# Patient Record
Sex: MALE | Race: OTHER | HISPANIC OR LATINO
[De-identification: names, ages, dates, MRNs, and addresses within clinical notes are randomized per-mention and may not be internally consistent; named-entity substitution may affect disease eponyms.]

---

## 2017-09-13 ENCOUNTER — RECORD ABSTRACTING (OUTPATIENT)
Age: 37
End: 2017-09-13

## 2017-09-13 ENCOUNTER — APPOINTMENT (OUTPATIENT)
Dept: ALLERGY | Facility: CLINIC | Age: 37
End: 2017-09-13
Payer: MEDICAID

## 2017-09-13 VITALS
DIASTOLIC BLOOD PRESSURE: 110 MMHG | HEART RATE: 80 BPM | RESPIRATION RATE: 14 BRPM | SYSTOLIC BLOOD PRESSURE: 150 MMHG | HEIGHT: 75 IN | WEIGHT: 256 LBS | BODY MASS INDEX: 31.83 KG/M2

## 2017-09-13 PROBLEM — Z00.00 ENCOUNTER FOR PREVENTIVE HEALTH EXAMINATION: Status: ACTIVE | Noted: 2017-09-13

## 2017-09-13 PROCEDURE — 95018 ALL TSTG PERQ&IQ DRUGS/BIOL: CPT

## 2017-09-13 PROCEDURE — 94060 EVALUATION OF WHEEZING: CPT

## 2017-09-13 PROCEDURE — 99204 OFFICE O/P NEW MOD 45 MIN: CPT | Mod: 25

## 2017-09-13 PROCEDURE — 95004 PERQ TESTS W/ALRGNC XTRCS: CPT

## 2017-09-13 RX ORDER — FAMOTIDINE 20 MG/1
20 TABLET, FILM COATED ORAL
Qty: 20 | Refills: 0 | Status: DISCONTINUED | COMMUNITY
Start: 2017-07-30

## 2017-09-13 RX ORDER — EPINEPHRINE 0.3 MG/.3ML
0.3 INJECTION INTRAMUSCULAR
Qty: 2 | Refills: 0 | Status: DISCONTINUED | COMMUNITY
Start: 2017-09-06

## 2017-09-13 RX ORDER — VALSARTAN 80 MG/1
80 TABLET, COATED ORAL
Qty: 15 | Refills: 0 | Status: DISCONTINUED | COMMUNITY
Start: 2017-07-05

## 2017-09-13 RX ORDER — PREDNISONE 20 MG/1
20 TABLET ORAL
Qty: 8 | Refills: 0 | Status: DISCONTINUED | COMMUNITY
Start: 2017-09-06

## 2017-09-13 RX ORDER — TRIAMCINOLONE ACETONIDE 1 MG/G
0.1 CREAM TOPICAL
Qty: 80 | Refills: 0 | Status: DISCONTINUED | COMMUNITY
Start: 2017-07-05

## 2017-09-14 ENCOUNTER — RESULT REVIEW (OUTPATIENT)
Age: 37
End: 2017-09-14

## 2017-09-14 ENCOUNTER — APPOINTMENT (OUTPATIENT)
Dept: DERMATOLOGY | Facility: CLINIC | Age: 37
End: 2017-09-14
Payer: MEDICAID

## 2017-09-14 ENCOUNTER — LABORATORY RESULT (OUTPATIENT)
Age: 37
End: 2017-09-14

## 2017-09-14 VITALS — DIASTOLIC BLOOD PRESSURE: 90 MMHG | SYSTOLIC BLOOD PRESSURE: 148 MMHG

## 2017-09-14 DIAGNOSIS — Z87.39 PERSONAL HISTORY OF OTHER DISEASES OF THE MUSCULOSKELETAL SYSTEM AND CONNECTIVE TISSUE: ICD-10-CM

## 2017-09-14 DIAGNOSIS — Z91.89 OTHER SPECIFIED PERSONAL RISK FACTORS, NOT ELSEWHERE CLASSIFIED: ICD-10-CM

## 2017-09-14 DIAGNOSIS — Z78.9 OTHER SPECIFIED HEALTH STATUS: ICD-10-CM

## 2017-09-14 DIAGNOSIS — D48.5 NEOPLASM OF UNCERTAIN BEHAVIOR OF SKIN: ICD-10-CM

## 2017-09-14 PROCEDURE — 99203 OFFICE O/P NEW LOW 30 MIN: CPT | Mod: 25

## 2017-09-14 PROCEDURE — 11100 BX SKIN SUBCUTANEOUS&/MUCOUS MEMBRANE 1 LESION: CPT

## 2017-09-20 ENCOUNTER — APPOINTMENT (OUTPATIENT)
Dept: ALLERGY | Facility: CLINIC | Age: 37
End: 2017-09-20
Payer: MEDICAID

## 2017-09-20 PROCEDURE — 95018 ALL TSTG PERQ&IQ DRUGS/BIOL: CPT

## 2017-09-20 PROCEDURE — 99214 OFFICE O/P EST MOD 30 MIN: CPT | Mod: 25

## 2017-09-20 PROCEDURE — 95004 PERQ TESTS W/ALRGNC XTRCS: CPT

## 2017-09-21 VITALS
HEART RATE: 80 BPM | RESPIRATION RATE: 14 BRPM | BODY MASS INDEX: 31.83 KG/M2 | HEIGHT: 75 IN | WEIGHT: 256 LBS | DIASTOLIC BLOOD PRESSURE: 90 MMHG | SYSTOLIC BLOOD PRESSURE: 150 MMHG

## 2017-09-21 RX ORDER — AZITHROMYCIN 250 MG/1
250 TABLET, FILM COATED ORAL
Qty: 6 | Refills: 0 | Status: DISCONTINUED | COMMUNITY
Start: 2017-09-13 | End: 2017-09-21

## 2017-09-27 ENCOUNTER — APPOINTMENT (OUTPATIENT)
Dept: ALLERGY | Facility: CLINIC | Age: 37
End: 2017-09-27
Payer: MEDICAID

## 2017-09-27 ENCOUNTER — APPOINTMENT (OUTPATIENT)
Dept: ENDOCRINOLOGY | Facility: CLINIC | Age: 37
End: 2017-09-27
Payer: MEDICAID

## 2017-09-27 VITALS — RESPIRATION RATE: 14 BRPM | WEIGHT: 256 LBS | HEIGHT: 75 IN | HEART RATE: 80 BPM | BODY MASS INDEX: 31.83 KG/M2

## 2017-09-27 VITALS
BODY MASS INDEX: 31.83 KG/M2 | HEART RATE: 87 BPM | WEIGHT: 256 LBS | SYSTOLIC BLOOD PRESSURE: 160 MMHG | HEIGHT: 75 IN | DIASTOLIC BLOOD PRESSURE: 100 MMHG | OXYGEN SATURATION: 98 %

## 2017-09-27 VITALS — DIASTOLIC BLOOD PRESSURE: 100 MMHG | SYSTOLIC BLOOD PRESSURE: 160 MMHG

## 2017-09-27 DIAGNOSIS — Z80.42 FAMILY HISTORY OF MALIGNANT NEOPLASM OF PROSTATE: ICD-10-CM

## 2017-09-27 PROCEDURE — 99204 OFFICE O/P NEW MOD 45 MIN: CPT

## 2017-09-27 PROCEDURE — 94060 EVALUATION OF WHEEZING: CPT

## 2017-09-27 PROCEDURE — 95004 PERQ TESTS W/ALRGNC XTRCS: CPT

## 2017-09-27 PROCEDURE — 95018 ALL TSTG PERQ&IQ DRUGS/BIOL: CPT

## 2017-09-27 PROCEDURE — 95024 IQ TESTS W/ALLERGENIC XTRCS: CPT

## 2017-09-27 PROCEDURE — 99214 OFFICE O/P EST MOD 30 MIN: CPT | Mod: 25

## 2017-09-27 RX ORDER — BUDESONIDE AND FORMOTEROL FUMARATE DIHYDRATE 160; 4.5 UG/1; UG/1
160-4.5 AEROSOL RESPIRATORY (INHALATION) TWICE DAILY
Qty: 1 | Refills: 3 | Status: DISCONTINUED | COMMUNITY
Start: 2017-09-21 | End: 2017-09-27

## 2017-09-27 RX ORDER — BUDESONIDE AND FORMOTEROL FUMARATE DIHYDRATE 160; 4.5 UG/1; UG/1
160-4.5 AEROSOL RESPIRATORY (INHALATION)
Refills: 0 | Status: DISCONTINUED | COMMUNITY
End: 2017-09-27

## 2017-09-28 ENCOUNTER — APPOINTMENT (OUTPATIENT)
Dept: DERMATOLOGY | Facility: CLINIC | Age: 37
End: 2017-09-28
Payer: MEDICAID

## 2017-09-28 VITALS — DIASTOLIC BLOOD PRESSURE: 100 MMHG | SYSTOLIC BLOOD PRESSURE: 150 MMHG

## 2017-09-28 DIAGNOSIS — Z48.02 ENCOUNTER FOR REMOVAL OF SUTURES: ICD-10-CM

## 2017-09-28 DIAGNOSIS — L30.9 DERMATITIS, UNSPECIFIED: ICD-10-CM

## 2017-09-28 PROCEDURE — 99214 OFFICE O/P EST MOD 30 MIN: CPT | Mod: GC

## 2017-09-29 PROBLEM — Z48.02 VISIT FOR SUTURE REMOVAL: Status: ACTIVE | Noted: 2017-09-29

## 2017-10-01 ENCOUNTER — TRANSCRIPTION ENCOUNTER (OUTPATIENT)
Age: 37
End: 2017-10-01

## 2017-10-04 ENCOUNTER — APPOINTMENT (OUTPATIENT)
Dept: ALLERGY | Facility: CLINIC | Age: 37
End: 2017-10-04
Payer: MEDICAID

## 2017-10-04 PROCEDURE — 95018 ALL TSTG PERQ&IQ DRUGS/BIOL: CPT

## 2017-10-11 ENCOUNTER — APPOINTMENT (OUTPATIENT)
Dept: ALLERGY | Facility: CLINIC | Age: 37
End: 2017-10-11
Payer: MEDICAID

## 2017-10-11 PROCEDURE — 95076 INGEST CHALLENGE INI 120 MIN: CPT

## 2017-10-13 ENCOUNTER — APPOINTMENT (OUTPATIENT)
Dept: RADIOLOGY | Facility: IMAGING CENTER | Age: 37
End: 2017-10-13
Payer: MEDICAID

## 2017-10-13 ENCOUNTER — APPOINTMENT (OUTPATIENT)
Dept: MRI IMAGING | Facility: IMAGING CENTER | Age: 37
End: 2017-10-13
Payer: MEDICAID

## 2017-10-13 ENCOUNTER — OUTPATIENT (OUTPATIENT)
Dept: OUTPATIENT SERVICES | Facility: HOSPITAL | Age: 37
LOS: 1 days | End: 2017-10-13
Payer: MEDICAID

## 2017-10-13 DIAGNOSIS — Z00.00 ENCOUNTER FOR GENERAL ADULT MEDICAL EXAMINATION WITHOUT ABNORMAL FINDINGS: ICD-10-CM

## 2017-10-13 PROCEDURE — 77080 DXA BONE DENSITY AXIAL: CPT | Mod: 26

## 2017-10-13 PROCEDURE — 77080 DXA BONE DENSITY AXIAL: CPT

## 2017-10-13 PROCEDURE — 70553 MRI BRAIN STEM W/O & W/DYE: CPT

## 2017-10-13 PROCEDURE — 82565 ASSAY OF CREATININE: CPT

## 2017-10-13 PROCEDURE — A9585: CPT

## 2017-10-13 PROCEDURE — 70553 MRI BRAIN STEM W/O & W/DYE: CPT | Mod: 26

## 2017-10-15 PROBLEM — Z80.42 FAMILY HISTORY OF MALIGNANT NEOPLASM OF PROSTATE: Status: ACTIVE | Noted: 2017-10-15

## 2017-10-16 ENCOUNTER — APPOINTMENT (OUTPATIENT)
Dept: DERMATOLOGY | Facility: CLINIC | Age: 37
End: 2017-10-16
Payer: MEDICAID

## 2017-10-16 PROCEDURE — 96910 PHOTCHMTX TAR&UVB/PTRLTM&UVB: CPT

## 2017-10-18 ENCOUNTER — APPOINTMENT (OUTPATIENT)
Dept: DERMATOLOGY | Facility: CLINIC | Age: 37
End: 2017-10-18
Payer: MEDICAID

## 2017-10-18 PROCEDURE — 96910 PHOTCHMTX TAR&UVB/PTRLTM&UVB: CPT

## 2017-10-19 ENCOUNTER — RESULT REVIEW (OUTPATIENT)
Age: 37
End: 2017-10-19

## 2017-10-19 LAB
ALBUMIN SERPL ELPH-MCNC: 4.3 G/DL
ALP BLD-CCNC: 81 U/L
ALT SERPL-CCNC: 22 U/L
ANDROST SERPL-MCNC: 136 NG/DL
ANION GAP SERPL CALC-SCNC: 14 MMOL/L
AST SERPL-CCNC: 18 U/L
BASOPHILS # BLD AUTO: 0.02 K/UL
BASOPHILS NFR BLD AUTO: 0.3 %
BILIRUB SERPL-MCNC: 0.9 MG/DL
BUN SERPL-MCNC: 14 MG/DL
CALCIUM SERPL-MCNC: 9.9 MG/DL
CHLORIDE SERPL-SCNC: 100 MMOL/L
CO2 SERPL-SCNC: 26 MMOL/L
CREAT SERPL-MCNC: 0.98 MG/DL
DHEA-S SERPL-MCNC: 73.4 UG/DL
EOSINOPHIL # BLD AUTO: 0.36 K/UL
EOSINOPHIL NFR BLD AUTO: 5 %
ESTRADIOL SERPL-MCNC: 14 PG/ML
FSH SERPL-MCNC: 1 IU/L
GLUCOSE SERPL-MCNC: 111 MG/DL
HBA1C MFR BLD HPLC: 5.4 %
HCT VFR BLD CALC: 43.1 %
HGB BLD-MCNC: 14.3 G/DL
IMM GRANULOCYTES NFR BLD AUTO: 0.4 %
LH SERPL-ACNC: 0.8 IU/L
LYMPHOCYTES # BLD AUTO: 1.75 K/UL
LYMPHOCYTES NFR BLD AUTO: 24.2 %
MAN DIFF?: NORMAL
MCHC RBC-ENTMCNC: 29.3 PG
MCHC RBC-ENTMCNC: 33.2 GM/DL
MCV RBC AUTO: 88.3 FL
MONOCYTES # BLD AUTO: 0.31 K/UL
MONOCYTES NFR BLD AUTO: 4.3 %
NEUTROPHILS # BLD AUTO: 4.77 K/UL
NEUTROPHILS NFR BLD AUTO: 65.8 %
PLATELET # BLD AUTO: 244 K/UL
POTASSIUM SERPL-SCNC: 4.2 MMOL/L
PROLACTIN SERPL-MCNC: 6.3 NG/ML
PROT SERPL-MCNC: 7.5 G/DL
PSA SERPL-MCNC: 0.03 NG/ML
RBC # BLD: 4.88 M/UL
RBC # FLD: 13.2 %
SHBG SERPL-SCNC: 32 NMOL/L
SODIUM SERPL-SCNC: 140 MMOL/L
T4 FREE SERPL-MCNC: 1.5 NG/DL
TESTOST SERPL-MCNC: 12.2 NG/DL
TSH SERPL-ACNC: 1.99 UIU/ML
WBC # FLD AUTO: 7.24 K/UL

## 2017-10-20 ENCOUNTER — APPOINTMENT (OUTPATIENT)
Dept: DERMATOLOGY | Facility: CLINIC | Age: 37
End: 2017-10-20
Payer: MEDICAID

## 2017-10-20 PROCEDURE — 96910 PHOTCHMTX TAR&UVB/PTRLTM&UVB: CPT

## 2017-10-23 ENCOUNTER — APPOINTMENT (OUTPATIENT)
Dept: DERMATOLOGY | Facility: CLINIC | Age: 37
End: 2017-10-23
Payer: MEDICAID

## 2017-10-23 PROCEDURE — 96910 PHOTCHMTX TAR&UVB/PTRLTM&UVB: CPT

## 2017-10-25 ENCOUNTER — APPOINTMENT (OUTPATIENT)
Dept: DERMATOLOGY | Facility: CLINIC | Age: 37
End: 2017-10-25
Payer: MEDICAID

## 2017-10-25 PROCEDURE — 96910 PHOTCHMTX TAR&UVB/PTRLTM&UVB: CPT

## 2017-10-26 ENCOUNTER — APPOINTMENT (OUTPATIENT)
Dept: ALLERGY | Facility: CLINIC | Age: 37
End: 2017-10-26
Payer: MEDICAID

## 2017-10-26 ENCOUNTER — OUTPATIENT (OUTPATIENT)
Dept: OUTPATIENT SERVICES | Facility: HOSPITAL | Age: 37
LOS: 1 days | End: 2017-10-26
Payer: MEDICAID

## 2017-10-26 ENCOUNTER — APPOINTMENT (OUTPATIENT)
Dept: UROLOGY | Facility: CLINIC | Age: 37
End: 2017-10-26
Payer: MEDICAID

## 2017-10-26 VITALS
WEIGHT: 264.4 LBS | BODY MASS INDEX: 32.87 KG/M2 | RESPIRATION RATE: 16 BRPM | HEART RATE: 66 BPM | HEIGHT: 75 IN | DIASTOLIC BLOOD PRESSURE: 101 MMHG | SYSTOLIC BLOOD PRESSURE: 153 MMHG

## 2017-10-26 VITALS
DIASTOLIC BLOOD PRESSURE: 110 MMHG | HEIGHT: 75 IN | HEART RATE: 80 BPM | WEIGHT: 256 LBS | BODY MASS INDEX: 31.83 KG/M2 | RESPIRATION RATE: 14 BRPM | SYSTOLIC BLOOD PRESSURE: 170 MMHG

## 2017-10-26 DIAGNOSIS — R35.0 FREQUENCY OF MICTURITION: ICD-10-CM

## 2017-10-26 PROCEDURE — 93975 VASCULAR STUDY: CPT

## 2017-10-26 PROCEDURE — 99214 OFFICE O/P EST MOD 30 MIN: CPT | Mod: 25

## 2017-10-26 PROCEDURE — 76870 US EXAM SCROTUM: CPT | Mod: 26

## 2017-10-26 PROCEDURE — 76870 US EXAM SCROTUM: CPT

## 2017-10-26 PROCEDURE — 94060 EVALUATION OF WHEEZING: CPT

## 2017-10-26 PROCEDURE — 93975 VASCULAR STUDY: CPT | Mod: 26

## 2017-10-26 PROCEDURE — 99204 OFFICE O/P NEW MOD 45 MIN: CPT | Mod: 25

## 2017-10-26 RX ORDER — PREDNISONE 10 MG/1
10 TABLET ORAL
Qty: 63 | Refills: 0 | Status: DISCONTINUED | COMMUNITY
Start: 2017-09-14 | End: 2017-10-26

## 2017-10-26 RX ORDER — AMOXICILLIN 250 MG/5ML
250 POWDER, FOR SUSPENSION ORAL 3 TIMES DAILY
Qty: 100 | Refills: 0 | Status: DISCONTINUED | COMMUNITY
Start: 2017-10-04 | End: 2017-10-26

## 2017-10-27 ENCOUNTER — APPOINTMENT (OUTPATIENT)
Dept: DERMATOLOGY | Facility: CLINIC | Age: 37
End: 2017-10-27
Payer: MEDICAID

## 2017-10-27 ENCOUNTER — LABORATORY RESULT (OUTPATIENT)
Age: 37
End: 2017-10-27

## 2017-10-27 PROCEDURE — 96910 PHOTCHMTX TAR&UVB/PTRLTM&UVB: CPT

## 2017-10-30 ENCOUNTER — APPOINTMENT (OUTPATIENT)
Dept: DERMATOLOGY | Facility: CLINIC | Age: 37
End: 2017-10-30
Payer: MEDICAID

## 2017-10-30 LAB
ALBUMIN SERPL ELPH-MCNC: 4.5 G/DL
ALP BLD-CCNC: 103 U/L
ALT SERPL-CCNC: 54 U/L
ANION GAP SERPL CALC-SCNC: 17 MMOL/L
AST SERPL-CCNC: 33 U/L
BASOPHILS # BLD AUTO: 0.04 K/UL
BASOPHILS NFR BLD AUTO: 0.6 %
BILIRUB SERPL-MCNC: 1.1 MG/DL
BUN SERPL-MCNC: 12 MG/DL
CALCIUM SERPL-MCNC: 9.5 MG/DL
CHLORIDE SERPL-SCNC: 100 MMOL/L
CHOLEST SERPL-MCNC: 159 MG/DL
CHOLEST/HDLC SERPL: 2.7 RATIO
CO2 SERPL-SCNC: 24 MMOL/L
CREAT SERPL-MCNC: 0.8 MG/DL
EOSINOPHIL # BLD AUTO: 0.47 K/UL
EOSINOPHIL NFR BLD AUTO: 6.8 %
ESTRADIOL SERPL-MCNC: 10 PG/ML
FSH SERPL-MCNC: 0.9 IU/L
GLUCOSE SERPL-MCNC: 89 MG/DL
HBA1C MFR BLD HPLC: 5.3 %
HCT VFR BLD CALC: 41.6 %
HDLC SERPL-MCNC: 59 MG/DL
HGB BLD-MCNC: 14.6 G/DL
IMM GRANULOCYTES NFR BLD AUTO: 0.3 %
LDLC SERPL CALC-MCNC: 80 MG/DL
LH SERPL-ACNC: 0.8 IU/L
LYMPHOCYTES # BLD AUTO: 1.21 K/UL
LYMPHOCYTES NFR BLD AUTO: 17.6 %
MAN DIFF?: NORMAL
MCHC RBC-ENTMCNC: 29.6 PG
MCHC RBC-ENTMCNC: 35.1 GM/DL
MCV RBC AUTO: 84.4 FL
MONOCYTES # BLD AUTO: 0.53 K/UL
MONOCYTES NFR BLD AUTO: 7.7 %
NEUTROPHILS # BLD AUTO: 4.62 K/UL
NEUTROPHILS NFR BLD AUTO: 67 %
PLATELET # BLD AUTO: 226 K/UL
POTASSIUM SERPL-SCNC: 4 MMOL/L
PROLACTIN SERPL-MCNC: 6.2 NG/ML
PROT SERPL-MCNC: 7.8 G/DL
RBC # BLD: 4.93 M/UL
RBC # FLD: 12.7 %
SODIUM SERPL-SCNC: 141 MMOL/L
TRIGL SERPL-MCNC: 101 MG/DL
TSH SERPL-ACNC: 2.95 UIU/ML
WBC # FLD AUTO: 6.89 K/UL

## 2017-10-30 PROCEDURE — 96910 PHOTCHMTX TAR&UVB/PTRLTM&UVB: CPT

## 2017-11-01 ENCOUNTER — APPOINTMENT (OUTPATIENT)
Dept: DERMATOLOGY | Facility: CLINIC | Age: 37
End: 2017-11-01
Payer: MEDICAID

## 2017-11-01 LAB
TESTOST BND SERPL-MCNC: 0.9 PG/ML
TESTOST SERPL-MCNC: 18.8 NG/DL
Y CHROMOSOME MICRODELETION, DNA ANALYSIS: NORMAL

## 2017-11-01 PROCEDURE — 96910 PHOTCHMTX TAR&UVB/PTRLTM&UVB: CPT

## 2017-11-03 ENCOUNTER — APPOINTMENT (OUTPATIENT)
Dept: DERMATOLOGY | Facility: CLINIC | Age: 37
End: 2017-11-03
Payer: MEDICAID

## 2017-11-03 PROCEDURE — 96910 PHOTCHMTX TAR&UVB/PTRLTM&UVB: CPT

## 2017-11-06 ENCOUNTER — APPOINTMENT (OUTPATIENT)
Dept: DERMATOLOGY | Facility: CLINIC | Age: 37
End: 2017-11-06

## 2017-11-06 DIAGNOSIS — N46.01 ORGANIC AZOOSPERMIA: ICD-10-CM

## 2017-11-06 DIAGNOSIS — I86.1 SCROTAL VARICES: ICD-10-CM

## 2017-11-07 LAB — CFTR MUT TESTED BLD/T: NORMAL

## 2017-11-08 ENCOUNTER — APPOINTMENT (OUTPATIENT)
Dept: DERMATOLOGY | Facility: CLINIC | Age: 37
End: 2017-11-08
Payer: MEDICAID

## 2017-11-08 PROCEDURE — 96910 PHOTCHMTX TAR&UVB/PTRLTM&UVB: CPT

## 2017-11-10 ENCOUNTER — APPOINTMENT (OUTPATIENT)
Dept: DERMATOLOGY | Facility: CLINIC | Age: 37
End: 2017-11-10
Payer: MEDICAID

## 2017-11-10 PROCEDURE — 96910 PHOTCHMTX TAR&UVB/PTRLTM&UVB: CPT

## 2017-11-13 ENCOUNTER — APPOINTMENT (OUTPATIENT)
Dept: DERMATOLOGY | Facility: CLINIC | Age: 37
End: 2017-11-13
Payer: MEDICAID

## 2017-11-13 PROCEDURE — 96910 PHOTCHMTX TAR&UVB/PTRLTM&UVB: CPT

## 2017-11-14 ENCOUNTER — APPOINTMENT (OUTPATIENT)
Dept: UROLOGY | Facility: CLINIC | Age: 37
End: 2017-11-14
Payer: MEDICAID

## 2017-11-14 PROCEDURE — 99214 OFFICE O/P EST MOD 30 MIN: CPT

## 2017-11-15 ENCOUNTER — APPOINTMENT (OUTPATIENT)
Dept: DERMATOLOGY | Facility: CLINIC | Age: 37
End: 2017-11-15
Payer: MEDICAID

## 2017-11-15 PROCEDURE — 96910 PHOTCHMTX TAR&UVB/PTRLTM&UVB: CPT

## 2017-11-17 ENCOUNTER — APPOINTMENT (OUTPATIENT)
Dept: DERMATOLOGY | Facility: CLINIC | Age: 37
End: 2017-11-17
Payer: MEDICAID

## 2017-11-17 PROCEDURE — 96910 PHOTCHMTX TAR&UVB/PTRLTM&UVB: CPT

## 2017-11-20 ENCOUNTER — APPOINTMENT (OUTPATIENT)
Dept: DERMATOLOGY | Facility: CLINIC | Age: 37
End: 2017-11-20
Payer: MEDICAID

## 2017-11-20 PROCEDURE — 96910 PHOTCHMTX TAR&UVB/PTRLTM&UVB: CPT

## 2017-11-22 ENCOUNTER — APPOINTMENT (OUTPATIENT)
Dept: DERMATOLOGY | Facility: CLINIC | Age: 37
End: 2017-11-22
Payer: MEDICAID

## 2017-11-22 ENCOUNTER — APPOINTMENT (OUTPATIENT)
Dept: HUMAN REPRODUCTION | Facility: CLINIC | Age: 37
End: 2017-11-22
Payer: SELF-PAY

## 2017-11-22 PROCEDURE — 96910 PHOTCHMTX TAR&UVB/PTRLTM&UVB: CPT

## 2017-11-22 PROCEDURE — 89322 SEMEN ANAL STRICT CRITERIA: CPT

## 2017-11-24 ENCOUNTER — APPOINTMENT (OUTPATIENT)
Dept: DERMATOLOGY | Facility: CLINIC | Age: 37
End: 2017-11-24

## 2017-11-27 ENCOUNTER — APPOINTMENT (OUTPATIENT)
Dept: DERMATOLOGY | Facility: CLINIC | Age: 37
End: 2017-11-27
Payer: MEDICAID

## 2017-11-27 PROCEDURE — 96910 PHOTCHMTX TAR&UVB/PTRLTM&UVB: CPT

## 2017-11-29 ENCOUNTER — APPOINTMENT (OUTPATIENT)
Dept: DERMATOLOGY | Facility: CLINIC | Age: 37
End: 2017-11-29
Payer: MEDICAID

## 2017-11-29 PROCEDURE — 96910 PHOTCHMTX TAR&UVB/PTRLTM&UVB: CPT

## 2017-12-01 ENCOUNTER — APPOINTMENT (OUTPATIENT)
Dept: DERMATOLOGY | Facility: CLINIC | Age: 37
End: 2017-12-01
Payer: MEDICAID

## 2017-12-01 PROCEDURE — 96910 PHOTCHMTX TAR&UVB/PTRLTM&UVB: CPT

## 2017-12-04 ENCOUNTER — APPOINTMENT (OUTPATIENT)
Dept: DERMATOLOGY | Facility: CLINIC | Age: 37
End: 2017-12-04

## 2017-12-06 ENCOUNTER — APPOINTMENT (OUTPATIENT)
Dept: DERMATOLOGY | Facility: CLINIC | Age: 37
End: 2017-12-06

## 2017-12-06 ENCOUNTER — APPOINTMENT (OUTPATIENT)
Dept: DERMATOLOGY | Facility: CLINIC | Age: 37
End: 2017-12-06
Payer: MEDICAID

## 2017-12-06 PROCEDURE — 96910 PHOTCHMTX TAR&UVB/PTRLTM&UVB: CPT

## 2017-12-08 ENCOUNTER — APPOINTMENT (OUTPATIENT)
Dept: DERMATOLOGY | Facility: CLINIC | Age: 37
End: 2017-12-08

## 2017-12-11 ENCOUNTER — APPOINTMENT (OUTPATIENT)
Dept: DERMATOLOGY | Facility: CLINIC | Age: 37
End: 2017-12-11

## 2017-12-13 ENCOUNTER — APPOINTMENT (OUTPATIENT)
Dept: DERMATOLOGY | Facility: CLINIC | Age: 37
End: 2017-12-13

## 2017-12-15 ENCOUNTER — APPOINTMENT (OUTPATIENT)
Dept: DERMATOLOGY | Facility: CLINIC | Age: 37
End: 2017-12-15

## 2017-12-18 ENCOUNTER — APPOINTMENT (OUTPATIENT)
Dept: DERMATOLOGY | Facility: CLINIC | Age: 37
End: 2017-12-18
Payer: MEDICAID

## 2017-12-18 PROCEDURE — 96910 PHOTCHMTX TAR&UVB/PTRLTM&UVB: CPT

## 2017-12-20 ENCOUNTER — APPOINTMENT (OUTPATIENT)
Dept: DERMATOLOGY | Facility: CLINIC | Age: 37
End: 2017-12-20
Payer: MEDICAID

## 2017-12-20 PROCEDURE — 96910 PHOTCHMTX TAR&UVB/PTRLTM&UVB: CPT

## 2017-12-22 ENCOUNTER — APPOINTMENT (OUTPATIENT)
Dept: DERMATOLOGY | Facility: CLINIC | Age: 37
End: 2017-12-22
Payer: MEDICAID

## 2017-12-22 PROCEDURE — 96910 PHOTCHMTX TAR&UVB/PTRLTM&UVB: CPT

## 2017-12-27 ENCOUNTER — APPOINTMENT (OUTPATIENT)
Dept: DERMATOLOGY | Facility: CLINIC | Age: 37
End: 2017-12-27

## 2017-12-28 ENCOUNTER — APPOINTMENT (OUTPATIENT)
Dept: DERMATOLOGY | Facility: CLINIC | Age: 37
End: 2017-12-28
Payer: MEDICAID

## 2017-12-28 ENCOUNTER — APPOINTMENT (OUTPATIENT)
Dept: DERMATOLOGY | Facility: CLINIC | Age: 37
End: 2017-12-28

## 2017-12-28 VITALS — SYSTOLIC BLOOD PRESSURE: 162 MMHG | DIASTOLIC BLOOD PRESSURE: 114 MMHG

## 2017-12-28 PROCEDURE — 99213 OFFICE O/P EST LOW 20 MIN: CPT

## 2017-12-29 ENCOUNTER — APPOINTMENT (OUTPATIENT)
Dept: DERMATOLOGY | Facility: CLINIC | Age: 37
End: 2017-12-29

## 2018-01-03 ENCOUNTER — APPOINTMENT (OUTPATIENT)
Dept: DERMATOLOGY | Facility: CLINIC | Age: 38
End: 2018-01-03

## 2018-01-05 ENCOUNTER — APPOINTMENT (OUTPATIENT)
Dept: DERMATOLOGY | Facility: CLINIC | Age: 38
End: 2018-01-05

## 2018-01-10 LAB
ACTH SER-ACNC: 50 PG/ML
CORTIS SERPL-MCNC: 14.2 UG/DL
FSH SERPL-MCNC: 1 IU/L
LH SERPL-ACNC: 0.6 IU/L
SHBG SERPL-SCNC: 43 NMOL/L
TESTOST BND SERPL-MCNC: 0.43 NG/DL
TESTOST SERPL-MCNC: 29.2 NG/DL
TESTOSTERONE BIOAVAILABLE: 2.6 NG/DL
TESTOSTERONE TOTAL S: 24 NG/DL

## 2018-03-27 ENCOUNTER — APPOINTMENT (OUTPATIENT)
Dept: UROLOGY | Facility: CLINIC | Age: 38
End: 2018-03-27
Payer: COMMERCIAL

## 2018-03-27 PROCEDURE — 99214 OFFICE O/P EST MOD 30 MIN: CPT

## 2018-04-02 LAB
25(OH)D3 SERPL-MCNC: 36.5 NG/ML
ALBUMIN SERPL ELPH-MCNC: 4.6 G/DL
ALP BLD-CCNC: 132 U/L
ALT SERPL-CCNC: 46 U/L
ANION GAP SERPL CALC-SCNC: 13 MMOL/L
AST SERPL-CCNC: 33 U/L
BASOPHILS # BLD AUTO: 0.03 K/UL
BASOPHILS NFR BLD AUTO: 0.4 %
BILIRUB SERPL-MCNC: 0.7 MG/DL
BUN SERPL-MCNC: 17 MG/DL
CALCIUM SERPL-MCNC: 10.1 MG/DL
CHLORIDE SERPL-SCNC: 101 MMOL/L
CHOLEST SERPL-MCNC: 148 MG/DL
CHOLEST/HDLC SERPL: 2.8 RATIO
CO2 SERPL-SCNC: 28 MMOL/L
CREAT SERPL-MCNC: 1.01 MG/DL
EOSINOPHIL # BLD AUTO: 0.5 K/UL
EOSINOPHIL NFR BLD AUTO: 6.5 %
ESTRADIOL SERPL-MCNC: 9 PG/ML
FSH SERPL-MCNC: 0.8 IU/L
GLUCOSE SERPL-MCNC: 107 MG/DL
HBA1C MFR BLD HPLC: 5.3 %
HCT VFR BLD CALC: 44.7 %
HDLC SERPL-MCNC: 52 MG/DL
HGB BLD-MCNC: 14.9 G/DL
IMM GRANULOCYTES NFR BLD AUTO: 0.1 %
LDLC SERPL CALC-MCNC: 80 MG/DL
LH SERPL-ACNC: 0.5 IU/L
LYMPHOCYTES # BLD AUTO: 2.13 K/UL
LYMPHOCYTES NFR BLD AUTO: 27.8 %
MAN DIFF?: NORMAL
MCHC RBC-ENTMCNC: 28.9 PG
MCHC RBC-ENTMCNC: 33.3 GM/DL
MCV RBC AUTO: 86.8 FL
MONOCYTES # BLD AUTO: 0.47 K/UL
MONOCYTES NFR BLD AUTO: 6.1 %
NEUTROPHILS # BLD AUTO: 4.51 K/UL
NEUTROPHILS NFR BLD AUTO: 59.1 %
PLATELET # BLD AUTO: 283 K/UL
POTASSIUM SERPL-SCNC: 4.8 MMOL/L
PROT SERPL-MCNC: 8.3 G/DL
RBC # BLD: 5.15 M/UL
RBC # FLD: 13.6 %
SODIUM SERPL-SCNC: 142 MMOL/L
TRIGL SERPL-MCNC: 79 MG/DL
TSH SERPL-ACNC: 2.29 UIU/ML
WBC # FLD AUTO: 7.65 K/UL

## 2018-04-03 LAB
TESTOST BND SERPL-MCNC: 2.1 PG/ML
TESTOST SERPL-MCNC: 13.3 NG/DL

## 2018-04-04 ENCOUNTER — TRANSCRIPTION ENCOUNTER (OUTPATIENT)
Age: 38
End: 2018-04-04

## 2018-05-17 ENCOUNTER — TRANSCRIPTION ENCOUNTER (OUTPATIENT)
Age: 38
End: 2018-05-17

## 2018-07-23 PROBLEM — Z78.9 ALCOHOL USE: Status: ACTIVE | Noted: 2017-09-14

## 2018-07-31 ENCOUNTER — APPOINTMENT (OUTPATIENT)
Dept: UROLOGY | Facility: CLINIC | Age: 38
End: 2018-07-31
Payer: COMMERCIAL

## 2018-07-31 PROCEDURE — 99214 OFFICE O/P EST MOD 30 MIN: CPT

## 2018-08-07 LAB
BASOPHILS # BLD AUTO: 0.03 K/UL
BASOPHILS NFR BLD AUTO: 0.4 %
EOSINOPHIL # BLD AUTO: 0.36 K/UL
EOSINOPHIL NFR BLD AUTO: 4.5 %
ESTRADIOL SERPL-MCNC: 25 PG/ML
FSH SERPL-MCNC: 0.8 IU/L
HCT VFR BLD CALC: 44.7 %
HGB BLD-MCNC: 15.6 G/DL
IMM GRANULOCYTES NFR BLD AUTO: 0.4 %
LH SERPL-ACNC: 0.7 IU/L
LYMPHOCYTES # BLD AUTO: 1.3 K/UL
LYMPHOCYTES NFR BLD AUTO: 16.1 %
MAN DIFF?: NORMAL
MCHC RBC-ENTMCNC: 28.9 PG
MCHC RBC-ENTMCNC: 34.9 GM/DL
MCV RBC AUTO: 82.9 FL
MONOCYTES # BLD AUTO: 0.46 K/UL
MONOCYTES NFR BLD AUTO: 5.7 %
NEUTROPHILS # BLD AUTO: 5.88 K/UL
NEUTROPHILS NFR BLD AUTO: 72.9 %
PLATELET # BLD AUTO: 255 K/UL
RBC # BLD: 5.39 M/UL
RBC # FLD: 13.5 %
WBC # FLD AUTO: 8.06 K/UL

## 2018-08-10 LAB
TESTOST BND SERPL-MCNC: 5 PG/ML
TESTOST SERPL-MCNC: 211.7 NG/DL

## 2018-09-20 ENCOUNTER — APPOINTMENT (OUTPATIENT)
Dept: UROLOGY | Facility: CLINIC | Age: 38
End: 2018-09-20
Payer: COMMERCIAL

## 2018-09-20 PROCEDURE — 99214 OFFICE O/P EST MOD 30 MIN: CPT

## 2018-09-20 RX ORDER — GONADOTROPHIN, CHORIONIC 10000 UNIT
10000 KIT INTRAMUSCULAR
Qty: 12 | Refills: 0 | Status: DISCONTINUED | COMMUNITY
Start: 2017-11-14 | End: 2018-09-20

## 2018-10-16 ENCOUNTER — APPOINTMENT (OUTPATIENT)
Dept: UROLOGY | Facility: CLINIC | Age: 38
End: 2018-10-16

## 2018-12-07 ENCOUNTER — TRANSCRIPTION ENCOUNTER (OUTPATIENT)
Age: 38
End: 2018-12-07

## 2018-12-08 ENCOUNTER — EMERGENCY (EMERGENCY)
Facility: HOSPITAL | Age: 38
LOS: 1 days | Discharge: ANOTHER HOSPITAL | End: 2018-12-08
Attending: EMERGENCY MEDICINE | Admitting: EMERGENCY MEDICINE
Payer: COMMERCIAL

## 2018-12-08 ENCOUNTER — APPOINTMENT (OUTPATIENT)
Dept: CARDIOTHORACIC SURGERY | Facility: HOSPITAL | Age: 38
End: 2018-12-08

## 2018-12-08 ENCOUNTER — RESULT REVIEW (OUTPATIENT)
Age: 38
End: 2018-12-08

## 2018-12-08 ENCOUNTER — INPATIENT (INPATIENT)
Facility: HOSPITAL | Age: 38
LOS: 4 days | Discharge: HOME | DRG: 219 | End: 2018-12-13
Attending: THORACIC SURGERY (CARDIOTHORACIC VASCULAR SURGERY) | Admitting: THORACIC SURGERY (CARDIOTHORACIC VASCULAR SURGERY)
Payer: COMMERCIAL

## 2018-12-08 VITALS
DIASTOLIC BLOOD PRESSURE: 79 MMHG | SYSTOLIC BLOOD PRESSURE: 142 MMHG | OXYGEN SATURATION: 97 % | RESPIRATION RATE: 14 BRPM | HEART RATE: 79 BPM

## 2018-12-08 VITALS
SYSTOLIC BLOOD PRESSURE: 205 MMHG | RESPIRATION RATE: 18 BRPM | TEMPERATURE: 98 F | HEART RATE: 66 BPM | DIASTOLIC BLOOD PRESSURE: 118 MMHG | OXYGEN SATURATION: 98 %

## 2018-12-08 VITALS
HEART RATE: 74 BPM | DIASTOLIC BLOOD PRESSURE: 99 MMHG | RESPIRATION RATE: 16 BRPM | OXYGEN SATURATION: 100 % | SYSTOLIC BLOOD PRESSURE: 175 MMHG

## 2018-12-08 DIAGNOSIS — I71.00 DISSECTION OF UNSPECIFIED SITE OF AORTA: ICD-10-CM

## 2018-12-08 LAB
ALBUMIN SERPL ELPH-MCNC: 3.7 G/DL — SIGNIFICANT CHANGE UP (ref 3.3–5)
ALBUMIN SERPL ELPH-MCNC: 4.2 G/DL — SIGNIFICANT CHANGE UP (ref 3.3–5)
ALBUMIN SERPL ELPH-MCNC: 4.2 G/DL — SIGNIFICANT CHANGE UP (ref 3.3–5)
ALP SERPL-CCNC: 108 U/L — SIGNIFICANT CHANGE UP (ref 40–120)
ALP SERPL-CCNC: 118 U/L — SIGNIFICANT CHANGE UP (ref 40–120)
ALP SERPL-CCNC: 64 U/L — SIGNIFICANT CHANGE UP (ref 40–120)
ALT FLD-CCNC: 19 U/L — SIGNIFICANT CHANGE UP (ref 10–45)
ALT FLD-CCNC: 23 U/L — SIGNIFICANT CHANGE UP (ref 10–45)
ALT FLD-CCNC: 27 U/L — SIGNIFICANT CHANGE UP (ref 4–41)
AMYLASE P1 CFR SERPL: 63 U/L — SIGNIFICANT CHANGE UP (ref 25–125)
ANION GAP SERPL CALC-SCNC: 13 MMOL/L — SIGNIFICANT CHANGE UP (ref 5–17)
ANION GAP SERPL CALC-SCNC: 21 MMOL/L — HIGH (ref 5–17)
APTT BLD: 27.5 SEC — SIGNIFICANT CHANGE UP (ref 27.5–36.3)
APTT BLD: 33.1 SEC — SIGNIFICANT CHANGE UP (ref 27.5–36.3)
APTT BLD: 33.2 SEC — SIGNIFICANT CHANGE UP (ref 27.5–36.3)
AST SERPL-CCNC: 18 U/L — SIGNIFICANT CHANGE UP (ref 10–40)
AST SERPL-CCNC: 21 U/L — SIGNIFICANT CHANGE UP (ref 4–40)
AST SERPL-CCNC: 43 U/L — HIGH (ref 10–40)
BASE EXCESS BLDV CALC-SCNC: -0.6 MMOL/L — SIGNIFICANT CHANGE UP (ref -2–2)
BASOPHILS # BLD AUTO: 0 K/UL — SIGNIFICANT CHANGE UP (ref 0–0.2)
BASOPHILS # BLD AUTO: 0 K/UL — SIGNIFICANT CHANGE UP (ref 0–0.2)
BASOPHILS # BLD AUTO: 0.04 K/UL — SIGNIFICANT CHANGE UP (ref 0–0.2)
BASOPHILS NFR BLD AUTO: 0.1 % — SIGNIFICANT CHANGE UP (ref 0–2)
BASOPHILS NFR BLD AUTO: 0.1 % — SIGNIFICANT CHANGE UP (ref 0–2)
BASOPHILS NFR BLD AUTO: 0.3 % — SIGNIFICANT CHANGE UP (ref 0–2)
BILIRUB SERPL-MCNC: 1.1 MG/DL — SIGNIFICANT CHANGE UP (ref 0.2–1.2)
BILIRUB SERPL-MCNC: 1.1 MG/DL — SIGNIFICANT CHANGE UP (ref 0.2–1.2)
BILIRUB SERPL-MCNC: 1.8 MG/DL — HIGH (ref 0.2–1.2)
BLD GP AB SCN SERPL QL: NEGATIVE — SIGNIFICANT CHANGE UP
BLD GP AB SCN SERPL QL: NEGATIVE — SIGNIFICANT CHANGE UP
BUN SERPL-MCNC: 14 MG/DL — SIGNIFICANT CHANGE UP (ref 7–23)
BUN SERPL-MCNC: 16 MG/DL — SIGNIFICANT CHANGE UP (ref 7–23)
BUN SERPL-MCNC: 16 MG/DL — SIGNIFICANT CHANGE UP (ref 7–23)
CA-I SERPL-SCNC: 1.06 MMOL/L — LOW (ref 1.12–1.3)
CALCIUM SERPL-MCNC: 8.8 MG/DL — SIGNIFICANT CHANGE UP (ref 8.4–10.5)
CALCIUM SERPL-MCNC: 9.1 MG/DL — SIGNIFICANT CHANGE UP (ref 8.4–10.5)
CALCIUM SERPL-MCNC: 9.4 MG/DL — SIGNIFICANT CHANGE UP (ref 8.4–10.5)
CHLORIDE BLDV-SCNC: 109 MMOL/L — HIGH (ref 96–108)
CHLORIDE SERPL-SCNC: 101 MMOL/L — SIGNIFICANT CHANGE UP (ref 96–108)
CHLORIDE SERPL-SCNC: 93 MMOL/L — LOW (ref 96–108)
CHLORIDE SERPL-SCNC: 97 MMOL/L — LOW (ref 98–107)
CK MB BLD-MCNC: 4.4 % — HIGH (ref 0–3.5)
CK MB CFR SERPL CALC: 25.8 NG/ML — HIGH (ref 0–6.7)
CK MB CFR SERPL CALC: 3.5 NG/ML — SIGNIFICANT CHANGE UP (ref 0–6.7)
CK SERPL-CCNC: 170 U/L — SIGNIFICANT CHANGE UP (ref 30–200)
CK SERPL-CCNC: 587 U/L — HIGH (ref 30–200)
CO2 BLDV-SCNC: 26 MMOL/L — SIGNIFICANT CHANGE UP (ref 22–30)
CO2 SERPL-SCNC: 20 MMOL/L — LOW (ref 22–31)
CO2 SERPL-SCNC: 23 MMOL/L — SIGNIFICANT CHANGE UP (ref 22–31)
CO2 SERPL-SCNC: 26 MMOL/L — SIGNIFICANT CHANGE UP (ref 22–31)
CREAT SERPL-MCNC: 0.85 MG/DL — SIGNIFICANT CHANGE UP (ref 0.5–1.3)
CREAT SERPL-MCNC: 1.03 MG/DL — SIGNIFICANT CHANGE UP (ref 0.5–1.3)
CREAT SERPL-MCNC: 1.27 MG/DL — SIGNIFICANT CHANGE UP (ref 0.5–1.3)
D DIMER BLD IA.RAPID-MCNC: 849 NG/ML DDU — HIGH
EOSINOPHIL # BLD AUTO: 0.1 K/UL — SIGNIFICANT CHANGE UP (ref 0–0.5)
EOSINOPHIL # BLD AUTO: 0.1 K/UL — SIGNIFICANT CHANGE UP (ref 0–0.5)
EOSINOPHIL # BLD AUTO: 0.28 K/UL — SIGNIFICANT CHANGE UP (ref 0–0.5)
EOSINOPHIL NFR BLD AUTO: 0.5 % — SIGNIFICANT CHANGE UP (ref 0–6)
EOSINOPHIL NFR BLD AUTO: 0.8 % — SIGNIFICANT CHANGE UP (ref 0–6)
EOSINOPHIL NFR BLD AUTO: 2.3 % — SIGNIFICANT CHANGE UP (ref 0–6)
FIBRINOGEN PPP-MCNC: 365 MG/DL — SIGNIFICANT CHANGE UP (ref 350–510)
FIBRINOGEN PPP-MCNC: 431 MG/DL — SIGNIFICANT CHANGE UP (ref 350–510)
GAS PNL BLDA: SIGNIFICANT CHANGE UP
GAS PNL BLDV: 141 MMOL/L — SIGNIFICANT CHANGE UP (ref 136–145)
GAS PNL BLDV: SIGNIFICANT CHANGE UP
GAS PNL BLDV: SIGNIFICANT CHANGE UP
GLUCOSE BLDC GLUCOMTR-MCNC: 129 MG/DL — HIGH (ref 70–99)
GLUCOSE BLDC GLUCOMTR-MCNC: 131 MG/DL — HIGH (ref 70–99)
GLUCOSE BLDC GLUCOMTR-MCNC: 163 MG/DL — HIGH (ref 70–99)
GLUCOSE BLDV-MCNC: 103 MG/DL — HIGH (ref 70–99)
GLUCOSE SERPL-MCNC: 133 MG/DL — HIGH (ref 70–99)
GLUCOSE SERPL-MCNC: 185 MG/DL — HIGH (ref 70–99)
GLUCOSE SERPL-MCNC: 191 MG/DL — HIGH (ref 70–99)
HBA1C BLD-MCNC: 5.7 % — HIGH (ref 4–5.6)
HCO3 BLDV-SCNC: 25 MMOL/L — SIGNIFICANT CHANGE UP (ref 21–29)
HCT VFR BLD CALC: 30.3 % — LOW (ref 39–50)
HCT VFR BLD CALC: 41.1 % — SIGNIFICANT CHANGE UP (ref 39–50)
HCT VFR BLD CALC: 42.6 % — SIGNIFICANT CHANGE UP (ref 39–50)
HCT VFR BLDA CALC: 31 % — LOW (ref 39–50)
HGB BLD CALC-MCNC: 10.1 G/DL — LOW (ref 13–17)
HGB BLD-MCNC: 10.8 G/DL — LOW (ref 13–17)
HGB BLD-MCNC: 14.6 G/DL — SIGNIFICANT CHANGE UP (ref 13–17)
HGB BLD-MCNC: 14.6 G/DL — SIGNIFICANT CHANGE UP (ref 13–17)
HOROWITZ INDEX BLDV+IHG-RTO: 70 — SIGNIFICANT CHANGE UP
IMM GRANULOCYTES # BLD AUTO: 0.05 # — SIGNIFICANT CHANGE UP
IMM GRANULOCYTES NFR BLD AUTO: 0.4 % — SIGNIFICANT CHANGE UP (ref 0–1.5)
INR BLD: 1.01 — SIGNIFICANT CHANGE UP (ref 0.88–1.17)
INR BLD: 1.06 RATIO — SIGNIFICANT CHANGE UP (ref 0.88–1.16)
INR BLD: 1.07 RATIO — SIGNIFICANT CHANGE UP (ref 0.88–1.16)
LACTATE BLDV-MCNC: 2.2 MMOL/L — HIGH (ref 0.7–2)
LYMPHOCYTES # BLD AUTO: 0.7 K/UL — LOW (ref 1–3.3)
LYMPHOCYTES # BLD AUTO: 1 K/UL — SIGNIFICANT CHANGE UP (ref 1–3.3)
LYMPHOCYTES # BLD AUTO: 1.73 K/UL — SIGNIFICANT CHANGE UP (ref 1–3.3)
LYMPHOCYTES # BLD AUTO: 14.4 % — SIGNIFICANT CHANGE UP (ref 13–44)
LYMPHOCYTES # BLD AUTO: 5.1 % — LOW (ref 13–44)
LYMPHOCYTES # BLD AUTO: 7.7 % — LOW (ref 13–44)
MAGNESIUM SERPL-MCNC: 1.6 MG/DL — SIGNIFICANT CHANGE UP (ref 1.6–2.6)
MAGNESIUM SERPL-MCNC: 2.7 MG/DL — HIGH (ref 1.6–2.6)
MCHC RBC-ENTMCNC: 28.5 PG — SIGNIFICANT CHANGE UP (ref 27–34)
MCHC RBC-ENTMCNC: 28.7 PG — SIGNIFICANT CHANGE UP (ref 27–34)
MCHC RBC-ENTMCNC: 29.1 PG — SIGNIFICANT CHANGE UP (ref 27–34)
MCHC RBC-ENTMCNC: 34.3 % — SIGNIFICANT CHANGE UP (ref 32–36)
MCHC RBC-ENTMCNC: 35.5 GM/DL — SIGNIFICANT CHANGE UP (ref 32–36)
MCHC RBC-ENTMCNC: 35.6 GM/DL — SIGNIFICANT CHANGE UP (ref 32–36)
MCV RBC AUTO: 80.8 FL — SIGNIFICANT CHANGE UP (ref 80–100)
MCV RBC AUTO: 81.6 FL — SIGNIFICANT CHANGE UP (ref 80–100)
MCV RBC AUTO: 83 FL — SIGNIFICANT CHANGE UP (ref 80–100)
MONOCYTES # BLD AUTO: 0.6 K/UL — SIGNIFICANT CHANGE UP (ref 0–0.9)
MONOCYTES # BLD AUTO: 0.8 K/UL — SIGNIFICANT CHANGE UP (ref 0–0.9)
MONOCYTES # BLD AUTO: 0.81 K/UL — SIGNIFICANT CHANGE UP (ref 0–0.9)
MONOCYTES NFR BLD AUTO: 4.5 % — SIGNIFICANT CHANGE UP (ref 2–14)
MONOCYTES NFR BLD AUTO: 5.8 % — SIGNIFICANT CHANGE UP (ref 2–14)
MONOCYTES NFR BLD AUTO: 6.7 % — SIGNIFICANT CHANGE UP (ref 2–14)
NEUTROPHILS # BLD AUTO: 11.1 K/UL — HIGH (ref 1.8–7.4)
NEUTROPHILS # BLD AUTO: 11.4 K/UL — HIGH (ref 1.8–7.4)
NEUTROPHILS # BLD AUTO: 9.14 K/UL — HIGH (ref 1.8–7.4)
NEUTROPHILS NFR BLD AUTO: 75.9 % — SIGNIFICANT CHANGE UP (ref 43–77)
NEUTROPHILS NFR BLD AUTO: 86.8 % — HIGH (ref 43–77)
NEUTROPHILS NFR BLD AUTO: 88.5 % — HIGH (ref 43–77)
NRBC # FLD: 0 — SIGNIFICANT CHANGE UP
NT-PROBNP SERPL-SCNC: 155 PG/ML — SIGNIFICANT CHANGE UP (ref 0–300)
OTHER CELLS CSF MANUAL: 9 ML/DL — LOW (ref 18–22)
PCO2 BLDV: 47 MMHG — SIGNIFICANT CHANGE UP (ref 35–50)
PH BLDV: 7.34 — LOW (ref 7.35–7.45)
PHOSPHATE SERPL-MCNC: 3.1 MG/DL — SIGNIFICANT CHANGE UP (ref 2.5–4.5)
PHOSPHATE SERPL-MCNC: 3.8 MG/DL — SIGNIFICANT CHANGE UP (ref 2.5–4.5)
PLATELET # BLD AUTO: 161 K/UL — SIGNIFICANT CHANGE UP (ref 150–400)
PLATELET # BLD AUTO: 210 K/UL — SIGNIFICANT CHANGE UP (ref 150–400)
PLATELET # BLD AUTO: 221 K/UL — SIGNIFICANT CHANGE UP (ref 150–400)
PMV BLD: 9.6 FL — SIGNIFICANT CHANGE UP (ref 7–13)
PO2 BLDV: 35 MMHG — SIGNIFICANT CHANGE UP (ref 25–45)
POTASSIUM BLDV-SCNC: 4.6 MMOL/L — SIGNIFICANT CHANGE UP (ref 3.5–5.3)
POTASSIUM SERPL-MCNC: 3.6 MMOL/L — SIGNIFICANT CHANGE UP (ref 3.5–5.3)
POTASSIUM SERPL-MCNC: 3.7 MMOL/L — SIGNIFICANT CHANGE UP (ref 3.5–5.3)
POTASSIUM SERPL-MCNC: 4.1 MMOL/L — SIGNIFICANT CHANGE UP (ref 3.5–5.3)
POTASSIUM SERPL-SCNC: 3.6 MMOL/L — SIGNIFICANT CHANGE UP (ref 3.5–5.3)
POTASSIUM SERPL-SCNC: 3.7 MMOL/L — SIGNIFICANT CHANGE UP (ref 3.5–5.3)
POTASSIUM SERPL-SCNC: 4.1 MMOL/L — SIGNIFICANT CHANGE UP (ref 3.5–5.3)
PROCALCITONIN SERPL-MCNC: 0.02 NG/ML — SIGNIFICANT CHANGE UP (ref 0.02–0.1)
PROT SERPL-MCNC: 5.6 G/DL — LOW (ref 6–8.3)
PROT SERPL-MCNC: 7.3 G/DL — SIGNIFICANT CHANGE UP (ref 6–8.3)
PROT SERPL-MCNC: 7.7 G/DL — SIGNIFICANT CHANGE UP (ref 6–8.3)
PROTHROM AB SERPL-ACNC: 11.5 SEC — SIGNIFICANT CHANGE UP (ref 9.8–13.1)
PROTHROM AB SERPL-ACNC: 12.2 SEC — SIGNIFICANT CHANGE UP (ref 10–12.9)
PROTHROM AB SERPL-ACNC: 12.3 SEC — SIGNIFICANT CHANGE UP (ref 10–12.9)
RBC # BLD: 3.72 M/UL — LOW (ref 4.2–5.8)
RBC # BLD: 5.08 M/UL — SIGNIFICANT CHANGE UP (ref 4.2–5.8)
RBC # BLD: 5.13 M/UL — SIGNIFICANT CHANGE UP (ref 4.2–5.8)
RBC # FLD: 11.8 % — SIGNIFICANT CHANGE UP (ref 10.3–14.5)
RBC # FLD: 12.3 % — SIGNIFICANT CHANGE UP (ref 10.3–14.5)
RBC # FLD: 12.6 % — SIGNIFICANT CHANGE UP (ref 10.3–14.5)
RH IG SCN BLD-IMP: POSITIVE — SIGNIFICANT CHANGE UP
SAO2 % BLDV: 63 % — LOW (ref 67–88)
SODIUM SERPL-SCNC: 129 MMOL/L — LOW (ref 135–145)
SODIUM SERPL-SCNC: 137 MMOL/L — SIGNIFICANT CHANGE UP (ref 135–145)
SODIUM SERPL-SCNC: 142 MMOL/L — SIGNIFICANT CHANGE UP (ref 135–145)
TROPONIN T, HIGH SENSITIVITY RESULT: 29 NG/L — SIGNIFICANT CHANGE UP (ref 0–51)
TROPONIN T, HIGH SENSITIVITY RESULT: 726 NG/L — HIGH (ref 0–51)
TROPONIN T, HIGH SENSITIVITY: 28 NG/L — SIGNIFICANT CHANGE UP (ref ?–14)
WBC # BLD: 12.05 K/UL — HIGH (ref 3.8–10.5)
WBC # BLD: 12.8 K/UL — HIGH (ref 3.8–10.5)
WBC # BLD: 12.9 K/UL — HIGH (ref 3.8–10.5)
WBC # FLD AUTO: 12.05 K/UL — HIGH (ref 3.8–10.5)
WBC # FLD AUTO: 12.8 K/UL — HIGH (ref 3.8–10.5)
WBC # FLD AUTO: 12.9 K/UL — HIGH (ref 3.8–10.5)

## 2018-12-08 PROCEDURE — 74174 CTA ABD&PLVS W/CONTRAST: CPT | Mod: 26

## 2018-12-08 PROCEDURE — 71045 X-RAY EXAM CHEST 1 VIEW: CPT | Mod: 26

## 2018-12-08 PROCEDURE — 33860: CPT | Mod: AS

## 2018-12-08 PROCEDURE — 93010 ELECTROCARDIOGRAM REPORT: CPT | Mod: 59

## 2018-12-08 PROCEDURE — 93010 ELECTROCARDIOGRAM REPORT: CPT

## 2018-12-08 PROCEDURE — 99285 EMERGENCY DEPT VISIT HI MDM: CPT | Mod: 25

## 2018-12-08 PROCEDURE — 71046 X-RAY EXAM CHEST 2 VIEWS: CPT | Mod: 26

## 2018-12-08 PROCEDURE — 33860: CPT

## 2018-12-08 PROCEDURE — 71275 CT ANGIOGRAPHY CHEST: CPT | Mod: 26

## 2018-12-08 PROCEDURE — 99291 CRITICAL CARE FIRST HOUR: CPT

## 2018-12-08 PROCEDURE — 88305 TISSUE EXAM BY PATHOLOGIST: CPT | Mod: 26

## 2018-12-08 RX ORDER — LABETALOL HCL 100 MG
2 TABLET ORAL
Qty: 400 | Refills: 0 | Status: DISCONTINUED | OUTPATIENT
Start: 2018-12-08 | End: 2018-12-12

## 2018-12-08 RX ORDER — METOCLOPRAMIDE HCL 10 MG
10 TABLET ORAL EVERY 8 HOURS
Qty: 0 | Refills: 0 | Status: COMPLETED | OUTPATIENT
Start: 2018-12-08 | End: 2018-12-10

## 2018-12-08 RX ORDER — NICARDIPINE HYDROCHLORIDE 30 MG/1
5 CAPSULE, EXTENDED RELEASE ORAL
Qty: 40 | Refills: 0 | Status: DISCONTINUED | OUTPATIENT
Start: 2018-12-08 | End: 2018-12-10

## 2018-12-08 RX ORDER — LABETALOL HCL 100 MG
10 TABLET ORAL ONCE
Qty: 0 | Refills: 0 | Status: COMPLETED | OUTPATIENT
Start: 2018-12-08 | End: 2018-12-08

## 2018-12-08 RX ORDER — CHLORHEXIDINE GLUCONATE 213 G/1000ML
1 SOLUTION TOPICAL DAILY
Qty: 0 | Refills: 0 | Status: DISCONTINUED | OUTPATIENT
Start: 2018-12-08 | End: 2018-12-08

## 2018-12-08 RX ORDER — MAGNESIUM SULFATE 500 MG/ML
1 VIAL (ML) INJECTION ONCE
Qty: 0 | Refills: 0 | Status: COMPLETED | OUTPATIENT
Start: 2018-12-08 | End: 2018-12-08

## 2018-12-08 RX ORDER — PANTOPRAZOLE SODIUM 20 MG/1
40 TABLET, DELAYED RELEASE ORAL DAILY
Qty: 0 | Refills: 0 | Status: DISCONTINUED | OUTPATIENT
Start: 2018-12-08 | End: 2018-12-09

## 2018-12-08 RX ORDER — METOCLOPRAMIDE HCL 10 MG
10 TABLET ORAL EVERY 8 HOURS
Qty: 0 | Refills: 0 | Status: DISCONTINUED | OUTPATIENT
Start: 2018-12-08 | End: 2018-12-08

## 2018-12-08 RX ORDER — ALBUMIN HUMAN 25 %
250 VIAL (ML) INTRAVENOUS ONCE
Qty: 0 | Refills: 0 | Status: COMPLETED | OUTPATIENT
Start: 2018-12-08 | End: 2018-12-08

## 2018-12-08 RX ORDER — LABETALOL HCL 100 MG
0.03 TABLET ORAL
Qty: 400 | Refills: 0 | Status: DISCONTINUED | OUTPATIENT
Start: 2018-12-08 | End: 2018-12-08

## 2018-12-08 RX ORDER — MILRINONE LACTATE 1 MG/ML
0.2 INJECTION, SOLUTION INTRAVENOUS
Qty: 20 | Refills: 0 | Status: DISCONTINUED | OUTPATIENT
Start: 2018-12-08 | End: 2018-12-09

## 2018-12-08 RX ORDER — NICARDIPINE HYDROCHLORIDE 30 MG/1
5 CAPSULE, EXTENDED RELEASE ORAL
Qty: 40 | Refills: 0 | Status: DISCONTINUED | OUTPATIENT
Start: 2018-12-08 | End: 2018-12-08

## 2018-12-08 RX ORDER — LABETALOL HCL 100 MG
40 TABLET ORAL ONCE
Qty: 0 | Refills: 0 | Status: COMPLETED | OUTPATIENT
Start: 2018-12-08 | End: 2018-12-08

## 2018-12-08 RX ORDER — SODIUM CHLORIDE 9 MG/ML
1000 INJECTION INTRAMUSCULAR; INTRAVENOUS; SUBCUTANEOUS
Qty: 0 | Refills: 0 | Status: DISCONTINUED | OUTPATIENT
Start: 2018-12-08 | End: 2018-12-13

## 2018-12-08 RX ORDER — ACETAMINOPHEN 500 MG
975 TABLET ORAL ONCE
Qty: 0 | Refills: 0 | Status: COMPLETED | OUTPATIENT
Start: 2018-12-08 | End: 2018-12-08

## 2018-12-08 RX ORDER — POTASSIUM CHLORIDE 20 MEQ
10 PACKET (EA) ORAL
Qty: 0 | Refills: 0 | Status: DISCONTINUED | OUTPATIENT
Start: 2018-12-08 | End: 2018-12-09

## 2018-12-08 RX ORDER — DIAZEPAM 5 MG
5 TABLET ORAL ONCE
Qty: 0 | Refills: 0 | Status: DISCONTINUED | OUTPATIENT
Start: 2018-12-08 | End: 2018-12-08

## 2018-12-08 RX ORDER — CHLORHEXIDINE GLUCONATE 213 G/1000ML
1 SOLUTION TOPICAL
Qty: 0 | Refills: 0 | Status: DISCONTINUED | OUTPATIENT
Start: 2018-12-08 | End: 2018-12-08

## 2018-12-08 RX ORDER — INFLUENZA VIRUS VACCINE 15; 15; 15; 15 UG/.5ML; UG/.5ML; UG/.5ML; UG/.5ML
0.5 SUSPENSION INTRAMUSCULAR ONCE
Qty: 0 | Refills: 0 | Status: DISCONTINUED | OUTPATIENT
Start: 2018-12-08 | End: 2018-12-09

## 2018-12-08 RX ORDER — POTASSIUM CHLORIDE 20 MEQ
10 PACKET (EA) ORAL
Qty: 0 | Refills: 0 | Status: COMPLETED | OUTPATIENT
Start: 2018-12-08 | End: 2018-12-08

## 2018-12-08 RX ORDER — INSULIN HUMAN 100 [IU]/ML
1 INJECTION, SOLUTION SUBCUTANEOUS
Qty: 100 | Refills: 0 | Status: DISCONTINUED | OUTPATIENT
Start: 2018-12-08 | End: 2018-12-09

## 2018-12-08 RX ORDER — NICARDIPINE HYDROCHLORIDE 30 MG/1
2.5 CAPSULE, EXTENDED RELEASE ORAL
Qty: 40 | Refills: 0 | Status: DISCONTINUED | OUTPATIENT
Start: 2018-12-08 | End: 2018-12-12

## 2018-12-08 RX ORDER — LABETALOL HCL 100 MG
80 TABLET ORAL ONCE
Qty: 0 | Refills: 0 | Status: COMPLETED | OUTPATIENT
Start: 2018-12-08 | End: 2018-12-08

## 2018-12-08 RX ORDER — DOBUTAMINE HCL 250MG/20ML
2 VIAL (ML) INTRAVENOUS
Qty: 500 | Refills: 0 | Status: DISCONTINUED | OUTPATIENT
Start: 2018-12-08 | End: 2018-12-08

## 2018-12-08 RX ORDER — CEFUROXIME AXETIL 250 MG
1500 TABLET ORAL EVERY 8 HOURS
Qty: 0 | Refills: 0 | Status: COMPLETED | OUTPATIENT
Start: 2018-12-08 | End: 2018-12-10

## 2018-12-08 RX ORDER — HYDROMORPHONE HYDROCHLORIDE 2 MG/ML
1 INJECTION INTRAMUSCULAR; INTRAVENOUS; SUBCUTANEOUS ONCE
Qty: 0 | Refills: 0 | Status: DISCONTINUED | OUTPATIENT
Start: 2018-12-08 | End: 2018-12-08

## 2018-12-08 RX ORDER — DOCUSATE SODIUM 100 MG
100 CAPSULE ORAL THREE TIMES A DAY
Qty: 0 | Refills: 0 | Status: DISCONTINUED | OUTPATIENT
Start: 2018-12-08 | End: 2018-12-13

## 2018-12-08 RX ORDER — ASPIRIN/CALCIUM CARB/MAGNESIUM 324 MG
300 TABLET ORAL ONCE
Qty: 0 | Refills: 0 | Status: DISCONTINUED | OUTPATIENT
Start: 2018-12-08 | End: 2018-12-09

## 2018-12-08 RX ORDER — MEPERIDINE HYDROCHLORIDE 50 MG/ML
25 INJECTION INTRAMUSCULAR; INTRAVENOUS; SUBCUTANEOUS ONCE
Qty: 0 | Refills: 0 | Status: DISCONTINUED | OUTPATIENT
Start: 2018-12-08 | End: 2018-12-09

## 2018-12-08 RX ORDER — ASPIRIN/CALCIUM CARB/MAGNESIUM 324 MG
81 TABLET ORAL DAILY
Qty: 0 | Refills: 0 | Status: DISCONTINUED | OUTPATIENT
Start: 2018-12-08 | End: 2018-12-13

## 2018-12-08 RX ORDER — DEXMEDETOMIDINE HYDROCHLORIDE IN 0.9% SODIUM CHLORIDE 4 UG/ML
0.1 INJECTION INTRAVENOUS
Qty: 200 | Refills: 0 | Status: DISCONTINUED | OUTPATIENT
Start: 2018-12-08 | End: 2018-12-09

## 2018-12-08 RX ORDER — CHLORHEXIDINE GLUCONATE 213 G/1000ML
5 SOLUTION TOPICAL EVERY 4 HOURS
Qty: 0 | Refills: 0 | Status: DISCONTINUED | OUTPATIENT
Start: 2018-12-08 | End: 2018-12-09

## 2018-12-08 RX ADMIN — Medication 100 MILLIEQUIVALENT(S): at 17:15

## 2018-12-08 RX ADMIN — HYDROMORPHONE HYDROCHLORIDE 1 MILLIGRAM(S): 2 INJECTION INTRAMUSCULAR; INTRAVENOUS; SUBCUTANEOUS at 07:45

## 2018-12-08 RX ADMIN — Medication 100 MILLIEQUIVALENT(S): at 20:12

## 2018-12-08 RX ADMIN — Medication 10 MILLIGRAM(S): at 22:03

## 2018-12-08 RX ADMIN — DEXMEDETOMIDINE HYDROCHLORIDE IN 0.9% SODIUM CHLORIDE 3.24 MICROGRAM(S)/KG/HR: 4 INJECTION INTRAVENOUS at 17:36

## 2018-12-08 RX ADMIN — Medication 100 MILLIEQUIVALENT(S): at 18:19

## 2018-12-08 RX ADMIN — Medication 80 MILLIGRAM(S): at 06:23

## 2018-12-08 RX ADMIN — Medication 40 MILLIGRAM(S): at 07:49

## 2018-12-08 RX ADMIN — CHLORHEXIDINE GLUCONATE 5 MILLILITER(S): 213 SOLUTION TOPICAL at 17:37

## 2018-12-08 RX ADMIN — Medication 125 MILLILITER(S): at 16:45

## 2018-12-08 RX ADMIN — Medication 10 MILLIGRAM(S): at 06:15

## 2018-12-08 RX ADMIN — Medication 5 MILLIGRAM(S): at 05:41

## 2018-12-08 RX ADMIN — Medication 120 MG/MIN: at 06:41

## 2018-12-08 RX ADMIN — MILRINONE LACTATE 14.57 MICROGRAM(S)/KG/MIN: 1 INJECTION, SOLUTION INTRAVENOUS at 20:11

## 2018-12-08 RX ADMIN — CHLORHEXIDINE GLUCONATE 5 MILLILITER(S): 213 SOLUTION TOPICAL at 22:03

## 2018-12-08 RX ADMIN — Medication 1500 MILLILITER(S): at 22:06

## 2018-12-08 RX ADMIN — Medication 100 MILLIGRAM(S): at 18:09

## 2018-12-08 RX ADMIN — Medication 100 MILLIEQUIVALENT(S): at 21:30

## 2018-12-08 RX ADMIN — Medication 100 MILLIEQUIVALENT(S): at 16:45

## 2018-12-08 RX ADMIN — NICARDIPINE HYDROCHLORIDE 25 MG/HR: 30 CAPSULE, EXTENDED RELEASE ORAL at 17:36

## 2018-12-08 RX ADMIN — Medication 100 GRAM(S): at 18:40

## 2018-12-08 RX ADMIN — INSULIN HUMAN 1 UNIT(S)/HR: 100 INJECTION, SOLUTION SUBCUTANEOUS at 17:37

## 2018-12-08 RX ADMIN — Medication 7.77 MICROGRAM(S)/KG/MIN: at 20:12

## 2018-12-08 RX ADMIN — Medication 19.43 MICROGRAM(S)/KG/MIN: at 17:37

## 2018-12-08 RX ADMIN — Medication 100 MILLIEQUIVALENT(S): at 19:02

## 2018-12-08 RX ADMIN — Medication 40 MILLIGRAM(S): at 07:20

## 2018-12-08 RX ADMIN — Medication 100 MILLIEQUIVALENT(S): at 18:39

## 2018-12-08 RX ADMIN — Medication 100 MILLIEQUIVALENT(S): at 21:00

## 2018-12-08 RX ADMIN — Medication 125 MILLILITER(S): at 18:09

## 2018-12-08 RX ADMIN — Medication 100 MILLIEQUIVALENT(S): at 17:40

## 2018-12-08 RX ADMIN — PANTOPRAZOLE SODIUM 40 MILLIGRAM(S): 20 TABLET, DELAYED RELEASE ORAL at 18:19

## 2018-12-08 RX ADMIN — Medication 10 MILLIGRAM(S): at 06:23

## 2018-12-08 RX ADMIN — Medication 975 MILLIGRAM(S): at 05:41

## 2018-12-08 RX ADMIN — Medication 975 MILLIGRAM(S): at 06:02

## 2018-12-08 RX ADMIN — HYDROMORPHONE HYDROCHLORIDE 1 MILLIGRAM(S): 2 INJECTION INTRAMUSCULAR; INTRAVENOUS; SUBCUTANEOUS at 07:30

## 2018-12-08 RX ADMIN — SODIUM CHLORIDE 10 MILLILITER(S): 9 INJECTION INTRAMUSCULAR; INTRAVENOUS; SUBCUTANEOUS at 17:38

## 2018-12-08 NOTE — ED ADULT TRIAGE NOTE - CHIEF COMPLAINT QUOTE
Pt s/p asthma exacerbation at 11:30pm, reports took breathing tx with relief however now having severe back spasm rad to left rib. Denies SOB at this time. Reports took advil at 3am with no relief. Pt hypertensive in triage, did not take BP meds today. Pt appears uncomfortable, unable to sit comfortably. PMHx asthma, htn, Kallmann syndrome. EKG to be obtained. Pt s/p asthma exacerbation at 11:30pm, reports took breathing tx with relief however now having severe back spasm rad to left rib. Denies SOB at this time. Reports took advil at 3am with no relief. Pt hypertensive in triage, did not take BP meds today. Pt appears uncomfortable, unable to sit comfortably. PMHx asthma, htn, Kallmann syndrome. EKG to be obtained.    Pt with penicillin allergy per chart, pt denies penicillin allergy, states "the doctor did a test and said I'm not allergic." Pt s/p asthma exacerbation at 11:30pm, reports took breathing tx with relief however now having severe back spasm rad to left rib. Denies SOB at this time. Reports took advil at 3am with no relief. Pt hypertensive in triage, did not take BP meds today. Pt appears uncomfortable, unable to sit comfortably. PMHx asthma, htn, Kallmann syndrome. EKG to be obtained.    Addendum: EKG completed, pt to be seen soon as per MD. Charge RN notified and aware. Pt to go to room 16.  Pt with penicillin allergy per chart, pt denies penicillin allergy, states "the doctor did a test and said I'm not allergic." Pt s/p asthma exacerbation at 11:30pm, reports took breathing tx with relief however now having severe back spasm rad to left rib. Denies SOB at this time. Reports took advil at 3am with no relief. Pt hypertensive in triage, did not take BP meds today. Pt appears uncomfortable, unable to sit comfortably. PMHx asthma, htn, Kallmann syndrome. EKG to be obtained.  Pt with penicillin allergy per chart, pt denies penicillin allergy, states "the doctor did a test and said I'm not allergic."    Addendum 0426: EKG completed, pt to be seen soon as per MD. Charge RN notified and aware. Pt to go to room 16.

## 2018-12-08 NOTE — ED ADULT NURSE NOTE - OBJECTIVE STATEMENT
pt on bed aox3 c/o midback pain, left arm shoulder anterior chest wall since 1130 pm last night and worsening, pain on left anterior chest with deep breathing with headache on left face and frontal area, denies dizziness palpitation SOB N V sweating. MD at bedside eval the pt. will monitor on cm sinus rhythm

## 2018-12-08 NOTE — PROCEDURE NOTE - NSPOSTCAREGUIDE_GEN_A_CORE
Verbal/written post procedure instructions were given to patient/caregiver/Care for catheter as per unit/ICU protocols
Verbal/written post procedure instructions were given to patient/caregiver/Care for catheter as per unit/ICU protocols

## 2018-12-08 NOTE — ED PROVIDER NOTE - MEDICAL DECISION MAKING DETAILS
37M presenting with back and chest pain. EKG with RBBB. Will obtain screening labs including trop and cxr. DDx includes pneumothorax vs msk pain. 37M presenting with back and chest pain. EKG with RBBB. Will obtain screening labs including trop and cxr. DDx includes dissection vs pneumothorax vs msk pain.

## 2018-12-08 NOTE — BRIEF OPERATIVE NOTE - OPERATION/FINDINGS
Hemiarch repair of Type A Aortic Dissection with 36mm hemashield platinum graft..  41 minutes of circulatory arrest with antegrade cerebral perfusion via innominate artery.  Aortic cross clamp time of 145 minutes

## 2018-12-08 NOTE — ED ADULT NURSE NOTE - CHIEF COMPLAINT QUOTE
Pt s/p asthma exacerbation at 11:30pm, reports took breathing tx with relief however now having severe back spasm rad to left rib. Denies SOB at this time. Reports took advil at 3am with no relief. Pt hypertensive in triage, did not take BP meds today. Pt appears uncomfortable, unable to sit comfortably. PMHx asthma, htn, Kallmann syndrome. EKG to be obtained.  Pt with penicillin allergy per chart, pt denies penicillin allergy, states "the doctor did a test and said I'm not allergic."    Addendum 0426: EKG completed, pt to be seen soon as per MD. Charge RN notified and aware. Pt to go to room 16.

## 2018-12-08 NOTE — PROGRESS NOTE ADULT - SUBJECTIVE AND OBJECTIVE BOX
10:30pm-11pm  Remained critically ill on continuos ICU monitoring.        OBJECTIVE:    T(C): 36.3 (18 @ 19:00), Max: 36.7 (18 @ 04:50)  T(F): 97.3 (18 @ 19:00), Max: 98.1 (18 @ 04:50)  HR: 80 (18 @ 22:30) (66 - 85)  BP: 142/79 (18 @ 07:15) (142/79 - 211/108)  ABP: 148/62 (18 @ 22:30) (116/58 - 155/70)  ABP(mean): 82 (18 @ 22:30) (67 - 94)  RR: 14 (18 @ 22:30) (12 - 21)  SpO2: 99% (18 @ 22:30) (95% - 100%)                PHYSICAL EXAM:    Daily Weight in kG:   General: intubated & sedated   Neurology: sedated nonfocal, GUADALUPE x 4  Eyes: PERRLA/ EOMI, Gross vision intact  ENT/Neck: Neck supple,+ET trachea midline, No JVD, Gross hearing intact  Respiratory:  B/L fine  rales + sternal dressing M x2  chest tubes  CV: RRR, S1S2, no murmurs, rubs or gallops  Abdominal: Soft, NT, ND +BS,   Extremities: No edema, + peripheral pulses  Skin: No Rashes, Hematoma, Ecchymosis          HOSPITAL MEDICATIONS:  MEDICATIONS  (STANDING):  MEDICATIONS  (STANDING):  aspirin enteric coated 81 milliGRAM(s) Oral daily  aspirin Suppository 300 milliGRAM(s) Rectal once  cefuroxime  IVPB 1500 milliGRAM(s) IV Intermittent every 8 hours  chlorhexidine 0.12% Liquid 5 milliLiter(s) Oral Mucosa every 4 hours  dexmedetomidine Infusion 0.1 MICROgram(s)/kG/Hr (3.238 mL/Hr) IV Continuous <Continuous>  DOBUTamine Infusion 2 MICROgram(s)/kG/Min (7.77 mL/Hr) IV Continuous <Continuous>  docusate sodium 100 milliGRAM(s) Oral three times a day  influenza   Vaccine 0.5 milliLiter(s) IntraMuscular once  insulin Infusion 1 Unit(s)/Hr (1 mL/Hr) IV Continuous <Continuous>  meperidine     Injectable 25 milliGRAM(s) IV Push once  metoclopramide Injectable 10 milliGRAM(s) IV Push every 8 hours  milrinone Infusion 0.375 MICROgram(s)/kG/Min (14.569 mL/Hr) IV Continuous <Continuous>  niCARdipine Infusion 5 mG/Hr (25 mL/Hr) IV Continuous <Continuous>  pantoprazole  Injectable 40 milliGRAM(s) IV Push daily  potassium chloride  10 mEq/50 mL IVPB 10 milliEquivalent(s) IV Intermittent every 1 hour  potassium chloride  10 mEq/50 mL IVPB 10 milliEquivalent(s) IV Intermittent every 1 hour  potassium chloride  10 mEq/50 mL IVPB 10 milliEquivalent(s) IV Intermittent every 1 hour  sodium chloride 0.9%. 1000 milliLiter(s) (10 mL/Hr) IV Continuous <Continuous>    MEDICATIONS  (PRN):        MEDICATIONS  (PRN):      LABS:              x                    142  | 20   | 16           x     >-----------< x       ------------------------< 185                   x                    4.1  | 101  | 1.27                                         Ca 8.8   Mg 2.7   Ph 3.8                       RADIOLOGY:  X Reviewed and interpreted by me            Assessment and Plan:   HPI:  37 yr male with hx of HTN , seasonal allg and food allg. pt c/o of substernal chest spasm at 1130 pm , pt took some motrin and fell asleep. pt woke up at 2 am with similiar pain . pt went to ER for further management .   pt with CTA showing TYpe A dissection , pt transferred for sx. (08 Dec 2018 07:46)    S/P Type A aortic dissection repair POD #0  Plan f/u ABGs, Spo2, CXR, wean to extubate once hemodynamically stable.  SR, back up pacing in place, monitor for post op bleeding, minimal chest tube outputs.    & Primacor gtt for LV dysfunction, maintain MAP <70   Transfuse products, PRBC cont to monitor  Hct    f/u perfusion indices, volume resuscitation  Glycemic control,   Ryann op antibiotics.        I have spent 30 minutes providing critical care management to this patient.

## 2018-12-08 NOTE — PATIENT PROFILE ADULT - NSPROGENDIFFINTUB_GEN_A_NUR
Hemoglobin A1C   Date Value Ref Range Status   09/26/2018 6.0 (H) 4.0 - 5.6 % Final     Comment:   06/22/2018 4.9 4.0 - 5.6 % Final     Comment:   04/06/2018 4.7 4.0 - 5.6 % Final     Comment:     Chronic insulin therapy at home.  contineu novolog.  Continue diabetic diet therapy with glucose monitoring AC and HS.    Hyperglycemia to be treated with SSNI prn.  Glucose goal is < 180mg/dl and avoidance of hypoglycemia.  Will continue to monitor and adjust regimen as necessary to achieve goals.       never intubated

## 2018-12-08 NOTE — ED PROVIDER NOTE - PROGRESS NOTE DETAILS
CTA with findings of type A dissection. CT surgery consulted with recs to start labetalol drip. Transfer center called CTA with findings of type A dissection. CT surgery consulted with recs to start labetalol drip. Transfer center called. Repeat exam with no change in neurological status

## 2018-12-08 NOTE — H&P ADULT - NSHPLABSRESULTS_GEN_ALL_CORE
albumin human  5% IVPB 250 milliLiter(s) IV Intermittent once  aspirin enteric coated 81 milliGRAM(s) Oral daily  aspirin Suppository 300 milliGRAM(s) Rectal once  cefuroxime  IVPB 1500 milliGRAM(s) IV Intermittent every 8 hours  chlorhexidine 0.12% Liquid 5 milliLiter(s) Oral Mucosa every 4 hours  dexmedetomidine Infusion 0.1 MICROgram(s)/kG/Hr IV Continuous <Continuous>  DOBUTamine Infusion 5 MICROgram(s)/kG/Min IV Continuous <Continuous>  docusate sodium 100 milliGRAM(s) Oral three times a day  insulin Infusion 1 Unit(s)/Hr IV Continuous <Continuous>  meperidine     Injectable 25 milliGRAM(s) IV Push once  metoclopramide Injectable 10 milliGRAM(s) IV Push every 8 hours  niCARdipine Infusion 5 mG/Hr IV Continuous <Continuous>  pantoprazole  Injectable 40 milliGRAM(s) IV Push daily  potassium chloride  10 mEq/50 mL IVPB 10 milliEquivalent(s) IV Intermittent every 1 hour  potassium chloride  10 mEq/50 mL IVPB 10 milliEquivalent(s) IV Intermittent every 1 hour  potassium chloride  10 mEq/50 mL IVPB 10 milliEquivalent(s) IV Intermittent every 1 hour  potassium chloride  10 mEq/50 mL IVPB 10 milliEquivalent(s) IV Intermittent every 1 hour  sodium chloride 0.9%. 1000 milliLiter(s) IV Continuous <Continuous>                            14.6   12.8  )-----------( 210      ( 08 Dec 2018 08:13 )             41.1       Hemoglobin: 14.6 g/dL (12-08 @ 08:13)  Hemoglobin: 14.6 g/dL (12-08 @ 04:50)      12-08    129<L>  |  93<L>  |  14  ----------------------------<  191<H>  3.6   |  23  |  0.85    Ca    9.1      08 Dec 2018 08:13  Phos  3.1     12-08  Mg     1.6     12-08    TPro  7.3  /  Alb  4.2  /  TBili  1.1  /  DBili  x   /  AST  18  /  ALT  23  /  AlkPhos  108  12-08    Creatinine Trend: 0.85<--, 1.03<--    COAGS: PT/INR - ( 08 Dec 2018 08:13 )   PT: 12.2 sec;   INR: 1.06 ratio         PTT - ( 08 Dec 2018 08:13 )  PTT:33.1 sec    CARDIAC MARKERS ( 08 Dec 2018 08:13 )  x     / x     / 170 U/L / x     / 3.5 ng/mL        T(C): 36.7 (12-08-18 @ 04:50), Max: 36.7 (12-08-18 @ 04:50)  HR: 77 (12-08-18 @ 16:14) (66 - 79)  BP: 142/79 (12-08-18 @ 07:15) (142/79 - 211/108)  RR: 12 (12-08-18 @ 07:45) (12 - 18)  SpO2: 100% (12-08-18 @ 16:14) (97% - 100%)  Wt(kg): --    I&O's Summary

## 2018-12-08 NOTE — PROCEDURE NOTE - NSPROCDETAILS_GEN_ALL_CORE
positive blood return obtained via catheter/sutured in place/hemostasis with direct pressure, dressing applied/connected to a pressurized flush line/all materials/supplies accounted for at end of procedure/location identified, draped/prepped, sterile technique used, needle inserted/introduced

## 2018-12-08 NOTE — BRIEF OPERATIVE NOTE - PROCEDURE
<<-----Click on this checkbox to enter Procedure Aortic dissection repair  12/08/2018  Type A Aortic Dissection, supracoronary.  26 Hemashield Graft hemiarch repair  Active  AMATTIA

## 2018-12-08 NOTE — PROCEDURE NOTE - NSPROCDETAILS_GEN_ALL_CORE
location identified, draped/prepped, sterile technique used, needle inserted/introduced/hemostasis with direct pressure, dressing applied/positive blood return obtained via catheter/Seldinger technique/all materials/supplies accounted for at end of procedure/connected to a pressurized flush line

## 2018-12-08 NOTE — ED PROVIDER NOTE - OBJECTIVE STATEMENT
37M with pmh HTN, asthma, Kallman syndrome presenting with chest and back pain x5 hours, constant, radiating to left arm and neck. Patient states he was having intercourse when pain started and thought it was his asthma however, symptoms continued to worsen. States it feels like his back is spasming. No fever, chills, sob, n/v/d, dizziness, headache, dysuria

## 2018-12-08 NOTE — H&P ADULT - ASSESSMENT
37 yr male with hx of HTN , seasonal allg and food allg. pt c/o of substernal chest spasm at 1130 pm , pt took some motrin and fell asleep. pt woke up at 2 am with similiar pain . pt went to ER for further management .   pt with CTA showing TYpe A dissection , pt transferred for sx.

## 2018-12-08 NOTE — ED ADULT NURSE NOTE - NSIMPLEMENTINTERV_GEN_ALL_ED
Implemented All Universal Safety Interventions:  [place] to call system. Call bell, personal items and telephone within reach. Instruct patient to call for assistance. Room bathroom lighting operational. Non-slip footwear when patient is off stretcher. Physically safe environment: no spills, clutter or unnecessary equipment. Stretcher in lowest position, wheels locked, appropriate side rails in place.

## 2018-12-08 NOTE — H&P ADULT - NSHPPHYSICALEXAM_GEN_ALL_CORE
Appearance: Normal	  HEENT:   Normal oral mucosa, PERRL, EOMI	  Lymphatic: No lymphadenopathy , no edema  Cardiovascular: Normal S1 S2, No JVD, No murmurs , Peripheral pulses palpable 2+ bilaterally  Respiratory: Lungs clear to auscultation, normal effort 	  Gastrointestinal:  Soft, Non-tender, + BS	  Skin: No rashes, No ecchymoses, No cyanosis, warm to touch  Musculoskeletal: Normal range of motion, normal strength  Psychiatry:  Mood & affect appropriate

## 2018-12-09 LAB
ALBUMIN SERPL ELPH-MCNC: 4 G/DL — SIGNIFICANT CHANGE UP (ref 3.3–5)
ALP SERPL-CCNC: 57 U/L — SIGNIFICANT CHANGE UP (ref 40–120)
ALT FLD-CCNC: 19 U/L — SIGNIFICANT CHANGE UP (ref 10–45)
ANION GAP SERPL CALC-SCNC: 13 MMOL/L — SIGNIFICANT CHANGE UP (ref 5–17)
APTT BLD: 25.9 SEC — LOW (ref 27.5–36.3)
AST SERPL-CCNC: 53 U/L — HIGH (ref 10–40)
BASE EXCESS BLDV CALC-SCNC: 0.9 MMOL/L — SIGNIFICANT CHANGE UP (ref -2–2)
BILIRUB SERPL-MCNC: 2.4 MG/DL — HIGH (ref 0.2–1.2)
BUN SERPL-MCNC: 19 MG/DL — SIGNIFICANT CHANGE UP (ref 7–23)
CALCIUM SERPL-MCNC: 8.8 MG/DL — SIGNIFICANT CHANGE UP (ref 8.4–10.5)
CHLORIDE SERPL-SCNC: 106 MMOL/L — SIGNIFICANT CHANGE UP (ref 96–108)
CO2 BLDV-SCNC: 28 MMOL/L — SIGNIFICANT CHANGE UP (ref 22–30)
CO2 SERPL-SCNC: 23 MMOL/L — SIGNIFICANT CHANGE UP (ref 22–31)
CREAT SERPL-MCNC: 1.34 MG/DL — HIGH (ref 0.5–1.3)
GAS PNL BLDA: SIGNIFICANT CHANGE UP
GLUCOSE BLDC GLUCOMTR-MCNC: 132 MG/DL — HIGH (ref 70–99)
GLUCOSE BLDC GLUCOMTR-MCNC: 138 MG/DL — HIGH (ref 70–99)
GLUCOSE BLDC GLUCOMTR-MCNC: 154 MG/DL — HIGH (ref 70–99)
GLUCOSE BLDC GLUCOMTR-MCNC: 181 MG/DL — HIGH (ref 70–99)
GLUCOSE BLDC GLUCOMTR-MCNC: 193 MG/DL — HIGH (ref 70–99)
GLUCOSE BLDC GLUCOMTR-MCNC: 203 MG/DL — HIGH (ref 70–99)
GLUCOSE BLDC GLUCOMTR-MCNC: 211 MG/DL — HIGH (ref 70–99)
GLUCOSE BLDC GLUCOMTR-MCNC: 222 MG/DL — HIGH (ref 70–99)
GLUCOSE BLDC GLUCOMTR-MCNC: 228 MG/DL — HIGH (ref 70–99)
GLUCOSE SERPL-MCNC: 133 MG/DL — HIGH (ref 70–99)
HCO3 BLDV-SCNC: 26 MMOL/L — SIGNIFICANT CHANGE UP (ref 21–29)
HCT VFR BLD CALC: 29.2 % — LOW (ref 39–50)
HGB BLD-MCNC: 10.6 G/DL — LOW (ref 13–17)
HOROWITZ INDEX BLDV+IHG-RTO: 60 — SIGNIFICANT CHANGE UP
INR BLD: 1.13 RATIO — SIGNIFICANT CHANGE UP (ref 0.88–1.16)
MAGNESIUM SERPL-MCNC: 2.6 MG/DL — SIGNIFICANT CHANGE UP (ref 1.6–2.6)
MCHC RBC-ENTMCNC: 29.7 PG — SIGNIFICANT CHANGE UP (ref 27–34)
MCHC RBC-ENTMCNC: 36.2 GM/DL — HIGH (ref 32–36)
MCV RBC AUTO: 82.2 FL — SIGNIFICANT CHANGE UP (ref 80–100)
PCO2 BLDV: 48 MMHG — SIGNIFICANT CHANGE UP (ref 35–50)
PH BLDV: 7.35 — SIGNIFICANT CHANGE UP (ref 7.35–7.45)
PHOSPHATE SERPL-MCNC: 4.2 MG/DL — SIGNIFICANT CHANGE UP (ref 2.5–4.5)
PLATELET # BLD AUTO: 134 K/UL — LOW (ref 150–400)
PO2 BLDV: 34 MMHG — SIGNIFICANT CHANGE UP (ref 25–45)
POTASSIUM SERPL-MCNC: 4.3 MMOL/L — SIGNIFICANT CHANGE UP (ref 3.5–5.3)
POTASSIUM SERPL-SCNC: 4.3 MMOL/L — SIGNIFICANT CHANGE UP (ref 3.5–5.3)
PROT SERPL-MCNC: 5.8 G/DL — LOW (ref 6–8.3)
PROTHROM AB SERPL-ACNC: 13.1 SEC — HIGH (ref 10–12.9)
RBC # BLD: 3.55 M/UL — LOW (ref 4.2–5.8)
RBC # FLD: 12.6 % — SIGNIFICANT CHANGE UP (ref 10.3–14.5)
SAO2 % BLDV: 59 % — LOW (ref 67–88)
SODIUM SERPL-SCNC: 142 MMOL/L — SIGNIFICANT CHANGE UP (ref 135–145)
WBC # BLD: 9.4 K/UL — SIGNIFICANT CHANGE UP (ref 3.8–10.5)
WBC # FLD AUTO: 9.4 K/UL — SIGNIFICANT CHANGE UP (ref 3.8–10.5)

## 2018-12-09 PROCEDURE — 71045 X-RAY EXAM CHEST 1 VIEW: CPT | Mod: 26

## 2018-12-09 PROCEDURE — 99291 CRITICAL CARE FIRST HOUR: CPT

## 2018-12-09 PROCEDURE — 93010 ELECTROCARDIOGRAM REPORT: CPT

## 2018-12-09 RX ORDER — TIOTROPIUM BROMIDE 18 UG/1
1 CAPSULE ORAL; RESPIRATORY (INHALATION) DAILY
Qty: 0 | Refills: 0 | Status: DISCONTINUED | OUTPATIENT
Start: 2018-12-09 | End: 2018-12-10

## 2018-12-09 RX ORDER — LABETALOL HCL 100 MG
400 TABLET ORAL EVERY 6 HOURS
Qty: 0 | Refills: 0 | Status: DISCONTINUED | OUTPATIENT
Start: 2018-12-09 | End: 2018-12-09

## 2018-12-09 RX ORDER — PANTOPRAZOLE SODIUM 20 MG/1
40 TABLET, DELAYED RELEASE ORAL
Qty: 0 | Refills: 0 | Status: DISCONTINUED | OUTPATIENT
Start: 2018-12-10 | End: 2018-12-13

## 2018-12-09 RX ORDER — HYDROMORPHONE HYDROCHLORIDE 2 MG/ML
1 INJECTION INTRAMUSCULAR; INTRAVENOUS; SUBCUTANEOUS ONCE
Qty: 0 | Refills: 0 | Status: DISCONTINUED | OUTPATIENT
Start: 2018-12-09 | End: 2018-12-09

## 2018-12-09 RX ORDER — INSULIN LISPRO 100/ML
VIAL (ML) SUBCUTANEOUS AT BEDTIME
Qty: 0 | Refills: 0 | Status: DISCONTINUED | OUTPATIENT
Start: 2018-12-09 | End: 2018-12-13

## 2018-12-09 RX ORDER — HYDROMORPHONE HYDROCHLORIDE 2 MG/ML
0.5 INJECTION INTRAMUSCULAR; INTRAVENOUS; SUBCUTANEOUS ONCE
Qty: 0 | Refills: 0 | Status: DISCONTINUED | OUTPATIENT
Start: 2018-12-09 | End: 2018-12-09

## 2018-12-09 RX ORDER — FUROSEMIDE 40 MG
20 TABLET ORAL ONCE
Qty: 0 | Refills: 0 | Status: COMPLETED | OUTPATIENT
Start: 2018-12-09 | End: 2018-12-09

## 2018-12-09 RX ORDER — IPRATROPIUM/ALBUTEROL SULFATE 18-103MCG
3 AEROSOL WITH ADAPTER (GRAM) INHALATION EVERY 6 HOURS
Qty: 0 | Refills: 0 | Status: DISCONTINUED | OUTPATIENT
Start: 2018-12-09 | End: 2018-12-10

## 2018-12-09 RX ORDER — INSULIN LISPRO 100/ML
VIAL (ML) SUBCUTANEOUS
Qty: 0 | Refills: 0 | Status: DISCONTINUED | OUTPATIENT
Start: 2018-12-09 | End: 2018-12-13

## 2018-12-09 RX ORDER — BUDESONIDE AND FORMOTEROL FUMARATE DIHYDRATE 160; 4.5 UG/1; UG/1
2 AEROSOL RESPIRATORY (INHALATION)
Qty: 0 | Refills: 0 | Status: DISCONTINUED | OUTPATIENT
Start: 2018-12-09 | End: 2018-12-10

## 2018-12-09 RX ORDER — HUMAN INSULIN 100 [IU]/ML
8 INJECTION, SUSPENSION SUBCUTANEOUS ONCE
Qty: 0 | Refills: 0 | Status: COMPLETED | OUTPATIENT
Start: 2018-12-09 | End: 2018-12-09

## 2018-12-09 RX ORDER — LOSARTAN POTASSIUM 100 MG/1
25 TABLET, FILM COATED ORAL DAILY
Qty: 0 | Refills: 0 | Status: DISCONTINUED | OUTPATIENT
Start: 2018-12-09 | End: 2018-12-12

## 2018-12-09 RX ORDER — ENOXAPARIN SODIUM 100 MG/ML
40 INJECTION SUBCUTANEOUS DAILY
Qty: 0 | Refills: 0 | Status: DISCONTINUED | OUTPATIENT
Start: 2018-12-09 | End: 2018-12-13

## 2018-12-09 RX ORDER — SPIRONOLACTONE 25 MG/1
25 TABLET, FILM COATED ORAL DAILY
Qty: 0 | Refills: 0 | Status: DISCONTINUED | OUTPATIENT
Start: 2018-12-09 | End: 2018-12-12

## 2018-12-09 RX ORDER — LABETALOL HCL 100 MG
400 TABLET ORAL EVERY 8 HOURS
Qty: 0 | Refills: 0 | Status: DISCONTINUED | OUTPATIENT
Start: 2018-12-09 | End: 2018-12-09

## 2018-12-09 RX ORDER — ALBUTEROL 90 UG/1
1 AEROSOL, METERED ORAL EVERY 4 HOURS
Qty: 0 | Refills: 0 | Status: DISCONTINUED | OUTPATIENT
Start: 2018-12-09 | End: 2018-12-10

## 2018-12-09 RX ORDER — FUROSEMIDE 40 MG
40 TABLET ORAL ONCE
Qty: 0 | Refills: 0 | Status: COMPLETED | OUTPATIENT
Start: 2018-12-09 | End: 2018-12-09

## 2018-12-09 RX ORDER — OXYCODONE AND ACETAMINOPHEN 5; 325 MG/1; MG/1
1 TABLET ORAL EVERY 6 HOURS
Qty: 0 | Refills: 0 | Status: DISCONTINUED | OUTPATIENT
Start: 2018-12-09 | End: 2018-12-13

## 2018-12-09 RX ORDER — OXYCODONE AND ACETAMINOPHEN 5; 325 MG/1; MG/1
2 TABLET ORAL EVERY 6 HOURS
Qty: 0 | Refills: 0 | Status: DISCONTINUED | OUTPATIENT
Start: 2018-12-09 | End: 2018-12-13

## 2018-12-09 RX ORDER — KETOROLAC TROMETHAMINE 30 MG/ML
30 SYRINGE (ML) INJECTION EVERY 8 HOURS
Qty: 0 | Refills: 0 | Status: DISCONTINUED | OUTPATIENT
Start: 2018-12-09 | End: 2018-12-11

## 2018-12-09 RX ORDER — ACETAMINOPHEN 500 MG
1000 TABLET ORAL ONCE
Qty: 0 | Refills: 0 | Status: COMPLETED | OUTPATIENT
Start: 2018-12-09 | End: 2018-12-09

## 2018-12-09 RX ORDER — LABETALOL HCL 100 MG
600 TABLET ORAL EVERY 6 HOURS
Qty: 0 | Refills: 0 | Status: DISCONTINUED | OUTPATIENT
Start: 2018-12-09 | End: 2018-12-10

## 2018-12-09 RX ORDER — SPIRONOLACTONE 25 MG/1
25 TABLET, FILM COATED ORAL ONCE
Qty: 0 | Refills: 0 | Status: COMPLETED | OUTPATIENT
Start: 2018-12-09 | End: 2018-12-09

## 2018-12-09 RX ORDER — LABETALOL HCL 100 MG
10 TABLET ORAL ONCE
Qty: 0 | Refills: 0 | Status: COMPLETED | OUTPATIENT
Start: 2018-12-09 | End: 2018-12-09

## 2018-12-09 RX ADMIN — Medication 100 MILLIGRAM(S): at 17:12

## 2018-12-09 RX ADMIN — Medication 10 MILLIGRAM(S): at 13:14

## 2018-12-09 RX ADMIN — Medication 1000 MILLIGRAM(S): at 10:03

## 2018-12-09 RX ADMIN — HUMAN INSULIN 8 UNIT(S): 100 INJECTION, SUSPENSION SUBCUTANEOUS at 13:26

## 2018-12-09 RX ADMIN — CHLORHEXIDINE GLUCONATE 5 MILLILITER(S): 213 SOLUTION TOPICAL at 01:57

## 2018-12-09 RX ADMIN — Medication 100 MILLIGRAM(S): at 01:56

## 2018-12-09 RX ADMIN — HYDROMORPHONE HYDROCHLORIDE 0.5 MILLIGRAM(S): 2 INJECTION INTRAMUSCULAR; INTRAVENOUS; SUBCUTANEOUS at 08:04

## 2018-12-09 RX ADMIN — HYDROMORPHONE HYDROCHLORIDE 0.5 MILLIGRAM(S): 2 INJECTION INTRAMUSCULAR; INTRAVENOUS; SUBCUTANEOUS at 10:23

## 2018-12-09 RX ADMIN — Medication 600 MILLIGRAM(S): at 23:48

## 2018-12-09 RX ADMIN — Medication 81 MILLIGRAM(S): at 11:45

## 2018-12-09 RX ADMIN — Medication 100 MILLIGRAM(S): at 13:14

## 2018-12-09 RX ADMIN — NICARDIPINE HYDROCHLORIDE 25 MG/HR: 30 CAPSULE, EXTENDED RELEASE ORAL at 09:45

## 2018-12-09 RX ADMIN — Medication 30 MILLIGRAM(S): at 18:12

## 2018-12-09 RX ADMIN — LOSARTAN POTASSIUM 25 MILLIGRAM(S): 100 TABLET, FILM COATED ORAL at 23:48

## 2018-12-09 RX ADMIN — HYDROMORPHONE HYDROCHLORIDE 0.5 MILLIGRAM(S): 2 INJECTION INTRAMUSCULAR; INTRAVENOUS; SUBCUTANEOUS at 15:39

## 2018-12-09 RX ADMIN — Medication 400 MILLIGRAM(S): at 10:25

## 2018-12-09 RX ADMIN — Medication 400 MILLIGRAM(S): at 17:11

## 2018-12-09 RX ADMIN — Medication 10 MILLIGRAM(S): at 05:12

## 2018-12-09 RX ADMIN — Medication 30 MILLIGRAM(S): at 10:56

## 2018-12-09 RX ADMIN — OXYCODONE AND ACETAMINOPHEN 2 TABLET(S): 5; 325 TABLET ORAL at 18:42

## 2018-12-09 RX ADMIN — INSULIN HUMAN 1 UNIT(S)/HR: 100 INJECTION, SOLUTION SUBCUTANEOUS at 09:46

## 2018-12-09 RX ADMIN — HYDROMORPHONE HYDROCHLORIDE 1 MILLIGRAM(S): 2 INJECTION INTRAMUSCULAR; INTRAVENOUS; SUBCUTANEOUS at 02:00

## 2018-12-09 RX ADMIN — Medication 40 MILLIGRAM(S): at 18:11

## 2018-12-09 RX ADMIN — BUDESONIDE AND FORMOTEROL FUMARATE DIHYDRATE 2 PUFF(S): 160; 4.5 AEROSOL RESPIRATORY (INHALATION) at 22:20

## 2018-12-09 RX ADMIN — Medication 400 MILLIGRAM(S): at 09:33

## 2018-12-09 RX ADMIN — SPIRONOLACTONE 25 MILLIGRAM(S): 25 TABLET, FILM COATED ORAL at 19:22

## 2018-12-09 RX ADMIN — HYDROMORPHONE HYDROCHLORIDE 1 MILLIGRAM(S): 2 INJECTION INTRAMUSCULAR; INTRAVENOUS; SUBCUTANEOUS at 01:45

## 2018-12-09 RX ADMIN — Medication 30 MILLIGRAM(S): at 10:41

## 2018-12-09 RX ADMIN — Medication 100 MILLIGRAM(S): at 21:47

## 2018-12-09 RX ADMIN — SPIRONOLACTONE 25 MILLIGRAM(S): 25 TABLET, FILM COATED ORAL at 23:48

## 2018-12-09 RX ADMIN — HYDROMORPHONE HYDROCHLORIDE 0.5 MILLIGRAM(S): 2 INJECTION INTRAMUSCULAR; INTRAVENOUS; SUBCUTANEOUS at 08:19

## 2018-12-09 RX ADMIN — Medication 10 MILLIGRAM(S): at 21:47

## 2018-12-09 RX ADMIN — Medication 30 MILLIGRAM(S): at 18:26

## 2018-12-09 RX ADMIN — MILRINONE LACTATE 14.57 MICROGRAM(S)/KG/MIN: 1 INJECTION, SOLUTION INTRAVENOUS at 09:45

## 2018-12-09 RX ADMIN — Medication 100 MILLIGRAM(S): at 10:23

## 2018-12-09 RX ADMIN — HYDROMORPHONE HYDROCHLORIDE 0.5 MILLIGRAM(S): 2 INJECTION INTRAMUSCULAR; INTRAVENOUS; SUBCUTANEOUS at 10:38

## 2018-12-09 RX ADMIN — ENOXAPARIN SODIUM 40 MILLIGRAM(S): 100 INJECTION SUBCUTANEOUS at 11:45

## 2018-12-09 RX ADMIN — OXYCODONE AND ACETAMINOPHEN 2 TABLET(S): 5; 325 TABLET ORAL at 18:12

## 2018-12-09 RX ADMIN — Medication 3 MILLILITER(S): at 23:53

## 2018-12-09 RX ADMIN — HYDROMORPHONE HYDROCHLORIDE 0.5 MILLIGRAM(S): 2 INJECTION INTRAMUSCULAR; INTRAVENOUS; SUBCUTANEOUS at 15:54

## 2018-12-09 RX ADMIN — SODIUM CHLORIDE 10 MILLILITER(S): 9 INJECTION INTRAMUSCULAR; INTRAVENOUS; SUBCUTANEOUS at 09:46

## 2018-12-09 RX ADMIN — Medication 2: at 12:28

## 2018-12-09 RX ADMIN — Medication 40 MILLIGRAM(S): at 23:48

## 2018-12-09 RX ADMIN — PANTOPRAZOLE SODIUM 40 MILLIGRAM(S): 20 TABLET, DELAYED RELEASE ORAL at 11:45

## 2018-12-09 RX ADMIN — Medication 10 MILLIGRAM(S): at 17:24

## 2018-12-09 RX ADMIN — Medication 20 MILLIGRAM(S): at 13:14

## 2018-12-09 NOTE — PHYSICAL THERAPY INITIAL EVALUATION ADULT - GENERAL OBSERVATIONS, REHAB EVAL
Received in chair, NAD. Received in chair, NAD. + external pacer, sternal incision, NRB mask, radial farooq, IV, IJ

## 2018-12-09 NOTE — PHYSICAL THERAPY INITIAL EVALUATION ADULT - PERTINENT HX OF CURRENT PROBLEM, REHAB EVAL
. pt c/o of substernal chest spasm at 1130 pm , pt took some motrin and fell asleep. pt woke up at 2 am with similiar pain . pt went to ER for further management .

## 2018-12-09 NOTE — PHYSICAL THERAPY INITIAL EVALUATION ADULT - PLANNED THERAPY INTERVENTIONS, PT EVAL
bed mobility training/strengthening/transfer training/Pt will negotiate up/down 12  steps independently with railing in 4 weeks/balance training/gait training

## 2018-12-09 NOTE — AIRWAY REMOVAL NOTE  ADULT & PEDS - ARTIFICAL AIRWAY REMOVAL COMMENTS
Written order for extubation verified. The patient was identified by full name and birth date compared to the identification band. Present during the procedure was Teresa Kearns RN.

## 2018-12-09 NOTE — PHYSICAL THERAPY INITIAL EVALUATION ADULT - ACTIVE RANGE OF MOTION EXAMINATION, REHAB EVAL
titi. upper extremity Active ROM was WNL (within normal limits)/bilateral lower extremity Active ROM was WNL (within normal limits)

## 2018-12-09 NOTE — PROGRESS NOTE ADULT - SUBJECTIVE AND OBJECTIVE BOX
JOANNE QUINTANA   MRN#: [id number]     The patient is a 37y Male who was seen, evaluated, & examined with the CTICU staff on rounds with a multidisciplinary care plan formulated & implemented.  All available clinical, laboratory, radiographic, pharmacologic, and electrocardiographic data were reviewed & analyzed.      The patient was in the CTICU in critical condition secondary to:     cardiogenic shock-cardiovascular dysfunction  uncontrolled Type II Diabetes melitus-stress hyperglycemia    For support and evaluation & prevention of further decompensation secondary to persistent cardiopulmonary dysfunction and cardiogenic shock-cardiovascular dysfunction, respiratory status required:     supplemental oxygen with nasal cannula  continuous pulse oximetry monitoring  following ABG’s with A-line monitoring    Invasive hemodynamic monitoring with an A-line was required for continuous MAP/BP monitoring     to ensure adequate cardiovascular support and to evaluate for & help prevent decompensation while receiving     intermittent volume expansion  IV Primacor infusion  IV Cardene infusion    secondary to cardiogenic shock-cardiovascular dysfunction    Metabolic stability, uncontrolled type II Diabetes mellitus-stress hyperglycemia, & infection prophylaxis required an IV regular Insulin drip & the following of serial glucose levels to help achieve & maintain euglycemia.      In addition:  Extubated this AM - stable on nasal cannula  Stable hemodynamics on Primacor on Cardene infusions; will bridge with PO Labetalol and attempt to wean both to off  Volume overloaded on exam with JVP 14-16 cm H2O - will diurese for goal 1-1.5L  Bridge off insulin drip with sliding scale      The patient required critical care management and I provided 30 minutes of non-continuous care to the patient in addition to  discussing the patient and plan at length with the CTICU staff and helping coordinate care. JOANNE QUINTANA   MRN#: [id number]     The patient is a 37y Male who was seen, evaluated, & examined with the CTICU staff on rounds with a multidisciplinary care plan formulated & implemented.  All available clinical, laboratory, radiographic, pharmacologic, and electrocardiographic data were reviewed & analyzed.      The patient was in the CTICU in critical condition secondary to:     cardiogenic shock-cardiovascular dysfunction  uncontrolled Type II Diabetes melitus-stress hyperglycemia    For support and evaluation & prevention of further decompensation secondary to persistent cardiopulmonary dysfunction and cardiogenic shock-cardiovascular dysfunction, respiratory status required:     supplemental oxygen with nasal cannula  continuous pulse oximetry monitoring  following ABG’s with A-line monitoring    Invasive hemodynamic monitoring with an A-line was required for continuous MAP/BP monitoring     to ensure adequate cardiovascular support and to evaluate for & help prevent decompensation while receiving     intermittent volume expansion  IV Primacor infusion  IV Cardene infusion    secondary to cardiogenic shock-cardiovascular dysfunction    Metabolic stability, uncontrolled type II Diabetes mellitus-stress hyperglycemia, & infection prophylaxis required an IV regular Insulin drip & the following of serial glucose levels to help achieve & maintain euglycemia.      In addition:  Extubated this AM - stable on nasal cannula  Stable hemodynamics on Primacor on Cardene infusions; will bridge with PO Labetalol and attempt to wean both to off  Volume overloaded on exam with JVP 14-16 cm H2O - will diurese for goal 1-1.5L  Bridge off insulin drip with sliding scale    The patient required critical care management and I provided 30 minutes of non-continuous care to the patient in addition to  discussing the patient and plan at length with the CTICU staff and helping coordinate care.

## 2018-12-10 DIAGNOSIS — Z98.890 OTHER SPECIFIED POSTPROCEDURAL STATES: ICD-10-CM

## 2018-12-10 LAB
ALBUMIN SERPL ELPH-MCNC: 3.7 G/DL — SIGNIFICANT CHANGE UP (ref 3.3–5)
ALP SERPL-CCNC: 54 U/L — SIGNIFICANT CHANGE UP (ref 40–120)
ALT FLD-CCNC: 22 U/L — SIGNIFICANT CHANGE UP (ref 10–45)
ANION GAP SERPL CALC-SCNC: 15 MMOL/L — SIGNIFICANT CHANGE UP (ref 5–17)
APTT BLD: 27.6 SEC — SIGNIFICANT CHANGE UP (ref 27.5–36.3)
AST SERPL-CCNC: 35 U/L — SIGNIFICANT CHANGE UP (ref 10–40)
BILIRUB SERPL-MCNC: 0.8 MG/DL — SIGNIFICANT CHANGE UP (ref 0.2–1.2)
BUN SERPL-MCNC: 38 MG/DL — HIGH (ref 7–23)
CALCIUM SERPL-MCNC: 8.2 MG/DL — LOW (ref 8.4–10.5)
CHLORIDE SERPL-SCNC: 101 MMOL/L — SIGNIFICANT CHANGE UP (ref 96–108)
CO2 SERPL-SCNC: 22 MMOL/L — SIGNIFICANT CHANGE UP (ref 22–31)
CREAT SERPL-MCNC: 1.31 MG/DL — HIGH (ref 0.5–1.3)
GAS PNL BLDA: SIGNIFICANT CHANGE UP
GLUCOSE BLDC GLUCOMTR-MCNC: 139 MG/DL — HIGH (ref 70–99)
GLUCOSE BLDC GLUCOMTR-MCNC: 144 MG/DL — HIGH (ref 70–99)
GLUCOSE BLDC GLUCOMTR-MCNC: 190 MG/DL — HIGH (ref 70–99)
GLUCOSE SERPL-MCNC: 176 MG/DL — HIGH (ref 70–99)
HCT VFR BLD CALC: 28.5 % — LOW (ref 39–50)
HGB BLD-MCNC: 10.1 G/DL — LOW (ref 13–17)
INR BLD: 1.23 RATIO — HIGH (ref 0.88–1.16)
MAGNESIUM SERPL-MCNC: 2.4 MG/DL — SIGNIFICANT CHANGE UP (ref 1.6–2.6)
MCHC RBC-ENTMCNC: 29.7 PG — SIGNIFICANT CHANGE UP (ref 27–34)
MCHC RBC-ENTMCNC: 35.5 GM/DL — SIGNIFICANT CHANGE UP (ref 32–36)
MCV RBC AUTO: 83.8 FL — SIGNIFICANT CHANGE UP (ref 80–100)
PHOSPHATE SERPL-MCNC: 5 MG/DL — HIGH (ref 2.5–4.5)
PLATELET # BLD AUTO: 140 K/UL — LOW (ref 150–400)
POTASSIUM BLDA-SCNC: 3.9 MMOL/L — SIGNIFICANT CHANGE UP (ref 3.5–5.3)
POTASSIUM SERPL-MCNC: 4.1 MMOL/L — SIGNIFICANT CHANGE UP (ref 3.5–5.3)
POTASSIUM SERPL-SCNC: 4.1 MMOL/L — SIGNIFICANT CHANGE UP (ref 3.5–5.3)
PROT SERPL-MCNC: 5.9 G/DL — LOW (ref 6–8.3)
PROTHROM AB SERPL-ACNC: 14.1 SEC — HIGH (ref 10–12.9)
RBC # BLD: 3.41 M/UL — LOW (ref 4.2–5.8)
RBC # FLD: 12.8 % — SIGNIFICANT CHANGE UP (ref 10.3–14.5)
SODIUM SERPL-SCNC: 138 MMOL/L — SIGNIFICANT CHANGE UP (ref 135–145)
WBC # BLD: 15.4 K/UL — HIGH (ref 3.8–10.5)
WBC # FLD AUTO: 15.4 K/UL — HIGH (ref 3.8–10.5)

## 2018-12-10 PROCEDURE — 71045 X-RAY EXAM CHEST 1 VIEW: CPT | Mod: 26

## 2018-12-10 PROCEDURE — 99291 CRITICAL CARE FIRST HOUR: CPT

## 2018-12-10 PROCEDURE — 99292 CRITICAL CARE ADDL 30 MIN: CPT | Mod: 24

## 2018-12-10 PROCEDURE — 93010 ELECTROCARDIOGRAM REPORT: CPT

## 2018-12-10 PROCEDURE — 93010 ELECTROCARDIOGRAM REPORT: CPT | Mod: 77

## 2018-12-10 RX ORDER — HYDROMORPHONE HYDROCHLORIDE 2 MG/ML
0.5 INJECTION INTRAMUSCULAR; INTRAVENOUS; SUBCUTANEOUS ONCE
Qty: 0 | Refills: 0 | Status: DISCONTINUED | OUTPATIENT
Start: 2018-12-10 | End: 2018-12-10

## 2018-12-10 RX ORDER — FUROSEMIDE 40 MG
40 TABLET ORAL DAILY
Qty: 0 | Refills: 0 | Status: DISCONTINUED | OUTPATIENT
Start: 2018-12-10 | End: 2018-12-13

## 2018-12-10 RX ORDER — POTASSIUM CHLORIDE 20 MEQ
40 PACKET (EA) ORAL ONCE
Qty: 0 | Refills: 0 | Status: COMPLETED | OUTPATIENT
Start: 2018-12-10 | End: 2018-12-10

## 2018-12-10 RX ORDER — LABETALOL HCL 100 MG
800 TABLET ORAL EVERY 8 HOURS
Qty: 0 | Refills: 0 | Status: DISCONTINUED | OUTPATIENT
Start: 2018-12-10 | End: 2018-12-13

## 2018-12-10 RX ORDER — SODIUM CHLORIDE 9 MG/ML
3 INJECTION INTRAMUSCULAR; INTRAVENOUS; SUBCUTANEOUS EVERY 8 HOURS
Qty: 0 | Refills: 0 | Status: DISCONTINUED | OUTPATIENT
Start: 2018-12-10 | End: 2018-12-13

## 2018-12-10 RX ADMIN — Medication 3 MILLILITER(S): at 05:06

## 2018-12-10 RX ADMIN — Medication 800 MILLIGRAM(S): at 13:18

## 2018-12-10 RX ADMIN — BUDESONIDE AND FORMOTEROL FUMARATE DIHYDRATE 2 PUFF(S): 160; 4.5 AEROSOL RESPIRATORY (INHALATION) at 05:09

## 2018-12-10 RX ADMIN — Medication 30 MILLIGRAM(S): at 03:00

## 2018-12-10 RX ADMIN — OXYCODONE AND ACETAMINOPHEN 2 TABLET(S): 5; 325 TABLET ORAL at 07:16

## 2018-12-10 RX ADMIN — Medication 600 MILLIGRAM(S): at 05:24

## 2018-12-10 RX ADMIN — PANTOPRAZOLE SODIUM 40 MILLIGRAM(S): 20 TABLET, DELAYED RELEASE ORAL at 08:32

## 2018-12-10 RX ADMIN — Medication 30 MILLIGRAM(S): at 12:00

## 2018-12-10 RX ADMIN — Medication 40 MILLIEQUIVALENT(S): at 05:25

## 2018-12-10 RX ADMIN — SODIUM CHLORIDE 3 MILLILITER(S): 9 INJECTION INTRAMUSCULAR; INTRAVENOUS; SUBCUTANEOUS at 21:33

## 2018-12-10 RX ADMIN — Medication 800 MILLIGRAM(S): at 21:34

## 2018-12-10 RX ADMIN — Medication 40 MILLIGRAM(S): at 05:24

## 2018-12-10 RX ADMIN — Medication 81 MILLIGRAM(S): at 11:23

## 2018-12-10 RX ADMIN — OXYCODONE AND ACETAMINOPHEN 1 TABLET(S): 5; 325 TABLET ORAL at 11:26

## 2018-12-10 RX ADMIN — Medication 30 MILLIGRAM(S): at 11:24

## 2018-12-10 RX ADMIN — Medication 2: at 16:35

## 2018-12-10 RX ADMIN — Medication 100 MILLIGRAM(S): at 05:24

## 2018-12-10 RX ADMIN — SODIUM CHLORIDE 10 MILLILITER(S): 9 INJECTION INTRAMUSCULAR; INTRAVENOUS; SUBCUTANEOUS at 05:25

## 2018-12-10 RX ADMIN — Medication 10 MILLIGRAM(S): at 05:25

## 2018-12-10 RX ADMIN — OXYCODONE AND ACETAMINOPHEN 2 TABLET(S): 5; 325 TABLET ORAL at 06:46

## 2018-12-10 RX ADMIN — Medication 30 MILLIGRAM(S): at 02:28

## 2018-12-10 RX ADMIN — SODIUM CHLORIDE 3 MILLILITER(S): 9 INJECTION INTRAMUSCULAR; INTRAVENOUS; SUBCUTANEOUS at 13:19

## 2018-12-10 RX ADMIN — Medication 100 MILLIGRAM(S): at 21:34

## 2018-12-10 RX ADMIN — OXYCODONE AND ACETAMINOPHEN 1 TABLET(S): 5; 325 TABLET ORAL at 12:05

## 2018-12-10 RX ADMIN — Medication 30 MILLIGRAM(S): at 18:09

## 2018-12-10 RX ADMIN — HYDROMORPHONE HYDROCHLORIDE 0.5 MILLIGRAM(S): 2 INJECTION INTRAMUSCULAR; INTRAVENOUS; SUBCUTANEOUS at 07:28

## 2018-12-10 RX ADMIN — Medication 100 MILLIGRAM(S): at 01:58

## 2018-12-10 RX ADMIN — ENOXAPARIN SODIUM 40 MILLIGRAM(S): 100 INJECTION SUBCUTANEOUS at 11:24

## 2018-12-10 RX ADMIN — Medication 2: at 08:32

## 2018-12-10 RX ADMIN — Medication 100 MILLIGRAM(S): at 13:17

## 2018-12-10 RX ADMIN — Medication 10 MILLIGRAM(S): at 13:18

## 2018-12-10 RX ADMIN — HYDROMORPHONE HYDROCHLORIDE 0.5 MILLIGRAM(S): 2 INJECTION INTRAMUSCULAR; INTRAVENOUS; SUBCUTANEOUS at 06:58

## 2018-12-10 NOTE — PROGRESS NOTE ADULT - PROBLEM SELECTOR PLAN 1
Continue with  mg PO Daily.   Continue with Labetalol 800 mg PO TID.   Continue with Losartan 25 mg PO daily.   Continue with Statin   Continue with Lasix 40 mg PO daily and Aldactone 25 mg PO daily   Increase activity as tolerated.   Encourage Chest PT / Pulmonary toileting and Incentive spirometry every 1 hour x 10 while awake.   Continue with PUD and DVT prophylaxis.   Shower on POD #5.   D/C plan.   Plan of care discussed with attending Continue with ASA 81 mg PO Daily.   Continue with Labetalol 800 mg PO TID.   Continue with Losartan 25 mg PO daily.   Continue with Statin   Continue with Lasix 40 mg PO daily and Aldactone 25 mg PO daily   Strict CRISTOBAL's   Daily Weight   Pain management   Glycemic Control   Increase activity as tolerated.   Encourage Chest PT / Pulmonary toileting and Incentive spirometry every 1 hour x 10 while awake.   Continue with PUD and DVT prophylaxis.   Shower on POD #5.   D/C plan.   Plan of care discussed with attending

## 2018-12-10 NOTE — PROGRESS NOTE ADULT - SUBJECTIVE AND OBJECTIVE BOX
JOANNE QUINTANA   MRN#: [id number]     The patient is a 37y Male who was seen, evaluated, & examined with the CTICU staff on rounds with a multidisciplinary care plan formulated & implemented.  All available clinical, laboratory, radiographic, pharmacologic, and electrocardiographic data were reviewed & analyzed.      The patient was in the CTICU in critical condition secondary to:     malignant hypertension-cardiovascular dysfunction    For support and evaluation & prevention of further decompensation secondary to persistent cardiopulmonary dysfunction and cardiogenic shock-cardiovascular dysfunction, respiratory status required:     supplemental oxygen with nasal cannula  continuous pulse oximetry monitoring  following ABG’s with A-line monitoring    Invasive hemodynamic monitoring with an A-line was required for continuous MAP/BP monitoring to ensure adequate cardiovascular support and to evaluate for & help prevent decompensation while receiving IV Cardene infusion secondary to malignant hypertension-cardiovascular dysfunction    In addition:  Extubated and off of Milrinone yesterday  Bridging off of Cardene infusion with PO Labetalol, Aldactone, Lasix and Losartan  Volume overloaded on exam with JVP 14-16 cm H2O - diurese appx 1L negative in last 24 hours, will continue to diurese for goal 1-1.5L net negative fluid balance  Rising WBC with borderline Tmax (99.6) - will monitor    The patient required critical care management and I provided 30 minutes of non-continuous care to the patient in addition to  discussing the patient and plan at length with the CTICU staff and helping coordinate care.

## 2018-12-10 NOTE — PROGRESS NOTE ADULT - SUBJECTIVE AND OBJECTIVE BOX
VITAL SIGNS    Subjective:     Telemetry:      Vital Signs Last 24 Hrs  T(C): 36.8 (12-10-18 @ 13:42), Max: 37.6 (18 @ 23:00)  T(F): 98.2 (12-10-18 @ 13:42), Max: 99.6 (18 @ 23:00)  HR: 86 (12-10-18 @ 13:42) (78 - 108)  BP: 153/67 (12-10-18 @ 13:42) (136/66 - 153/67)  RR: 18 (12-10-18 @ 13:42) (11 - 33)  SpO2: 90% (12-10-18 @ 13:42) (88% - 97%)                    @ 07:01  -  12-10 @ 07:00  --------------------------------------------------------  IN: 2766.4 mL / OUT: 3760 mL / NET: -993.6 mL    12-10 @ 07:01  -  12-10 @ 17:52  --------------------------------------------------------  IN: 270 mL / OUT: 410 mL / NET: -140 mL          Daily     Daily Weight in k.8 (10 Dec 2018 01:00)        CAPILLARY BLOOD GLUCOSE      POCT Blood Glucose.: 190 mg/dL (10 Dec 2018 15:53)  POCT Blood Glucose.: 139 mg/dL (10 Dec 2018 11:48)  POCT Blood Glucose.: 193 mg/dL (09 Dec 2018 21:44)        PHYSICAL EXAM    Neurology: alert and oriented x 3, nonfocal, no gross deficits    CV: (+) S1 and S2, No murmurs, rubs, gallops or clicks     Sternal Wound:  MSI -->CDI , sternum stable    Lungs: CTA B/L     Abdomen: soft, nontender, nondistended, positive bowel sounds, (+) Flatus; (+) BM     :  Voiding               Extremities:  B/L LE (+) edema; negative calf tenderness; (+) 2 DP palpable        aspirin enteric coated 81 milliGRAM(s) Oral daily  docusate sodium 100 milliGRAM(s) Oral three times a day  enoxaparin Injectable 40 milliGRAM(s) SubCutaneous daily  furosemide Tablet 40 milliGRAM(s) Oral daily  insulin lispro (HumaLOG) corrective regimen sliding scale   SubCutaneous three times a day before meals  insulin lispro (HumaLOG) corrective regimen sliding scale   SubCutaneous at bedtime  ketorolac  Injectable 30 milliGRAM(s) IV Push every 8 hours  labetalol 800 milliGRAM(s) Oral every 8 hours  losartan 25 milliGRAM(s) Oral daily  oxyCODONE 5 mG/acetaminophen 325 mG 2 Tablet(s) Oral every 6 hours PRN  oxyCODONE 5 mG/acetaminophen 325 mG 1 Tablet(s) Oral every 6 hours PRN  pantoprazole Tablet 40 milliGRAM(s) Oral before breakfast  spironolactone 25 milliGRAM(s) Oral daily                              Physical Therapy Rec:   Home  [  ]   Home w/ PT  [  ]  Rehab  [  ]    Discussed with Cardiothoracic Team at AM rounds. VITAL SIGNS    Subjective: "I'm feeling tonight." Denies CP, palpitation, SOB, ALVARADO, HA, dizziness, N/V/D, fever or chills.  No acute event noted overnight.     Telemetry: NSR 80-90     Vital Signs Last 24 Hrs  T(C): 36.8 (12-10-18 @ 13:42), Max: 37.6 (18 @ 23:00)  T(F): 98.2 (12-10-18 @ 13:42), Max: 99.6 (18 @ 23:00)  HR: 86 (12-10-18 @ 13:42) (78 - 108)  BP: 153/67 (12-10-18 @ 13:42) (136/66 - 153/67)  RR: 18 (12-10-18 @ 13:42) (11 - 33)  SpO2: 90% (12-10-18 @ 13:42) (88% - 97%)            @ :  -  12-10 @ 07:00  --------------------------------------------------------  IN: 2766.4 mL / OUT: 3760 mL / NET: -993.6 mL    12-10 @ 07:01  -  12-10 @ 17:52  --------------------------------------------------------  IN: 270 mL / OUT: 410 mL / NET: -140 mL    Daily     Daily Weight in k.8 (10 Dec 2018 01:00)    CAPILLARY BLOOD GLUCOSE    POCT Blood Glucose.: 190 mg/dL (10 Dec 2018 15:53)  POCT Blood Glucose.: 139 mg/dL (10 Dec 2018 11:48)  POCT Blood Glucose.: 193 mg/dL (09 Dec 2018 21:44)        PHYSICAL EXAM    Neurology: alert and oriented x 3, nonfocal, no gross deficits    CV: (+) S1 and S2, No murmurs, rubs, gallops or clicks     Sternal Wound:  MSI -->CDI , sternum stable    Lungs: CTA B/L     Abdomen: soft, nontender, nondistended, positive bowel sounds, (+) Flatus; (+) BM     :  Voiding               Extremities:  B/L LE (+) edema; negative calf tenderness; (+) 2 DP palpable        aspirin enteric coated 81 milliGRAM(s) Oral daily  docusate sodium 100 milliGRAM(s) Oral three times a day  enoxaparin Injectable 40 milliGRAM(s) SubCutaneous daily  furosemide Tablet 40 milliGRAM(s) Oral daily  insulin lispro (HumaLOG) corrective regimen sliding scale   SubCutaneous three times a day before meals  insulin lispro (HumaLOG) corrective regimen sliding scale   SubCutaneous at bedtime  ketorolac  Injectable 30 milliGRAM(s) IV Push every 8 hours  labetalol 800 milliGRAM(s) Oral every 8 hours  losartan 25 milliGRAM(s) Oral daily  oxyCODONE 5 mG/acetaminophen 325 mG 2 Tablet(s) Oral every 6 hours PRN  oxyCODONE 5 mG/acetaminophen 325 mG 1 Tablet(s) Oral every 6 hours PRN  pantoprazole Tablet 40 milliGRAM(s) Oral before breakfast  spironolactone 25 milliGRAM(s) Oral daily    Physical Therapy Rec:   Home  [  ]   Home w/ PT  [ X ]  Rehab  [  ]    Discussed with Cardiothoracic Team at AM rounds. VITAL SIGNS    Subjective: "I'm feeling tonight." Denies CP, palpitation, SOB, ALVARADO, HA, dizziness, N/V/D, fever or chills.  No acute event noted overnight.     Telemetry: NSR 80-90     Vital Signs Last 24 Hrs  T(C): 36.8 (12-10-18 @ 13:42), Max: 37.6 (18 @ 23:00)  T(F): 98.2 (12-10-18 @ 13:42), Max: 99.6 (18 @ 23:00)  HR: 86 (12-10-18 @ 13:42) (78 - 108)  BP: 153/67 (12-10-18 @ 13:42) (136/66 - 153/67)  RR: 18 (12-10-18 @ 13:42) (11 - 33)  SpO2: 90% (12-10-18 @ 13:42) (88% - 97%)            @ :  -  12-10 @ 07:00  --------------------------------------------------------  IN: 2766.4 mL / OUT: 3760 mL / NET: -993.6 mL    12-10 @ 07:01  -  12-10 @ 17:52  --------------------------------------------------------  IN: 270 mL / OUT: 410 mL / NET: -140 mL    Daily     Daily Weight in k.8 (10 Dec 2018 01:00)    CAPILLARY BLOOD GLUCOSE    POCT Blood Glucose.: 190 mg/dL (10 Dec 2018 15:53)  POCT Blood Glucose.: 139 mg/dL (10 Dec 2018 11:48)  POCT Blood Glucose.: 193 mg/dL (09 Dec 2018 21:44)        PHYSICAL EXAM    Neurology: alert and oriented x 3, nonfocal, no gross deficits    CV: (+) S1 and S2, No murmurs, rubs, gallops or clicks     Sternal Wound:  MSI -->CDI , sternum stable; PW x 2 --> EPM     Lungs: CTA B/L     Abdomen: soft, nontender, nondistended, positive bowel sounds, (+) Flatus; (+) BM     :  Voiding               Extremities:  B/L LE (+) edema; negative calf tenderness; (+) 2 DP palpable        aspirin enteric coated 81 milliGRAM(s) Oral daily  docusate sodium 100 milliGRAM(s) Oral three times a day  enoxaparin Injectable 40 milliGRAM(s) SubCutaneous daily  furosemide Tablet 40 milliGRAM(s) Oral daily  insulin lispro (HumaLOG) corrective regimen sliding scale SubCutaneous three times a day before meals  insulin lispro (HumaLOG) corrective regimen sliding scale SubCutaneous at bedtime  ketorolac  Injectable 30 milliGRAM(s) IV Push every 8 hours  labetalol 800 milliGRAM(s) Oral every 8 hours  losartan 25 milliGRAM(s) Oral daily  oxyCODONE 5 mG/acetaminophen 325 mG 2 Tablet(s) Oral every 6 hours PRN  oxyCODONE 5 mG/acetaminophen 325 mG 1 Tablet(s) Oral every 6 hours PRN  pantoprazole Tablet 40 milliGRAM(s) Oral before breakfast  spironolactone 25 milliGRAM(s) Oral daily    Physical Therapy Rec:   Home  [  ]   Home w/ PT  [ X ]  Rehab  [  ]    Discussed with Cardiothoracic Team at AM rounds.

## 2018-12-10 NOTE — PROGRESS NOTE ADULT - SUBJECTIVE AND OBJECTIVE BOX
JOANNE QUINTANA   MRN#: [id number]     The patient is a 37y Male who was seen, evaluated, & examined with the CTICU staff on rounds with a multidisciplinary care plan formulated & implemented.  All available clinical, laboratory, radiographic, pharmacologic, and electrocardiographic data were reviewed & analyzed.      The patient was in the CTICU in critical condition secondary to:     malignant hypertension-cardiovascular dysfunction    For support and evaluation & prevention of further decompensation secondary to persistent cardiopulmonary dysfunction and cardiogenic shock-cardiovascular dysfunction, respiratory status required:     supplemental oxygen with nasal cannula  continuous pulse oximetry monitoring  following ABG’s with A-line monitoring    Invasive hemodynamic monitoring with an A-line was required for continuous MAP/BP monitoring to ensure adequate cardiovascular support and to evaluate for & help prevent decompensation while receiving IV Cardene infusion secondary to malignant hypertension-cardiovascular dysfunction    In addition:  Extubated and off of Milrinone yesterday  Bridging off of Cardene infusion with PO Labetalol, Aldactone, Lasix and Losartan  Volume overloaded on exam with JVP 14-16 cm H2O - diurese appx 1L negative in last 24 hours, will continue to diurese for goal 1-1.5L net negative fluid balance  Rising WBC with borderline Tmax (99.6) - will monitor    The patient required critical care management and I provided 30 minutes of non-continuous care to the patient in addition to  discussing the patient and plan at length with the CTICU staff and helping coordinate care.

## 2018-12-11 LAB
ANION GAP SERPL CALC-SCNC: 11 MMOL/L — SIGNIFICANT CHANGE UP (ref 5–17)
BUN SERPL-MCNC: 43 MG/DL — HIGH (ref 7–23)
CALCIUM SERPL-MCNC: 8.4 MG/DL — SIGNIFICANT CHANGE UP (ref 8.4–10.5)
CHLORIDE SERPL-SCNC: 102 MMOL/L — SIGNIFICANT CHANGE UP (ref 96–108)
CO2 SERPL-SCNC: 25 MMOL/L — SIGNIFICANT CHANGE UP (ref 22–31)
CREAT SERPL-MCNC: 1.35 MG/DL — HIGH (ref 0.5–1.3)
GLUCOSE BLDC GLUCOMTR-MCNC: 129 MG/DL — HIGH (ref 70–99)
GLUCOSE BLDC GLUCOMTR-MCNC: 136 MG/DL — HIGH (ref 70–99)
GLUCOSE BLDC GLUCOMTR-MCNC: 137 MG/DL — HIGH (ref 70–99)
GLUCOSE BLDC GLUCOMTR-MCNC: 149 MG/DL — HIGH (ref 70–99)
GLUCOSE SERPL-MCNC: 129 MG/DL — HIGH (ref 70–99)
HCT VFR BLD CALC: 27.5 % — LOW (ref 39–50)
HGB BLD-MCNC: 9.6 G/DL — LOW (ref 13–17)
INR BLD: 1.08 RATIO — SIGNIFICANT CHANGE UP (ref 0.88–1.16)
MCHC RBC-ENTMCNC: 29.8 PG — SIGNIFICANT CHANGE UP (ref 27–34)
MCHC RBC-ENTMCNC: 35 GM/DL — SIGNIFICANT CHANGE UP (ref 32–36)
MCV RBC AUTO: 85.2 FL — SIGNIFICANT CHANGE UP (ref 80–100)
PLATELET # BLD AUTO: 149 K/UL — LOW (ref 150–400)
POTASSIUM SERPL-MCNC: 5 MMOL/L — SIGNIFICANT CHANGE UP (ref 3.5–5.3)
POTASSIUM SERPL-SCNC: 5 MMOL/L — SIGNIFICANT CHANGE UP (ref 3.5–5.3)
PROTHROM AB SERPL-ACNC: 12.5 SEC — SIGNIFICANT CHANGE UP (ref 10–12.9)
RBC # BLD: 3.23 M/UL — LOW (ref 4.2–5.8)
RBC # FLD: 12.8 % — SIGNIFICANT CHANGE UP (ref 10.3–14.5)
SODIUM SERPL-SCNC: 138 MMOL/L — SIGNIFICANT CHANGE UP (ref 135–145)
WBC # BLD: 11.9 K/UL — HIGH (ref 3.8–10.5)
WBC # FLD AUTO: 11.9 K/UL — HIGH (ref 3.8–10.5)

## 2018-12-11 PROCEDURE — 71045 X-RAY EXAM CHEST 1 VIEW: CPT | Mod: 26

## 2018-12-11 PROCEDURE — 99232 SBSQ HOSP IP/OBS MODERATE 35: CPT

## 2018-12-11 RX ORDER — ATORVASTATIN CALCIUM 80 MG/1
20 TABLET, FILM COATED ORAL AT BEDTIME
Qty: 0 | Refills: 0 | Status: DISCONTINUED | OUTPATIENT
Start: 2018-12-11 | End: 2018-12-13

## 2018-12-11 RX ADMIN — Medication 100 MILLIGRAM(S): at 06:09

## 2018-12-11 RX ADMIN — SPIRONOLACTONE 25 MILLIGRAM(S): 25 TABLET, FILM COATED ORAL at 06:09

## 2018-12-11 RX ADMIN — PANTOPRAZOLE SODIUM 40 MILLIGRAM(S): 20 TABLET, DELAYED RELEASE ORAL at 06:10

## 2018-12-11 RX ADMIN — SODIUM CHLORIDE 3 MILLILITER(S): 9 INJECTION INTRAMUSCULAR; INTRAVENOUS; SUBCUTANEOUS at 06:10

## 2018-12-11 RX ADMIN — Medication 81 MILLIGRAM(S): at 11:47

## 2018-12-11 RX ADMIN — Medication 100 MILLIGRAM(S): at 14:30

## 2018-12-11 RX ADMIN — Medication 800 MILLIGRAM(S): at 14:31

## 2018-12-11 RX ADMIN — Medication 800 MILLIGRAM(S): at 23:05

## 2018-12-11 RX ADMIN — SODIUM CHLORIDE 3 MILLILITER(S): 9 INJECTION INTRAMUSCULAR; INTRAVENOUS; SUBCUTANEOUS at 14:29

## 2018-12-11 RX ADMIN — Medication 30 MILLIGRAM(S): at 02:38

## 2018-12-11 RX ADMIN — LOSARTAN POTASSIUM 25 MILLIGRAM(S): 100 TABLET, FILM COATED ORAL at 06:09

## 2018-12-11 RX ADMIN — SODIUM CHLORIDE 3 MILLILITER(S): 9 INJECTION INTRAMUSCULAR; INTRAVENOUS; SUBCUTANEOUS at 23:05

## 2018-12-11 RX ADMIN — Medication 40 MILLIGRAM(S): at 06:09

## 2018-12-11 RX ADMIN — Medication 800 MILLIGRAM(S): at 06:09

## 2018-12-11 RX ADMIN — ENOXAPARIN SODIUM 40 MILLIGRAM(S): 100 INJECTION SUBCUTANEOUS at 11:47

## 2018-12-11 RX ADMIN — Medication 100 MILLIGRAM(S): at 23:05

## 2018-12-11 RX ADMIN — ATORVASTATIN CALCIUM 20 MILLIGRAM(S): 80 TABLET, FILM COATED ORAL at 23:05

## 2018-12-11 RX ADMIN — Medication 30 MILLIGRAM(S): at 03:08

## 2018-12-11 NOTE — PROGRESS NOTE ADULT - SUBJECTIVE AND OBJECTIVE BOX
VITAL SIGNS    Telemetry:    Vital Signs Last 24 Hrs  T(C): 37.4 (18 @ 13:39), Max: 37.6 (12-10-18 @ 20:37)  T(F): 99.3 (18 @ 13:39), Max: 99.7 (12-10-18 @ 20:37)  HR: 91 (18 @ 14:29) (83 - 94)  BP: 114/75 (18 @ 14:29) (101/66 - 150/76)  RR: 20 (18 @ 13:39) (18 - 20)  SpO2: 92% (18 @ 13:39) (90% - 92%)            12-10 @ 07:01  -   @ 07:00  --------------------------------------------------------  IN: 510 mL / OUT: 410 mL / NET: 100 mL     @ 07:01  -   @ 16:23  --------------------------------------------------------  IN: 480 mL / OUT: 300 mL / NET: 180 mL       Daily     Daily Weight in k.6 (11 Dec 2018 07:30)  Admit Wt: Drug Dosing Weight  Height (cm): 190.5 (08 Dec 2018 07:28)  Weight (kg): 129.5 (08 Dec 2018 07:28)  BMI (kg/m2): 35.7 (08 Dec 2018 07:28)  BSA (m2): 2.55 (08 Dec 2018 07:28)      CAPILLARY BLOOD GLUCOSE      POCT Blood Glucose.: 129 mg/dL (11 Dec 2018 11:53)  POCT Blood Glucose.: 149 mg/dL (11 Dec 2018 07:37)  POCT Blood Glucose.: 144 mg/dL (10 Dec 2018 21:29)          MEDICATIONS  aspirin enteric coated 81 milliGRAM(s) Oral daily  docusate sodium 100 milliGRAM(s) Oral three times a day  enoxaparin Injectable 40 milliGRAM(s) SubCutaneous daily  furosemide    Tablet 40 milliGRAM(s) Oral daily  insulin lispro (HumaLOG) corrective regimen sliding scale   SubCutaneous three times a day before meals  insulin lispro (HumaLOG) corrective regimen sliding scale   SubCutaneous at bedtime  labetalol 800 milliGRAM(s) Oral every 8 hours  losartan 25 milliGRAM(s) Oral daily  oxyCODONE    5 mG/acetaminophen 325 mG 2 Tablet(s) Oral every 6 hours PRN  oxyCODONE    5 mG/acetaminophen 325 mG 1 Tablet(s) Oral every 6 hours PRN  pantoprazole    Tablet 40 milliGRAM(s) Oral before breakfast  sodium chloride 0.9% lock flush 3 milliLiter(s) IV Push every 8 hours  sodium chloride 0.9%. 1000 milliLiter(s) IV Continuous <Continuous>  spironolactone 25 milliGRAM(s) Oral daily      PHYSICAL EXAM  Subjective: Pt denies any complaints at this time  Neurology: alert and oriented x 3, nonfocal, no gross deficits  CV : s1, s2  Sternal Wound :  CDI , Stable (+) PW  Lungs: CTA B/L  Abdomen: soft, NT,ND, (+) Bowel sounds  :  voiding  Extremities:  -c/c/e. Neg calve tenderness. Pedal pulses 2+ b/l     LABS  -    138  |  102  |  43<H>  ----------------------------<  129<H>  5.0   |  25  |  1.35<H>    Ca    8.4      11 Dec 2018 06:49  Phos  5.0     12-10  Mg     2.4     12-10    TPro  5.9<L>  /  Alb  3.7  /  TBili  0.8  /  DBili  x   /  AST  35  /  ALT  22  /  AlkPhos  54  12-10                                 9.6    11.9  )-----------( 149      ( 11 Dec 2018 06:52 )             27.5          PT/INR - ( 11 Dec 2018 06:52 )   PT: 12.5 sec;   INR: 1.08 ratio         PTT - ( 10 Dec 2018 01:48 )  PTT:27.6 sec       PAST MEDICAL & SURGICAL HISTORY:  Hypertension, unspecified type  No significant past surgical history

## 2018-12-11 NOTE — PROGRESS NOTE ADULT - PROBLEM SELECTOR PLAN 1
Continue with ASA 81 mg PO Daily.   Continue with Labetalol 800 mg PO TID.   Continue with Losartan 25 mg PO daily.   Continue with Statin   Continue with Lasix 40 mg PO daily and Aldactone 25 mg PO daily   Strict CRISTOBAL's   Daily Weight   Pain management   Glycemic Control   Increase activity as tolerated.   Encourage Chest PT / Pulmonary toileting and Incentive spirometry every 1 hour x 10 while awake.   Continue with PUD and DVT prophylaxis.   Shower on POD #5.   D/C plan.   Plan of care discussed with attending

## 2018-12-12 LAB
ANION GAP SERPL CALC-SCNC: 12 MMOL/L — SIGNIFICANT CHANGE UP (ref 5–17)
ANION GAP SERPL CALC-SCNC: 12 MMOL/L — SIGNIFICANT CHANGE UP (ref 5–17)
APPEARANCE UR: CLEAR — SIGNIFICANT CHANGE UP
BILIRUB UR-MCNC: NEGATIVE — SIGNIFICANT CHANGE UP
BUN SERPL-MCNC: 24 MG/DL — HIGH (ref 7–23)
BUN SERPL-MCNC: 25 MG/DL — HIGH (ref 7–23)
CALCIUM SERPL-MCNC: 8.3 MG/DL — LOW (ref 8.4–10.5)
CALCIUM SERPL-MCNC: 8.5 MG/DL — SIGNIFICANT CHANGE UP (ref 8.4–10.5)
CHLORIDE SERPL-SCNC: 100 MMOL/L — SIGNIFICANT CHANGE UP (ref 96–108)
CHLORIDE SERPL-SCNC: 99 MMOL/L — SIGNIFICANT CHANGE UP (ref 96–108)
CO2 SERPL-SCNC: 22 MMOL/L — SIGNIFICANT CHANGE UP (ref 22–31)
CO2 SERPL-SCNC: 24 MMOL/L — SIGNIFICANT CHANGE UP (ref 22–31)
COLOR SPEC: YELLOW — SIGNIFICANT CHANGE UP
CREAT SERPL-MCNC: 0.94 MG/DL — SIGNIFICANT CHANGE UP (ref 0.5–1.3)
CREAT SERPL-MCNC: 0.99 MG/DL — SIGNIFICANT CHANGE UP (ref 0.5–1.3)
DIFF PNL FLD: NEGATIVE — SIGNIFICANT CHANGE UP
GLUCOSE BLDC GLUCOMTR-MCNC: 117 MG/DL — HIGH (ref 70–99)
GLUCOSE BLDC GLUCOMTR-MCNC: 136 MG/DL — HIGH (ref 70–99)
GLUCOSE BLDC GLUCOMTR-MCNC: 139 MG/DL — HIGH (ref 70–99)
GLUCOSE BLDC GLUCOMTR-MCNC: 155 MG/DL — HIGH (ref 70–99)
GLUCOSE SERPL-MCNC: 120 MG/DL — HIGH (ref 70–99)
GLUCOSE SERPL-MCNC: 135 MG/DL — HIGH (ref 70–99)
GLUCOSE UR QL: NEGATIVE — SIGNIFICANT CHANGE UP
HCT VFR BLD CALC: 28.3 % — LOW (ref 39–50)
HGB BLD-MCNC: 9.8 G/DL — LOW (ref 13–17)
KETONES UR-MCNC: NEGATIVE — SIGNIFICANT CHANGE UP
LEUKOCYTE ESTERASE UR-ACNC: NEGATIVE — SIGNIFICANT CHANGE UP
MCHC RBC-ENTMCNC: 29.4 PG — SIGNIFICANT CHANGE UP (ref 27–34)
MCHC RBC-ENTMCNC: 34.5 GM/DL — SIGNIFICANT CHANGE UP (ref 32–36)
MCV RBC AUTO: 85.3 FL — SIGNIFICANT CHANGE UP (ref 80–100)
NITRITE UR-MCNC: NEGATIVE — SIGNIFICANT CHANGE UP
PH UR: 6 — SIGNIFICANT CHANGE UP (ref 5–8)
PLATELET # BLD AUTO: 182 K/UL — SIGNIFICANT CHANGE UP (ref 150–400)
POTASSIUM SERPL-MCNC: 4.7 MMOL/L — SIGNIFICANT CHANGE UP (ref 3.5–5.3)
POTASSIUM SERPL-MCNC: 5.1 MMOL/L — SIGNIFICANT CHANGE UP (ref 3.5–5.3)
POTASSIUM SERPL-SCNC: 4.7 MMOL/L — SIGNIFICANT CHANGE UP (ref 3.5–5.3)
POTASSIUM SERPL-SCNC: 5.1 MMOL/L — SIGNIFICANT CHANGE UP (ref 3.5–5.3)
PROT UR-MCNC: SIGNIFICANT CHANGE UP
RBC # BLD: 3.32 M/UL — LOW (ref 4.2–5.8)
RBC # FLD: 12.4 % — SIGNIFICANT CHANGE UP (ref 10.3–14.5)
SODIUM SERPL-SCNC: 134 MMOL/L — LOW (ref 135–145)
SODIUM SERPL-SCNC: 135 MMOL/L — SIGNIFICANT CHANGE UP (ref 135–145)
SP GR SPEC: 1.02 — SIGNIFICANT CHANGE UP (ref 1.01–1.02)
SURGICAL PATHOLOGY STUDY: SIGNIFICANT CHANGE UP
UROBILINOGEN FLD QL: NEGATIVE — SIGNIFICANT CHANGE UP
WBC # BLD: 11 K/UL — HIGH (ref 3.8–10.5)
WBC # FLD AUTO: 11 K/UL — HIGH (ref 3.8–10.5)

## 2018-12-12 PROCEDURE — 93306 TTE W/DOPPLER COMPLETE: CPT | Mod: 26

## 2018-12-12 RX ORDER — LOSARTAN POTASSIUM 100 MG/1
25 TABLET, FILM COATED ORAL ONCE
Qty: 0 | Refills: 0 | Status: COMPLETED | OUTPATIENT
Start: 2018-12-12 | End: 2018-12-12

## 2018-12-12 RX ORDER — ACETAMINOPHEN 500 MG
650 TABLET ORAL EVERY 6 HOURS
Qty: 0 | Refills: 0 | Status: DISCONTINUED | OUTPATIENT
Start: 2018-12-12 | End: 2018-12-13

## 2018-12-12 RX ORDER — LOSARTAN POTASSIUM 100 MG/1
50 TABLET, FILM COATED ORAL DAILY
Qty: 0 | Refills: 0 | Status: DISCONTINUED | OUTPATIENT
Start: 2018-12-12 | End: 2018-12-13

## 2018-12-12 RX ORDER — BENZOCAINE AND MENTHOL 5; 1 G/100ML; G/100ML
1 LIQUID ORAL
Qty: 0 | Refills: 0 | Status: DISCONTINUED | OUTPATIENT
Start: 2018-12-12 | End: 2018-12-13

## 2018-12-12 RX ADMIN — Medication 650 MILLIGRAM(S): at 15:15

## 2018-12-12 RX ADMIN — LOSARTAN POTASSIUM 25 MILLIGRAM(S): 100 TABLET, FILM COATED ORAL at 05:51

## 2018-12-12 RX ADMIN — ATORVASTATIN CALCIUM 20 MILLIGRAM(S): 80 TABLET, FILM COATED ORAL at 21:05

## 2018-12-12 RX ADMIN — Medication 100 MILLIGRAM(S): at 21:05

## 2018-12-12 RX ADMIN — LOSARTAN POTASSIUM 25 MILLIGRAM(S): 100 TABLET, FILM COATED ORAL at 11:12

## 2018-12-12 RX ADMIN — Medication 800 MILLIGRAM(S): at 05:51

## 2018-12-12 RX ADMIN — SPIRONOLACTONE 25 MILLIGRAM(S): 25 TABLET, FILM COATED ORAL at 05:51

## 2018-12-12 RX ADMIN — ENOXAPARIN SODIUM 40 MILLIGRAM(S): 100 INJECTION SUBCUTANEOUS at 11:12

## 2018-12-12 RX ADMIN — Medication 650 MILLIGRAM(S): at 14:45

## 2018-12-12 RX ADMIN — Medication 40 MILLIGRAM(S): at 05:51

## 2018-12-12 RX ADMIN — SODIUM CHLORIDE 3 MILLILITER(S): 9 INJECTION INTRAMUSCULAR; INTRAVENOUS; SUBCUTANEOUS at 21:03

## 2018-12-12 RX ADMIN — Medication 800 MILLIGRAM(S): at 14:45

## 2018-12-12 RX ADMIN — SODIUM CHLORIDE 3 MILLILITER(S): 9 INJECTION INTRAMUSCULAR; INTRAVENOUS; SUBCUTANEOUS at 14:40

## 2018-12-12 RX ADMIN — Medication 800 MILLIGRAM(S): at 21:05

## 2018-12-12 RX ADMIN — OXYCODONE AND ACETAMINOPHEN 2 TABLET(S): 5; 325 TABLET ORAL at 06:01

## 2018-12-12 RX ADMIN — OXYCODONE AND ACETAMINOPHEN 2 TABLET(S): 5; 325 TABLET ORAL at 20:55

## 2018-12-12 RX ADMIN — OXYCODONE AND ACETAMINOPHEN 2 TABLET(S): 5; 325 TABLET ORAL at 21:33

## 2018-12-12 RX ADMIN — SODIUM CHLORIDE 3 MILLILITER(S): 9 INJECTION INTRAMUSCULAR; INTRAVENOUS; SUBCUTANEOUS at 06:03

## 2018-12-12 RX ADMIN — OXYCODONE AND ACETAMINOPHEN 2 TABLET(S): 5; 325 TABLET ORAL at 07:00

## 2018-12-12 RX ADMIN — PANTOPRAZOLE SODIUM 40 MILLIGRAM(S): 20 TABLET, DELAYED RELEASE ORAL at 05:51

## 2018-12-12 RX ADMIN — Medication 100 MILLIGRAM(S): at 05:51

## 2018-12-12 RX ADMIN — Medication 81 MILLIGRAM(S): at 11:12

## 2018-12-12 NOTE — PROGRESS NOTE ADULT - PROBLEM SELECTOR PLAN 1
Continue with ASA 81 mg PO Daily.   Continue with Labetalol 800 mg PO TID.   Continue with Losartan 50 mg PO daily.   Continue with Statin   Continue with Lasix 40 mg PO daily and Aldactone 25 mg PO daily   Strict CRISTOBAL's   Daily Weight   Pain management   Glycemic Control   Increase activity as tolerated.   Encourage Chest PT / Pulmonary toileting and Incentive spirometry every 1 hour x 10 while awake.   Continue with PUD and DVT prophylaxis.   Shower on POD #5.   D/C plan.   Plan of care discussed with attending

## 2018-12-12 NOTE — PROGRESS NOTE ADULT - REASON FOR ADMISSION
Type A Dissection Repair
Type A dissection

## 2018-12-12 NOTE — PROGRESS NOTE ADULT - SUBJECTIVE AND OBJECTIVE BOX
VITAL SIGNS    Telemetry:  SR 80-90  Vital Signs Last 24 Hrs  T(C): 37.1 (18 @ 16:23), Max: 38.6 (18 @ 14:22)  T(F): 98.8 (18 @ 16:23), Max: 101.5 (18 @ 14:22)  HR: 84 (18 @ 14:22) (84 - 86)  BP: 113/73 (18 @ 14:22) (113/73 - 149/98)  RR: 19 (18 @ 14:22) (18 - 19)  SpO2: 95% (18 @ 14:22) (91% - 98%)             @ 07:01  -   @ 07:00  --------------------------------------------------------  IN: 960 mL / OUT: 1000 mL / NET: -40 mL     @ 07:01  -   @ 16:37  --------------------------------------------------------  IN: 340 mL / OUT: 0 mL / NET: 340 mL       Daily     Daily Weight in k.9 (12 Dec 2018 07:25)  Admit Wt: Drug Dosing Weight  Height (cm): 190.5 (08 Dec 2018 07:28)  Weight (kg): 129.5 (08 Dec 2018 07:28)  BMI (kg/m2): 35.7 (08 Dec 2018 07:28)  BSA (m2): 2.55 (08 Dec 2018 07:28)      CAPILLARY BLOOD GLUCOSE      POCT Blood Glucose.: 136 mg/dL (12 Dec 2018 16:20)  POCT Blood Glucose.: 117 mg/dL (12 Dec 2018 11:38)  POCT Blood Glucose.: 139 mg/dL (12 Dec 2018 07:35)  POCT Blood Glucose.: 136 mg/dL (11 Dec 2018 22:25)          MEDICATIONS  acetaminophen   Tablet .. 650 milliGRAM(s) Oral every 6 hours PRN  aspirin enteric coated 81 milliGRAM(s) Oral daily  atorvastatin 20 milliGRAM(s) Oral at bedtime  docusate sodium 100 milliGRAM(s) Oral three times a day  enoxaparin Injectable 40 milliGRAM(s) SubCutaneous daily  furosemide    Tablet 40 milliGRAM(s) Oral daily  insulin lispro (HumaLOG) corrective regimen sliding scale   SubCutaneous three times a day before meals  insulin lispro (HumaLOG) corrective regimen sliding scale   SubCutaneous at bedtime  labetalol 800 milliGRAM(s) Oral every 8 hours  losartan 50 milliGRAM(s) Oral daily  oxyCODONE    5 mG/acetaminophen 325 mG 2 Tablet(s) Oral every 6 hours PRN  oxyCODONE    5 mG/acetaminophen 325 mG 1 Tablet(s) Oral every 6 hours PRN  pantoprazole    Tablet 40 milliGRAM(s) Oral before breakfast  sodium chloride 0.9% lock flush 3 milliLiter(s) IV Push every 8 hours  sodium chloride 0.9%. 1000 milliLiter(s) IV Continuous <Continuous>      PHYSICAL EXAM  Subjective: Pt denies any pain or sob  Neurology: alert and oriented x 3, nonfocal, no gross deficits  CV : s1, s2 +pw  Sternal Wound :  CDI , Stable  Lungs: CTA B/L  Abdomen: soft, NT,ND, (+) Bowel sounds  :  voiding  Extremities:   -c/c/e    LABS  -    134<L>  |  100  |  25<H>  ----------------------------<  120<H>  5.1   |  22  |  0.94    Ca    8.3<L>      12 Dec 2018 06:48                                   9.8    11.0  )-----------( 182      ( 12 Dec 2018 06:48 )             28.3          PT/INR - ( 11 Dec 2018 06:52 )   PT: 12.5 sec;   INR: 1.08 ratio                PAST MEDICAL & SURGICAL HISTORY:  Hypertension, unspecified type  No significant past surgical history

## 2018-12-12 NOTE — PROGRESS NOTE ADULT - ASSESSMENT
37 yr male with PMHx of HTN, seasonal allergies and food allergies who presents with c/o of substernal chest spasm at 1130 pm, reports took Motrin and fell asleep. pt woke up at 2 am with similiar pain and  went to ER for further management. In ER CTA revealed Type A dissection patient transferred to [facility] for emergent cardiac surgery.   On 12/8/18 s/p Aortic dissection repair supracoronary with 26 Hemashield Graft hemiarch repair / Circulatory arrest.   Post op Course:   Extubated on POD #1  Inotropic support required on Milrinone gtt weaned off.   Hypertension on Cardene gtt infusion à transitioned to PO Labetalol and Losartan   Volume overloaded diuresis on Lasix and aldactone   Rising WBC with borderline Tmax (99.6) - will monitor  12/10 VVS; transferred to 2 [facility] floor.   12/11 VSS; OOB to chair. Continue asa, BB and BP control < 130.  Disposition: Home PT once medically cleared
37 yr male with PMHx of HTN, seasonal allergies and food allergies who presents with c/o of substernal chest spasm at 1130 pm, reports took Motrin and fell asleep. pt woke up at 2 am with similiar pain and  went to ER for further management. In ER CTA revealed Type A dissection patient transferred to [facility] for emergent cardiac surgery.   On 12/8/18 s/p Aortic dissection repair supracoronary with 26 Hemashield Graft hemiarch repair / Circulatory arrest.   Post op Course:   Extubated on POD #1  Inotropic support required on Milrinone gtt weaned off.   Hypertension on Cardene gtt infusion à transitioned to PO Labetalol and Losartan   Volume overloaded diuresis on Lasix and aldactone   Rising WBC with borderline Tmax (99.6) - will monitor  12/10 VVS; transferred to 2 [facility] floor.   12/11 VSS; OOB to chair. Continue asa, BB and BP control < 130.  12/12 Temp 101.5, UA sent. F/U. OB to chair; ambulating. TTE today Losartan increased to 50 QD.   Disposition: Home PT once medically cleared
37 yr male with PMHx of HTN, seasonal allergies and food allergies who presents with c/o of substernal chest spasm at 1130 pm, reports took Motrin and fell asleep. pt woke up at 2 am with similiar pain and  went to ER for further management. In ER CTA revealed Type A dissection patient transferred to [facility] for emergent cardiac surgery.   On 12/8/18 s/p Aortic dissection repair supracoronary with 26 Hemashield Graft hemiarch repair / Circulatory arrest.   Post op Course:   Extubated on POD #1  Inotropic support required on Milrinone gtt weaned off.   Hypertension on Cardene gtt infusion à transitioned to PO Labetalol and Losartan   Volume overloaded diuresis on Lasix and aldactone   Rising WBC with borderline Tmax (99.6) - will monitor  12/10 VVS; transferred to 2 [facility] floor.   Disposition: Home PT once medically cleared

## 2018-12-13 ENCOUNTER — TRANSCRIPTION ENCOUNTER (OUTPATIENT)
Age: 38
End: 2018-12-13

## 2018-12-13 VITALS
OXYGEN SATURATION: 94 % | HEART RATE: 78 BPM | SYSTOLIC BLOOD PRESSURE: 118 MMHG | TEMPERATURE: 99 F | DIASTOLIC BLOOD PRESSURE: 74 MMHG | RESPIRATION RATE: 18 BRPM

## 2018-12-13 PROBLEM — I10 ESSENTIAL (PRIMARY) HYPERTENSION: Chronic | Status: ACTIVE | Noted: 2018-12-08

## 2018-12-13 LAB
ANION GAP SERPL CALC-SCNC: 13 MMOL/L — SIGNIFICANT CHANGE UP (ref 5–17)
BUN SERPL-MCNC: 21 MG/DL — SIGNIFICANT CHANGE UP (ref 7–23)
CALCIUM SERPL-MCNC: 8.7 MG/DL — SIGNIFICANT CHANGE UP (ref 8.4–10.5)
CHLORIDE SERPL-SCNC: 97 MMOL/L — SIGNIFICANT CHANGE UP (ref 96–108)
CO2 SERPL-SCNC: 25 MMOL/L — SIGNIFICANT CHANGE UP (ref 22–31)
CREAT SERPL-MCNC: 0.99 MG/DL — SIGNIFICANT CHANGE UP (ref 0.5–1.3)
GLUCOSE BLDC GLUCOMTR-MCNC: 127 MG/DL — HIGH (ref 70–99)
GLUCOSE BLDC GLUCOMTR-MCNC: 136 MG/DL — HIGH (ref 70–99)
GLUCOSE BLDC GLUCOMTR-MCNC: 142 MG/DL — HIGH (ref 70–99)
GLUCOSE BLDC GLUCOMTR-MCNC: 150 MG/DL — HIGH (ref 70–99)
GLUCOSE SERPL-MCNC: 117 MG/DL — HIGH (ref 70–99)
HCT VFR BLD CALC: 27 % — LOW (ref 39–50)
HGB BLD-MCNC: 9.3 G/DL — LOW (ref 13–17)
MCHC RBC-ENTMCNC: 29.4 PG — SIGNIFICANT CHANGE UP (ref 27–34)
MCHC RBC-ENTMCNC: 34.5 GM/DL — SIGNIFICANT CHANGE UP (ref 32–36)
MCV RBC AUTO: 85.3 FL — SIGNIFICANT CHANGE UP (ref 80–100)
PLATELET # BLD AUTO: 202 K/UL — SIGNIFICANT CHANGE UP (ref 150–400)
POTASSIUM SERPL-MCNC: 4.5 MMOL/L — SIGNIFICANT CHANGE UP (ref 3.5–5.3)
POTASSIUM SERPL-SCNC: 4.5 MMOL/L — SIGNIFICANT CHANGE UP (ref 3.5–5.3)
RBC # BLD: 3.16 M/UL — LOW (ref 4.2–5.8)
RBC # FLD: 12.4 % — SIGNIFICANT CHANGE UP (ref 10.3–14.5)
SODIUM SERPL-SCNC: 135 MMOL/L — SIGNIFICANT CHANGE UP (ref 135–145)
WBC # BLD: 10.6 K/UL — HIGH (ref 3.8–10.5)
WBC # FLD AUTO: 10.6 K/UL — HIGH (ref 3.8–10.5)

## 2018-12-13 PROCEDURE — 84145 PROCALCITONIN (PCT): CPT

## 2018-12-13 PROCEDURE — 82553 CREATINE MB FRACTION: CPT

## 2018-12-13 PROCEDURE — 83735 ASSAY OF MAGNESIUM: CPT

## 2018-12-13 PROCEDURE — P9041: CPT

## 2018-12-13 PROCEDURE — 85379 FIBRIN DEGRADATION QUANT: CPT

## 2018-12-13 PROCEDURE — 84100 ASSAY OF PHOSPHORUS: CPT

## 2018-12-13 PROCEDURE — 82330 ASSAY OF CALCIUM: CPT

## 2018-12-13 PROCEDURE — P9045: CPT

## 2018-12-13 PROCEDURE — 82435 ASSAY OF BLOOD CHLORIDE: CPT

## 2018-12-13 PROCEDURE — 93005 ELECTROCARDIOGRAM TRACING: CPT

## 2018-12-13 PROCEDURE — 82150 ASSAY OF AMYLASE: CPT

## 2018-12-13 PROCEDURE — 85384 FIBRINOGEN ACTIVITY: CPT

## 2018-12-13 PROCEDURE — 81003 URINALYSIS AUTO W/O SCOPE: CPT

## 2018-12-13 PROCEDURE — 94002 VENT MGMT INPAT INIT DAY: CPT

## 2018-12-13 PROCEDURE — P9016: CPT

## 2018-12-13 PROCEDURE — 36430 TRANSFUSION BLD/BLD COMPNT: CPT

## 2018-12-13 PROCEDURE — 86850 RBC ANTIBODY SCREEN: CPT

## 2018-12-13 PROCEDURE — 83036 HEMOGLOBIN GLYCOSYLATED A1C: CPT

## 2018-12-13 PROCEDURE — 82803 BLOOD GASES ANY COMBINATION: CPT

## 2018-12-13 PROCEDURE — 84484 ASSAY OF TROPONIN QUANT: CPT

## 2018-12-13 PROCEDURE — 85027 COMPLETE CBC AUTOMATED: CPT

## 2018-12-13 PROCEDURE — 99238 HOSP IP/OBS DSCHRG MGMT 30/<: CPT

## 2018-12-13 PROCEDURE — 97116 GAIT TRAINING THERAPY: CPT

## 2018-12-13 PROCEDURE — 86901 BLOOD TYPING SEROLOGIC RH(D): CPT

## 2018-12-13 PROCEDURE — 83880 ASSAY OF NATRIURETIC PEPTIDE: CPT

## 2018-12-13 PROCEDURE — C1768: CPT

## 2018-12-13 PROCEDURE — 82962 GLUCOSE BLOOD TEST: CPT

## 2018-12-13 PROCEDURE — 86923 COMPATIBILITY TEST ELECTRIC: CPT

## 2018-12-13 PROCEDURE — 71046 X-RAY EXAM CHEST 2 VIEWS: CPT | Mod: 26

## 2018-12-13 PROCEDURE — 85610 PROTHROMBIN TIME: CPT

## 2018-12-13 PROCEDURE — 80053 COMPREHEN METABOLIC PANEL: CPT

## 2018-12-13 PROCEDURE — 94640 AIRWAY INHALATION TREATMENT: CPT

## 2018-12-13 PROCEDURE — 82947 ASSAY GLUCOSE BLOOD QUANT: CPT

## 2018-12-13 PROCEDURE — P9047: CPT

## 2018-12-13 PROCEDURE — 71046 X-RAY EXAM CHEST 2 VIEWS: CPT

## 2018-12-13 PROCEDURE — 84132 ASSAY OF SERUM POTASSIUM: CPT

## 2018-12-13 PROCEDURE — 97110 THERAPEUTIC EXERCISES: CPT

## 2018-12-13 PROCEDURE — 31720 CLEARANCE OF AIRWAYS: CPT

## 2018-12-13 PROCEDURE — 93306 TTE W/DOPPLER COMPLETE: CPT

## 2018-12-13 PROCEDURE — 84295 ASSAY OF SERUM SODIUM: CPT

## 2018-12-13 PROCEDURE — 86900 BLOOD TYPING SEROLOGIC ABO: CPT

## 2018-12-13 PROCEDURE — 97162 PT EVAL MOD COMPLEX 30 MIN: CPT

## 2018-12-13 PROCEDURE — 85730 THROMBOPLASTIN TIME PARTIAL: CPT

## 2018-12-13 PROCEDURE — P9059: CPT

## 2018-12-13 PROCEDURE — C1751: CPT

## 2018-12-13 PROCEDURE — 80048 BASIC METABOLIC PNL TOTAL CA: CPT

## 2018-12-13 PROCEDURE — 83605 ASSAY OF LACTIC ACID: CPT

## 2018-12-13 PROCEDURE — 88305 TISSUE EXAM BY PATHOLOGIST: CPT

## 2018-12-13 PROCEDURE — 71045 X-RAY EXAM CHEST 1 VIEW: CPT

## 2018-12-13 PROCEDURE — 85014 HEMATOCRIT: CPT

## 2018-12-13 PROCEDURE — P9012: CPT

## 2018-12-13 PROCEDURE — P9037: CPT

## 2018-12-13 PROCEDURE — C1889: CPT

## 2018-12-13 PROCEDURE — 82550 ASSAY OF CK (CPK): CPT

## 2018-12-13 PROCEDURE — C1769: CPT

## 2018-12-13 RX ORDER — VERAPAMIL HCL 240 MG
0 CAPSULE, EXTENDED RELEASE PELLETS 24 HR ORAL
Qty: 0 | Refills: 0 | COMMUNITY

## 2018-12-13 RX ORDER — FUROSEMIDE 40 MG
1 TABLET ORAL
Qty: 3 | Refills: 0 | OUTPATIENT
Start: 2018-12-13 | End: 2018-12-15

## 2018-12-13 RX ORDER — ATORVASTATIN CALCIUM 80 MG/1
1 TABLET, FILM COATED ORAL
Qty: 30 | Refills: 0 | OUTPATIENT
Start: 2018-12-13 | End: 2019-01-11

## 2018-12-13 RX ORDER — DOCUSATE SODIUM 100 MG
1 CAPSULE ORAL
Qty: 9 | Refills: 0 | OUTPATIENT
Start: 2018-12-13 | End: 2018-12-15

## 2018-12-13 RX ORDER — ACETAMINOPHEN 500 MG
2 TABLET ORAL
Qty: 0 | Refills: 0 | COMMUNITY
Start: 2018-12-13

## 2018-12-13 RX ORDER — LABETALOL HCL 100 MG
4 TABLET ORAL
Qty: 240 | Refills: 0 | OUTPATIENT
Start: 2018-12-13 | End: 2019-01-01

## 2018-12-13 RX ORDER — LABETALOL HCL 100 MG
3 TABLET ORAL
Qty: 180 | Refills: 0 | OUTPATIENT
Start: 2018-12-13 | End: 2019-01-01

## 2018-12-13 RX ORDER — ACETAMINOPHEN 500 MG
2 TABLET ORAL
Qty: 40 | Refills: 0 | OUTPATIENT
Start: 2018-12-13 | End: 2018-12-17

## 2018-12-13 RX ORDER — LOSARTAN POTASSIUM 100 MG/1
1 TABLET, FILM COATED ORAL
Qty: 30 | Refills: 0 | OUTPATIENT
Start: 2018-12-13 | End: 2019-01-11

## 2018-12-13 RX ORDER — PANTOPRAZOLE SODIUM 20 MG/1
1 TABLET, DELAYED RELEASE ORAL
Qty: 14 | Refills: 0 | OUTPATIENT
Start: 2018-12-13 | End: 2018-12-26

## 2018-12-13 RX ADMIN — Medication 100 MILLIGRAM(S): at 14:30

## 2018-12-13 RX ADMIN — SODIUM CHLORIDE 3 MILLILITER(S): 9 INJECTION INTRAMUSCULAR; INTRAVENOUS; SUBCUTANEOUS at 14:29

## 2018-12-13 RX ADMIN — Medication 800 MILLIGRAM(S): at 05:38

## 2018-12-13 RX ADMIN — OXYCODONE AND ACETAMINOPHEN 2 TABLET(S): 5; 325 TABLET ORAL at 05:28

## 2018-12-13 RX ADMIN — LOSARTAN POTASSIUM 50 MILLIGRAM(S): 100 TABLET, FILM COATED ORAL at 05:44

## 2018-12-13 RX ADMIN — Medication 40 MILLIGRAM(S): at 05:38

## 2018-12-13 RX ADMIN — Medication 100 MILLIGRAM(S): at 05:38

## 2018-12-13 RX ADMIN — PANTOPRAZOLE SODIUM 40 MILLIGRAM(S): 20 TABLET, DELAYED RELEASE ORAL at 07:06

## 2018-12-13 RX ADMIN — OXYCODONE AND ACETAMINOPHEN 2 TABLET(S): 5; 325 TABLET ORAL at 04:46

## 2018-12-13 RX ADMIN — Medication 81 MILLIGRAM(S): at 14:30

## 2018-12-13 RX ADMIN — Medication 800 MILLIGRAM(S): at 14:30

## 2018-12-13 RX ADMIN — SODIUM CHLORIDE 3 MILLILITER(S): 9 INJECTION INTRAMUSCULAR; INTRAVENOUS; SUBCUTANEOUS at 05:36

## 2018-12-13 RX ADMIN — ENOXAPARIN SODIUM 40 MILLIGRAM(S): 100 INJECTION SUBCUTANEOUS at 14:30

## 2018-12-13 NOTE — DISCHARGE NOTE ADULT - HOSPITAL COURSE
37 yr male with PMHx of HTN, seasonal allergies and food allergies who presents with c/o of substernal chest spasm at 1130 pm, reports took Motrin and fell asleep. pt woke up at 2 am with similiar pain and  went to ER for further management. In ER CTA revealed Type A dissection patient transferred to [facility] for emergent cardiac surgery.   On 12/8/18 s/p Aortic dissection repair supracoronary with 26 Hemashield Graft hemiarch repair / Circulatory arrest.   Post op Course:   Extubated on POD #1  Inotropic support required on Milrinone gtt weaned off.   Hypertension on Cardene gtt infusion à transitioned to PO Labetalol and Losartan   Volume overloaded diuresis on Lasix and aldactone   Rising WBC with borderline Tmax (99.6) - will monitor  12/10 VVS; transferred to 2 [facility] floor.   12/11 VSS; OOB to chair. Continue asa, BB and BP control < 130.  12/12 Temp 101.5, UA sent. F/U. OB to chair; ambulating. TTE today Losartan increased to 50 QD.   12/13 UA- Neg. No further temps. VSS; D/C to home today w/ Vatsia  Disposition: Home PT

## 2018-12-13 NOTE — DISCHARGE NOTE ADULT - PATIENT PORTAL LINK FT
You can access the [facility] Patient Portal, offered by [facility], by registering with the following website: [email or web address]

## 2018-12-13 NOTE — DISCHARGE NOTE ADULT - ADDITIONAL INSTRUCTIONS
Follow up with Dr. Toledo- cardiothoracic surgeon on 12/21/18 at 9:00 AM. Call [phone] to confirm appointment.  Follow up with cardiologist/PCP - Dr. Salgado within 1 week post discharge. Call Phone: [phone] to make appointment.

## 2018-12-13 NOTE — DISCHARGE NOTE ADULT - CARE PROVIDER_API CALL
Scar Toledo), Surgery; Thoracic and Cardiac Surgery  [address] [place] [address]  Phone: [phone]  Fax: [phone]    Peewee Salgado), Internal Medicine  [address], [place] [id number]  Phone: [phone]  Fax: [phone]

## 2018-12-13 NOTE — DISCHARGE NOTE ADULT - MEDICATION SUMMARY - MEDICATIONS TO STOP TAKING
I will STOP taking the medications listed below when I get home from the hospital:    verapamil 80 mg oral tablet  -- orally once a day

## 2018-12-13 NOTE — DISCHARGE NOTE ADULT - MEDICATION SUMMARY - MEDICATIONS TO TAKE
I will START or STAY ON the medications listed below when I get home from the hospital:    oxycodone-acetaminophen 5 mg-325 mg oral tablet  -- 2 tab(s) by mouth every 6 hours, As needed, Severe Pain (7 - 10) MDD:8 tabs  -- Indication: For pain control    acetaminophen 325 mg oral tablet  -- 2 tab(s) by mouth every 6 hours, As needed, Temp greater or equal to 38.5C (101.3F)  -- Indication: For pain control    losartan 50 mg oral tablet  -- 1 tab(s) by mouth once a day  -- Indication: For blood pressure    atorvastatin 20 mg oral tablet  -- 1 tab(s) by mouth once a day (at bedtime)  -- Indication: For Statin    labetalol 200 mg oral tablet  -- 4 tab(s) by mouth every 8 hours  -- Indication: For Heart rate and blood pressure    furosemide 40 mg oral tablet  -- 1 tab(s) by mouth once a day  -- Indication: For water pill    docusate sodium 100 mg oral capsule  -- 1 cap(s) by mouth 3 times a day  -- Indication: For Stool softerner    pantoprazole 40 mg oral delayed release tablet  -- 1 tab(s) by mouth once a day (before a meal)  -- Indication: For PPI I will START or STAY ON the medications listed below when I get home from the hospital:    oxycodone-acetaminophen 5 mg-325 mg oral tablet  -- 2 tab(s) by mouth every 6 hours, As needed, Severe Pain (7 - 10) MDD:8 tabs  -- Indication: For pain control    acetaminophen 325 mg oral tablet  -- 2 tab(s) by mouth every 6 hours, As needed, Temp greater or equal to 38.5C (101.3F)  -- Indication: For pain control    losartan 50 mg oral tablet  -- 1 tab(s) by mouth once a day  -- Indication: For blood pressure    atorvastatin 20 mg oral tablet  -- 1 tab(s) by mouth once a day (at bedtime)  -- Indication: For Statin    labetalol 200 mg oral tablet  -- 3 tab(s) by mouth every 8 hours   -- Indication: For Heart rate control    furosemide 40 mg oral tablet  -- 1 tab(s) by mouth once a day  -- Indication: For water pill    docusate sodium 100 mg oral capsule  -- 1 cap(s) by mouth 3 times a day  -- Indication: For Stool softerner    pantoprazole 40 mg oral delayed release tablet  -- 1 tab(s) by mouth once a day (before a meal)  -- Indication: For PPI

## 2018-12-13 NOTE — DISCHARGE NOTE ADULT - CARE PLAN
Principal Discharge DX:	S/P aortic aneurysm repair  Goal:	Recovery  Assessment and plan of treatment:	1. Daily Shower  2. Weight yourself daily and notify any weight gain greater than 2-3 pounds in 24 hours.  3. Regular diet - low fat, low cholesterol, no added salt.  4. Cleanse Midsternal incision and leg incision daily while showering with warm water and mild soap, pat dry and maintain open to air.   5. Follow Cardiac Surgery Do's and Don'ts discharge instructions.   6. No driving until cleared by MD.   7. No heavy lifting nothing greater than 5 pounds until cleared by MD.   8. Call / Notify MD any fever greater than 101.0  9. Increase Activity as tolerated.

## 2018-12-17 ENCOUNTER — INPATIENT (INPATIENT)
Facility: HOSPITAL | Age: 38
LOS: 0 days | Discharge: HOME | DRG: 202 | End: 2018-12-18
Attending: THORACIC SURGERY (CARDIOTHORACIC VASCULAR SURGERY) | Admitting: THORACIC SURGERY (CARDIOTHORACIC VASCULAR SURGERY)
Payer: COMMERCIAL

## 2018-12-17 VITALS
HEIGHT: 70 IN | RESPIRATION RATE: 22 BRPM | HEART RATE: 87 BPM | SYSTOLIC BLOOD PRESSURE: 175 MMHG | TEMPERATURE: 99 F | OXYGEN SATURATION: 95 % | WEIGHT: 309.97 LBS | DIASTOLIC BLOOD PRESSURE: 92 MMHG

## 2018-12-17 DIAGNOSIS — Z98.890 OTHER SPECIFIED POSTPROCEDURAL STATES: Chronic | ICD-10-CM

## 2018-12-17 DIAGNOSIS — J45.909 UNSPECIFIED ASTHMA, UNCOMPLICATED: Chronic | ICD-10-CM

## 2018-12-17 DIAGNOSIS — R06.02 SHORTNESS OF BREATH: ICD-10-CM

## 2018-12-17 DIAGNOSIS — I71.00 DISSECTION OF UNSPECIFIED SITE OF AORTA: Chronic | ICD-10-CM

## 2018-12-17 DIAGNOSIS — Z98.890 OTHER SPECIFIED POSTPROCEDURAL STATES: ICD-10-CM

## 2018-12-17 DIAGNOSIS — I10 ESSENTIAL (PRIMARY) HYPERTENSION: Chronic | ICD-10-CM

## 2018-12-17 LAB
BASOPHILS # BLD AUTO: 0 K/UL — SIGNIFICANT CHANGE UP (ref 0–0.2)
BASOPHILS NFR BLD AUTO: 0.2 % — SIGNIFICANT CHANGE UP (ref 0–2)
EOSINOPHIL # BLD AUTO: 0.5 K/UL — SIGNIFICANT CHANGE UP (ref 0–0.5)
EOSINOPHIL NFR BLD AUTO: 4.5 % — SIGNIFICANT CHANGE UP (ref 0–6)
GAS PNL BLDV: SIGNIFICANT CHANGE UP
HCT VFR BLD CALC: 25.6 % — LOW (ref 39–50)
HGB BLD-MCNC: 8.8 G/DL — LOW (ref 13–17)
LYMPHOCYTES # BLD AUTO: 1.2 K/UL — SIGNIFICANT CHANGE UP (ref 1–3.3)
LYMPHOCYTES # BLD AUTO: 11.9 % — LOW (ref 13–44)
MCHC RBC-ENTMCNC: 29.5 PG — SIGNIFICANT CHANGE UP (ref 27–34)
MCHC RBC-ENTMCNC: 34.6 GM/DL — SIGNIFICANT CHANGE UP (ref 32–36)
MCV RBC AUTO: 85.5 FL — SIGNIFICANT CHANGE UP (ref 80–100)
MONOCYTES # BLD AUTO: 1.2 K/UL — HIGH (ref 0–0.9)
MONOCYTES NFR BLD AUTO: 11.2 % — SIGNIFICANT CHANGE UP (ref 2–14)
NEUTROPHILS # BLD AUTO: 7.5 K/UL — HIGH (ref 1.8–7.4)
NEUTROPHILS NFR BLD AUTO: 72.2 % — SIGNIFICANT CHANGE UP (ref 43–77)
NT-PROBNP SERPL-SCNC: 823 PG/ML — HIGH (ref 0–300)
PLATELET # BLD AUTO: 310 K/UL — SIGNIFICANT CHANGE UP (ref 150–400)
RBC # BLD: 3 M/UL — LOW (ref 4.2–5.8)
RBC # FLD: 12.4 % — SIGNIFICANT CHANGE UP (ref 10.3–14.5)
TROPONIN T, HIGH SENSITIVITY RESULT: 128 NG/L — HIGH (ref 0–51)
TROPONIN T, HIGH SENSITIVITY RESULT: 142 NG/L — HIGH (ref 0–51)
WBC # BLD: 10.4 K/UL — SIGNIFICANT CHANGE UP (ref 3.8–10.5)
WBC # FLD AUTO: 10.4 K/UL — SIGNIFICANT CHANGE UP (ref 3.8–10.5)

## 2018-12-17 PROCEDURE — 93010 ELECTROCARDIOGRAM REPORT: CPT | Mod: 59

## 2018-12-17 PROCEDURE — 71046 X-RAY EXAM CHEST 2 VIEWS: CPT | Mod: 26

## 2018-12-17 PROCEDURE — 99285 EMERGENCY DEPT VISIT HI MDM: CPT | Mod: 25

## 2018-12-17 PROCEDURE — 71275 CT ANGIOGRAPHY CHEST: CPT | Mod: 26

## 2018-12-17 RX ORDER — OXYCODONE AND ACETAMINOPHEN 5; 325 MG/1; MG/1
2 TABLET ORAL EVERY 6 HOURS
Qty: 0 | Refills: 0 | Status: DISCONTINUED | OUTPATIENT
Start: 2018-12-17 | End: 2018-12-18

## 2018-12-17 RX ORDER — ALBUTEROL 90 UG/1
2.5 AEROSOL, METERED ORAL ONCE
Qty: 0 | Refills: 0 | Status: COMPLETED | OUTPATIENT
Start: 2018-12-17 | End: 2018-12-17

## 2018-12-17 RX ORDER — IPRATROPIUM/ALBUTEROL SULFATE 18-103MCG
3 AEROSOL WITH ADAPTER (GRAM) INHALATION
Qty: 0 | Refills: 0 | Status: COMPLETED | OUTPATIENT
Start: 2018-12-17 | End: 2018-12-17

## 2018-12-17 RX ORDER — MONTELUKAST 4 MG/1
10 TABLET, CHEWABLE ORAL AT BEDTIME
Qty: 0 | Refills: 0 | Status: DISCONTINUED | OUTPATIENT
Start: 2018-12-17 | End: 2018-12-18

## 2018-12-17 RX ORDER — LABETALOL HCL 100 MG
600 TABLET ORAL EVERY 8 HOURS
Qty: 0 | Refills: 0 | Status: DISCONTINUED | OUTPATIENT
Start: 2018-12-17 | End: 2018-12-18

## 2018-12-17 RX ORDER — LOSARTAN POTASSIUM 100 MG/1
50 TABLET, FILM COATED ORAL DAILY
Qty: 0 | Refills: 0 | Status: DISCONTINUED | OUTPATIENT
Start: 2018-12-17 | End: 2018-12-18

## 2018-12-17 RX ORDER — ALBUTEROL 90 UG/1
1 AEROSOL, METERED ORAL EVERY 4 HOURS
Qty: 0 | Refills: 0 | Status: DISCONTINUED | OUTPATIENT
Start: 2018-12-17 | End: 2018-12-18

## 2018-12-17 RX ORDER — MORPHINE SULFATE 50 MG/1
4 CAPSULE, EXTENDED RELEASE ORAL ONCE
Qty: 0 | Refills: 0 | Status: DISCONTINUED | OUTPATIENT
Start: 2018-12-17 | End: 2018-12-17

## 2018-12-17 RX ORDER — ATORVASTATIN CALCIUM 80 MG/1
20 TABLET, FILM COATED ORAL AT BEDTIME
Qty: 0 | Refills: 0 | Status: DISCONTINUED | OUTPATIENT
Start: 2018-12-17 | End: 2018-12-18

## 2018-12-17 RX ORDER — TIOTROPIUM BROMIDE 18 UG/1
1 CAPSULE ORAL; RESPIRATORY (INHALATION) DAILY
Qty: 0 | Refills: 0 | Status: DISCONTINUED | OUTPATIENT
Start: 2018-12-17 | End: 2018-12-18

## 2018-12-17 RX ORDER — IPRATROPIUM/ALBUTEROL SULFATE 18-103MCG
3 AEROSOL WITH ADAPTER (GRAM) INHALATION EVERY 6 HOURS
Qty: 0 | Refills: 0 | Status: DISCONTINUED | OUTPATIENT
Start: 2018-12-17 | End: 2018-12-18

## 2018-12-17 RX ORDER — FUROSEMIDE 40 MG
40 TABLET ORAL DAILY
Qty: 0 | Refills: 0 | Status: DISCONTINUED | OUTPATIENT
Start: 2018-12-17 | End: 2018-12-18

## 2018-12-17 RX ORDER — DOCUSATE SODIUM 100 MG
100 CAPSULE ORAL THREE TIMES A DAY
Qty: 0 | Refills: 0 | Status: DISCONTINUED | OUTPATIENT
Start: 2018-12-17 | End: 2018-12-18

## 2018-12-17 RX ORDER — ACETAMINOPHEN 500 MG
650 TABLET ORAL EVERY 6 HOURS
Qty: 0 | Refills: 0 | Status: DISCONTINUED | OUTPATIENT
Start: 2018-12-17 | End: 2018-12-18

## 2018-12-17 RX ORDER — PANTOPRAZOLE SODIUM 20 MG/1
40 TABLET, DELAYED RELEASE ORAL
Qty: 0 | Refills: 0 | Status: DISCONTINUED | OUTPATIENT
Start: 2018-12-17 | End: 2018-12-18

## 2018-12-17 RX ADMIN — OXYCODONE AND ACETAMINOPHEN 2 TABLET(S): 5; 325 TABLET ORAL at 20:18

## 2018-12-17 RX ADMIN — LOSARTAN POTASSIUM 50 MILLIGRAM(S): 100 TABLET, FILM COATED ORAL at 18:38

## 2018-12-17 RX ADMIN — ALBUTEROL 2.5 MILLIGRAM(S): 90 AEROSOL, METERED ORAL at 12:52

## 2018-12-17 RX ADMIN — ATORVASTATIN CALCIUM 20 MILLIGRAM(S): 80 TABLET, FILM COATED ORAL at 23:06

## 2018-12-17 RX ADMIN — OXYCODONE AND ACETAMINOPHEN 2 TABLET(S): 5; 325 TABLET ORAL at 20:48

## 2018-12-17 RX ADMIN — MORPHINE SULFATE 4 MILLIGRAM(S): 50 CAPSULE, EXTENDED RELEASE ORAL at 05:47

## 2018-12-17 RX ADMIN — MONTELUKAST 10 MILLIGRAM(S): 4 TABLET, CHEWABLE ORAL at 23:06

## 2018-12-17 RX ADMIN — Medication 3 MILLILITER(S): at 05:43

## 2018-12-17 RX ADMIN — Medication 100 MILLIGRAM(S): at 23:06

## 2018-12-17 RX ADMIN — Medication 125 MILLIGRAM(S): at 05:49

## 2018-12-17 RX ADMIN — Medication 40 MILLIGRAM(S): at 18:38

## 2018-12-17 RX ADMIN — Medication 3 MILLILITER(S): at 18:40

## 2018-12-17 RX ADMIN — Medication 600 MILLIGRAM(S): at 18:38

## 2018-12-17 NOTE — H&P ADULT - HISTORY OF PRESENT ILLNESS
History of Present Illness:  37y Male History of Present Illness:    37M with PMHx of obesity,  HTN, asthma, seasonal allergies and food allergies who presented to the hospital with complaint of of substernal chest pain,  CTA revealed Type A dissection patient transferred to [facility] for emergent cardiac surgery.  He underwent Aortic dissection repair supracoronary with 26 Hemashield Graft hemiarch repair on 12/8/18 with post op course significant for cardiogenic shock requiring pressor support, hypertension requiring cardene gtt, volume overload,  and leukocytosis with one episode of fever to 101.5.  He was discharged on 12/13 and returns today to [facility] ER with c/o shortness of breath and wheezing. Reports unable to successfully use his albuterol inhaler because he is so sore from recent surgery and unable to take a deep breath. Pt. endorses recent cough. Denies fever chills n/v/d.  Chest CTA revealed no evidence of PE. O2 sat on repeat vitals 92-93% on RA.  Readmitted for further management of likely asthma exacerbation; may also have component of atelectasis or bronchiectasis given recent surgery.

## 2018-12-17 NOTE — H&P ADULT - PROBLEM SELECTOR PLAN 2
Continue with Labetalol 600 mg TID   Continue with Losartan 25 mg PO daily   Increase activity as tolerated.   Encourage Chest PT / Pulmonary toileting and Incentive spirometry every 1 hour x 10 while awake.  D/C plan Home once medically cleared   Plan of care discussed with attending Continue with Labetalol 600 mg TID   Continue with Losartan 50 mg PO daily   Increase activity as tolerated.   Encourage Chest PT / Pulmonary toileting and Incentive spirometry every 1 hour x 10 while awake.  D/C plan Home once medically cleared   Plan of care discussed with attending

## 2018-12-17 NOTE — ED PROVIDER NOTE - OBJECTIVE STATEMENT
36yo M pmhx Kallmann Syndrome, Asthma, HTN, recent Type A aortic dissection s/p surgical repair on 12/8/18 p/w CC SOB and wheezing. States that since he was discharged from the hospital on Thursday he has experienced difficulty breathing, he has been unable to successfully use his albuterol inhaler because he is so sore from recent surgery and unable to take a deep breath. Pt. endorses recent cough. Denies fever chills n/v/d. Has been using incentive spirometer at home.

## 2018-12-17 NOTE — CONSULT NOTE ADULT - ASSESSMENT
37 yr male with PMHx of HTN, seasonal allergies and food allergies who presented to the hospital on 12/18 with complaint of of substernal chest pain,  CTA revealed Type A dissection patient transferred to [facility] for emergent cardiac surgery.  He underwent Aortic dissection repair supracoronary with 26 Hemashield Graft hemiarch repair on 12/18 with post operative course requiring pressor support, hypertension requiring cardene gtt, volume overload and leukocytosis with one episode of fever to 101.5.  He was discharged on 37 yr male with PMHx of HTN, seasonal allergies and food allergies who presented to the hospital on 12/8 with complaint of of substernal chest pain,  CTA revealed Type A dissection patient transferred to [facility] for emergent cardiac surgery.  He underwent Aortic dissection repair supracoronary with 26 Hemashield Graft hemiarch repair on 12/8 with post operative course requiring pressor support, hypertension requiring cardene gtt, volume overload and leukocytosis with one episode of fever to 101.5. He was discharged on 12/13 and returns today with shortness of breath, wheezing, increasing edema, and complaints of incisional pain. 37M with PMHx of Kallman syndrome, obesity,  HTN, asthma, seasonal/food allergies, Type A dissection s/p  Aortic dissection repair supra-coronary with 26 Hemashield Graft hemiarch repair on 12/8/18.  Presents to the ER with shortness of breath, cough, and increased lower extremity edema.   +wheezing on initial assessment, receiving duoneb.   Lab work pending and ER planning for CT-A to rule out PE.    Disposition pending testing.

## 2018-12-17 NOTE — H&P ADULT - PROBLEM SELECTOR PLAN 1
Admit to 2 [facility] Telemetry floor   Start DuoNeb inh every 6 hours   Spiriva 1 puff inh daily   Pulmonary Consult  Continue with Labetalol 600 mg TID   Continue with Losartan 25 mg PO daily   Increase activity as tolerated.   Encourage Chest PT / Pulmonary toileting and Incentive spirometry every 1 hour x 10 while awake.   Continue with PUD and DVT prophylaxis.   D/C plan Home once medically cleared   Plan of care discussed with attending Admit to 2 [facility] Telemetry floor   Start DuoNeb inh every 6 hours   Spiriva 1 puff inh daily   Pulmonary Consult  Continue with Labetalol 600 mg TID   Continue with Losartan 50 mg PO daily   Increase activity as tolerated.   Encourage Chest PT / Pulmonary toileting and Incentive spirometry every 1 hour x 10 while awake.   Continue with PUD and DVT prophylaxis.   D/C plan Home once medically cleared   Plan of care discussed with attending

## 2018-12-17 NOTE — H&P ADULT - NSHPREVIEWOFSYSTEMS_GEN_ALL_CORE
Review of Systems  GENERAL:  Fevers[] chills[] sweats[] fatigue[] weight loss[] weight gain []                                        NEURO:  parathesias[] seizures []  syncope []  confusion []                                                                                  EYES: glasses[]  blurry vision[]  discharge[] pain[] glaucoma []                                                                            ENMT:  difficulty hearing []  vertigo[]  dysphagia[] epistaxis[] recent dental work []                                      CV:  chest pain[] palpitations[] ALVARADO [X] diaphoresis [] edema[]                                                                                             RESPIRATORY:  wheezing[X] SOB[X] cough [X] sputum[] hemoptysis[]                                                                    GI:  nausea[]  vomiting []  diarrhea[] constipation [] melena []                                                                        : hematuria[ ]  dysuria[ ] urgency[] incontinence[]                                                                                              MUSCULOSKELETAL:  arthritis[ ]  joint swelling [ ] muscle weakness [ ]                                                                  SKIN/BREAST:  rash[ ] itching [ ]  hair loss[ ] masses[ ]                                                                                                PSYCH:  dementia [ ] depression [ ] anxiety[ ]                                                                                                                  HEME/LYMPH:  bruises easily[ ] enlarged lymph nodes[ ] tender lymph nodes[ ]                                                 ENDOCRINE:  cold intolerance[ ] heat intolerance[ ] polydipsia[ ]

## 2018-12-17 NOTE — ED ADULT NURSE NOTE - OBJECTIVE STATEMENT
Pt presents to the ED with complaint of shortness of breath, AXOX3 with significant other at the bedside, reports worsening difficulty breathing, ,states unable to use inhaler at home, pt recently discharged thursday for surgery s/p aortic dissection, breathing labored, wheezes noted, pt reports shortness of breath, pt reports chest pain, states pain is similar in character and quality since surgery. Steristrips to sternum intact, dry, wound is well approximated and dry. Pt denies fevers or chills, no nausea vomiting or diarrhea, no dysuria or hematuria.

## 2018-12-17 NOTE — ED PROVIDER NOTE - PROGRESS NOTE DETAILS
Roula Retana DO: Patient signed to me at 7AM pending reassessment and evaluation by CT surgeon. Patient seen and evaluated by NP and discussed with surgeon. clinically appears as asthma, c/b splinting from post-op pain. Post-surgical changes on CTA. No evidence of PE. Will d/c with nebulizer. Patient has f/u appt on 12/21 with CT surgeon. Patient comfortable with plan. Symptomatically improved. Roula Retana DO: Patient hypoxic on RA to 92-93% on RA. discussed with NP. will admit to attg. Patient assessed, reports feeling SOB again; lungs with slight end expiratory wheezing, but patient otherwise well appearing, in NAD, not tachypneic and speaking in full sentences. additional neb ordered, will continue to monitor. Bubba Patient remains well appearing and in NAD; however, O2 sat on repeat vitals 92-93% on RA.  Will plan now for admission (CT surgery v medicine) for further management of likely asthma exacerbation; may also have component of atelectasis or bronchiectasis given recent surgery. No evidence of pulmonary embolism, edema/effusions or consolidation on CT. -[place]

## 2018-12-17 NOTE — H&P ADULT - ASSESSMENT
37y Male presents with Shortness of breath 37y Male presents s/p 12/8/18 Aortic dissection repair with worsening Shortness of breath

## 2018-12-17 NOTE — ED ADULT NURSE NOTE - NSIMPLEMENTINTERV_GEN_ALL_ED
Implemented All Fall Risk Interventions:  [place] to call system. Call bell, personal items and telephone within reach. Instruct patient to call for assistance. Room bathroom lighting operational. Non-slip footwear when patient is off stretcher. Physically safe environment: no spills, clutter or unnecessary equipment. Stretcher in lowest position, wheels locked, appropriate side rails in place. Provide visual cue, wrist band, yellow gown, etc. Monitor gait and stability. Monitor for mental status changes and reorient to person, place, and time. Review medications for side effects contributing to fall risk. Reinforce activity limits and safety measures with patient and family.

## 2018-12-17 NOTE — H&P ADULT - NSHPSOCIALHISTORY_GEN_ALL_CORE
SOCIAL HISTORY:  Smoker: [ ] Yes  [ ] No        PACK YEARS:                         WHEN QUIT?  ETOH use: [ ] Yes  [ ] No              FREQUENCY / QUANTITY:  [place] Drug use:  [ ] Yes  [ ] No  Occupation:  Live with:  Assist device use: SOCIAL HISTORY:  Smoker: [ ] Yes  [X ] No        PACK YEARS:                         WHEN QUIT?  ETOH use: [ ] Yes  [X] No              FREQUENCY / QUANTITY:  [place] Drug use:  [ ] Yes  [X ] No  Live with: Spouse   Assist device use: Denies

## 2018-12-17 NOTE — CONSULT NOTE ADULT - SUBJECTIVE AND OBJECTIVE BOX
CC: Called to assess post-operative patient with complaint of shortness of breath.     History of Present Illness:  37 yr male with PMHx of HTN, seasonal allergies and food allergies who presented to the hospital on 12/18 with complaint of of substernal chest pain,  CTA revealed Type A dissection patient transferred to [facility] for emergent cardiac surgery.  He underwent Aortic dissection repair supracoronary with 26 Hemashield Graft hemiarch repair on 12/18 with post operative course requiring pressor support, hypertension requiring cardene gtt, volume overload and leukocytosis with one episode of fever to 101.5.  He was discharged on 12/13 and returns today with shortness of breath and wheezing.      Past Medical History  Hypertension, unspecified type    Past Surgical History  s/p Type A dissection repair      MEDICATIONS:   ALBUTerol/ipratropium for Nebulization.. 3 milliLiter(s) Nebulizer every 20 minutes  methylPREDNISolone sodium succinate Injectable 125 milliGRAM(s) IV Push Once  morphine  - Injectable 4 milliGRAM(s) IV Push Once    MEDICATIONS  Home medications:  oxycodone-acetaminophen 5 mg-325 mg oral tablet  2 tab(s) by mouth every 6 hours, As needed  acetaminophen 325 mg oral tablet 2 tab(s) by mouth every 6 hours  losartan 50 mg oral tablet 1 tab(s) by mouth once a day  atorvastatin 20 mg oral tablet 1 tab(s) by mouth once a day (at bedtime)  labetalol 200 mg oral tablet 3 tab(s) by mouth every 8 hours   furosemide 40 mg oral tablet 1 tab(s) by mouth once a day  docusate sodium 100 mg oral capsule 1 cap(s) by mouth 3 times a day  pantoprazole 40 mg oral delayed release table 1 tab(s) by mouth once a day (before a meal)    Allergies:   Allergy to Eggplant (Short breath; Rash)  penicillin (Unknown)  Potatoes (Short breath; Rash)  Tomatoes (Short breath; Rash)      SOCIAL HISTORY:  Smoker: [ ] Yes  [ ] No        PACK YEARS:                         WHEN QUIT?  ETOH use: [ ] Yes  [ ] No              FREQUENCY / QUANTITY:  Ilicit Drug use:  [ ] Yes  [ ] No  Occupation:  Live with:  Assist device use:    Relevant Family History  FAMILY HISTORY:  No pertinent family history in first degree relatives      Review of Systems  GENERAL:  Fevers[] chills[] sweats[] fatigue[] weight loss[] weight gain []                                        NEURO:  paresthesias[] seizures []  syncope []  confusion []                                                                                  EYES: glasses[]  blurry vision[]  discharge[] pain[] glaucoma []                                                                            ENMT:  difficulty hearing []  vertigo[]  dysphagia[] epistaxis[] recent dental work []                                      CV:  chest pain[] palpitations[] ALVARADO [] diaphoresis [] edema[]                                                                                             RESPIRATORY:  wheezing[x] SOB[x] cough [] sputum[] hemoptysis[]                                                                    GI:  nausea[]  vomiting []  diarrhea[] constipation [] melena []                                                                        : hematuria[ ]  dysuria[ ] urgency[] incontinence[]                                                                                              MUSKULOSKELETAL:  arthritis[ ]  joint swelling [ ] muscle weakness [ ]                                                                  SKIN/BREAST:  rash[ ] itching [ ]  hair loss[ ] masses[ ]                                                                                                PSYCH:  dementia [ ] depresion [ ] anxiety[ ]                                                                                                                  HEME/LYMPH:  bruises easily[ ] enlarged lymph nodes[ ] tender lymph nodes[ ]                                                 ENDOCRINE:  cold intolerance[ ] heat intolerance[ ] polydipsia[ ]                                                                              PHYSICAL EXAM  Vital Signs Last 24 Hrs  T(C): 37.1 (17 Dec 2018 05:10), Max: 37.1 (17 Dec 2018 05:10)  T(F): 98.7 (17 Dec 2018 05:10), Max: 98.7 (17 Dec 2018 05:10)  HR: 87 (17 Dec 2018 05:10) (87 - 87)  BP: 175/92 (17 Dec 2018 05:10) (175/92 - 175/92)  RR: 22 (17 Dec 2018 05:10) (22 - 22)  SpO2: 95% (17 Dec 2018 05:10) (95% - 95%)    General: Well nourished, well developed, no acute distress.                                                         Neuro: Normal exam oriented to person/place & time with no focal motor or sensory  deficits.                    Eyes: Normal exam of conjunctiva & lids, pupils equally reactive.   ENT: Normal exam of nasal/oral mucosa with absence of cyanosis.   Neck: Normal exam of jugular veins, trachea & thyroid.   Chest: Normal lung exam with good air movement absence of wheezes, rales, or rhonchi:                                                                          CV:  Auscultation: normal [ ] S3[ ] S4[ ] Irregular [ ] Rub[ ] Clicks[ ]  Murmurs none:[ ]systolic [ ]  diastolic [ ] holosystolic [ ]  Carotids: No Bruits[ ] Other____________ Abdominal Aorta: normal [ ] nonpalpable[ ]                                                                         GI: Normal exam of abdomen, liver & spleen with no noted masses or tenderness.                                                                                              Extremities: Normal no evidence of cyanosis or deformity Edema: none[ ]trace[ ]1+[ ]2+[ ]3+[ ]4+[ ]  Lower Extremity Pulses: Right[ ] Left[ ]Varicosities[ ]  SKIN : Normal exam to inspection & palation.                                                           LABS:  Pending    CT-A pending: CC: Called to assess post-operative patient with complaint of shortness of breath.     History of Present Illness:  37M with PMHx of obesity,  HTN, asthma, seasonal allergies and food allergies who presented to the hospital with complaint of of substernal chest pain,  CTA revealed Type A dissection patient transferred to [facility] for emergent cardiac surgery.  He underwent Aortic dissection repair supracoronary with 26 Hemashield Graft hemiarch repair on 12/8/18 with post operative course significant for cardiogenic shock requiring pressor support, hypertension requiring cardene gtt, volume overload,  and leukocytosis with one episode of fever to 101.5.  He was discharged on 12/13 and returns today with shortness of breath and wheezing.      Past Medical History  Hypertension, unspecified type    Past Surgical History  s/p Type A dissection repair      MEDICATIONS:   ALBUTerol/ipratropium for Nebulization.. 3 milliLiter(s) Nebulizer every 20 minutes  methylPREDNISolone sodium succinate Injectable 125 milliGRAM(s) IV Push Once  morphine  - Injectable 4 milliGRAM(s) IV Push Once    MEDICATIONS  Home medications:  oxycodone-acetaminophen 5 mg-325 mg oral tablet  2 tab(s) by mouth every 6 hours, As needed  acetaminophen 325 mg oral tablet 2 tab(s) by mouth every 6 hours  losartan 50 mg oral tablet 1 tab(s) by mouth once a day  atorvastatin 20 mg oral tablet 1 tab(s) by mouth once a day (at bedtime)  labetalol 200 mg oral tablet 3 tab(s) by mouth every 8 hours   furosemide 40 mg oral tablet 1 tab(s) by mouth once a day  docusate sodium 100 mg oral capsule 1 cap(s) by mouth 3 times a day  pantoprazole 40 mg oral delayed release table 1 tab(s) by mouth once a day (before a meal)    Allergies:   Allergy to Eggplant (Short breath; Rash)  penicillin (Unknown)  Potatoes (Short breath; Rash)  Tomatoes (Short breath; Rash)      SOCIAL HISTORY:  Smoker: [ ] Yes  [x ] No        PACK YEARS:                         WHEN QUIT?  ETOH use: [ ] Yes  [x ] No              FREQUENCY / QUANTITY:  Ilicit Drug use:  [ ] Yes  [x ] No  Occupation:  Live with:  Assist device use: none    Relevant Family History  FAMILY HISTORY:  No pertinent family history in first degree relatives      Review of Systems  GENERAL:  Fevers[] chills[] sweats[] fatigue[] weight loss[] weight gain []                                        NEURO:  paresthesias[] seizures []  syncope []  confusion []                                                                                  EYES: glasses[]  blurry vision[]  discharge[] pain[] glaucoma []                                                                            ENMT:  difficulty hearing []  vertigo[]  dysphagia[] epistaxis[] recent dental work []                                      CV:  chest pain[] palpitations[] ALVARADO [] diaphoresis [] edema[]                                                                                             RESPIRATORY:  wheezing[x] SOB[x] cough [] sputum[] hemoptysis[]                                                                    GI:  nausea[]  vomiting []  diarrhea[] constipation [] melena []                                                                        : hematuria[ ]  dysuria[ ] urgency[] incontinence[]                                                                                                                                                          SKIN/BREAST:  rash[ ] itching [ ]  hair loss[ ] masses[ ]                                                                                                PSYCH:  dementia [ ] depresion [ ] anxiety[ ]                                                                                                                  HEME/LYMPH:  bruises easily[ ] enlarged lymph nodes[ ] tender lymph nodes[ ]                                                 ENDOCRINE:  cold intolerance[ ] heat intolerance[ ] polydipsia[ ]                                                                              PHYSICAL EXAM  Vital Signs Last 24 Hrs  T(C): 37.1 (17 Dec 2018 05:10), Max: 37.1 (17 Dec 2018 05:10)  T(F): 98.7 (17 Dec 2018 05:10), Max: 98.7 (17 Dec 2018 05:10)  HR: 87 (17 Dec 2018 05:10) (87 - 87)  BP: 175/92 (17 Dec 2018 05:10) (175/92 - 175/92)  RR: 22 (17 Dec 2018 05:10) (22 - 22)  SpO2: 95% (17 Dec 2018 05:10) (95% - 95%)    General: Well nourished, well developed, no acute distress.                                                         Neuro: Non-focal, A+Ox3, GUADALUPE x 4                  Eyes: PERRLA EOMI conjunctiva clear   ENT: Moist oral mucosa, neck supple, trachea midline   Neck: Normal exam of jugular veins, trachea & thyroid.   Chest: expiratory wheezes, intermittent productive cough, currently receiving duoneb treatment                                                                      CV:  Auscultation: normal [x ] S3[ ] S4[ ] Irregular [ ] Rub[ ] Clicks[ ]  Murmurs none:[x ]systolic [ ]  diastolic [ ] holosystolic [ ]                                                                   GI: Obese,soft, non-tender, non-distended bowel sounds active x 4                                                                                              Extremities:  Edema: none[ ]trace[ ]1+[ ]2+[x ]3+[ ]4+[ ] bilateral lower extremity   Lower Extremity Pulses: Right[+2/4 ] Left[ +2/4]  SKIN : Normal exam to inspection & palpation.   Mediastinal incision NA stable, intact, without drainage.  CT sites with staples NA no drainage                                                          LABS:  Pending    CT-A pending: CC: Called to assess post-operative patient with complaint of shortness of breath.     History of Present Illness:  37M with PMHx of Kallman syndrome, obesity,  HTN, asthma, seasonal and food allergies, Type A dissection s/p  Aortic dissection repair supracoronary with 26 Hemashield Graft hemiarch repair on 12/8/18.  His post operative course significant for cardiogenic shock requiring pressor support, hypertension requiring cardene gtt, volume overload,  and leukocytosis with one episode of fever to 101.5.  He was discharged on 12/13 and returns today with shortness of breath and wheezing.  Cites difficulty in using MDI's for asthma due to persistent incisional pain.    Patient receiving duoneb nebulizer therapy during interview.   Denies fever, chills, headache, sick contacts, orthopnea, back pain, abdominal pain, constipation, dysuria.  Endorses ambulating and using incentive spirometer at home.  + cough, white sputum production, increased lower extremity edema.     Past Medical History  Hypertension, unspecified type  Kallman syndrome    Past Surgical History  s/p Type A dissection repair    MEDICATIONS:   ALBUTerol/ipratropium for Nebulization.. 3 milliLiter(s) Nebulizer every 20 minutes  methylPREDNISolone sodium succinate Injectable 125 milliGRAM(s) IV Push Once  morphine  - Injectable 4 milliGRAM(s) IV Push Once    MEDICATIONS  Home medications:  oxycodone-acetaminophen 5 mg-325 mg oral tablet  2 tab(s) by mouth every 6 hours, As needed  acetaminophen 325 mg oral tablet 2 tab(s) by mouth every 6 hours  losartan 50 mg oral tablet 1 tab(s) by mouth once a day  atorvastatin 20 mg oral tablet 1 tab(s) by mouth once a day (at bedtime)  labetalol 200 mg oral tablet 3 tab(s) by mouth every 8 hours   furosemide 40 mg oral tablet 1 tab(s) by mouth once a day  docusate sodium 100 mg oral capsule 1 cap(s) by mouth 3 times a day  pantoprazole 40 mg oral delayed release table 1 tab(s) by mouth once a day (before a meal)    Allergies:   Allergy to Eggplant (Short breath; Rash)  penicillin (Unknown)  Potatoes (Short breath; Rash)  Tomatoes (Short breath; Rash)      SOCIAL HISTORY:  Smoker: [ ] Yes  [x ] No        PACK YEARS:                         WHEN QUIT?  ETOH use: [ ] Yes  [x ] No              FREQUENCY / QUANTITY:  Ilicit Drug use:  [ ] Yes  [x ] No  Occupation: works as a   Live with: , lives with spouse   Assist device use: none    Relevant Family History  FAMILY HISTORY:  No pertinent family history in first degree relatives      Review of Systems  GENERAL:  Fevers[] chills[] sweats[] fatigue[] weight loss[] weight gain []                                        NEURO:  paresthesias[] seizures []  syncope []  confusion []                                                                                  EYES: glasses[]  blurry vision[]  discharge[] pain[] glaucoma []                                                                            ENMT:  difficulty hearing []  vertigo[]  dysphagia[] epistaxis[] recent dental work []                                      CV:  chest pain[] palpitations[] ALVARADO [] diaphoresis [] edema[]                                                                                             RESPIRATORY:  wheezing[x] SOB[x] cough [x] sputum[] hemoptysis[]                                                                    GI:  nausea[]  vomiting []  diarrhea[] constipation [] melena []                                                                        : hematuria[ ]  dysuria[ ] urgency[] incontinence[]                                                                                                                                                          SKIN/BREAST:  rash[ ] itching [ ]  hair loss[ ] masses[ ]                                                                                                PSYCH:  dementia [ ] depression [ ] anxiety[ ]                                                                                                                  HEME/LYMPH:  bruises easily[ ] enlarged lymph nodes[ ] tender lymph nodes[ ]                                                 ENDOCRINE:  cold intolerance[ ] heat intolerance[ ] polydipsia[ ]                                                                              PHYSICAL EXAM  Vital Signs Last 24 Hrs  T(C): 37.1 (17 Dec 2018 05:10), Max: 37.1 (17 Dec 2018 05:10)  T(F): 98.7 (17 Dec 2018 05:10), Max: 98.7 (17 Dec 2018 05:10)  HR: 87 (17 Dec 2018 05:10) (87 - 87)  Tele: SR  BP: 175/92 (17 Dec 2018 05:10) (175/92 - 175/92)  RR: 22 (17 Dec 2018 05:10) (22 - 22)  SpO2: 95% (17 Dec 2018 05:10) (95% - 95%)    General: Well nourished, well developed, no acute distress. Spouse at bedside                                                   Neuro: Non-focal, A+Ox3, GUADALUPE x 4                  Eyes: PERRLA EOMI conjunctiva clear   ENT: Moist oral mucosa, neck supple, trachea midline   Neck: Normal exam of jugular veins, trachea & thyroid.   Chest: expiratory wheezes, intermittent productive cough, currently receiving duoneb treatment                                                                      CV:  Auscultation: normal [x ] S3[ ] S4[ ] Irregular [ ] Rub[ ] Clicks[ ]  Murmurs none:[x ]systolic [ ]  diastolic [ ] holosystolic [ ]                                                                   GI: Obese,soft, non-tender, non-distended bowel sounds active x 4                                                                                              Extremities:  Edema: none[ ]trace[ ]1+[ ]2+[x ]3+[ ]4+[ ] bilateral lower extremity   Lower Extremity Pulses: Right[+2/4 ] Left[ +2/4]  SKIN : Normal exam to inspection & palpation.   Mediastinal incision NA stable, intact, without drainage.  CT sites with staples NA no drainage                                                          LABS:  Pending    CT-A pending:

## 2018-12-17 NOTE — ED PROVIDER NOTE - SHIFT CHANGE DETAILS
I have received sign out on this patient, briefly: 36yo M w/ h/o Kallmann Syndrome, Asthma, HTN, recent Type A aortic dissection s/p surgical repair on 12/8/18 p/w SOB and wheezing.  Feeling better now after solumedrol, nebs and reassessment; CTA performed and shows no evidence of PE.  Labs nonactinoable.  Currently awaiting re-evaluation by CT surgery service.  -jeremie

## 2018-12-17 NOTE — ED PROVIDER NOTE - ATTENDING CONTRIBUTION TO CARE
GEN: no acute respiratory distress. nontoxic, speaking comfortably in full sentences, ambulating with steady gait.  HEENT: NCAT. face symmetrical. PERRL 4mm, EOMI,  MMM, oropharynx wnl.  Neck: no JVD, trachea midline, no LAD  CV: RRR. +S1S2, no murmur. 2+ pulses in 4 extremities  Chest: slight wheezing, no rales or rhonchi. no retractions. decreased air movement. poor resp effort. appropriate incisional ttp. no signs of infection. post-op scars well healing.   ABD: +BS, soft, non distended, non tender.   MSK: No clubbing, cyanosis, 1+bl lower ext edema. FROM of all extremities. no tenderness to palpation. No midline or paraspinal tenderness.   Neuro: AOOX3.   Sensation intact, motor 5/5 throughout.     kallman, syndrome, asthma, recent Ao dissection repair presents with sob, wheezing, bilateral lower ext edema (edema since post op). poor resp effort 2/2 post-op pain. ddx asthma vs volume overload/chf vs PE. EKG unchanged. plan labs, cxr, cta for PE. nebs ,steroids, pain relief, reassess.  CT sx contacted [phone] and are following pt.

## 2018-12-17 NOTE — H&P ADULT - NSHPPHYSICALEXAM_GEN_ALL_CORE
PHYSICAL EXAM  Vital Signs Last 24 Hrs  T(C): 37.5 (17 Dec 2018 17:45), Max: 37.7 (17 Dec 2018 05:35)  T(F): 99.5 (17 Dec 2018 17:45), Max: 99.8 (17 Dec 2018 05:35)  HR: 83 (17 Dec 2018 17:45) (78 - 87)  BP: 177/84 (17 Dec 2018 17:45) (151/69 - 177/84)  BP(mean): --  RR: 18 (17 Dec 2018 17:45) (18 - 22)  SpO2: 95% (17 Dec 2018 17:45) (92% - 95%)    General: WN/WD NAD  Neurology: A&Ox3, nonfocal, GUADALUPE x 4  Head:  Normocephalic, atraumatic  ENT:  Mucosa moist, no ulcerations  Neck:  Supple, no sinuses or palpable masses  Lymphatic:  No palpable cervical, supraclavicular, axillary or inguinal adenopathy  Respiratory: CTA B/L  CV: RRR, S1S2, no murmur  Abdominal: Soft, NT, ND no palpable mass  MSK: No edema, + peripheral pulses, FROM all 4 extremity  Incisions: intact, no erythema or drainage PHYSICAL EXAM  Vital Signs Last 24 Hrs  T(C): 37.5 (17 Dec 2018 17:45), Max: 37.7 (17 Dec 2018 05:35)  T(F): 99.5 (17 Dec 2018 17:45), Max: 99.8 (17 Dec 2018 05:35)  HR: 83 (17 Dec 2018 17:45) (78 - 87)  BP: 177/84 (17 Dec 2018 17:45) (151/69 - 177/84)  BP(mean): --  RR: 18 (17 Dec 2018 17:45) (18 - 22)  SpO2: 95% (17 Dec 2018 17:45) (92% - 95%)    General: WN/WD NAD  Neurology: A&Ox3, nonfocal, GUADALUPE x 4  Head:  Normocephalic, atraumatic  ENT:  Mucosa moist, no ulcerations  Neck:  Supple, no sinuses or palpable masses  Lymphatic:  No palpable cervical, supraclavicular, axillary or inguinal adenopathy  Respiratory: CTA B/L  CV: RRR, S1S2, no murmur  Abdominal: Soft, NT, ND no palpable mass  MSK: B/L LE Trace edema, + peripheral pulses, FROM all 4 extremity  Incisions: intact, no erythema or drainage

## 2018-12-17 NOTE — ED PROVIDER NOTE - MEDICAL DECISION MAKING DETAILS
36yo M pmhx asthma, recent aortic dissection repair p/w CC difficulty breathing/wheezing for days. Likely secondary to asthma exacerbation and inability to use asthma pump at home. Cannot r/o PE, needs CTA. Will consult CT surg

## 2018-12-17 NOTE — ED ADULT TRIAGE NOTE - CHIEF COMPLAINT QUOTE
pt c/o "worsening SOB since being d/c'd on Thursday s/p aortic dissection with surgery" + audible wheezing in triage

## 2018-12-17 NOTE — H&P ADULT - NSHPLABSRESULTS_GEN_ALL_CORE
[facility]    [address] [place] [id number]    Phone:  [phone]    Fax:  [phone]       Thank You for choosing us for your health care visit. We are glad to serve you and happy to provide you with this summary of your visit. Please help us to ensure we have accurate records. If you find anything that needs to be changed, please let our staff know as soon as possible.          Your Demographic Information     Patient Name Sex Bhupendra Vargas Female 1971       Ethnic Group Patient Race    Not of  or  Origin Black/      Your Visit Details     Date & Time Provider Department    2017 11:15 AM Matilde Salas MD [facility]      Your Upcoming Appointment*(Max 10)      11:15 AM CST   Follow-up Visit with Matilde Salas MD   [facility] ([facility])    [address] [place] [address] [phone]            2017  9:00 AM CST   Follow-up Visit with Jesus Lawson DPM   [facility] ([facility])    [address]   [place] [address]   [phone]            2017 11:00 AM CST   Complete Physical Exam with Pap with Conor Buckner MD   [facility] ([facility])    [address]   [place] [address]   [phone]            2017  8:00 AM CST   Routine Eye Exam with Lon Rivas OD   [facility] ([facility])    [address]  [facility] [address]   [phone]            Wednesday February 15, 2017 11:20 AM CST   Behavioral Health Extended Follow-Up with Ken Ribeiro MD   [facility] ([facility])    [address] [place] [address]   [phone]              9:30 AM CDT      Follow-up Visit with Matilde Salas MD   [facility] ([facility])    [address] [place] [address] [phone]              Your To Do List     Follow-Up    Return in about 6 months (around 7/12/2017).      Conditions Discussed Today or Order-Related Diagnoses        Comments    Uncomplicated asthma, unspecified asthma severity           Your Vitals Were     BP Pulse Height Weight LMP SpO2    116/68 (BP Location: [facility], Patient Position: Sitting, Cuff Size: Large Adult) 71 5' 5\" (1.651 m) 243 lb (110.2 kg) 04/18/2016 (Exact Date) 100%    BMI Smoking Status                40.44 kg/m2 Current Some Day Smoker          Medications Prescribed or Re-Ordered Today     None      Your Current Medications Are        Disp Refills Start End    meloxicam (MOBIC) 15 MG tablet 30 tablet 3 1/2/2017     Sig: TAKE 1 TABLET BY MOUTH DAILY    Class: Eprescribe    pregabalin (LYRICA) 300 MG capsule 60 capsule 3 12/28/2016     Sig - Route: Take 1 capsule by mouth 2 times daily. - Oral    oxcarbazepine (TRILEPTAL) 600 MG tablet 180 tablet 1 12/28/2016     Sig - Route: Take 1 tablet by mouth 2 times daily. - Oral    Class: Eprescribe    Notes to Pharmacy: **10/8 REORDERED AS 90 DAY SUPPLY PER MD FIELDS**    Milnacipran HCl (SAVELLA) 100 MG Tab 60 tablet 3 12/28/2016     Sig - Route: Take 1 tablet by mouth 2 times daily. - Oral    Class: Eprescribe    clonazePAM (KLONOPIN) 1 MG tablet 90 tablet 3 12/28/2016     Sig - Route: Take 1 tablet by mouth 3 times daily. - Oral    lurasidone (LATUDA) 80 MG tablet 30 tablet 3 12/28/2016     Sig - Route: Take 1 tablet by mouth daily after a meal. - Oral    Class: Eprescribe    fluticasone-salmeterol (ADVAIR DISKUS) 250-50 MCG/DOSE AEROSOL POWDER, BREATH ACTIVATED 1 each 5 12/7/2016     Sig - Route: Inhale 1 puff into the lungs two times daily. For maintenance - Inhalation    Class: Eprescribe    Cosign for Ordering: Accepted by Matilde Salas MD on  12/7/2016  2:52 PM    albuterol (PROVENTIL HFA) 108 (90 BASE) MCG/ACT inhaler 1 Inhaler 0 11/9/2016     Sig - Route: Inhale 2 puffs into the lungs every 4 hours as needed for Shortness of Breath or Wheezing. - Inhalation    Class: Eprescribe    Cosign for Ordering: Accepted by Matilde Salas MD on 11/9/2016  3:10 PM    montelukast (SINGULAIR) 10 MG tablet 90 tablet 6 10/22/2015     Sig - Route: Take 1 tablet by mouth nightly. - Oral    Class: Eprescribe    omeprazole (PRILOSEC) 20 MG capsule 90 capsule 1 3/14/2014     Sig - Route: Take 1 capsule by mouth daily. - Oral    Class: Eprescribe    methocarbamol (ROBAXIN) 500 MG tablet 60 tablet 0 4/8/2013     Sig: TAKE ONE TABLET BY MOUTH FOUR TIMES DAILY    Class: Eprescribe    hydrocortisone (CORTIZONE) 2.5 % cream 60 g 4 8/27/2012     Sig: Apply to facial eruption with ketoconazole cream BID until eruption clears, then as needed.    Class: Eprescribe    adapalene (DIFFERIN) 0.1 % gel 45 g 4 8/27/2012     Sig: Applied to nose and cheeks once daily with the benzyl peroxide gel. Apply Cetaphil cream over these to prevent dryness.    Class: Eprescribe    tretinoin microspheres (RETIN-A MICRO) 0.04 % gel        Sig - Route: Apply  topically nightly. - Topical    Class: Historical Med      Allergies     No Known Allergies      Immunizations History as of 1/12/2017     Name Date    Depo-provera 6/13/2011    Hepatitis B Adult 3/10/2014, 10/7/2013, 9/6/2013    INFLUENZA QUADRIVALENT 11/5/2015, 10/27/2014 10:57 AM    Influenza 9/26/2016, 9/6/2013, 11/7/2012, 2/6/2012, 10/16/2006    Tdap 7/20/2010, 10/16/2006    Toradol 8/16/2011      Problem List as of 1/12/2017     DJD (degenerative joint disease) of lumbar spine    Fibromyalgia    Bilateral knee pain    Primary osteoarthritis of both knees    Backache, unspecified    Major depressive disorder, recurrent episode, severe, without mention of psychotic behavior    Allergic rhinitis    Morbid obesity - BMI=47    At risk for abuse of  opiates    Candidal enteritis    Lumbago    Neck pain    Abnormal pelvic ultrasound    Calcaneal spur    Pain, upper back    External hemorrhoid    Plantar fascial fibromatosis    Pain in limb    Contracture of tendon (sheath)    Tobacco use    RT ASD; DCR; RCRlg ; biceps tenodesis 3/31/15    Localized osteoarthrosis not specified whether primary or secondary, shoulder region    Nontraumatic rupture of tendons of biceps (long head)    Left ovarian cyst    asthma    Vitamin D insufficiency    Bipolar disorder    Lymph node enlargement    Rheumatism, unspecified and fibrositis    Unspecified inflammatory polyarthropathy    Other and unspecified nonspecific immunological findings    Multinodular goiter (nontoxic)    LT shoulder ASD; DCR; RCR med 2/2/16            Patient Instructions     None       LABS:                        8.8    10.4  )-----------( 310      ( 17 Dec 2018 06:12 )             25.6     12-17    133<L>  |  95<L>  |  12  ----------------------------<  117<H>  4.7   |  27  |  0.85    Ca    9.1      17 Dec 2018 06:12    TPro  6.6  /  Alb  3.4  /  TBili  0.7  /  DBili  x   /  AST  34  /  ALT  70<H>  /  AlkPhos  98  12-17

## 2018-12-18 ENCOUNTER — TRANSCRIPTION ENCOUNTER (OUTPATIENT)
Age: 38
End: 2018-12-18

## 2018-12-18 VITALS
RESPIRATION RATE: 18 BRPM | DIASTOLIC BLOOD PRESSURE: 74 MMHG | HEART RATE: 72 BPM | SYSTOLIC BLOOD PRESSURE: 144 MMHG | OXYGEN SATURATION: 98 %

## 2018-12-18 DIAGNOSIS — I10 ESSENTIAL (PRIMARY) HYPERTENSION: ICD-10-CM

## 2018-12-18 LAB
ANION GAP SERPL CALC-SCNC: 12 MMOL/L — SIGNIFICANT CHANGE UP (ref 5–17)
BUN SERPL-MCNC: 13 MG/DL — SIGNIFICANT CHANGE UP (ref 7–23)
CALCIUM SERPL-MCNC: 8.8 MG/DL — SIGNIFICANT CHANGE UP (ref 8.4–10.5)
CHLORIDE SERPL-SCNC: 97 MMOL/L — SIGNIFICANT CHANGE UP (ref 96–108)
CO2 SERPL-SCNC: 25 MMOL/L — SIGNIFICANT CHANGE UP (ref 22–31)
CREAT SERPL-MCNC: 0.87 MG/DL — SIGNIFICANT CHANGE UP (ref 0.5–1.3)
GLUCOSE SERPL-MCNC: 123 MG/DL — HIGH (ref 70–99)
HCT VFR BLD CALC: 25.4 % — LOW (ref 39–50)
HGB BLD-MCNC: 8.6 G/DL — LOW (ref 13–17)
MCHC RBC-ENTMCNC: 29 PG — SIGNIFICANT CHANGE UP (ref 27–34)
MCHC RBC-ENTMCNC: 33.9 GM/DL — SIGNIFICANT CHANGE UP (ref 32–36)
MCV RBC AUTO: 85.5 FL — SIGNIFICANT CHANGE UP (ref 80–100)
PLATELET # BLD AUTO: 319 K/UL — SIGNIFICANT CHANGE UP (ref 150–400)
POTASSIUM SERPL-MCNC: 3.8 MMOL/L — SIGNIFICANT CHANGE UP (ref 3.5–5.3)
POTASSIUM SERPL-SCNC: 3.8 MMOL/L — SIGNIFICANT CHANGE UP (ref 3.5–5.3)
RBC # BLD: 2.97 M/UL — LOW (ref 4.2–5.8)
RBC # FLD: 12 % — SIGNIFICANT CHANGE UP (ref 10.3–14.5)
SODIUM SERPL-SCNC: 134 MMOL/L — LOW (ref 135–145)
WBC # BLD: 9.8 K/UL — SIGNIFICANT CHANGE UP (ref 3.8–10.5)
WBC # FLD AUTO: 9.8 K/UL — SIGNIFICANT CHANGE UP (ref 3.8–10.5)

## 2018-12-18 PROCEDURE — 93005 ELECTROCARDIOGRAM TRACING: CPT

## 2018-12-18 PROCEDURE — 80053 COMPREHEN METABOLIC PANEL: CPT

## 2018-12-18 PROCEDURE — 85027 COMPLETE CBC AUTOMATED: CPT

## 2018-12-18 PROCEDURE — 80048 BASIC METABOLIC PNL TOTAL CA: CPT

## 2018-12-18 PROCEDURE — 71046 X-RAY EXAM CHEST 2 VIEWS: CPT

## 2018-12-18 PROCEDURE — 84295 ASSAY OF SERUM SODIUM: CPT

## 2018-12-18 PROCEDURE — 96375 TX/PRO/DX INJ NEW DRUG ADDON: CPT | Mod: XU

## 2018-12-18 PROCEDURE — 82330 ASSAY OF CALCIUM: CPT

## 2018-12-18 PROCEDURE — 84132 ASSAY OF SERUM POTASSIUM: CPT

## 2018-12-18 PROCEDURE — 83880 ASSAY OF NATRIURETIC PEPTIDE: CPT

## 2018-12-18 PROCEDURE — 82947 ASSAY GLUCOSE BLOOD QUANT: CPT

## 2018-12-18 PROCEDURE — 94640 AIRWAY INHALATION TREATMENT: CPT

## 2018-12-18 PROCEDURE — 84484 ASSAY OF TROPONIN QUANT: CPT

## 2018-12-18 PROCEDURE — 99285 EMERGENCY DEPT VISIT HI MDM: CPT | Mod: 25

## 2018-12-18 PROCEDURE — 71275 CT ANGIOGRAPHY CHEST: CPT

## 2018-12-18 PROCEDURE — 85014 HEMATOCRIT: CPT

## 2018-12-18 PROCEDURE — 82435 ASSAY OF BLOOD CHLORIDE: CPT

## 2018-12-18 PROCEDURE — 96374 THER/PROPH/DIAG INJ IV PUSH: CPT | Mod: XU

## 2018-12-18 PROCEDURE — 82803 BLOOD GASES ANY COMBINATION: CPT

## 2018-12-18 PROCEDURE — 83605 ASSAY OF LACTIC ACID: CPT

## 2018-12-18 RX ORDER — IPRATROPIUM/ALBUTEROL SULFATE 18-103MCG
3 AEROSOL WITH ADAPTER (GRAM) INHALATION
Qty: 1 | Refills: 0 | OUTPATIENT
Start: 2018-12-18 | End: 2019-01-16

## 2018-12-18 RX ORDER — POTASSIUM CHLORIDE 20 MEQ
20 PACKET (EA) ORAL
Qty: 0 | Refills: 0 | Status: COMPLETED | OUTPATIENT
Start: 2018-12-18 | End: 2018-12-18

## 2018-12-18 RX ORDER — FUROSEMIDE 40 MG
1 TABLET ORAL
Qty: 7 | Refills: 0 | OUTPATIENT
Start: 2018-12-18 | End: 2018-12-24

## 2018-12-18 RX ORDER — INFLUENZA VIRUS VACCINE 15; 15; 15; 15 UG/.5ML; UG/.5ML; UG/.5ML; UG/.5ML
0.5 SUSPENSION INTRAMUSCULAR ONCE
Qty: 0 | Refills: 0 | Status: DISCONTINUED | OUTPATIENT
Start: 2018-12-18 | End: 2018-12-18

## 2018-12-18 RX ORDER — TIOTROPIUM BROMIDE 18 UG/1
1 CAPSULE ORAL; RESPIRATORY (INHALATION)
Qty: 0 | Refills: 0 | COMMUNITY
Start: 2018-12-18

## 2018-12-18 RX ADMIN — LOSARTAN POTASSIUM 50 MILLIGRAM(S): 100 TABLET, FILM COATED ORAL at 06:08

## 2018-12-18 RX ADMIN — Medication 3 MILLILITER(S): at 06:08

## 2018-12-18 RX ADMIN — Medication 650 MILLIGRAM(S): at 10:54

## 2018-12-18 RX ADMIN — Medication 600 MILLIGRAM(S): at 12:32

## 2018-12-18 RX ADMIN — Medication 650 MILLIGRAM(S): at 10:24

## 2018-12-18 RX ADMIN — Medication 3 MILLILITER(S): at 00:42

## 2018-12-18 RX ADMIN — Medication 600 MILLIGRAM(S): at 06:08

## 2018-12-18 RX ADMIN — OXYCODONE AND ACETAMINOPHEN 2 TABLET(S): 5; 325 TABLET ORAL at 04:01

## 2018-12-18 RX ADMIN — Medication 40 MILLIGRAM(S): at 10:24

## 2018-12-18 RX ADMIN — Medication 20 MILLIEQUIVALENT(S): at 12:31

## 2018-12-18 RX ADMIN — Medication 3 MILLILITER(S): at 12:31

## 2018-12-18 RX ADMIN — Medication 40 MILLIGRAM(S): at 06:08

## 2018-12-18 RX ADMIN — Medication 100 MILLIGRAM(S): at 06:08

## 2018-12-18 RX ADMIN — OXYCODONE AND ACETAMINOPHEN 2 TABLET(S): 5; 325 TABLET ORAL at 04:31

## 2018-12-18 RX ADMIN — PANTOPRAZOLE SODIUM 40 MILLIGRAM(S): 20 TABLET, DELAYED RELEASE ORAL at 06:08

## 2018-12-18 RX ADMIN — Medication 20 MILLIEQUIVALENT(S): at 09:45

## 2018-12-18 NOTE — CONSULT NOTE ADULT - SUBJECTIVE AND OBJECTIVE BOX
Patient is a 37y old  Male who presents with a chief complaint of SOB (17 Dec 2018 18:15)      HPI:  History of Present Illness:    37M with PMHx of obesity,  HTN, asthma, seasonal allergies and food allergies who presented to the hospital with complaint of of substernal chest pain,  CTA revealed Type A dissection patient transferred to [facility] for emergent cardiac surgery.  He underwent Aortic dissection repair supracoronary with 26 Hemashield Graft hemiarch repair on 12/8/18 with post op course significant for cardiogenic shock requiring pressor support, hypertension requiring cardene gtt, volume overload,  and leukocytosis with one episode of fever to 101.5.  He was discharged on 12/13 and returns today to [facility] ER with c/o shortness of breath and wheezing. Reports unable to successfully use his albuterol inhaler because he is so sore from recent surgery and unable to take a deep breath. Pt. endorses recent cough. Denies fever chills n/v/d.  Chest CTA revealed no evidence of PE. O2 sat on repeat vitals 92-93% on RA.  Readmitted for further management of likely asthma exacerbation; may also have component of atelectasis or bronchiectasis given recent surgery. (17 Dec 2018 18:15)    pulmonary called to evaluate: He says he is feeling better: He was not able to use his inhlaer proplery at home  , ? secondary to paian At one time he used to use singulair prior to exercise but he has not taken it for last many years      ?FOLLOWING PRESENT  [x ] Hx of PE/DVT, [ x] Hx COPD, [ y] Hx of Asthma, [y ] Hx of Hospitalization, [x ]  Hx of BiPAP/CPAP use, [x ] Hx of MARYLOU    Allergies    Allergy to Eggplant (Short breath; Rash)  penicillin (Unknown)  Potatoes (Short breath; Rash)  Tomatoes (Short breath; Rash)    Intolerances        PAST MEDICAL & SURGICAL HISTORY:  Aortic dissection  Asthma  Hypertension, unspecified type  S/P aortic dissection repair      FAMILY HISTORY:  No pertinent family history in first degree relatives      Social History: [x ] TOBACCO                  [  x] ETOH                                 [x  ] IVDA/DRUGS    REVIEW OF SYSTEMS      General:	x    Skin/Breast:x  	  Ophthalmologic:x  	  ENMT:	x    Respiratory and Thorax: SOB  	  Cardiovascular:	x    Gastrointestinal:	x    Genitourinary:	x    Musculoskeletal:	x    Neurological:	x    Psychiatric:x	    Hematology/Lymphatics:	x    Endocrine:	x    Allergic/Immunologic:	x    MEDICATIONS  (STANDING):  ALBUTerol    90 MICROgram(s) HFA Inhaler 1 Puff(s) Inhalation every 4 hours  ALBUTerol/ipratropium for Nebulization 3 milliLiter(s) Nebulizer every 6 hours  atorvastatin 20 milliGRAM(s) Oral at bedtime  docusate sodium 100 milliGRAM(s) Oral three times a day  furosemide    Tablet 40 milliGRAM(s) Oral daily  labetalol 600 milliGRAM(s) Oral every 8 hours  losartan 50 milliGRAM(s) Oral daily  montelukast 10 milliGRAM(s) Oral at bedtime  pantoprazole    Tablet 40 milliGRAM(s) Oral before breakfast  potassium chloride    Tablet ER 20 milliEquivalent(s) Oral every 2 hours  tiotropium 18 MICROgram(s) Capsule 1 Capsule(s) Inhalation daily    MEDICATIONS  (PRN):  acetaminophen   Tablet .. 650 milliGRAM(s) Oral every 6 hours PRN Moderate Pain (4 - 6)  oxyCODONE    5 mG/acetaminophen 325 mG 2 Tablet(s) Oral every 6 hours PRN Severe Pain (7 - 10)       Vital Signs Last 24 Hrs  T(C): 36.8 (18 Dec 2018 04:46), Max: 37.5 (17 Dec 2018 12:00)  T(F): 98.2 (18 Dec 2018 04:46), Max: 99.5 (17 Dec 2018 12:00)  HR: 73 (18 Dec 2018 04:46) (73 - 83)  BP: 145/78 (18 Dec 2018 04:46) (144/78 - 177/84)  BP(mean): --  RR: 18 (18 Dec 2018 04:46) (18 - 22)  SpO2: 91% (18 Dec 2018 04:46) (91% - 95%)        I&O's Summary    17 Dec 2018 07:01  -  18 Dec 2018 07:00  --------------------------------------------------------  IN: 360 mL / OUT: 0 mL / NET: 360 mL    18 Dec 2018 07:01  -  18 Dec 2018 09:50  --------------------------------------------------------  IN: 0 mL / OUT: 350 mL / NET: -350 mL        Physical Exam:   GENERAL: NAD, well-groomed, well-developed  HEENT: TRIPP/   Atraumatic, Normocephalic  ENMT: No tonsillar erythema, exudates, or enlargement; Moist mucous membranes, Good dentition, No lesions  NECK: Supple, No JVD, Normal thyroid  CHEST/LUNG: Not much of wheezing  CVS: Regular rate and rhythm; No murmurs, rubs, or gallops  GI: : Soft, Nontender, Nondistended; Bowel sounds present  NERVOUS SYSTEM:  Alert & Oriented X3  EXTREMITIES:  2+ Peripheral Pulses, No clubbing, cyanosis, or edema  LYMPH: No lymphadenopathy noted  SKIN: No rashes or lesions  ENDOCRINOLOGY: No Thyromegaly  PSYCH: Appropriate    Labs:  UNKNOWN<55<4>>32<<7.355>>Venous<<3><<4><<5<<329>>                            8.6    9.8   )-----------( 319      ( 18 Dec 2018 06:02 )             25.4                         8.8    10.4  )-----------( 310      ( 17 Dec 2018 06:12 )             25.6     12-18    134<L>  |  97  |  13  ----------------------------<  123<H>  3.8   |  25  |  0.87  12-17    133<L>  |  95<L>  |  12  ----------------------------<  117<H>  4.7   |  27  |  0.85    Ca    8.8      18 Dec 2018 06:02  Ca    9.1      17 Dec 2018 06:12    TPro  6.6  /  Alb  3.4  /  TBili  0.7  /  DBili  x   /  AST  34  /  ALT  70<H>  /  AlkPhos  98  12-17    CAPILLARY BLOOD GLUCOSE        LIVER FUNCTIONS - ( 17 Dec 2018 06:12 )  Alb: 3.4 g/dL / Pro: 6.6 g/dL / ALK PHOS: 98 U/L / ALT: 70 U/L / AST: 34 U/L / GGT: x               D DImer< from: CT Angio Chest w/ IV Cont (12.17.18 @ 09:01) >  arteries arise from the false lumen. A third, partially imaged right   renal artery arises from both the true and false lumens.    HEART: Heart size is normal. Small pericardial effusion is increased from   12/8/2018.  MEDIASTINUM AND MARCK: No lymphadenopathy.  CHEST WALL AND LOWER NECK: Sternotomy. Small fluid adjacent to the   sternotomy defect, likely postsurgical.  VISUALIZED UPPER ABDOMEN: Within normal limits.  BONES: Within normal limits.    IMPRESSION:   No central pulmonary embolism.    Status post aortic dissection repair as above. Small pericardial effusion   and small fluid in the anterior mediastinum and adjacent to the   sternotomy defect, likely postsurgical.                   CESAR NGUYEN M.D., RADIOLOGY RESIDENT  This document has been electronically signed.  TATE MIX M.D., ATTENDING RADIOLOGIST  This document has been electronically signed. Dec 17 2018  9:26AM    < end of copied text >    Serum Pro-Brain Natriuretic Peptide: 823 pg/mL (12-17 @ 06:12)      Studies  Chest X-RAY  CT SCAN Chest   CT Abdomen  Venous Dopplers: LE:   Others

## 2018-12-18 NOTE — DISCHARGE NOTE ADULT - HOME CARE AGENCY
[facility] [phone] for visiting nurse . Agency will contact you to schedule visit.

## 2018-12-18 NOTE — DISCHARGE NOTE ADULT - MEDICATION SUMMARY - MEDICATIONS TO TAKE
I will START or STAY ON the medications listed below when I get home from the hospital:    predniSONE 20 mg oral tablet  -- 2 tab(s) by mouth once a day 40 mg,   -- Indication: For respiratory    oxycodone-acetaminophen 5 mg-325 mg oral tablet  -- 2 tab(s) by mouth every 6 hours, As needed, Severe Pain (7 - 10)  -- Indication: For pain    acetaminophen 325 mg oral tablet  -- 2 tab(s) by mouth every 6 hours, As needed, Temp greater or equal to 38.5C (101.3F)  -- Indication: For pain    losartan 50 mg oral tablet  -- 1 tab(s) by mouth once a day  -- Indication: For bp    atorvastatin 20 mg oral tablet  -- 1 tab(s) by mouth once a day (at bedtime)  -- Indication: For Hld    labetalol 200 mg oral tablet  -- 3 tab(s) by mouth every 8 hours   -- Indication: For bp    tiotropium 18 mcg inhalation capsule  -- 1 cap(s) inhaled once a day  -- Indication: For respiratory    ipratropium-albuterol 0.5 mg-2.5 mg/3 mLinhalation solution  -- 3 milliliter(s) inhaled every 6 hours  -- Indication: For respiratory    furosemide 40 mg oral tablet  -- 1 tab(s) by mouth once a day  -- Indication: For diuretic    docusate sodium 100 mg oral capsule  -- 1 cap(s) by mouth 3 times a day  -- Indication: For gi    montelukast 10 mg oral tablet  -- 1 tab(s) by mouth once a day (at bedtime)  -- Indication: For respiratory    K-Tab 20 mEq oral tablet, extended release  -- 1 tab(s) by mouth once a day   -- It is very important that you take or use this exactly as directed.  Do not skip doses or discontinue unless directed by your doctor.  Medication should be taken with plenty of water.  Take with food or milk.    -- Indication: For with lasix    pantoprazole 40 mg oral delayed release tablet  -- 1 tab(s) by mouth once a day (before a meal)  -- Indication: For gi

## 2018-12-18 NOTE — DISCHARGE NOTE ADULT - HOSPITAL COURSE
37M with PMHx of obesity,  HTN, asthma, seasonal allergies and food allergies who presented to the hospital with complaint of of substernal chest pain,  CTA revealed Type A dissection patient transferred to [facility] for emergent cardiac surgery.  He underwent Aortic dissection repair supracoronary with 26 Hemashield Graft hemiarch repair on 12/8/18 with post op course significant for cardiogenic shock requiring pressor support, hypertension requiring cardene gtt, volume overload,  and leukocytosis with one episode of fever to 101.5.  He was discharged on 12/13 and returns today to [facility] ER with c/o shortness of breath and wheezing. Reports unable to successfully use his albuterol inhaler because he is so sore from recent surgery and unable to take a deep breath. Pt. endorses recent cough. Denies fever chills n/v/d.  Chest CTA revealed no evidence of PE. O2 sat on repeat vitals 92-93% on RA. 12/18   Pulm cslt   pred 40 qd x 5 days, singulair and nebulizer for home   dc  home.

## 2018-12-18 NOTE — DISCHARGE NOTE ADULT - PATIENT PORTAL LINK FT
You can access the [facility] Patient Portal, offered by [facility], by registering with the following website: [email or web address]

## 2018-12-18 NOTE — DISCHARGE NOTE ADULT - CARE PROVIDER_API CALL
Scar Toledo), Surgery; Thoracic and Cardiac Surgery  [address] [place] [address]  Phone: [phone]  Fax: [phone]

## 2018-12-18 NOTE — CONSULT NOTE ADULT - PROBLEM SELECTOR RECOMMENDATION 9
? Astma exacerbation: He is not wheezing a lot at this time: He had some difficulty inhaling the inhaler yesterday Today he is able to TAKE DEEP BREATHS: THERE IS NO PE AND THE LUNG PARENCHYMA seems OK  he panda have mild SOB: To Symbicort will add Singulair and prednisone short course: 40 mg for 5 days , if Ok with CTS given his recent surgery: TEQUILA CTS PA on the floor
Duonebs q 6 prn  Received solumedrol 125mg IVSS x1. Prednisone not recommended  CT-A as planned  awaiting lab work

## 2018-12-19 RX ORDER — POTASSIUM CHLORIDE 20 MEQ
1 PACKET (EA) ORAL
Qty: 7 | Refills: 0 | OUTPATIENT
Start: 2018-12-19 | End: 2018-12-25

## 2018-12-19 RX ORDER — MONTELUKAST 4 MG/1
1 TABLET, CHEWABLE ORAL
Qty: 30 | Refills: 0 | OUTPATIENT
Start: 2018-12-19 | End: 2019-01-17

## 2018-12-21 RX ORDER — VALSARTAN 80 MG/1
80 TABLET, COATED ORAL DAILY
Qty: 30 | Refills: 3 | Status: COMPLETED | COMMUNITY
End: 2018-12-21

## 2018-12-21 RX ORDER — BUDESONIDE AND FORMOTEROL FUMARATE DIHYDRATE 160; 4.5 UG/1; UG/1
160-4.5 AEROSOL RESPIRATORY (INHALATION) TWICE DAILY
Qty: 1 | Refills: 5 | Status: COMPLETED | COMMUNITY
Start: 2017-09-20 | End: 2018-12-21

## 2018-12-21 RX ORDER — TRIAMCINOLONE ACETONIDE 1 MG/G
0.1 OINTMENT TOPICAL
Qty: 454 | Refills: 0 | Status: COMPLETED | COMMUNITY
Start: 2017-12-28 | End: 2018-12-21

## 2018-12-21 RX ORDER — ALBUTEROL SULFATE 90 UG/1
108 (90 BASE) AEROSOL, METERED RESPIRATORY (INHALATION)
Qty: 1 | Refills: 2 | Status: COMPLETED | COMMUNITY
Start: 2017-09-13 | End: 2018-12-21

## 2018-12-21 RX ORDER — CALCIUM CARBONATE/VITAMIN D3 600 MG-10
TABLET ORAL
Refills: 0 | Status: COMPLETED | COMMUNITY
End: 2018-12-21

## 2018-12-21 RX ORDER — TACROLIMUS 1 MG/G
0.1 OINTMENT TOPICAL
Qty: 1 | Refills: 2 | Status: COMPLETED | COMMUNITY
Start: 2017-10-13 | End: 2018-12-21

## 2018-12-21 RX ORDER — FAMOTIDINE 40 MG/1
TABLET, FILM COATED ORAL
Refills: 0 | Status: COMPLETED | COMMUNITY
End: 2018-12-21

## 2018-12-21 RX ORDER — BETAMETHASONE DIPROPIONATE 0.5 MG/G
0.05 OINTMENT TOPICAL
Qty: 45 | Refills: 0 | Status: COMPLETED | COMMUNITY
Start: 2017-07-15 | End: 2018-12-21

## 2018-12-21 RX ORDER — TRIAMCINOLONE ACETONIDE 1 MG/G
0.1 OINTMENT TOPICAL TWICE DAILY
Qty: 1 | Refills: 0 | Status: COMPLETED | COMMUNITY
Start: 2017-09-14 | End: 2018-12-21

## 2018-12-21 RX ORDER — FLUTICASONE PROPIONATE 50 MCG
50 SPRAY, SUSPENSION NASAL
Refills: 0 | Status: COMPLETED | COMMUNITY
End: 2018-12-21

## 2018-12-21 RX ORDER — MELATONIN 3 MG
TABLET ORAL
Refills: 0 | Status: COMPLETED | COMMUNITY
End: 2018-12-21

## 2018-12-21 RX ORDER — CHORIOGONADOTROPIN ALFA 10000 UNIT
10000 KIT INTRAMUSCULAR
Qty: 15 | Refills: 0 | Status: COMPLETED | COMMUNITY
Start: 2018-09-20 | End: 2018-12-21

## 2018-12-21 RX ORDER — SYRINGE W-NEEDLE,DISPOSAB,3 ML 25GX5/8"
22G X 1-1/2" SYRINGE, EMPTY DISPOSABLE MISCELLANEOUS
Qty: 30 | Refills: 2 | Status: COMPLETED | COMMUNITY
Start: 2018-04-09 | End: 2018-12-21

## 2018-12-22 ENCOUNTER — INPATIENT (INPATIENT)
Facility: HOSPITAL | Age: 38
LOS: 2 days | Discharge: HOME | DRG: 305 | End: 2018-12-25
Attending: THORACIC SURGERY (CARDIOTHORACIC VASCULAR SURGERY) | Admitting: THORACIC SURGERY (CARDIOTHORACIC VASCULAR SURGERY)
Payer: COMMERCIAL

## 2018-12-22 VITALS
TEMPERATURE: 98 F | HEART RATE: 73 BPM | DIASTOLIC BLOOD PRESSURE: 125 MMHG | HEIGHT: 75 IN | RESPIRATION RATE: 20 BRPM | OXYGEN SATURATION: 98 % | WEIGHT: 291.89 LBS | SYSTOLIC BLOOD PRESSURE: 182 MMHG

## 2018-12-22 DIAGNOSIS — E87.70 FLUID OVERLOAD, UNSPECIFIED: ICD-10-CM

## 2018-12-22 DIAGNOSIS — M54.6 PAIN IN THORACIC SPINE: ICD-10-CM

## 2018-12-22 DIAGNOSIS — J45.909 UNSPECIFIED ASTHMA, UNCOMPLICATED: ICD-10-CM

## 2018-12-22 DIAGNOSIS — I10 ESSENTIAL (PRIMARY) HYPERTENSION: ICD-10-CM

## 2018-12-22 DIAGNOSIS — Z98.890 OTHER SPECIFIED POSTPROCEDURAL STATES: Chronic | ICD-10-CM

## 2018-12-22 DIAGNOSIS — Z98.890 OTHER SPECIFIED POSTPROCEDURAL STATES: ICD-10-CM

## 2018-12-22 DIAGNOSIS — J45.20 MILD INTERMITTENT ASTHMA, UNCOMPLICATED: ICD-10-CM

## 2018-12-22 PROBLEM — I71.00 DISSECTION OF UNSPECIFIED SITE OF AORTA: Chronic | Status: ACTIVE | Noted: 2018-12-17

## 2018-12-22 LAB
ALBUMIN SERPL ELPH-MCNC: 3.7 G/DL — SIGNIFICANT CHANGE UP (ref 3.3–5)
ALP SERPL-CCNC: 99 U/L — SIGNIFICANT CHANGE UP (ref 40–120)
ALT FLD-CCNC: 60 U/L — HIGH (ref 10–45)
ANION GAP SERPL CALC-SCNC: 13 MMOL/L — SIGNIFICANT CHANGE UP (ref 5–17)
APTT BLD: 29 SEC — SIGNIFICANT CHANGE UP (ref 27.5–36.3)
AST SERPL-CCNC: 16 U/L — SIGNIFICANT CHANGE UP (ref 10–40)
BASOPHILS # BLD AUTO: 0 K/UL — SIGNIFICANT CHANGE UP (ref 0–0.2)
BASOPHILS NFR BLD AUTO: 0.3 % — SIGNIFICANT CHANGE UP (ref 0–2)
BILIRUB SERPL-MCNC: 0.7 MG/DL — SIGNIFICANT CHANGE UP (ref 0.2–1.2)
BLD GP AB SCN SERPL QL: NEGATIVE — SIGNIFICANT CHANGE UP
BUN SERPL-MCNC: 14 MG/DL — SIGNIFICANT CHANGE UP (ref 7–23)
CALCIUM SERPL-MCNC: 8.9 MG/DL — SIGNIFICANT CHANGE UP (ref 8.4–10.5)
CHLORIDE SERPL-SCNC: 96 MMOL/L — SIGNIFICANT CHANGE UP (ref 96–108)
CO2 SERPL-SCNC: 26 MMOL/L — SIGNIFICANT CHANGE UP (ref 22–31)
CREAT SERPL-MCNC: 0.88 MG/DL — SIGNIFICANT CHANGE UP (ref 0.5–1.3)
EOSINOPHIL # BLD AUTO: 0.1 K/UL — SIGNIFICANT CHANGE UP (ref 0–0.5)
EOSINOPHIL NFR BLD AUTO: 0.5 % — SIGNIFICANT CHANGE UP (ref 0–6)
GAS PNL BLDV: SIGNIFICANT CHANGE UP
GLUCOSE SERPL-MCNC: 107 MG/DL — HIGH (ref 70–99)
HCT VFR BLD CALC: 27.9 % — LOW (ref 39–50)
HGB BLD-MCNC: 9.4 G/DL — LOW (ref 13–17)
INR BLD: 1.33 RATIO — HIGH (ref 0.88–1.16)
LYMPHOCYTES # BLD AUTO: 13 % — SIGNIFICANT CHANGE UP (ref 13–44)
LYMPHOCYTES # BLD AUTO: 2.1 K/UL — SIGNIFICANT CHANGE UP (ref 1–3.3)
MAGNESIUM SERPL-MCNC: 2 MG/DL — SIGNIFICANT CHANGE UP (ref 1.6–2.6)
MCHC RBC-ENTMCNC: 28.5 PG — SIGNIFICANT CHANGE UP (ref 27–34)
MCHC RBC-ENTMCNC: 33.6 GM/DL — SIGNIFICANT CHANGE UP (ref 32–36)
MCV RBC AUTO: 84.6 FL — SIGNIFICANT CHANGE UP (ref 80–100)
MONOCYTES # BLD AUTO: 1.2 K/UL — HIGH (ref 0–0.9)
MONOCYTES NFR BLD AUTO: 7.6 % — SIGNIFICANT CHANGE UP (ref 2–14)
NEUTROPHILS # BLD AUTO: 12.5 K/UL — HIGH (ref 1.8–7.4)
NEUTROPHILS NFR BLD AUTO: 78.7 % — HIGH (ref 43–77)
PLATELET # BLD AUTO: 399 K/UL — SIGNIFICANT CHANGE UP (ref 150–400)
POTASSIUM SERPL-MCNC: 3.7 MMOL/L — SIGNIFICANT CHANGE UP (ref 3.5–5.3)
POTASSIUM SERPL-SCNC: 3.7 MMOL/L — SIGNIFICANT CHANGE UP (ref 3.5–5.3)
PROT SERPL-MCNC: 7.2 G/DL — SIGNIFICANT CHANGE UP (ref 6–8.3)
PROTHROM AB SERPL-ACNC: 15.3 SEC — HIGH (ref 10–12.9)
RBC # BLD: 3.3 M/UL — LOW (ref 4.2–5.8)
RBC # FLD: 12.4 % — SIGNIFICANT CHANGE UP (ref 10.3–14.5)
RH IG SCN BLD-IMP: POSITIVE — SIGNIFICANT CHANGE UP
SODIUM SERPL-SCNC: 135 MMOL/L — SIGNIFICANT CHANGE UP (ref 135–145)
TROPONIN T, HIGH SENSITIVITY RESULT: 80 NG/L — HIGH (ref 0–51)
WBC # BLD: 15.9 K/UL — HIGH (ref 3.8–10.5)
WBC # FLD AUTO: 15.9 K/UL — HIGH (ref 3.8–10.5)

## 2018-12-22 PROCEDURE — 93308 TTE F-UP OR LMTD: CPT | Mod: 26

## 2018-12-22 PROCEDURE — 93010 ELECTROCARDIOGRAM REPORT: CPT

## 2018-12-22 PROCEDURE — 74174 CTA ABD&PLVS W/CONTRAST: CPT | Mod: 26

## 2018-12-22 PROCEDURE — 71046 X-RAY EXAM CHEST 2 VIEWS: CPT | Mod: 26

## 2018-12-22 PROCEDURE — 71275 CT ANGIOGRAPHY CHEST: CPT | Mod: 26

## 2018-12-22 PROCEDURE — 99285 EMERGENCY DEPT VISIT HI MDM: CPT | Mod: 25

## 2018-12-22 PROCEDURE — 99251: CPT | Mod: 24

## 2018-12-22 RX ORDER — SODIUM CHLORIDE 9 MG/ML
1000 INJECTION INTRAMUSCULAR; INTRAVENOUS; SUBCUTANEOUS ONCE
Qty: 0 | Refills: 0 | Status: COMPLETED | OUTPATIENT
Start: 2018-12-22 | End: 2018-12-22

## 2018-12-22 RX ORDER — FUROSEMIDE 40 MG
40 TABLET ORAL DAILY
Qty: 0 | Refills: 0 | Status: DISCONTINUED | OUTPATIENT
Start: 2018-12-22 | End: 2018-12-25

## 2018-12-22 RX ORDER — LABETALOL HCL 100 MG
600 TABLET ORAL ONCE
Qty: 0 | Refills: 0 | Status: COMPLETED | OUTPATIENT
Start: 2018-12-22 | End: 2018-12-22

## 2018-12-22 RX ORDER — ENOXAPARIN SODIUM 100 MG/ML
40 INJECTION SUBCUTANEOUS DAILY
Qty: 0 | Refills: 0 | Status: DISCONTINUED | OUTPATIENT
Start: 2018-12-22 | End: 2018-12-25

## 2018-12-22 RX ORDER — FUROSEMIDE 40 MG
40 TABLET ORAL ONCE
Qty: 0 | Refills: 0 | Status: COMPLETED | OUTPATIENT
Start: 2018-12-22 | End: 2018-12-22

## 2018-12-22 RX ORDER — LOSARTAN POTASSIUM 100 MG/1
100 TABLET, FILM COATED ORAL DAILY
Qty: 0 | Refills: 0 | Status: DISCONTINUED | OUTPATIENT
Start: 2018-12-22 | End: 2018-12-22

## 2018-12-22 RX ORDER — TIOTROPIUM BROMIDE 18 UG/1
1 CAPSULE ORAL; RESPIRATORY (INHALATION) DAILY
Qty: 0 | Refills: 0 | Status: DISCONTINUED | OUTPATIENT
Start: 2018-12-22 | End: 2018-12-25

## 2018-12-22 RX ORDER — LOSARTAN POTASSIUM 100 MG/1
50 TABLET, FILM COATED ORAL DAILY
Qty: 0 | Refills: 0 | Status: DISCONTINUED | OUTPATIENT
Start: 2018-12-22 | End: 2018-12-22

## 2018-12-22 RX ORDER — LABETALOL HCL 100 MG
600 TABLET ORAL THREE TIMES A DAY
Qty: 0 | Refills: 0 | Status: DISCONTINUED | OUTPATIENT
Start: 2018-12-22 | End: 2018-12-25

## 2018-12-22 RX ORDER — LABETALOL HCL 100 MG
10 TABLET ORAL ONCE
Qty: 0 | Refills: 0 | Status: COMPLETED | OUTPATIENT
Start: 2018-12-22 | End: 2018-12-22

## 2018-12-22 RX ORDER — INFLUENZA VIRUS VACCINE 15; 15; 15; 15 UG/.5ML; UG/.5ML; UG/.5ML; UG/.5ML
0.5 SUSPENSION INTRAMUSCULAR ONCE
Qty: 0 | Refills: 0 | Status: DISCONTINUED | OUTPATIENT
Start: 2018-12-22 | End: 2018-12-25

## 2018-12-22 RX ORDER — MONTELUKAST 4 MG/1
10 TABLET, CHEWABLE ORAL AT BEDTIME
Qty: 0 | Refills: 0 | Status: DISCONTINUED | OUTPATIENT
Start: 2018-12-22 | End: 2018-12-25

## 2018-12-22 RX ORDER — OXYCODONE HYDROCHLORIDE 5 MG/1
10 TABLET ORAL ONCE
Qty: 0 | Refills: 0 | Status: DISCONTINUED | OUTPATIENT
Start: 2018-12-22 | End: 2018-12-22

## 2018-12-22 RX ORDER — DOCUSATE SODIUM 100 MG
100 CAPSULE ORAL THREE TIMES A DAY
Qty: 0 | Refills: 0 | Status: DISCONTINUED | OUTPATIENT
Start: 2018-12-22 | End: 2018-12-25

## 2018-12-22 RX ORDER — MORPHINE SULFATE 50 MG/1
4 CAPSULE, EXTENDED RELEASE ORAL ONCE
Qty: 0 | Refills: 0 | Status: DISCONTINUED | OUTPATIENT
Start: 2018-12-22 | End: 2018-12-22

## 2018-12-22 RX ORDER — LOSARTAN POTASSIUM 100 MG/1
50 TABLET, FILM COATED ORAL DAILY
Qty: 0 | Refills: 0 | Status: DISCONTINUED | OUTPATIENT
Start: 2018-12-23 | End: 2018-12-23

## 2018-12-22 RX ORDER — PANTOPRAZOLE SODIUM 20 MG/1
40 TABLET, DELAYED RELEASE ORAL
Qty: 0 | Refills: 0 | Status: DISCONTINUED | OUTPATIENT
Start: 2018-12-22 | End: 2018-12-25

## 2018-12-22 RX ORDER — HYDROMORPHONE HYDROCHLORIDE 2 MG/ML
4 INJECTION INTRAMUSCULAR; INTRAVENOUS; SUBCUTANEOUS ONCE
Qty: 0 | Refills: 0 | Status: DISCONTINUED | OUTPATIENT
Start: 2018-12-22 | End: 2018-12-22

## 2018-12-22 RX ORDER — OXYCODONE HYDROCHLORIDE 5 MG/1
5 TABLET ORAL ONCE
Qty: 0 | Refills: 0 | Status: DISCONTINUED | OUTPATIENT
Start: 2018-12-22 | End: 2018-12-22

## 2018-12-22 RX ORDER — POTASSIUM CHLORIDE 20 MEQ
20 PACKET (EA) ORAL DAILY
Qty: 0 | Refills: 0 | Status: DISCONTINUED | OUTPATIENT
Start: 2018-12-22 | End: 2018-12-25

## 2018-12-22 RX ORDER — IPRATROPIUM/ALBUTEROL SULFATE 18-103MCG
3 AEROSOL WITH ADAPTER (GRAM) INHALATION EVERY 6 HOURS
Qty: 0 | Refills: 0 | Status: DISCONTINUED | OUTPATIENT
Start: 2018-12-22 | End: 2018-12-25

## 2018-12-22 RX ORDER — ATORVASTATIN CALCIUM 80 MG/1
20 TABLET, FILM COATED ORAL AT BEDTIME
Qty: 0 | Refills: 0 | Status: DISCONTINUED | OUTPATIENT
Start: 2018-12-22 | End: 2018-12-25

## 2018-12-22 RX ORDER — ALBUTEROL 90 UG/1
1 AEROSOL, METERED ORAL EVERY 4 HOURS
Qty: 0 | Refills: 0 | Status: DISCONTINUED | OUTPATIENT
Start: 2018-12-22 | End: 2018-12-25

## 2018-12-22 RX ORDER — ACETAMINOPHEN 500 MG
650 TABLET ORAL EVERY 6 HOURS
Qty: 0 | Refills: 0 | Status: DISCONTINUED | OUTPATIENT
Start: 2018-12-22 | End: 2018-12-25

## 2018-12-22 RX ADMIN — Medication 40 MILLIGRAM(S): at 06:25

## 2018-12-22 RX ADMIN — HYDROMORPHONE HYDROCHLORIDE 4 MILLIGRAM(S): 2 INJECTION INTRAMUSCULAR; INTRAVENOUS; SUBCUTANEOUS at 06:27

## 2018-12-22 RX ADMIN — Medication 100 MILLIGRAM(S): at 21:26

## 2018-12-22 RX ADMIN — Medication 3 MILLILITER(S): at 23:43

## 2018-12-22 RX ADMIN — Medication 10 MILLIGRAM(S): at 03:55

## 2018-12-22 RX ADMIN — Medication 600 MILLIGRAM(S): at 06:27

## 2018-12-22 RX ADMIN — SODIUM CHLORIDE 1000 MILLILITER(S): 9 INJECTION INTRAMUSCULAR; INTRAVENOUS; SUBCUTANEOUS at 04:09

## 2018-12-22 RX ADMIN — SODIUM CHLORIDE 1000 MILLILITER(S): 9 INJECTION INTRAMUSCULAR; INTRAVENOUS; SUBCUTANEOUS at 03:58

## 2018-12-22 RX ADMIN — LOSARTAN POTASSIUM 50 MILLIGRAM(S): 100 TABLET, FILM COATED ORAL at 06:25

## 2018-12-22 RX ADMIN — OXYCODONE HYDROCHLORIDE 5 MILLIGRAM(S): 5 TABLET ORAL at 02:10

## 2018-12-22 RX ADMIN — ATORVASTATIN CALCIUM 20 MILLIGRAM(S): 80 TABLET, FILM COATED ORAL at 21:26

## 2018-12-22 RX ADMIN — Medication 40 MILLIGRAM(S): at 06:26

## 2018-12-22 RX ADMIN — MORPHINE SULFATE 4 MILLIGRAM(S): 50 CAPSULE, EXTENDED RELEASE ORAL at 02:19

## 2018-12-22 RX ADMIN — MORPHINE SULFATE 4 MILLIGRAM(S): 50 CAPSULE, EXTENDED RELEASE ORAL at 03:09

## 2018-12-22 RX ADMIN — Medication 10 MILLIGRAM(S): at 12:03

## 2018-12-22 RX ADMIN — Medication 600 MILLIGRAM(S): at 15:01

## 2018-12-22 RX ADMIN — Medication 600 MILLIGRAM(S): at 21:10

## 2018-12-22 RX ADMIN — OXYCODONE HYDROCHLORIDE 5 MILLIGRAM(S): 5 TABLET ORAL at 02:40

## 2018-12-22 RX ADMIN — Medication 3 MILLILITER(S): at 17:57

## 2018-12-22 RX ADMIN — MONTELUKAST 10 MILLIGRAM(S): 4 TABLET, CHEWABLE ORAL at 21:26

## 2018-12-22 RX ADMIN — Medication 20 MILLIEQUIVALENT(S): at 15:10

## 2018-12-22 RX ADMIN — TIOTROPIUM BROMIDE 1 CAPSULE(S): 18 CAPSULE ORAL; RESPIRATORY (INHALATION) at 17:54

## 2018-12-22 NOTE — ED ADULT NURSE NOTE - NSIMPLEMENTINTERV_GEN_ALL_ED
Implemented All Universal Safety Interventions:  [place] to call system. Call bell, personal items and telephone within reach. Instruct patient to call for assistance. Room bathroom lighting operational. Non-slip footwear when patient is off stretcher. Physically safe environment: no spills, clutter or unnecessary equipment. Stretcher in lowest position, wheels locked, appropriate side rails in place.

## 2018-12-22 NOTE — ED ADULT NURSE NOTE - LOCATION
"[facility]  Endocrinology  Diabetes Consult Note    Consult Requested by: Jaime Reynolds MD   Reason for admit: Embolic stroke involving left middle cerebral artery    HISTORY OF PRESENT ILLNESS:  Reason for Consult: Management of T2DM, Hyperglycemia     Surgical Procedure and Date: thrombectomy with TICI 2C reperfusion and stent placement due to L MCA stenosis  PEG 1/31    Home Diabetes Medications: metformin      How often checking glucose at home? FRIEDA, pt aphasic     Diabetes Complications include:     Hyperglycemia    Complicating diabetes co morbidities:   Residual deficits from CVA  and LOYDA      HPI:   Patient is a 47 y.o. male with a diagnosis of poorly controlled DM II, HLD, LOYDA, presents [facility] after noted "strange behavior" for which EMS was called.  Pt was found to have a L M1 occlusion and was taken to IR for thrombectomy with TICI 2C reperfusion and stent placement due to L MCA stenosis. DAPT 2/2 recent stent placement in angiogram after PEG placement.  Endo consulted for BG management.           PMH, PSH, FH, SH updated and reviewed         Review of Systems  FRIEDA as pt nonverbal s/p CVA       PHYSICAL EXAMINATION:  Vitals:    02/24/18 0836   BP: (!) 145/97   Pulse: 103   Resp: 16   Temp: 98 °F (36.7 °C)     Body mass index is 33.4 kg/m².    Physical Exam   PHYSICAL EXAMINATION  Constitutional:  Well developed, well nourished, NAD.   ENT: External ears no masses with nose patent;    Neck:  Supple; trachea midline; no thyromegaly.   Cardiovascular: Normal heart sounds, no LE edema.     Lungs:  Normal effort; lungs anterior bilaterally clear to auscultation.  Abdomen:  Soft, no masses,  no hernias.  MS: No clubbing or cyanosis of nails noted; unable to assess gait.  Skin: No rashes, lesions, or ulcers; no nodules.  Psychiatric: difficult to assess   Neurological: some EOM tracking noted        Labs Reviewed and Include     Recent Labs  Lab 02/24/18  0652   *   CALCIUM " 9.3   ALBUMIN 2.3*   PROT 7.8      K 4.3   CO2 29   CL 98   BUN 20   CREATININE 0.8   ALKPHOS 141*   ALT 33   AST 29   BILITOT 0.6     Lab Results   Component Value Date    WBC 13.01 (H) 02/24/2018    HGB 12.8 (L) 02/24/2018    HCT 37.7 (L) 02/24/2018    MCV 86 02/24/2018     (H) 02/24/2018     No results for input(s): TSH, FREET4 in the last 168 hours.  Lab Results   Component Value Date    HGBA1C 10.0 (H) 01/25/2018       Nutritional status:   Body mass index is 33.4 kg/m².  Lab Results   Component Value Date    ALBUMIN 2.3 (L) 02/24/2018    ALBUMIN 2.3 (L) 02/23/2018    ALBUMIN 2.2 (L) 02/22/2018     No results found for: PREALBUMIN    Estimated Creatinine Clearance: 137.4 mL/min (based on SCr of 0.8 mg/dL).    Accu-Checks  Recent Labs      02/22/18   0809  02/22/18   1120  02/22/18   1708  02/22/18   2153  02/23/18   0609  02/23/18   1137  02/23/18   1614  02/23/18   1946  02/24/18   0006  02/24/18   0416   POCTGLUCOSE  266*  226*  209*  221*  235*  241*  214*  222*  239*  249*        ASSESSMENT and PLAN    * Embolic stroke involving left middle cerebral artery    Avoid hypoglycemia             Type 2 diabetes mellitus    -180  Current regimen of levemir 36 units bid is creating basal/bolus mismatch in setting of continuous enteral feeds      Recommend levemir 40 units daily to cover basal needs (using ~0.7u/kg)   Start novolog 6 units q4hr to cover TF  Moderate correction q4hr             On enteral nutrition    TF 55cc/hr, increasing insulin needs            Obstructive sleep apnea    May increase insulin resistance if untreated             Plan discussed with patient, family, and RN at bedside.     Meron Marsh MD  Endocrinology  [facility]   back

## 2018-12-22 NOTE — CONSULT NOTE ADULT - ASSESSMENT
37M with PMHx of Kallman syndrome, obesity,  HTN, asthma, seasonal/ food allergies who presented to the hospital with complaint of of substernal chest pain,  CTA revealed Type A dissection patient transferred to [facility] for emergent cardiac surgery.  He underwent Aortic dissection repair supracoronary with 26 Hemashield Graft hemiarch repair on 12/8/18 with post op course significant for cardiogenic shock requiring pressor support, hypertension requiring cardene gtt, volume overload,  and leukocytosis with one episode of fever to 101.5.  He was discharged on 12/13 and returns today to [facility] ER with c/o shortness of breath and wheezing. Reports unable to successfully use his albuterol inhaler because he is so sore from recent surgery and unable to take a deep breath.  Chest CTA on 12/17 revealed no evidence of PE. Patient was admitted for observation and pulmonary was consulted who treated for asthma exacerbation with prednisone  40 qd x 5 days, singulair and nebulizer for home use.  Patient presents today with complaint of constant, severe left sided pain, lateral to T7, which has improved since arrival in ER related to received 4mg IVP morphine.  CT-Angio chest revealed stable post-operative changes with decrease in pleural effusions. 37M with PMHx of Kallman syndrome, obesity,  HTN, asthma, seasonal/ food allergies who was found to have Type A dissection on 12/8/18 and is s/p emergent Aortic dissection repair supracoronary with 26 Hemashield Graft hemiarch repair and was discharged on 12/13.  He was readmitted on 12/17-12/8 for pain management and asthma exacerbation.  He returns again for sustained left sided back pain, and found to be hypertensive.  Patient able to verbalize medications and schedule for taking anti-hypertensives.   Will administer 600mg labetolol and 50mg losartan and pain medication and monitor in ER, if no improvement in blood pressure, will admit for management.         CTS [id number]

## 2018-12-22 NOTE — CONSULT NOTE ADULT - SUBJECTIVE AND OBJECTIVE BOX
History of Present Illness:  37M with PMHx of Kallman syndrome, obesity,  HTN, asthma, seasonal/ food allergies who presented to the hospital with complaint of of substernal chest pain,  CTA revealed Type A dissection patient transferred to [facility] for emergent cardiac surgery.  He underwent Aortic dissection repair supracoronary with 26 Hemashield Graft hemiarch repair on 12/8/18 with post op course significant for cardiogenic shock requiring pressor support, hypertension requiring cardene gtt, volume overload,  and leukocytosis with one episode of fever to 101.5.  He was discharged on 12/13 and returns today to [facility] ER with c/o shortness of breath and wheezing. Reports unable to successfully use his albuterol inhaler because he is so sore from recent surgery and unable to take a deep breath.  Chest CTA on 12/17 revealed no evidence of PE. Patient was admitted for observation and pulmonary was consulted who treated for asthma exacerbation with prednisone  40 qd x 5 days, singulair and nebulizer for home use.  Patient presents today with complaint of constant, severe left sided pain, lateral to T7, which has improved since arrival in ER related to received 4mg IVP morphine.  CT surgery consult to assist in further management.     Past Medical History  Aortic dissection  Asthma  Hypertension, unspecified type  No pertinent past medical history    Past Surgical History  S/P aortic dissection repair  Aortic dissection  Active asthma  Essential hypertension  No significant past surgical history    MEDICATIONS  (STANDING):  tiotropium 18 mcg inhalation capsule: 1 cap(s) inhaled once a day (18 Dec 2018 10:42)  labetalol 200 mg oral tablet: 3 tab(s) orally every 8 hours  pantoprazole 40 mg oral delayed release tablet: 1 tab(s) orally once a day (before a meal)  furosemide 40 mg oral tablet: 1 tab(s) orally once a day,  atorvastatin 20 mg oral tablet: 1 tab(s) orally once a day (at bedtime),   losartan 50 mg oral tablet: 1 tab(s) orally once a day  oxycodone-acetaminophen 5 mg-325 mg oral tablet: 2 tab(s) orally every 6 hours, As needed, Severe Pain (7 - 10) MDD:8 tabs    Allergies:   Eggplant (Short breath; Rash)  penicillin (Unknown)  Potatoes (Short breath; Rash)  Tomatoes (Short breath; Rash)    SOCIAL HISTORY:  Smoker: [ ] Yes  [x ] No        PACK YEARS:                         WHEN QUIT?  ETOH use: [ ] Yes  [x ] No              FREQUENCY / QUANTITY:  Ilicit Drug use:  [ ] Yes  [ x] No  Occupation:  Live with: spouse   Assist device use: none    Relevant Family History  FAMILY HISTORY:  No pertinent family history in first degree relatives    Review of Systems  GENERAL:  Fevers[] chills[] sweats[] fatigue[] weight loss[] weight gain []                                        NEURO:  paresthesias[] seizures []  syncope []  confusion []                                                                                  EYES: glasses[]  blurry vision[]  discharge[] pain[] glaucoma []                                                                            ENMT:  difficulty hearing []  vertigo[]  dysphagia[] epistaxis[] recent dental work []                                      CV:  chest pain[] palpitations[] ALVARADO [] diaphoresis [] edema[]                                                                                             RESPIRATORY:  wheezing[] SOB[] cough [] sputum[] hemoptysis[]                                                                    GI:  nausea[]  vomiting []  diarrhea[] constipation [] melena []                                                                        : hematuria[ ]  dysuria[ ] urgency[] incontinence[]                                                                                              MUSCULOSKELETAL:  arthritis[ ]  joint swelling [ ] muscle weakness [ ]  back pain [ x]                                                                SKIN/BREAST:  rash[ ] itching [ ]  hair loss[ ] masses[ ]                                                                                                PSYCH:  dementia [ ] depression [ ] anxiety[ ]                                                                                                                  HEME/LYMPH:  bruises easily[ ] enlarged lymph nodes[ ] tender lymph nodes[ ]                                                 ENDOCRINE:  cold intolerance[ ] heat intolerance[ ] polydipsia[ ]                                                                              PHYSICAL EXAM  Vital Signs Last 24 Hrs  T(C): 36.8 (22 Dec 2018 00:38), Max: 36.8 (22 Dec 2018 00:38)  T(F): 98.3 (22 Dec 2018 00:38), Max: 98.3 (22 Dec 2018 00:38)  HR: 64 (22 Dec 2018 04:35) (64 - 73)  BP: 166/85 (22 Dec 2018 04:35) (166/85 - 221/104)  RR: 14 (22 Dec 2018 04:35) (14 - 20)  SpO2: 98% (22 Dec 2018 04:35) (98% - 98%)    General: WN/WD NAD  Neurology: A&Ox3, nonfocal, GUADALUPE x 4  Head:  Normocephalic, atraumatic  ENT:  Mucosa moist, no ulcerations  Neck:  Supple, no sinuses or palpable masses  Lymphatic:  No palpable cervical, supraclavicular, axillary or inguinal adenopathy  Respiratory: CTA B/L intermittent dry cough  CV: RRR, S1S2, no murmur B/L LE Trace edema, + peripheral pulses,  Abdominal: Soft, NT, ND no palpable mass  MSK:  FROM all 4 extremity, able to position self on stretcher without guarding or difficulty  Incisions: intact, no erythema or drainage                                                       LABS:                        9.4    15.9  )-----------( 399      ( 22 Dec 2018 02:27 )             27.9     12-22    135  |  96  |  14  ----------------------------<  107<H>  3.7   |  26  |  0.88    Ca    8.9      22 Dec 2018 02:27  Mg     2.0     12-22    TPro  7.2  /  Alb  3.7  /  TBili  0.7  /  DBili  x   /  AST  16  /  ALT  60<H>  /  AlkPhos  99  12-22    PT/INR - ( 22 Dec 2018 02:27 )   PT: 15.3 sec;   INR: 1.33 ratio      PTT - ( 22 Dec 2018 02:27 )  PTT:29.0 sec     CT Angio Chest w/ IV Cont (12.22.18 @ 03:26)   Compared to the recent CT performed at December 17, there is been no   significant interval change. Again seen are postoperative changes related   to recent repair of the patient's known type A aortic dissection. There   is no change in the appearance of the ascending aorta reconstruction.    There is no pseudoaneurysm.  The brachiocephalic trunk, left common   carotid artery, and left subclavian artery are patent without dissection   or thrombosis. The left and right coronary arteries are patent. The   aortic root measures 4.9 cm. The arch measures 3.8 cm. The dissection   flap is again seen at the aortic arch with extension to the abdominal   aorta. The celiac axis arises from the true lumen while the superior   mesenteric artery is supplied by both the true and false lumina. The left   renal artery arises from the true lumen. There are 2 right renal arteries   arise from the false lumen which is patent to the aortic bifurcation.   There is no central pulmonary embolism.     Again seen are sternotomy wires and an unchanged small subcutaneous fluid   collection in the lower ventral thoracotomy incision measuring about 1.8   x 2.7 x 7 cm, unchanged.    The tracheobronchial tree is patent proximally.  There is no significant   mediastinal or hilar adenopathy.    The heart is not enlarged.  There is no pericardial effusion.    Lungs: There is bibasilar streaky atelectasis.    There has been near resolution of previously visualized small right   pleural effusion which is now trace. There is persistent mild left   pleural effusion.       There is no free air, fluid or focal collection.    The liver, spleen, pancreas, adrenal glands and kidneys are unremarkable   besides a small left renal cyst.    Gallbladder: Contracted.    There is no small bowel obstruction. The appendix is unremarkable. There   is scattered diverticulosis.    Pelvic organs: The prostate is unremarkable.  The urinary bladder is unremarkable.    The aorta is not dilated.  There are atherosclerotic vascular   calcifications.  There is no significant adenopathy.  There is no retroperitoneal mass.  The visualized osseous structures are unremarkable.  There is a small fat-containing umbilical hernia.    IMPRESSION:    No significant intervalchange compared to the previous CT of December 17, 2018.  Postoperative changes status post recent type a aortic dissection repair.   No change in the appearance of the rest of the aortic dissection which is   seen as distally as the bifurcation. No evidence of renal ischemia or   bowel thickening.  No central pulmonary embolism.  Decrease in pleural effusion, now mild on the left.  Bibasilar streaky   atelectasis.  Diverticulosis. History of Present Illness:  37M with PMHx of Kallman syndrome, obesity,  HTN, asthma, seasonal/ food allergies who was found to have a Type A dissection on 12/8/18 and emergently  transferred to [facility] for surgical management.  He underwent Aortic dissection repair supracoronary with 26 Hemashield Graft hemiarch repair on 12/8/18 with post op course significant for cardiogenic shock requiring pressor support, hypertension requiring cardene gtt, volume overload,  and leukocytosis with one episode of fever to 101.5.  He was discharged on 12/13.    He returned to ER on 12/17 to ER with c/o shortness of breath and wheezing. Reports unable to successfully use his albuterol inhaler because he is so sore from recent surgery and unable to take a deep breath.  Chest CTA on 12/17 revealed no evidence of PE. Patient was admitted for observation and pulmonary was consulted who treated for asthma exacerbation with prednisone  40 qd x 5 days, singulair and nebulizer and was discharged on 12/18.      Patient presents today, 12/22,  with complaint of sudden onset,  constant, severe left sided stabbing back pain, lateral to T7 that he states was similar to the pain he experienced when diagnosed with dissection.  It has since has improved after receiving 4mg IVP morphine.  CT surgery consult called for further management of this post-operative patient.    Currently, in NAD on stretcher, denies fever, chills, cough, sputum production, chest pain, abdominal pain, flank pain, nor dysuria.      Past Medical History  Aortic dissection  Asthma  Hypertension, unspecified type  No pertinent past medical history    Past Surgical History  S/P aortic dissection repair  Aortic dissection  Active asthma  Essential hypertension  No significant past surgical history    MEDICATIONS  (STANDING):  tiotropium 18 mcg inhalation capsule: 1 cap(s) inhaled once a day (18 Dec 2018 10:42)  labetalol 200 mg oral tablet: 3 tab(s) orally every 8 hours  pantoprazole 40 mg oral delayed release tablet: 1 tab(s) orally once a day (before a meal)  furosemide 40 mg oral tablet: 1 tab(s) orally once a day,  atorvastatin 20 mg oral tablet: 1 tab(s) orally once a day (at bedtime),   losartan 50 mg oral tablet: 1 tab(s) orally once a day  oxycodone-acetaminophen 5 mg-325 mg oral tablet: 2 tab(s) orally every 6 hours, As needed, Severe Pain (7 - 10) MDD:8 tabs    Allergies:   Eggplant (Short breath; Rash)  penicillin (Unknown)  Potatoes (Short breath; Rash)  Tomatoes (Short breath; Rash)    SOCIAL HISTORY:  Smoker: [ ] Yes  [x ] No        PACK YEARS:                         WHEN QUIT?  ETOH use: [ ] Yes  [x ] No              FREQUENCY / QUANTITY:  Ilicit Drug use:  [ ] Yes  [ x] No  Occupation:   Live with: spouse   Assist device use: none    Relevant Family History  FAMILY HISTORY:  No pertinent family history in first degree relatives    Review of Systems  GENERAL:  Fevers[] chills[] sweats[] fatigue[] weight loss[] weight gain []                                        NEURO:  paresthesias[] seizures []  syncope []  confusion []                                                                                  EYES: glasses[]  blurry vision[]  discharge[] pain[] glaucoma []                                                                            ENMT:  difficulty hearing []  vertigo[]  dysphagia[] epistaxis[] recent dental work []                                      CV:  chest pain[] palpitations[] ALVARADO [] diaphoresis [] edema[]                                                                                             RESPIRATORY:  wheezing[] SOB[] cough [] sputum[] hemoptysis[]                                                                    GI:  nausea[]  vomiting []  diarrhea[] constipation [] melena []                                                                        : hematuria[ ]  dysuria[ ] urgency[] incontinence[]                                                                                              MUSCULOSKELETAL:  arthritis[ ]  joint swelling [ ] muscle weakness [ ]  back pain [ x]                                                                SKIN/BREAST:  rash[ ] itching [ ]  hair loss[ ] masses[ ]                                                                                                PSYCH:  dementia [ ] depression [ ] anxiety[ ]                                                                                                                  HEME/LYMPH:  bruises easily[ ] enlarged lymph nodes[ ] tender lymph nodes[ ]                                                 ENDOCRINE:  cold intolerance[ ] heat intolerance[ ] polydipsia[ ]                                                                              PHYSICAL EXAM  Vital Signs Last 24 Hrs  T(C): 36.8 (22 Dec 2018 00:38), Max: 36.8 (22 Dec 2018 00:38)  T(F): 98.3 (22 Dec 2018 00:38), Max: 98.3 (22 Dec 2018 00:38)  HR: 64 (22 Dec 2018 04:35) (64 - 73)  BP: 166/85 (22 Dec 2018 04:35) (166/85 - 221/104)  RR: 14 (22 Dec 2018 04:35) (14 - 20)  SpO2: 98% (22 Dec 2018 04:35) (98% - 98%)    General: WN/WD NAD  Neurology: A&Ox3, nonfocal, GUADALUPE x 4  Head:  Normocephalic, atraumatic  ENT:  Mucosa moist, no ulcerations  Lymphatic:  No palpable cervical, supraclavicular, axillary or inguinal adenopathy  Respiratory: Trachea midline,  CTA B/L intermittent dry cough  CV: RRR, S1S2, no murmur B/L LE Trace edema, + peripheral pulses, no jvd  Abdominal: Soft, NT, ND no palpable mass  MSK:  FROM all 4 extremity, able to position self on stretcher without guarding or difficulty  Skin:  Incisions: intact, no erythema or drainage                                                       LABS:                        9.4    15.9  )-----------( 399      ( 22 Dec 2018 02:27 )             27.9     12-22    135  |  96  |  14  ----------------------------<  107<H>  3.7   |  26  |  0.88    Ca    8.9      22 Dec 2018 02:27  Mg     2.0     12-22    TPro  7.2  /  Alb  3.7  /  TBili  0.7  /  DBili  x   /  AST  16  /  ALT  60<H>  /  AlkPhos  99  12-22    PT/INR - ( 22 Dec 2018 02:27 )   PT: 15.3 sec;   INR: 1.33 ratio      PTT - ( 22 Dec 2018 02:27 )  PTT:29.0 sec     CT Angio Chest w/ IV Cont (12.22.18 @ 03:26)   Compared to the recent CT performed at December 17, there is been no   significant interval change. Again seen are postoperative changes related   to recent repair of the patient's known type A aortic dissection. There   is no change in the appearance of the ascending aorta reconstruction.    There is no pseudoaneurysm.  The brachiocephalic trunk, left common   carotid artery, and left subclavian artery are patent without dissection   or thrombosis. The left and right coronary arteries are patent. The   aortic root measures 4.9 cm. The arch measures 3.8 cm. The dissection   flap is again seen at the aortic arch with extension to the abdominal   aorta. The celiac axis arises from the true lumen while the superior   mesenteric artery is supplied by both the true and false lumina. The left   renal artery arises from the true lumen. There are 2 right renal arteries   arise from the false lumen which is patent to the aortic bifurcation.   There is no central pulmonary embolism.     Again seen are sternotomy wires and an unchanged small subcutaneous fluid   collection in the lower ventral thoracotomy incision measuring about 1.8   x 2.7 x 7 cm, unchanged.    The tracheobronchial tree is patent proximally.  There is no significant   mediastinal or hilar adenopathy.    The heart is not enlarged.  There is no pericardial effusion.    Lungs: There is bibasilar streaky atelectasis.    There has been near resolution of previously visualized small right   pleural effusion which is now trace. There is persistent mild left   pleural effusion.       There is no free air, fluid or focal collection.    The liver, spleen, pancreas, adrenal glands and kidneys are unremarkable   besides a small left renal cyst.    Gallbladder: Contracted.    There is no small bowel obstruction. The appendix is unremarkable. There   is scattered diverticulosis.    Pelvic organs: The prostate is unremarkable.  The urinary bladder is unremarkable.    The aorta is not dilated.  There are atherosclerotic vascular   calcifications.  There is no significant adenopathy.  There is no retroperitoneal mass.  The visualized osseous structures are unremarkable.  There is a small fat-containing umbilical hernia.    IMPRESSION:    No significant intervalchange compared to the previous CT of December 17, 2018.  Postoperative changes status post recent type a aortic dissection repair.   No change in the appearance of the rest of the aortic dissection which is   seen as distally as the bifurcation. No evidence of renal ischemia or   bowel thickening.  No central pulmonary embolism.  Decrease in pleural effusion, now mild on the left.  Bibasilar streaky   atelectasis.  Diverticulosis.

## 2018-12-22 NOTE — ED CLERICAL - NS ED CLERK NOTE PRE-ARRIVAL INFORMATION; ADDITIONAL PRE-ARRIVAL INFORMATION
This patient is enrolled in the Follow Your [facility] program and has undergone a cardiac surgery procedure within the last 30 days and has active care navigation.   This patient can be followed up by the care navigation team within 24 hours. To arrange close follow-up or to obtain additional clinical information about this patient, please call the contact number above.   Please call the cardiac surgery team once patient is registered at [phone] for consultation PRIOR to disposition decision.  The patient recently underwent a cardiac surgery procedure and the team can assist in acute medical management.

## 2018-12-22 NOTE — ED ADULT NURSE NOTE - OBJECTIVE STATEMENT
37M aaox3 ambulatory with h/o Asthma HTN and recent Type A aortic dissection and repair last Dec 8 2018 in [facility], presented today with c/o back pain and numbness, no chest pain, chills or fever, nausea, vomiting or abd pain. VS linhl. 37M aaox3 ambulatory with h/o Asthma HTN and recent Type A aortic dissection and repair last Dec 8 2018 in [facility], [place] 3 days ago, presented today with c/o left upper back pain and numbness, no chest pain, chills or fever, nausea, vomiting or abd pain. VS linhl.

## 2018-12-22 NOTE — H&P ADULT - NSHPREVIEWOFSYSTEMS_GEN_ALL_CORE
REVIEW OF SYSTEMS:  CONSTITUTIONAL: No fever, weight loss, or fatigue  EYES: No eye pain, visual disturbances, or discharge  ENMT:  No difficulty hearing, tinnitus, vertigo; No sinus or throat pain  NECK: No pain or stiffness  RESPIRATORY: Has non productive cough,No wheezing, chills or hemoptysis; No shortness of breath  CARDIOVASCULAR: No chest pain,No palpitations, dizziness, or leg swelling  GASTROINTESTINAL: No abdominal or epigastric pain. No nausea, vomiting, or hematemesis; No diarrhea ,has No melena  GENITOURINARY: No dysuria, frequency, hematuria, or incontinence  NEUROLOGICAL: No headaches, memory loss, loss of strength, numbness, or tremors  SKIN: No itching, burning, rashes, or lesions   LYMPH NODES: No enlarged glands  ENDOCRINE: No heat or cold intolerance; No hair loss  MUSCULOSKELETAL: No joint pain or swelling; No muscle, back, or extremity pain  PSYCHIATRIC: No depression, anxiety, mood swings, or difficulty sleeping  HEME/LYMPH: No easy bruising, or bleeding gums  ALLERGY: No hives or eczema REVIEW OF SYSTEMS:  CONSTITUTIONAL: No fever, weight loss, or fatigue  EYES: No eye pain, visual disturbances, or discharge  ENMT:  No difficulty hearing, tinnitus, vertigo; No sinus or throat pain  NECK: No pain or stiffness  RESPIRATORY: Has non productive cough,No wheezing, chills or hemoptysis; No shortness of breath  CARDIOVASCULAR: No chest pain,No palpitations, dizziness, or leg swelling; + back pain noted   GASTROINTESTINAL: No abdominal or epigastric pain. No nausea, vomiting, or hematemesis; No diarrhea ,has No melena  GENITOURINARY: No dysuria, frequency, hematuria, or incontinence  NEUROLOGICAL: No headaches, memory loss, loss of strength, numbness, or tremors  SKIN: No itching, burning, rashes, or lesions   LYMPH NODES: No enlarged glands  ENDOCRINE: No heat or cold intolerance; No hair loss  MUSCULOSKELETAL: No joint pain or swelling; No muscle, back, or extremity pain  PSYCHIATRIC: No depression, anxiety, mood swings, or difficulty sleeping  HEME/LYMPH: No easy bruising, or bleeding gums  ALLERGY: No hives or eczema

## 2018-12-22 NOTE — H&P ADULT - ASSESSMENT
37M with PMHx of Kallman syndrome, obesity,  HTN, asthma, seasonal/ food allergies who presented to the hospital with complaint of of substernal chest pain,  CTA revealed Type A dissection patient transferred to [facility] for emergent cardiac surgery.  He underwent Aortic dissection repair supracoronary with 26 Hemashield Graft hemiarch repair on 12/8/18 with post op course significant for cardiogenic shock requiring pressor support, hypertension requiring cardene gtt, volume overload,  and leukocytosis with one episode of fever to 101.5.  He was discharged on 12/13 and returns today to [facility] ER with c/o shortness of breath and wheezing. Reports unable to successfully use his albuterol inhaler because he is so sore from recent surgery and unable to take a deep breath.  Chest CTA on 12/17 revealed no evidence of PE. Patient was admitted for observation and pulmonary was consulted who treated for asthma exacerbation with prednisone  40 qd x 5 days, singulair and nebulizer for home use.  Patient presents today with complaint of constant, severe left sided pain, lateral to T7, which has improved since arrival in ER related to received 4mg IVP morphine.  CT-Angio chest revealed stable post-operative changes with decrease in pleural effusions.     12/22 pt to be admitted for HTN management   Disharge planning - home when BP stable 37M with PMHx of Kallman syndrome, obesity,  HTN, asthma, seasonal/ food allergies who presented to the hospital with complaint of of substernal chest pain,  CTA revealed Type A dissection patient transferred to [facility] for emergent cardiac surgery.  He underwent Aortic dissection repair supracoronary with 26 Hemashield Graft hemiarch repair on 12/8/18 with post op course significant for cardiogenic shock requiring pressor support, hypertension requiring cardene gtt, volume overload,  and leukocytosis with one episode of fever to 101.5.  He was discharged on 12/13 and returns today to [facility] ER with c/o shortness of breath and wheezing. Reports unable to successfully use his albuterol inhaler because he is so sore from recent surgery and unable to take a deep breath.  Chest CTA on 12/17 revealed no evidence of PE. Patient was admitted for observation and pulmonary was consulted who treated for asthma exacerbation with prednisone  40 qd x 5 days, singulair and nebulizer for home use.  Patient presents today with complaint of constant, severe left sided pain, lateral to T7, which has improved since arrival in ER related to received 4mg IVP morphine.  CT-Angio chest revealed stable post-operative changes with decrease in pleural effusions.     12/22 pt to be admitted for HTN management- d/w Dr. Josey Montgomery planning - home when BP stable

## 2018-12-22 NOTE — ED ADULT NURSE REASSESSMENT NOTE - NS ED NURSE REASSESS COMMENT FT1
Pt. resting at this time, reports partial relief to pain medication and does not have any complaints at this time. Will continue to monitor.

## 2018-12-22 NOTE — ED PROVIDER NOTE - PHYSICAL EXAMINATION
GEN: Well appearing, well nourished, in no apparent distress.  HEAD: NCAT  HEENT: PERRL, EOMI, no nystagmus, no facial droop, Airway patent, uvula midline, MMM, neck supple, no LAD, no JVD  LUNG: CTAB, no adventitious sounds, no retractions, no nasal flaring  CV: RRR, no murmurs,   Abd: soft, NTND, no rebound or guarding, BS+ in all quadrants, no CVAT  MSK: WWP, Pulses 2+ in extremities, No edema, no visible deformities, strength 5/5 in all extremities, no midspine TTP  Neuro:  CN II-XII in tact. AAOx3, Ambulatory with stable gait. sensations in tact in all extremities, neg romberg  Skin: Warm and dry, no evidence of rash  Psych: normal mood and affect

## 2018-12-22 NOTE — H&P ADULT - NSHPPHYSICALEXAM_GEN_ALL_CORE
General: NAD  HEENT:  NC/AT  Neuro: A&Ox4, gait steady, speech clear, no focal deficits noted  Respiratory: B/L BS CTA, no wheeze, no rhonchi, no crackles noted  Cardiovascular: RRR, normal S1S2, no murmur noted  GI: Abd soft, NT/ND, +BSx4Q +BM  Peripheral Vascular:  B/L LE neg edema, 2+ peripheral pulses, no clubbing, cyanosis, varicosities/PVD noted  Musculoskeletal: B/L UE and LE 5/5 strength   Psychiatric: Normal mood, normal affect observed  Skin: Normal exam to inspection and palpation. no bleeding, no hematoma. General: NAD  HEENT:  NC/AT  Neuro: A&Ox4, gait steady, speech clear, no focal deficits noted  Respiratory: B/L BS CTA, no wheeze, no rhonchi, no crackles noted  Cardiovascular: RSR 80's normal S1S2, no murmur noted  GI: Abd soft, NT/ND, +BSx4Q +BM; neg N/V/D  Peripheral Vascular:  B/L LE neg edema, 2+ peripheral pulses, no clubbing, cyanosis, varicosities/ PVD noted  Musculoskeletal: B/L UE and LE 5/5 strength   Psychiatric: Normal mood, normal affect observed  Skin: Normal exam to inspection and palpation. no bleeding, no hematoma. General: NAD  HEENT:  NC/AT  Neuro: A&Ox4, gait steady, speech clear, no focal deficits noted  Respiratory: B/L BS CTA, no wheeze, no rhonchi, no crackles noted  Cardiovascular: RSR 80's normal S1S2, no murmur noted  GI: Abd soft, NT/ND, +BSx4Q +BM; neg N/V/D  Peripheral Vascular:  B/L LE neg edema, 2+ peripheral pulses, no clubbing, cyanosis, varicosities/ PVD noted  Musculoskeletal: B/L UE and LE 5/5 strength   Psychiatric: Normal mood, normal affect observed  Skin: Normal exam to inspection and palpation. no bleeding, no hematoma.  midsternal incision cdi jerome- well-healed

## 2018-12-22 NOTE — ED ADULT NURSE REASSESSMENT NOTE - NS ED NURSE REASSESS COMMENT FT1
Received report from Damián MAYER. Pt. A&Ox3, sitting up in stretcher, does not appear to be in any acute distress- nonlabored breathing noted and speaking in full coherent sentences. Had type A aorta dissection repair on 12/7 - presents c/o sudden onset of CP and mid/L sided back pain. Pt. received pain medication around 0630. Pt. reports pain is still present at this time, however, is beginning to subside. Pt. on 3L NC due to SOB. Pt. does not appear short of breath at this time. VSS. NAD. On CM. Will continue to monitor closely. Awaiting cards. Safety and comfort provided - side rails up and call bell within reach. Received report from Damián MAYER. Pt. A&Ox3, sitting up in stretcher, does not appear to be in any acute distress- nonlabored breathing noted and speaking in full coherent sentences. Had type A aorta dissection repair on 12/7 - presents c/o sudden onset of CP and mid/L sided back pain. Pt. received pain medication around 0630. Pt. reports pain is still present at this time, however, is beginning to subside. Pt. on 3L NC due to SOB. Pt. does not appear short of breath at this time. Pt. has productive cough, reports he is taking prednisone at home for cough. VSS. NAD. On CM. Will continue to monitor closely. Awaiting cards. Safety and comfort provided - side rails up and call bell within reach.

## 2018-12-22 NOTE — CONSULT NOTE ADULT - PROBLEM SELECTOR RECOMMENDATION 2
Continue with Labetalol 600 mg TID   Continue with Losartan 50 mg PO daily Remains hypertensive  Continue with Labetalol 600 mg TID   Continue with Losartan 50 mg PO daily  Monitor BP, if no improvement, admit for management of HTN

## 2018-12-22 NOTE — ED PROVIDER NOTE - NS ED ROS FT
Constitutional: no fevers, no chills.  Eyes: no visual changes.  Ears: no ear drainage, no ear pain.  Nose: no nasal congestion.  Mouth/Throat: no sore throat.  Cardiovascular: + chest pain.  Respiratory: no shortness of breath, no wheezing, no cough  Gastrointestinal: no nausea, no vomiting, no diarrhea, no abdominal pain.  MSK: no flank pain, +back pain.  Genitourinary: no dysuria, no hematuria.  Skin: no rashes.  Neuro: no headache,   Psychiatric: no known mental health issues.

## 2018-12-22 NOTE — ED ADULT NURSE REASSESSMENT NOTE - NS ED NURSE REASSESS COMMENT FT1
Patient remains stable while in the ED, Labs and CT scan resulted, VS wnl. Pending CTSx recommendation and repeat CT scan.

## 2018-12-22 NOTE — ED PROVIDER NOTE - PROGRESS NOTE DETAILS
small pericardial effusion noticed on bedside Echo. Radiology reports CT contrast miss timed and pt will need second study. Will give IVF. CTSx paged multiple times without response. ROBERT. Rafi: Pt still w/ persistently elevated BPs despite oral meds this AM. CT surg recommends addition IV labetalol and admission for BP control

## 2018-12-22 NOTE — ED PROVIDER NOTE - OBJECTIVE STATEMENT
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 Pt took his home percocet (doubled the dose) without relief. Pain is same location previous dissection caused pain before and is assoc with dizziness and blurry vision, , denies f/c, paresthesia/numbness, focal weakness, sob, diaphoresis, n/v, abdominal pain, HA.

## 2018-12-22 NOTE — ED PROVIDER NOTE - ATTENDING CONTRIBUTION TO CARE
Afebrile. Awake and Alert. In mild distress due to pain. +HTN. Lungs CTA. Heart RRR. Abdomen soft NTND. CN II-XII grossly intact. Moves all extremities without lateralization. Well healing mid-line vertical sternal surgical wound without erythema.    CTA r/o dissection: stable post-op changes  Bedside POCUS: Small pericardial effusion, ?pericarditis  EKG: No acute ischemic morphology  Troponin elevated, however, downtrending from last admit   HTN: IV labetalol and Home BP meds given  Elevated WBCs, possibly due to PO steroids, no fever, non-toxic appearing  Pt seen by CTICU NP-C in ED, recs PO Dilaudid trial for pain control and home meds Afebrile. Awake and Alert. In mild distress due to pain. +HTN. Lungs CTA. Heart RRR. Abdomen soft NTND. CN II-XII grossly intact. Moves all extremities without lateralization. Well healing mid-line vertical sternal surgical wound without erythema.    CTA r/o dissection: stable post-op changes  Bedside POCUS: Small pericardial effusion, ?pericarditis  EKG: No acute ischemic morphology  Troponin elevated, however, downtrending from last admit   HTN: IV labetalol and Home BP meds given  Elevated WBCs, possibly due to PO steroids, no fever, non-toxic appearing  IVF given pt received 2x IV contrast for CTA due to CT scanner not triggering after first IV load per radiologist's recommendation  Pt seen by CTICU NP-C in ED, recs PO Dilaudid trial for pain control and home AM meds

## 2018-12-22 NOTE — H&P ADULT - HISTORY OF PRESENT ILLNESS
37M with PMHx of Kallman syndrome, obesity,  HTN, asthma, seasonal/ food allergies who presented to the hospital with complaint of of substernal chest pain,  CTA revealed Type A dissection patient transferred to [facility] for emergent cardiac surgery.  He underwent Aortic dissection repair supracoronary with 26 Hemashield Graft hemiarch repair on 12/8/18 with post op course significant for cardiogenic shock requiring pressor support, hypertension requiring cardene gtt, volume overload,  and leukocytosis with one episode of fever to 101.5.  He was discharged on 12/13 and returns today to [facility] ER with c/o shortness of breath and wheezing. Reports unable to successfully use his albuterol inhaler because he is so sore from recent surgery and unable to take a deep breath.  Chest CTA on 12/17 revealed no evidence of PE. Patient was admitted for observation and pulmonary was consulted who treated for asthma exacerbation with prednisone  40 qd x 5 days, singulair and nebulizer for home use.  Patient presents today with complaint of constant, severe left sided pain, lateral to T7, which has improved since arrival in ER related to received 4mg IVP morphine.  CT-Angio chest revealed stable post-operative changes with decrease in pleural effusions.     12/22 pt to be admitted for HTN management   Disharge planning - home when BP stable 37M with PMHx of Kallman syndrome, obesity,  HTN, asthma, seasonal/ food allergies who presented to the hospital with complaint of of substernal chest pain,  CTA revealed Type A dissection patient transferred to [facility] for emergent cardiac surgery.  He underwent Aortic dissection repair supracoronary with 26 Hemashield Graft hemiarch repair on 12/8/18 with post op course significant for cardiogenic shock requiring pressor support, hypertension requiring cardene gtt, volume overload,  and leukocytosis with one episode of fever to 101.5.  He was discharged on 12/13 and returned to [facility] ER 12/17 with c/o shortness of breath and wheezing. Reports unable to successfully use his albuterol inhaler because he is so sore from recent surgery and unable to take a deep breath.  Chest CTA on 12/17 revealed no evidence of PE. Patient was admitted for observation and pulmonary was consulted who treated for asthma exacerbation with prednisone  40 qd x 5 days, singulair and nebulizer for home use and discharged home 12/18/   Patient presents today 12/22 with complaint of constant, severe left sided pain, lateral to T7, which has improved since arrival in ER related to received 4mg IVP morphine.  CT-Angio chest 12/22: No significant intervalchange compared to the previous CT of 12/17/18; Postoperative changes status post recent type a aortic dissection repair. No change in the appearance of the rest of the aortic dissection which is seen as distally as the bifurcation. No evidence of renal ischemia or bowel thickening. no PE. IV labetolol given in ER.   12/22 pt to be admitted for HTN management. d/w Dr. Dowling

## 2018-12-22 NOTE — H&P ADULT - NSHPSOCIALHISTORY_GEN_ALL_CORE
SOCIAL HISTORY:  Smoker: [ ] Yes  [X ] No        PACK YEARS:                         WHEN QUIT?  ETOH use: [ ] Yes  [X] No              FREQUENCY / QUANTITY:  Ilicit Drug use:  [ ] Yes  [X ] No  Live with: Spouse   Assist device use: Denies

## 2018-12-22 NOTE — ED PROCEDURE NOTE - PROCEDURE ADDITIONAL DETAILS
ED Focused Bedside Ultrasound of Lung   Right Lung: [N] B-lines, [Y] Lung sliding, [N] Pleural effusion   Left Lung: [N] B-lines, [Y] Lung sliding, [small base] Pleural effusion   Impression: [N] Pulmonary edema, [Y] Pneumothorax, [Small left base] Pleural effusion.   Y=Yes, N=No. CPT Code 68373. FALKOWSKA.
Please see PACS and SCM for imaged and report.

## 2018-12-22 NOTE — CONSULT NOTE ADULT - PROBLEM SELECTOR RECOMMENDATION 9
May be discharged to home with percocet 2 tablets q 6 hours for pain management  Follow up appointment to be scheduled for next week with Dr. Toledo, patient to call office on Monday 12/24 for appointment

## 2018-12-23 LAB
ANION GAP SERPL CALC-SCNC: 13 MMOL/L — SIGNIFICANT CHANGE UP (ref 5–17)
BUN SERPL-MCNC: 12 MG/DL — SIGNIFICANT CHANGE UP (ref 7–23)
CALCIUM SERPL-MCNC: 9.1 MG/DL — SIGNIFICANT CHANGE UP (ref 8.4–10.5)
CHLORIDE SERPL-SCNC: 99 MMOL/L — SIGNIFICANT CHANGE UP (ref 96–108)
CO2 SERPL-SCNC: 24 MMOL/L — SIGNIFICANT CHANGE UP (ref 22–31)
CREAT SERPL-MCNC: 0.84 MG/DL — SIGNIFICANT CHANGE UP (ref 0.5–1.3)
GLUCOSE SERPL-MCNC: 101 MG/DL — HIGH (ref 70–99)
HCT VFR BLD CALC: 28.5 % — LOW (ref 39–50)
HGB BLD-MCNC: 9.5 G/DL — LOW (ref 13–17)
MCHC RBC-ENTMCNC: 28.4 PG — SIGNIFICANT CHANGE UP (ref 27–34)
MCHC RBC-ENTMCNC: 33.4 GM/DL — SIGNIFICANT CHANGE UP (ref 32–36)
MCV RBC AUTO: 85 FL — SIGNIFICANT CHANGE UP (ref 80–100)
PLATELET # BLD AUTO: 404 K/UL — HIGH (ref 150–400)
POTASSIUM SERPL-MCNC: 3.9 MMOL/L — SIGNIFICANT CHANGE UP (ref 3.5–5.3)
POTASSIUM SERPL-SCNC: 3.9 MMOL/L — SIGNIFICANT CHANGE UP (ref 3.5–5.3)
RBC # BLD: 3.35 M/UL — LOW (ref 4.2–5.8)
RBC # FLD: 12.4 % — SIGNIFICANT CHANGE UP (ref 10.3–14.5)
SODIUM SERPL-SCNC: 136 MMOL/L — SIGNIFICANT CHANGE UP (ref 135–145)
WBC # BLD: 11.4 K/UL — HIGH (ref 3.8–10.5)
WBC # FLD AUTO: 11.4 K/UL — HIGH (ref 3.8–10.5)

## 2018-12-23 RX ORDER — LOSARTAN POTASSIUM 100 MG/1
100 TABLET, FILM COATED ORAL DAILY
Qty: 0 | Refills: 0 | Status: DISCONTINUED | OUTPATIENT
Start: 2018-12-24 | End: 2018-12-25

## 2018-12-23 RX ORDER — LIDOCAINE 4 G/100G
1 CREAM TOPICAL DAILY
Qty: 0 | Refills: 0 | Status: DISCONTINUED | OUTPATIENT
Start: 2018-12-23 | End: 2018-12-25

## 2018-12-23 RX ORDER — LOSARTAN POTASSIUM 100 MG/1
50 TABLET, FILM COATED ORAL ONCE
Qty: 0 | Refills: 0 | Status: COMPLETED | OUTPATIENT
Start: 2018-12-23 | End: 2018-12-23

## 2018-12-23 RX ORDER — ACETAMINOPHEN 500 MG
1000 TABLET ORAL ONCE
Qty: 0 | Refills: 0 | Status: COMPLETED | OUTPATIENT
Start: 2018-12-23 | End: 2018-12-23

## 2018-12-23 RX ADMIN — Medication 100 MILLIGRAM(S): at 23:02

## 2018-12-23 RX ADMIN — ENOXAPARIN SODIUM 40 MILLIGRAM(S): 100 INJECTION SUBCUTANEOUS at 07:09

## 2018-12-23 RX ADMIN — Medication 20 MILLIEQUIVALENT(S): at 11:47

## 2018-12-23 RX ADMIN — MONTELUKAST 10 MILLIGRAM(S): 4 TABLET, CHEWABLE ORAL at 23:02

## 2018-12-23 RX ADMIN — LIDOCAINE 1 PATCH: 4 CREAM TOPICAL at 06:51

## 2018-12-23 RX ADMIN — Medication 650 MILLIGRAM(S): at 00:11

## 2018-12-23 RX ADMIN — LIDOCAINE 1 PATCH: 4 CREAM TOPICAL at 18:29

## 2018-12-23 RX ADMIN — Medication 650 MILLIGRAM(S): at 00:41

## 2018-12-23 RX ADMIN — Medication 3 MILLILITER(S): at 11:46

## 2018-12-23 RX ADMIN — ATORVASTATIN CALCIUM 20 MILLIGRAM(S): 80 TABLET, FILM COATED ORAL at 23:01

## 2018-12-23 RX ADMIN — Medication 3 MILLILITER(S): at 23:02

## 2018-12-23 RX ADMIN — Medication 3 MILLILITER(S): at 17:36

## 2018-12-23 RX ADMIN — Medication 600 MILLIGRAM(S): at 13:31

## 2018-12-23 RX ADMIN — Medication 600 MILLIGRAM(S): at 23:05

## 2018-12-23 RX ADMIN — Medication 600 MILLIGRAM(S): at 05:38

## 2018-12-23 RX ADMIN — Medication 650 MILLIGRAM(S): at 06:39

## 2018-12-23 RX ADMIN — Medication 650 MILLIGRAM(S): at 07:09

## 2018-12-23 RX ADMIN — Medication 1000 MILLIGRAM(S): at 16:35

## 2018-12-23 RX ADMIN — PANTOPRAZOLE SODIUM 40 MILLIGRAM(S): 20 TABLET, DELAYED RELEASE ORAL at 05:38

## 2018-12-23 RX ADMIN — Medication 40 MILLIGRAM(S): at 05:38

## 2018-12-23 RX ADMIN — TIOTROPIUM BROMIDE 1 CAPSULE(S): 18 CAPSULE ORAL; RESPIRATORY (INHALATION) at 11:48

## 2018-12-23 RX ADMIN — Medication 100 MILLIGRAM(S): at 05:41

## 2018-12-23 RX ADMIN — Medication 650 MILLIGRAM(S): at 23:36

## 2018-12-23 RX ADMIN — Medication 400 MILLIGRAM(S): at 16:09

## 2018-12-23 RX ADMIN — Medication 3 MILLILITER(S): at 05:38

## 2018-12-23 RX ADMIN — LOSARTAN POTASSIUM 50 MILLIGRAM(S): 100 TABLET, FILM COATED ORAL at 11:53

## 2018-12-23 RX ADMIN — Medication 650 MILLIGRAM(S): at 23:05

## 2018-12-23 NOTE — PROGRESS NOTE ADULT - ASSESSMENT
37M with PMHx of Kallman syndrome, obesity,  HTN, asthma, seasonal/ food allergies who presented to the hospital with complaint of of substernal chest pain,  CTA revealed Type A dissection patient transferred to [facility] for emergent cardiac surgery.  He underwent Aortic dissection repair supracoronary with 26 Hemashield Graft hemiarch repair on 12/8/18 with post op course significant for cardiogenic shock requiring pressor support, hypertension requiring cardene gtt, volume overload,  and leukocytosis with one episode of fever to 101.5.  He was discharged on 12/13 and returns today to [facility] ER with c/o shortness of breath and wheezing. Reports unable to successfully use his albuterol inhaler because he is so sore from recent surgery and unable to take a deep breath.  Chest CTA on 12/17 revealed no evidence of PE. Patient was admitted for observation and pulmonary was consulted who treated for asthma exacerbation with prednisone  40 qd x 5 days, singulair and nebulizer for home use.  Patient presents today with complaint of constant, severe left sided pain, lateral to T7, which has improved since arrival in ER related to received 4mg IVP morphine.  CT-Angio chest revealed stable post-operative changes with decrease in pleural effusions.     12/22 pt to be admitted for HTN management- d/w Dr. Dowling  12/23 /93 - will increase Losartan to 100mg po daily - give additional 50mg po Losartan this am   Disharge planning - home when BP stable ?monday

## 2018-12-23 NOTE — PROVIDER CONTACT NOTE (OTHER) - SITUATION
Patient had rhythm change on tele from NSR to aflutter 110-120. Patient vitals 180/91 and . Aflutter period lasted approx 30 min. Change in rhythm happened when walking.

## 2018-12-23 NOTE — PROGRESS NOTE ADULT - SUBJECTIVE AND OBJECTIVE BOX
VITAL SIGNS-Telemetry:  SR 60-80  Vital Signs Last 24 Hrs  T(C): 36.7 (12-23-18 @ 04:47), Max: 37.4 (12-22-18 @ 12:08)  T(F): 98 (12-23-18 @ 04:47), Max: 99.3 (12-22-18 @ 12:08)  HR: 75 (12-23-18 @ 04:47) (72 - 113)  BP: 166/93 (12-23-18 @ 04:47) (155/84 - 180/91)  RR: 18 (12-23-18 @ 04:47) (15 - 18)  SpO2: 95% (12-23-18 @ 04:47) (90% - 98%)         12-22 @ 07:01  -  12-23 @ 07:00  --------------------------------------------------------  IN: 240 mL / OUT: 0 mL / NET: 240 mL    12-23 @ 07:01  -  12-23 @ 11:42  --------------------------------------------------------  IN: 240 mL / OUT: 0 mL / NET: 240 mL      PHYSICAL EXAM:  Neurology: alert and oriented x 3, nonfocal, no gross deficits  CV : S1S2  Lungs: CTA  Abdomen: soft, nontender, nondistended, positive bowel sounds, last bowel movement         Extremities:     no edema, no calf tenderness    acetaminophen   Tablet .. 650 milliGRAM(s) Oral every 6 hours PRN  ALBUTerol    90 MICROgram(s) HFA Inhaler 1 Puff(s) Inhalation every 4 hours  ALBUTerol/ipratropium for Nebulization 3 milliLiter(s) Nebulizer every 6 hours  atorvastatin 20 milliGRAM(s) Oral at bedtime  diltiazem    Tablet 60 milliGRAM(s) Oral every 6 hours  docusate sodium 100 milliGRAM(s) Oral three times a day  enoxaparin Injectable 40 milliGRAM(s) SubCutaneous daily  furosemide    Tablet 40 milliGRAM(s) Oral daily  influenza   Vaccine 0.5 milliLiter(s) IntraMuscular once  labetalol 600 milliGRAM(s) Oral three times a day  lidocaine   Patch 1 Patch Transdermal daily  losartan 50 milliGRAM(s) Oral once  montelukast 10 milliGRAM(s) Oral at bedtime  pantoprazole    Tablet 40 milliGRAM(s) Oral before breakfast  potassium chloride    Tablet ER 20 milliEquivalent(s) Oral daily  tiotropium 18 MICROgram(s) Capsule 1 Capsule(s) Inhalation daily  tiotropium 18 MICROgram(s) Capsule 1 Capsule(s) Inhalation daily    Physical Therapy Rec:   Home  [ x]   Home w/ PT  [  ]  Rehab  [  ]  Discussed with Cardiothoracic Team at AM rounds.

## 2018-12-23 NOTE — PROVIDER CONTACT NOTE (OTHER) - ASSESSMENT
Patient had rhythm change on tele from NSR to aflutter 110-120. Patient vitals 180/91 and . Aflutter period lasted approx 30 min. Change in rhythm happened when walking. Patient denies dizziness, shortness of breath.

## 2018-12-24 ENCOUNTER — TRANSCRIPTION ENCOUNTER (OUTPATIENT)
Age: 38
End: 2018-12-24

## 2018-12-24 ENCOUNTER — APPOINTMENT (OUTPATIENT)
Dept: CARDIOTHORACIC SURGERY | Facility: CLINIC | Age: 38
End: 2018-12-24

## 2018-12-24 LAB
ANION GAP SERPL CALC-SCNC: 13 MMOL/L — SIGNIFICANT CHANGE UP (ref 5–17)
BUN SERPL-MCNC: 14 MG/DL — SIGNIFICANT CHANGE UP (ref 7–23)
CALCIUM SERPL-MCNC: 9.4 MG/DL — SIGNIFICANT CHANGE UP (ref 8.4–10.5)
CHLORIDE SERPL-SCNC: 98 MMOL/L — SIGNIFICANT CHANGE UP (ref 96–108)
CO2 SERPL-SCNC: 24 MMOL/L — SIGNIFICANT CHANGE UP (ref 22–31)
CREAT SERPL-MCNC: 0.92 MG/DL — SIGNIFICANT CHANGE UP (ref 0.5–1.3)
GLUCOSE SERPL-MCNC: 117 MG/DL — HIGH (ref 70–99)
HCT VFR BLD CALC: 29.9 % — LOW (ref 39–50)
HGB BLD-MCNC: 10.2 G/DL — LOW (ref 13–17)
MCHC RBC-ENTMCNC: 28.8 PG — SIGNIFICANT CHANGE UP (ref 27–34)
MCHC RBC-ENTMCNC: 34.1 GM/DL — SIGNIFICANT CHANGE UP (ref 32–36)
MCV RBC AUTO: 84.4 FL — SIGNIFICANT CHANGE UP (ref 80–100)
PLATELET # BLD AUTO: 487 K/UL — HIGH (ref 150–400)
POTASSIUM SERPL-MCNC: 4 MMOL/L — SIGNIFICANT CHANGE UP (ref 3.5–5.3)
POTASSIUM SERPL-SCNC: 4 MMOL/L — SIGNIFICANT CHANGE UP (ref 3.5–5.3)
RBC # BLD: 3.54 M/UL — LOW (ref 4.2–5.8)
RBC # FLD: 12.4 % — SIGNIFICANT CHANGE UP (ref 10.3–14.5)
SODIUM SERPL-SCNC: 135 MMOL/L — SIGNIFICANT CHANGE UP (ref 135–145)
WBC # BLD: 12.6 K/UL — HIGH (ref 3.8–10.5)
WBC # FLD AUTO: 12.6 K/UL — HIGH (ref 3.8–10.5)

## 2018-12-24 RX ORDER — SPIRONOLACTONE 25 MG/1
25 TABLET, FILM COATED ORAL DAILY
Qty: 0 | Refills: 0 | Status: DISCONTINUED | OUTPATIENT
Start: 2018-12-24 | End: 2018-12-24

## 2018-12-24 RX ORDER — HYDROCORTISONE 1 %
1 OINTMENT (GRAM) TOPICAL
Qty: 0 | Refills: 0 | Status: DISCONTINUED | OUTPATIENT
Start: 2018-12-24 | End: 2018-12-25

## 2018-12-24 RX ORDER — SPIRONOLACTONE 25 MG/1
25 TABLET, FILM COATED ORAL DAILY
Qty: 0 | Refills: 0 | Status: DISCONTINUED | OUTPATIENT
Start: 2018-12-24 | End: 2018-12-25

## 2018-12-24 RX ADMIN — Medication 3 MILLILITER(S): at 18:07

## 2018-12-24 RX ADMIN — PANTOPRAZOLE SODIUM 40 MILLIGRAM(S): 20 TABLET, DELAYED RELEASE ORAL at 05:11

## 2018-12-24 RX ADMIN — Medication 100 MILLIGRAM(S): at 22:08

## 2018-12-24 RX ADMIN — Medication 40 MILLIGRAM(S): at 05:11

## 2018-12-24 RX ADMIN — Medication 200 MILLIGRAM(S): at 18:53

## 2018-12-24 RX ADMIN — Medication 650 MILLIGRAM(S): at 13:00

## 2018-12-24 RX ADMIN — LOSARTAN POTASSIUM 100 MILLIGRAM(S): 100 TABLET, FILM COATED ORAL at 05:11

## 2018-12-24 RX ADMIN — Medication 650 MILLIGRAM(S): at 23:03

## 2018-12-24 RX ADMIN — MONTELUKAST 10 MILLIGRAM(S): 4 TABLET, CHEWABLE ORAL at 22:08

## 2018-12-24 RX ADMIN — Medication 200 MILLIGRAM(S): at 06:12

## 2018-12-24 RX ADMIN — SPIRONOLACTONE 25 MILLIGRAM(S): 25 TABLET, FILM COATED ORAL at 12:50

## 2018-12-24 RX ADMIN — Medication 100 MILLIGRAM(S): at 05:11

## 2018-12-24 RX ADMIN — Medication 600 MILLIGRAM(S): at 05:11

## 2018-12-24 RX ADMIN — Medication 1 APPLICATION(S): at 18:07

## 2018-12-24 RX ADMIN — Medication 3 MILLILITER(S): at 23:01

## 2018-12-24 RX ADMIN — TIOTROPIUM BROMIDE 1 CAPSULE(S): 18 CAPSULE ORAL; RESPIRATORY (INHALATION) at 12:51

## 2018-12-24 RX ADMIN — Medication 600 MILLIGRAM(S): at 22:08

## 2018-12-24 RX ADMIN — Medication 1 APPLICATION(S): at 06:12

## 2018-12-24 RX ADMIN — Medication 600 MILLIGRAM(S): at 12:52

## 2018-12-24 RX ADMIN — Medication 100 MILLIGRAM(S): at 23:01

## 2018-12-24 RX ADMIN — Medication 3 MILLILITER(S): at 05:11

## 2018-12-24 RX ADMIN — Medication 200 MILLIGRAM(S): at 12:53

## 2018-12-24 RX ADMIN — ATORVASTATIN CALCIUM 20 MILLIGRAM(S): 80 TABLET, FILM COATED ORAL at 22:08

## 2018-12-24 RX ADMIN — Medication 20 MILLIEQUIVALENT(S): at 12:49

## 2018-12-24 RX ADMIN — Medication 650 MILLIGRAM(S): at 23:44

## 2018-12-24 RX ADMIN — Medication 650 MILLIGRAM(S): at 13:30

## 2018-12-24 RX ADMIN — Medication 3 MILLILITER(S): at 12:50

## 2018-12-24 RX ADMIN — Medication 100 MILLIGRAM(S): at 12:52

## 2018-12-24 NOTE — DISCHARGE NOTE ADULT - MEDICATION SUMMARY - MEDICATIONS TO STOP TAKING
I will STOP taking the medications listed below when I get home from the hospital:    predniSONE 20 mg oral tablet  -- 2 tab(s) by mouth once a day 40 mg,    K-Tab 20 mEq oral tablet, extended release  -- 1 tab(s) by mouth once a day   -- It is very important that you take or use this exactly as directed.  Do not skip doses or discontinue unless directed by your doctor.  Medication should be taken with plenty of water.  Take with food or milk.

## 2018-12-24 NOTE — DISCHARGE NOTE ADULT - PLAN OF CARE
Controlled blood pressure Your blood pressure was elevated on admission to the hospital.  Your blood pressure medication, Losartan was increased to 100mg daily and an additional medication, diltiazem, was started to improve your blood pressure. Your back pain improved while you were hospitalized Take all medications as prescribed   weigh yourself daily call our office for weight gain of 3 lbs in 48 hours  Follow up with Dr Toledo call our  [phone] for  appointment within two weeks of discharge  Follow up with Dr Leong nephology  call [phone] for an appointment within 7 days of discharge  Call our cardiology clinic  [phone] for an appointment  Call our medical clinic   call the  Follow your heart NPs  [phone] with any concerns  Call our Medical clinic at [address] [place] [address] for follow up   within 1 weeks of discharge  full recovery Take all medications as prescribed   weigh yourself daily call our office for weight gain of 3 lbs in 48 hours  Follow up with Dr Toledo call our  [phone] for  appointment within two weeks of discharge  Follow up with Dr Leong nephology  call  [phone]  Call our cardiology clinic  [phone] for an appointment  Call our medical clinic   call the  [facility] your heart NPs  [phone] with any concerns  Call our Medical clinic at [address] [place] [address] for follow up   within 1 weeks of discharge   Call our office with fever chills or any concerns

## 2018-12-24 NOTE — PROGRESS NOTE ADULT - PROBLEM SELECTOR PLAN 3
continue losartan 50 mg po daily   continue diuresis  continue labetolol 600 mg po tid continue losartan 100mg po daily   continue diuresis  continue labetolol 600 mg po tid

## 2018-12-24 NOTE — CONSULT NOTE ADULT - ASSESSMENT
37M with PMHx of Kallman syndrome, obesity,  HTN, asthma, seasonal/ food allergies who presented to the hospital with complaint of of substernal chest pain,  CTA revealed Type A dissection patient transferred to [facility] for emergent cardiac surgery.  He underwent Aortic dissection repair supracoronary with 26 Hemashield Graft hemiarch repair on 12/8/18 with post op course significant for cardiogenic shock requiring pressor support, hypertension. now readmitted with uncontrolled htn and cp    1- HTN accelerated   2- chf suspected requiring diuretics     given his dizziness check orthostatics to evaluate for orthostasis   given he ct scan with no adrenal nodule   continue above anti-htn medications   add aldactone 25mg qd and dc kcl   d/w cts team

## 2018-12-24 NOTE — DISCHARGE NOTE ADULT - PATIENT PORTAL LINK FT
You can access the [facility], offered by [facility], by registering with the following website: [email or web address]

## 2018-12-24 NOTE — DISCHARGE NOTE ADULT - CARE PROVIDERS DIRECT ADDRESSES
[email or web address]

## 2018-12-24 NOTE — CONSULT NOTE ADULT - SUBJECTIVE AND OBJECTIVE BOX
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  He underwent Aortic dissection repair supracoronary with 26 Hemashield Graft hemiarch repair on 12/8/18 with post op course significant for cardiogenic shock requiring pressor support, hypertension requiring cardene gtt, volume overload,  and leukocytosis with one episode of fever to 101.5.  He was discharged on 12/13 and returned to [facility] ER 12/17 with c/o shortness of breath and wheezing. Reports unable to successfully use his albuterol inhaler because he is so sore from recent surgery and unable to take a deep breath.  Chest CTA on 12/17 revealed no evidence of PE. Patient was admitted for observation and pulmonary was consulted who treated for asthma exacerbation with prednisone  40 qd x 5 days, singulair and nebulizer for home use and discharged home 12/18/   Patient presents today 12/22 with complaint of constant, severe left sided pain, lateral to T7, which  improved since arrival in ER related to received 4mg IVP morphine.  CT-Angio chest 12/22: No significant intervalchange compared to the previous CT of 12/17/18; Postoperative changes status post recent type a aortic dissection repair. No change in the appearance of the rest of the aortic dissection which is seen as distally as the bifurcation. No evidence of renal ischemia or bowel thickening. no PE as documented. received IV labetolol given in ER for HTN bp 177/99.     12/22 pt to be admitted for HTN management. pt states has had extensive work up for htn when younger and all have been neg. states has had htn since age 15.       PAST HISTORY  --------------------------------------------------------------------------------  PAST MEDICAL & SURGICAL HISTORY:  Aortic dissection  Asthma  Hypertension, unspecified type  S/P aortic dissection repair    FAMILY HISTORY:  No pertinent family history in first degree relatives    PAST SOCIAL HISTORY: no tobacco use     ALLERGIES & MEDICATIONS  --------------------------------------------------------------------------------  Allergies    Allergy to Eggplant (Short breath; Rash)  penicillin (Unknown)  Potatoes (Short breath; Rash)  Tomatoes (Short breath; Rash)    Intolerances      Standing Inpatient Medications  ALBUTerol    90 MICROgram(s) HFA Inhaler 1 Puff(s) Inhalation every 4 hours  ALBUTerol/ipratropium for Nebulization 3 milliLiter(s) Nebulizer every 6 hours  atorvastatin 20 milliGRAM(s) Oral at bedtime  diltiazem    Tablet 60 milliGRAM(s) Oral every 6 hours  docusate sodium 100 milliGRAM(s) Oral three times a day  enoxaparin Injectable 40 milliGRAM(s) SubCutaneous daily  furosemide    Tablet 40 milliGRAM(s) Oral daily  hydrocortisone 1% Cream 1 Application(s) Topical two times a day  influenza   Vaccine 0.5 milliLiter(s) IntraMuscular once  labetalol 600 milliGRAM(s) Oral three times a day  lidocaine   Patch 1 Patch Transdermal daily  losartan 100 milliGRAM(s) Oral daily  montelukast 10 milliGRAM(s) Oral at bedtime  pantoprazole    Tablet 40 milliGRAM(s) Oral before breakfast  potassium chloride    Tablet ER 20 milliEquivalent(s) Oral daily  spironolactone 25 milliGRAM(s) Oral daily  tiotropium 18 MICROgram(s) Capsule 1 Capsule(s) Inhalation daily  tiotropium 18 MICROgram(s) Capsule 1 Capsule(s) Inhalation daily    PRN Inpatient Medications  acetaminophen   Tablet .. 650 milliGRAM(s) Oral every 6 hours PRN  guaiFENesin   Syrup  (Sugar-Free) 200 milliGRAM(s) Oral every 6 hours PRN      REVIEW OF SYSTEMS  --------------------------------------------------------------------------------  Gen: No  fevers/chills   Skin: No rashes  Head/Eyes/Ears/Mouth: No headache; Normal hearing;  No sinus pain/discomfort, sore throat  Respiratory: No dyspnea, cough, wheezing, hemoptysis  CV: No chest pain, orthopnea when seen   GI: No abdominal pain, diarrhea, nausea, vomiting  : No dysuria, decrease urination or hesitancy urinating  hematuria, nocturia  MSK: No joint pain/swelling; no back pain  Neuro: ocassional dizziness/lightheadedness,   also with no edema     All other systems were reviewed and are negative, except as noted.    VITALS/PHYSICAL EXAM  --------------------------------------------------------------------------------  T(C): 37.5 (12-24-18 @ 14:01), Max: 37.5 (12-24-18 @ 14:01)  HR: 77 (12-24-18 @ 14:01) (64 - 77)  BP: 131/70 (12-24-18 @ 14:01) (131/70 - 156/73)  RR: 18 (12-24-18 @ 14:01) (18 - 18)  SpO2: 92% (12-24-18 @ 14:01) (92% - 93%)  Wt(kg): --        12-23-18 @ 07:01  -  12-24-18 @ 07:00  --------------------------------------------------------  IN: 780 mL / OUT: 2875 mL / NET: -2095 mL    12-24-18 @ 07:01  -  12-24-18 @ 17:15  --------------------------------------------------------  IN: 340 mL / OUT: 2200 mL / NET: -1860 mL      Physical Exam:  	Gen: Non toxic comfortable appearing   	no jvd , supple neck,   	Pulm: decrease bs  no rales or ronchi or wheezing  	CV: RRR, S1S2; no rub  	Back: No CVA tenderness  	Abd: +BS, soft, nontender/nondistended  	: No suprapubic tenderness  	UE: Warm, no cyanosis  no clubbing,  no edema; no asterixis  	LE: Warm, no cyanosis  no clubbing, no edema  	Neuro: alert and oriented. speech coherent   	Psych: Normal affect and mood  	Skin: Warm, no decrease skin turgor   	    LABS/STUDIES  --------------------------------------------------------------------------------              10.2   12.6  >-----------<  487      [12-24-18 @ 06:30]              29.9     135  |  98  |  14  ----------------------------<  117      [12-24-18 @ 06:30]  4.0   |  24  |  0.92        Ca     9.4     [12-24-18 @ 06:30]            Creatinine Trend:  SCr 0.92 [12-24 @ 06:30]  SCr 0.84 [12-23 @ 06:41]  SCr 0.88 [12-22 @ 02:27]  SCr 0.87 [12-18 @ 06:02]  SCr 0.85 [12-17 @ 06:12]    Urinalysis - [12-12-18 @ 19:03]      Color Yellow / Appearance Clear / SG 1.023 / pH 6.0      Gluc Negative / Ketone Negative  / Bili Negative / Urobili Negative       Blood Negative / Protein Trace / Leuk Est Negative / Nitrite Negative      RBC  / WBC  / Hyaline  / Gran  / Sq Epi  / Non Sq Epi  / Bacteria       HbA1c 5.7      [12-08-18 @ 20:30]      < from: US TTE 2D F/U, Limited w/o Contrast (ED) (12.22.18 @ 07:26) >    Procedure was performed in the Emergency Department by a credentialed   Emergency Medicine Attending Physician    EXAM:  ER TTE LIMITED      ORDER COMMENTS:      PROCEDURE DATE:  12/22/2018    FOCUSED ED ULTRASOUND REPORT          INTERPRETATION:  A focused transthoracic cardiac ultrasound examination   was performed.   A small anterior pericardial effusion was visualized.  No gross global wall motion abnormality was identified.  Grossly normal left ventricular function.    IMPRESSION:   Small anterior pericardial effusion.                LUIZA JONES   This document has been electronically signed. Dec 22 2018  7:28AM        < end of copied text >      < from: CT Angio Abdomen and Pelvis w/ IV Cont (12.22.18 @ 03:26) >    EXAM:  CT ANGIO ABD PELV (W)AW IC                          EXAM:  CT ANGIO CHEST (W)AW IC                            PROCEDURE DATE:  12/22/2018            INTERPRETATION:  EXAMINATION: CT ANGIOGRAPHY OF THE CHEST (AORTIC   DISSECTION PROTOCOL)    INDICATION: Back pain after recent aortic dissection repair    Technique: Axial images were obtained from the thoracic inlet through the   pelvis during arterial phase of intravenous contrast administration.   First attempt and 90 cc of Omnipaque 350 was nondiagnostic due to late   triggering. A second attempt was made with 120 cc of Omnipaque 350   without complication. Reformatted coronal and sagittal images are   submitted.  Axial MIP images are submitted.    COMPARISON: CT of December 17, 2018    FINDINGS:    Compared to the recent CT performed at December 17, there is been no   significant interval change. Again seen are postoperative changes related   to recent repair of the patient's known type A aortic dissection. There   is no change in the appearance of the ascending aorta reconstruction.    There is no pseudoaneurysm.  The brachiocephalic trunk, left common   carotid artery, and left subclavian artery are patent without dissection   or thrombosis. The left and right coronary arteries are patent. The   aortic root measures 4.9 cm. The arch measures 3.8 cm. The dissection   flap is again seen at the aortic arch with extension to the abdominal   aorta. The celiac axis arises from the true lumen while the superior   mesenteric artery is supplied by both the true and false lumina. The left   renal artery arises from the true lumen. There are 2 right renal arteries   arise from the false lumen which is patent to the aortic bifurcation.   There is no central pulmonary embolism.     Again seen are sternotomy wires and an unchanged small subcutaneous fluid   collection in the lower ventral thoracotomy incision measuring about 1.8   x 2.7 x 7 cm, unchanged.    The tracheobronchial tree is patent proximally.  There is no significant   mediastinal or hilar adenopathy.    The heart is not enlarged.  There is no pericardial effusion.    Lungs: There is bibasilar streaky atelectasis.    There has been near resolution of previously visualized small right   pleural effusion which is now trace. There is persistent mild left   pleural effusion.       There is no free air, fluid or focal collection.    The liver, spleen, pancreas, adrenal glands and kidneys are unremarkable   besides a small left renal cyst.    Gallbladder: Contracted.    There is no small bowel obstruction. The appendix is unremarkable. There   is scattered diverticulosis.    Pelvic organs: The prostate is unremarkable.  The urinary bladder is unremarkable.    The aorta is not dilated.  There are atherosclerotic vascular   calcifications.  There is no significant adenopathy.  There is no retroperitoneal mass.  The visualized osseous structures are unremarkable.  There is a small fat-containing umbilical hernia.    IMPRESSION:    No significant intervalchange compared to the previous CT of December 17, 2018.  Postoperative changes status post recent type a aortic dissection repair.   No change in the appearance of the rest of the aortic dissection which is   seen as distally as the bifurcation. No evidence of renal ischemia or   bowel thickening.  No central pulmonary embolism.  Decrease in pleural effusion, now mild on the left.  Bibasilar streaky   atelectasis.  Diverticulosis.                    DOMINICK JEROME M.D, ATTENDING RADIOLOGIST  Thisdocument has been electronically signed. Dec 22 2018  4:03AM              < end of copied text >

## 2018-12-24 NOTE — PROGRESS NOTE ADULT - PROBLEM SELECTOR PLAN 4
continue bronchodilators  continue spiriva  continue singulair continue bronchodilators  continue spiriva  continue singulair  pt stable at this time

## 2018-12-24 NOTE — DISCHARGE NOTE ADULT - HOSPITAL COURSE
37M with PMHx of Kallman syndrome, obesity,  HTN, asthma, seasonal/ food allergies who presented to the hospital with complaint of of substernal chest pain,  CTA revealed Type A dissection patient transferred to [facility] for emergent cardiac surgery.  He underwent Aortic dissection repair supracoronary with 26 Hemashield Graft hemiarch repair on 12/8/18 with post op course significant for cardiogenic shock requiring pressor support, hypertension requiring cardene gtt, volume overload,  and leukocytosis with one episode of fever to 101.5.  He was discharged on 12/13 and returns today to [facility] ER with c/o shortness of breath and wheezing. Reports unable to successfully use his albuterol inhaler because he is so sore from recent surgery and unable to take a deep breath.  Chest CTA on 12/17 revealed no evidence of PE. Patient was admitted for observation and pulmonary was consulted who treated for asthma exacerbation with prednisone  40 qd x 5 days, singulair and nebulizer for home use.  Patient presents today with complaint of constant, severe left sided pain, lateral to T7, which has improved since arrival in ER related to received 4mg IVP morphine.  CT-Angio chest revealed stable post-operative changes with decrease in pleural effusions.     12/22 pt to be admitted for HTN management- d/w Dr. Dowling.  Atrial Flutter x 1 episode with continued HTN, cardizem added. Maintain losartan at 50mg.   12/23 /93 - will increase Losartan to 100mg po daily - give additional 50mg po Losartan this am   Disharge planning - home when BP stable ?monday 37M with PMHx of Kallman syndrome, obesity,  HTN, asthma, seasonal/ food allergies who presented to the hospital with complaint of of substernal chest pain,  CTA revealed Type A dissection patient transferred to [facility] for emergent cardiac surgery.  He underwent Aortic dissection repair supracoronary with 26 Hemashield Graft hemiarch repair on 12/8/18 with post op course significant for cardiogenic shock requiring pressor support, hypertension requiring cardene gtt, volume overload,  and leukocytosis with one episode of fever to 101.5.  He was discharged on 12/13 and returns today to [facility] ER with c/o shortness of breath and wheezing. Reports unable to successfully use his albuterol inhaler because he is so sore from recent surgery and unable to take a deep breath.  Chest CTA on 12/17 revealed no evidence of PE. Patient was admitted for observation and pulmonary was consulted who treated for asthma exacerbation with prednisone  40 qd x 5 days, singulair and nebulizer for home use.  Patient presents today with complaint of constant, severe left sided pain, lateral to T7, which has improved since arrival in ER related to received 4mg IVP morphine.  CT-Angio chest revealed stable post-operative changes with decrease in pleural effusions.     12/22 pt to be admitted for HTN management- d/w Dr. Dowling.  Atrial Flutter x 1 episode with continued HTN, cardizem added. Maintain losartan at 50mg.   12/23 /93 - will increase Losartan to 100mg po daily - give additional 50mg po Losartan this am   Discharge planning - home when BP stable   12/24   seen by nephrology Dr Leong BP improving   12/25 BP stable  patient stable and eager for discharge d/w Dr Toledo

## 2018-12-24 NOTE — DISCHARGE NOTE ADULT - CARE PLAN
Principal Discharge DX:	Essential hypertension  Goal:	Controlled blood pressure  Assessment and plan of treatment:	Your blood pressure was elevated on admission to the hospital.  Your blood pressure medication, Losartan was increased to 100mg daily and an additional medication, diltiazem, was started to improve your blood pressure.  Secondary Diagnosis:	Chronic left-sided thoracic back pain  Assessment and plan of treatment:	Your back pain improved while you were hospitalized Principal Discharge DX:	Essential hypertension  Goal:	Controlled blood pressure  Assessment and plan of treatment:	Take all medications as prescribed   weigh yourself daily call our office for weight gain of 3 lbs in 48 hours  Follow up with Dr Toledo call our  [phone] for  appointment within two weeks of discharge  Follow up with Dr Leong nephology  call [phone] for an appointment within 7 days of discharge  Call our cardiology clinic  [phone] for an appointment  Call our medical clinic   call the  Follow your heart NPs  [phone] with any concerns  Call our Medical clinic at [address] [place] [address] for follow up   within 1 weeks of discharge   Secondary Diagnosis:	S/P aortic dissection repair  Goal:	full recovery  Assessment and plan of treatment:	Take all medications as prescribed   weigh yourself daily call our office for weight gain of 3 lbs in 48 hours  Follow up with Dr Toledo call our  [phone] for  appointment within two weeks of discharge  Follow up with Dr Leong nephology  call  [phone]  Call our cardiology clinic  [phone] for an appointment  Call our medical clinic   call the  Follow your heart NPs  [phone] with any concerns  Call our Medical clinic at [address] [place] [address] for follow up   within 1 weeks of discharge   Call our office with fever chills or any concerns

## 2018-12-24 NOTE — DISCHARGE NOTE ADULT - REASON FOR ADMISSION
"I have back pain."
Alert and oriented to person, place and time/Patient baseline mental status/Awake/Symptoms improved

## 2018-12-24 NOTE — PROGRESS NOTE ADULT - SUBJECTIVE AND OBJECTIVE BOX
VITAL SIGNS    Telemetry:    Vital Signs Last 24 Hrs  T(C): 37.1 (18 @ 04:57), Max: 37.3 (18 @ 20:25)  T(F): 98.8 (18 @ 04:57), Max: 99.1 (18 @ 20:25)  HR: 75 (18 @ 04:57) (72 - 82)  BP: 148/75 (18 @ 04:57) (143/73 - 184/86)  RR: 18 (18 @ 04:57) (18 - 18)  SpO2: 93% (18 @ 04:57) (91% - 95%)             @ 07:01  -   @ 07:00  --------------------------------------------------------  IN: 780 mL / OUT: 2875 mL / NET: -2095 mL     @ 07:01  -   @ 11:03  --------------------------------------------------------  IN: 220 mL / OUT: 0 mL / NET: 220 mL       Daily     Daily Weight in k.6 (24 Dec 2018 07:30)  Admit Wt: Drug Dosing Weight  Height (cm): 190.5 (22 Dec 2018 00:38)  Weight (kg): 132.4 (22 Dec 2018 00:38)  BMI (kg/m2): 36.5 (22 Dec 2018 00:38)  BSA (m2): 2.58 (22 Dec 2018 00:38)      CAPILLARY BLOOD GLUCOSE              acetaminophen   Tablet .. 650 milliGRAM(s) Oral every 6 hours PRN  ALBUTerol    90 MICROgram(s) HFA Inhaler 1 Puff(s) Inhalation every 4 hours  ALBUTerol/ipratropium for Nebulization 3 milliLiter(s) Nebulizer every 6 hours  atorvastatin 20 milliGRAM(s) Oral at bedtime  diltiazem    Tablet 60 milliGRAM(s) Oral every 6 hours  docusate sodium 100 milliGRAM(s) Oral three times a day  enoxaparin Injectable 40 milliGRAM(s) SubCutaneous daily  furosemide    Tablet 40 milliGRAM(s) Oral daily  guaiFENesin   Syrup  (Sugar-Free) 200 milliGRAM(s) Oral every 6 hours PRN  hydrocortisone 1% Cream 1 Application(s) Topical two times a day  influenza   Vaccine 0.5 milliLiter(s) IntraMuscular once  labetalol 600 milliGRAM(s) Oral three times a day  lidocaine   Patch 1 Patch Transdermal daily  losartan 100 milliGRAM(s) Oral daily  montelukast 10 milliGRAM(s) Oral at bedtime  pantoprazole    Tablet 40 milliGRAM(s) Oral before breakfast  potassium chloride    Tablet ER 20 milliEquivalent(s) Oral daily  spironolactone 25 milliGRAM(s) Oral daily  tiotropium 18 MICROgram(s) Capsule 1 Capsule(s) Inhalation daily  tiotropium 18 MICROgram(s) Capsule 1 Capsule(s) Inhalation daily      PHYSICAL EXAM    Subjective: "Hi.   Neurology: alert and oriented x 3, nonfocal, no gross deficits  CV : tele:  RSR  Sternal Wound :  CDI with dressing , Stable  Lungs: clear. RR easy, unlabored   Abdomen: soft, nontender, nondistended, positive bowel sounds, bowel movement   Neg N/V/D   :  pt voiding without difficulty   Extremities:   GUADALUPE; edema, neg calf tenderness.   PPP bilaterally      PW:  Chest tubes: VITAL SIGNS    Telemetry:  RSR 60-90   Vital Signs Last 24 Hrs  T(C): 37.1 (18 @ 04:57), Max: 37.3 (18 @ 20:25)  T(F): 98.8 (18 @ 04:57), Max: 99.1 (18 @ 20:25)  HR: 75 (18 @ 04:57) (72 - 82)  BP: 148/75 (18 @ 04:57) (143/73 - 184/86)  RR: 18 (18 @ 04:57) (18 - 18)  SpO2: 93% (18 @ 04:57) (91% - 95%)             @ 07:01  -   @ 07:00  --------------------------------------------------------  IN: 780 mL / OUT: 2875 mL / NET: -2095 mL     @ 07:01  -   @ 11:03  --------------------------------------------------------  IN: 220 mL / OUT: 0 mL / NET: 220 mL       Daily     Daily Weight in k.6 (24 Dec 2018 07:30)  Admit Wt: Drug Dosing Weight  Height (cm): 190.5 (22 Dec 2018 00:38)  Weight (kg): 132.4 (22 Dec 2018 00:38)  BMI (kg/m2): 36.5 (22 Dec 2018 00:38)  BSA (m2): 2.58 (22 Dec 2018 00:38)              acetaminophen   Tablet .. 650 milliGRAM(s) Oral every 6 hours PRN  ALBUTerol    90 MICROgram(s) HFA Inhaler 1 Puff(s) Inhalation every 4 hours  ALBUTerol/ipratropium for Nebulization 3 milliLiter(s) Nebulizer every 6 hours  atorvastatin 20 milliGRAM(s) Oral at bedtime  diltiazem    Tablet 60 milliGRAM(s) Oral every 6 hours  docusate sodium 100 milliGRAM(s) Oral three times a day  enoxaparin Injectable 40 milliGRAM(s) SubCutaneous daily  furosemide    Tablet 40 milliGRAM(s) Oral daily  guaiFENesin   Syrup  (Sugar-Free) 200 milliGRAM(s) Oral every 6 hours PRN  hydrocortisone 1% Cream 1 Application(s) Topical two times a day  influenza   Vaccine 0.5 milliLiter(s) IntraMuscular once  labetalol 600 milliGRAM(s) Oral three times a day  lidocaine   Patch 1 Patch Transdermal daily  losartan 100 milliGRAM(s) Oral daily  montelukast 10 milliGRAM(s) Oral at bedtime  pantoprazole    Tablet 40 milliGRAM(s) Oral before breakfast  potassium chloride    Tablet ER 20 milliEquivalent(s) Oral daily  spironolactone 25 milliGRAM(s) Oral daily  tiotropium 18 MICROgram(s) Capsule 1 Capsule(s) Inhalation daily  tiotropium 18 MICROgram(s) Capsule 1 Capsule(s) Inhalation daily      PHYSICAL EXAM    Subjective: "I"m doing ok."   Neurology: alert and oriented x 3, nonfocal, no gross deficits  CV : tele:  RSR 60-90   Sternal Wound :  CDI NA - sternum Stable  Lungs: clear. RR easy, unlabored   Abdomen: soft, nontender, nondistended, positive bowel sounds, + bowel movement   Neg N/V/D   :  pt voiding without difficulty   Extremities:   GUADALUPE; neg LE edema, neg calf tenderness.   PPP bilaterally

## 2018-12-24 NOTE — PROGRESS NOTE ADULT - PROBLEM SELECTOR PLAN 2
increase losartan to 100 mg po daily   continue diuresis  continue labetolol 600 mg po tid increase losartan to 100 mg po daily   continue diuresis/ aldactone 25 mg po daily initiated today  renal Dr. Varma consulted  continue labetolol 600 mg po tid  continue cardizem 60 mg po q6

## 2018-12-24 NOTE — DISCHARGE NOTE ADULT - NS AS ACTIVITY OBS
Walking-Indoors allowed/Walking-Outdoors allowed/Stairs allowed/No Heavy lifting/straining/increase activity as tolerated

## 2018-12-24 NOTE — DISCHARGE NOTE ADULT - OTHER SIGNIFICANT FINDINGS
Axox3  NSR 70-90  MTI sternum stable NA   b/l CTA breath sounds  ab soft nontender + Bm  Voids b/l le warm + DP trace edema  125 /73 78 18 94 37.1    greater then 30 minutes spent on discharge

## 2018-12-24 NOTE — DISCHARGE NOTE ADULT - ADDITIONAL INSTRUCTIONS
You will see your surgeon, Dr. Toledo, as scheduled on Take all medications as prescribed   weigh yourself daily call our office for weight gain of 3 lbs in 48 hours  Follow up with Dr Toledo call our  [phone] for  appointment within two weeks of discharge  Follow up with Dr Leong nephology  call  [phone]  Call our cardiology clinic  [phone] for an appointment  Call our medical clinic   Call the  Follow your heart NPs  [phone] with any concerns  Call our Medical clinic at [address] [place] [address] for follow up   within 1 weeks of discharge   Call our office with fever chills or any concerns

## 2018-12-24 NOTE — PROGRESS NOTE ADULT - ASSESSMENT
37M with PMHx of Kallman syndrome, obesity,  HTN, asthma, seasonal/ food allergies who presented to the hospital with complaint of of substernal chest pain,  CTA revealed Type A dissection patient transferred to [facility] for emergent cardiac surgery.  He underwent Aortic dissection repair supracoronary with 26 Hemashield Graft hemiarch repair on 12/8/18 with post op course significant for cardiogenic shock requiring pressor support, hypertension requiring cardene gtt, volume overload,  and leukocytosis with one episode of fever to 101.5.  He was discharged on 12/13 and returns today to [facility] ER with c/o shortness of breath and wheezing. Reports unable to successfully use his albuterol inhaler because he is so sore from recent surgery and unable to take a deep breath.  Chest CTA on 12/17 revealed no evidence of PE. Patient was admitted for observation and pulmonary was consulted who treated for asthma exacerbation with prednisone  40 qd x 5 days, singulair and nebulizer for home use.  Patient presents today with complaint of constant, severe left sided pain, lateral to T7, which has improved since arrival in ER related to received 4mg IVP morphine.  CT-Angio chest revealed stable post-operative changes with decrease in pleural effusions.     12/22 pt to be admitted for HTN management- d/w Dr. Dowling  12/23 /93 - will increase Losartan to 100mg po daily - give additional 50mg po Losartan this am   Disharge planning - home when BP stable ?monday 37M with PMHx of Kallman syndrome, obesity,  HTN, asthma, seasonal/ food allergies who presented to the hospital with complaint of of substernal chest pain,  CTA revealed Type A dissection patient transferred to [facility] for emergent cardiac surgery.  He underwent Aortic dissection repair supracoronary with 26 Hemashield Graft hemiarch repair on 12/8/18 with post op course significant for cardiogenic shock requiring pressor support, hypertension requiring cardene gtt, volume overload,  and leukocytosis with one episode of fever to 101.5.  He was discharged on 12/13 and returns today to [facility] ER with c/o shortness of breath and wheezing. Reports unable to successfully use his albuterol inhaler because he is so sore from recent surgery and unable to take a deep breath.  Chest CTA on 12/17 revealed no evidence of PE. Patient was admitted for observation and pulmonary was consulted who treated for asthma exacerbation with prednisone  40 qd x 5 days, singulair and nebulizer for home use.  Patient presents today with complaint of constant, severe left sided pain, lateral to T7, which has improved since arrival in ER related to received 4mg IVP morphine.  CT-Angio chest revealed stable post-operative changes with decrease in pleural effusions.     12/22 pt to be admitted for HTN management- d/w Dr. Dowling  12/23 /93 - will increase Losartan to 100mg po daily - give additional 50mg po Losartan this am; cardizem initiated  12/24 renal consulted Dr. Varma for HTN management; aldactone 25 mg po daily initiated; bp improved 136/70   Discharge planning- home when bp controlled tue/wed

## 2018-12-24 NOTE — DISCHARGE NOTE ADULT - MEDICATION SUMMARY - MEDICATIONS TO TAKE
I will START or STAY ON the medications listed below when I get home from the hospital:    spironolactone 25 mg oral tablet  -- 1 tab(s) by mouth once a day  -- Indication: For water pill    acetaminophen 325 mg oral tablet  -- 2 tab(s) by mouth every 6 hours, As needed, Moderate Pain (4 - 6)  -- Indication: For pain    losartan 100 mg oral tablet  -- 1 tab(s) by mouth once a day  -- Indication: For Essential hypertension    dilTIAZem 60 mg oral tablet  -- 1 tab(s) by mouth every 6 hours  -- Indication: For Essential hypertension    atorvastatin 20 mg oral tablet  -- 1 tab(s) by mouth once a day (at bedtime)  -- Indication: For S/P aortic dissection repair    benzonatate 100 mg oral capsule  -- 1 cap(s) by mouth 3 times a day, As needed, Cough  -- Indication: For Cough    labetalol 300 mg oral tablet  -- 2 tab(s) by mouth 3 times a day  -- Indication: For Essential hypertension    ipratropium-albuterol 0.5 mg-2.5 mg/3 mLinhalation solution  -- 3 milliliter(s) inhaled every 6 hours as needed  -- Indication: For Cough    tiotropium 18 mcg inhalation capsule  -- 1 cap(s) inhaled once a day  -- Indication: For Cough    hydrocortisone 1% topical cream  -- 1 application on skin 2 times a day PRN  -- Indication: For left back pain    furosemide 40 mg oral tablet  -- 1 tab(s) by mouth once a day  -- Indication: For water pill    guaiFENesin 100 mg/5 mL oral liquid  -- 10 milliliter(s) by mouth every 6 hours, As needed, Cough  -- Indication: For Cough    docusate sodium 100 mg oral capsule  -- 1 cap(s) by mouth 3 times a day  -- Indication: For Stool softner    montelukast 10 mg oral tablet  -- 1 tab(s) by mouth once a day (at bedtime)  -- Indication: For Allergies    pantoprazole 40 mg oral delayed release tablet  -- 1 tab(s) by mouth once a day (before a meal)  -- Indication: For Antacid

## 2018-12-25 VITALS
DIASTOLIC BLOOD PRESSURE: 76 MMHG | TEMPERATURE: 99 F | OXYGEN SATURATION: 98 % | HEART RATE: 88 BPM | RESPIRATION RATE: 16 BRPM | SYSTOLIC BLOOD PRESSURE: 118 MMHG

## 2018-12-25 LAB
ANION GAP SERPL CALC-SCNC: 13 MMOL/L — SIGNIFICANT CHANGE UP (ref 5–17)
BUN SERPL-MCNC: 17 MG/DL — SIGNIFICANT CHANGE UP (ref 7–23)
CALCIUM SERPL-MCNC: 9.4 MG/DL — SIGNIFICANT CHANGE UP (ref 8.4–10.5)
CHLORIDE SERPL-SCNC: 98 MMOL/L — SIGNIFICANT CHANGE UP (ref 96–108)
CO2 SERPL-SCNC: 24 MMOL/L — SIGNIFICANT CHANGE UP (ref 22–31)
CREAT SERPL-MCNC: 0.96 MG/DL — SIGNIFICANT CHANGE UP (ref 0.5–1.3)
GLUCOSE SERPL-MCNC: 117 MG/DL — HIGH (ref 70–99)
HCT VFR BLD CALC: 30.1 % — LOW (ref 39–50)
HGB BLD-MCNC: 10.4 G/DL — LOW (ref 13–17)
MCHC RBC-ENTMCNC: 28.8 PG — SIGNIFICANT CHANGE UP (ref 27–34)
MCHC RBC-ENTMCNC: 34.4 GM/DL — SIGNIFICANT CHANGE UP (ref 32–36)
MCV RBC AUTO: 83.7 FL — SIGNIFICANT CHANGE UP (ref 80–100)
PLATELET # BLD AUTO: 452 K/UL — HIGH (ref 150–400)
POTASSIUM SERPL-MCNC: 4.2 MMOL/L — SIGNIFICANT CHANGE UP (ref 3.5–5.3)
POTASSIUM SERPL-SCNC: 4.2 MMOL/L — SIGNIFICANT CHANGE UP (ref 3.5–5.3)
RBC # BLD: 3.59 M/UL — LOW (ref 4.2–5.8)
RBC # FLD: 12.6 % — SIGNIFICANT CHANGE UP (ref 10.3–14.5)
SODIUM SERPL-SCNC: 135 MMOL/L — SIGNIFICANT CHANGE UP (ref 135–145)
WBC # BLD: 13.1 K/UL — HIGH (ref 3.8–10.5)
WBC # FLD AUTO: 13.1 K/UL — HIGH (ref 3.8–10.5)

## 2018-12-25 PROCEDURE — 82803 BLOOD GASES ANY COMBINATION: CPT

## 2018-12-25 PROCEDURE — 86901 BLOOD TYPING SEROLOGIC RH(D): CPT

## 2018-12-25 PROCEDURE — 86850 RBC ANTIBODY SCREEN: CPT

## 2018-12-25 PROCEDURE — 85014 HEMATOCRIT: CPT

## 2018-12-25 PROCEDURE — 74174 CTA ABD&PLVS W/CONTRAST: CPT

## 2018-12-25 PROCEDURE — 83605 ASSAY OF LACTIC ACID: CPT

## 2018-12-25 PROCEDURE — 84484 ASSAY OF TROPONIN QUANT: CPT

## 2018-12-25 PROCEDURE — 86900 BLOOD TYPING SEROLOGIC ABO: CPT

## 2018-12-25 PROCEDURE — 80053 COMPREHEN METABOLIC PANEL: CPT

## 2018-12-25 PROCEDURE — 99285 EMERGENCY DEPT VISIT HI MDM: CPT | Mod: 25

## 2018-12-25 PROCEDURE — 71275 CT ANGIOGRAPHY CHEST: CPT

## 2018-12-25 PROCEDURE — 85610 PROTHROMBIN TIME: CPT

## 2018-12-25 PROCEDURE — 96375 TX/PRO/DX INJ NEW DRUG ADDON: CPT

## 2018-12-25 PROCEDURE — 84295 ASSAY OF SERUM SODIUM: CPT

## 2018-12-25 PROCEDURE — 93005 ELECTROCARDIOGRAM TRACING: CPT

## 2018-12-25 PROCEDURE — 71046 X-RAY EXAM CHEST 2 VIEWS: CPT

## 2018-12-25 PROCEDURE — 99239 HOSP IP/OBS DSCHRG MGMT >30: CPT | Mod: 24

## 2018-12-25 PROCEDURE — 85027 COMPLETE CBC AUTOMATED: CPT

## 2018-12-25 PROCEDURE — 84132 ASSAY OF SERUM POTASSIUM: CPT

## 2018-12-25 PROCEDURE — 96374 THER/PROPH/DIAG INJ IV PUSH: CPT | Mod: XU

## 2018-12-25 PROCEDURE — 83735 ASSAY OF MAGNESIUM: CPT

## 2018-12-25 PROCEDURE — 94640 AIRWAY INHALATION TREATMENT: CPT

## 2018-12-25 PROCEDURE — 82435 ASSAY OF BLOOD CHLORIDE: CPT

## 2018-12-25 PROCEDURE — 82330 ASSAY OF CALCIUM: CPT

## 2018-12-25 PROCEDURE — 93308 TTE F-UP OR LMTD: CPT

## 2018-12-25 PROCEDURE — 82947 ASSAY GLUCOSE BLOOD QUANT: CPT

## 2018-12-25 PROCEDURE — 83880 ASSAY OF NATRIURETIC PEPTIDE: CPT

## 2018-12-25 PROCEDURE — 85730 THROMBOPLASTIN TIME PARTIAL: CPT

## 2018-12-25 PROCEDURE — 80048 BASIC METABOLIC PNL TOTAL CA: CPT

## 2018-12-25 RX ORDER — DOCUSATE SODIUM 100 MG
1 CAPSULE ORAL
Qty: 90 | Refills: 0
Start: 2018-12-25 | End: 2019-01-23

## 2018-12-25 RX ORDER — DILTIAZEM HCL 120 MG
1 CAPSULE, EXT RELEASE 24 HR ORAL
Qty: 120 | Refills: 0
Start: 2018-12-25 | End: 2019-01-23

## 2018-12-25 RX ORDER — HYDROCORTISONE 1 %
1 OINTMENT (GRAM) TOPICAL
Qty: 1 | Refills: 0
Start: 2018-12-25 | End: 2019-01-23

## 2018-12-25 RX ORDER — ATORVASTATIN CALCIUM 80 MG/1
1 TABLET, FILM COATED ORAL
Qty: 30 | Refills: 0
Start: 2018-12-25 | End: 2019-01-23

## 2018-12-25 RX ORDER — ACETAMINOPHEN 500 MG
2 TABLET ORAL
Qty: 240 | Refills: 0
Start: 2018-12-25 | End: 2019-01-23

## 2018-12-25 RX ORDER — TIOTROPIUM BROMIDE 18 UG/1
1 CAPSULE ORAL; RESPIRATORY (INHALATION)
Qty: 1 | Refills: 0
Start: 2018-12-25 | End: 2019-01-23

## 2018-12-25 RX ORDER — MONTELUKAST 4 MG/1
1 TABLET, CHEWABLE ORAL
Qty: 30 | Refills: 0
Start: 2018-12-25 | End: 2019-01-23

## 2018-12-25 RX ORDER — LABETALOL HCL 100 MG
2 TABLET ORAL
Qty: 180 | Refills: 0
Start: 2018-12-25 | End: 2019-01-23

## 2018-12-25 RX ORDER — FUROSEMIDE 40 MG
1 TABLET ORAL
Qty: 3 | Refills: 0
Start: 2018-12-25 | End: 2018-12-27

## 2018-12-25 RX ORDER — LOSARTAN POTASSIUM 100 MG/1
1 TABLET, FILM COATED ORAL
Qty: 30 | Refills: 0
Start: 2018-12-25 | End: 2019-01-23

## 2018-12-25 RX ORDER — IPRATROPIUM/ALBUTEROL SULFATE 18-103MCG
3 AEROSOL WITH ADAPTER (GRAM) INHALATION
Qty: 1 | Refills: 0
Start: 2018-12-25 | End: 2019-01-23

## 2018-12-25 RX ORDER — PANTOPRAZOLE SODIUM 20 MG/1
1 TABLET, DELAYED RELEASE ORAL
Qty: 30 | Refills: 0
Start: 2018-12-25 | End: 2019-01-23

## 2018-12-25 RX ORDER — SPIRONOLACTONE 25 MG/1
1 TABLET, FILM COATED ORAL
Qty: 3 | Refills: 0
Start: 2018-12-25 | End: 2018-12-27

## 2018-12-25 RX ADMIN — PANTOPRAZOLE SODIUM 40 MILLIGRAM(S): 20 TABLET, DELAYED RELEASE ORAL at 06:23

## 2018-12-25 RX ADMIN — Medication 1 APPLICATION(S): at 06:24

## 2018-12-25 RX ADMIN — Medication 20 MILLIEQUIVALENT(S): at 11:38

## 2018-12-25 RX ADMIN — Medication 600 MILLIGRAM(S): at 12:57

## 2018-12-25 RX ADMIN — TIOTROPIUM BROMIDE 1 CAPSULE(S): 18 CAPSULE ORAL; RESPIRATORY (INHALATION) at 11:38

## 2018-12-25 RX ADMIN — ENOXAPARIN SODIUM 40 MILLIGRAM(S): 100 INJECTION SUBCUTANEOUS at 05:44

## 2018-12-25 RX ADMIN — Medication 3 MILLILITER(S): at 05:44

## 2018-12-25 RX ADMIN — Medication 200 MILLIGRAM(S): at 06:24

## 2018-12-25 RX ADMIN — Medication 600 MILLIGRAM(S): at 05:43

## 2018-12-25 RX ADMIN — SPIRONOLACTONE 25 MILLIGRAM(S): 25 TABLET, FILM COATED ORAL at 06:23

## 2018-12-25 RX ADMIN — Medication 650 MILLIGRAM(S): at 05:43

## 2018-12-25 RX ADMIN — Medication 3 MILLILITER(S): at 11:39

## 2018-12-25 RX ADMIN — Medication 100 MILLIGRAM(S): at 05:43

## 2018-12-25 RX ADMIN — Medication 40 MILLIGRAM(S): at 05:43

## 2018-12-25 RX ADMIN — LOSARTAN POTASSIUM 100 MILLIGRAM(S): 100 TABLET, FILM COATED ORAL at 06:23

## 2018-12-25 NOTE — PROGRESS NOTE ADULT - ASSESSMENT
37M with PMHx of Kallman syndrome, obesity,  HTN, asthma, seasonal/ food allergies who presented to the hospital with complaint of of substernal chest pain,  CTA revealed Type A dissection patient transferred to [facility] for emergent cardiac surgery.  He underwent Aortic dissection repair supracoronary with 26 Hemashield Graft hemiarch repair on 12/8/18 with post op course significant for cardiogenic shock requiring pressor support, hypertension. now readmitted with uncontrolled htn and cp    1- HTN accelerated   2- chf suspected requiring diuretics     given his dizziness check orthostatics to evaluate for orthostasis   given he ct scan with no adrenal nodule   continue above anti-htn medications    aldactone 25mg qd and dc kcl

## 2018-12-25 NOTE — PROGRESS NOTE ADULT - SUBJECTIVE AND OBJECTIVE BOX
[facility]   [phone]  RENAL FOLLOW UP NOTE  --------------------------------------------------------------------------------  Chief Complaint:    24 hour events/subjective:      no dizziness or lightheadedness       PAST HISTORY  --------------------------------------------------------------------------------  No significant changes to PMH, PSH, FHx, SHx, unless otherwise noted    ALLERGIES & MEDICATIONS  --------------------------------------------------------------------------------  Allergies    Allergy to Eggplant (Short breath; Rash)  penicillin (Unknown)  Potatoes (Short breath; Rash)  Tomatoes (Short breath; Rash)    Intolerances      Standing Inpatient Medications  ALBUTerol    90 MICROgram(s) HFA Inhaler 1 Puff(s) Inhalation every 4 hours  ALBUTerol/ipratropium for Nebulization 3 milliLiter(s) Nebulizer every 6 hours  atorvastatin 20 milliGRAM(s) Oral at bedtime  diltiazem    Tablet 60 milliGRAM(s) Oral every 6 hours  docusate sodium 100 milliGRAM(s) Oral three times a day  enoxaparin Injectable 40 milliGRAM(s) SubCutaneous daily  furosemide    Tablet 40 milliGRAM(s) Oral daily  hydrocortisone 1% Cream 1 Application(s) Topical two times a day  influenza   Vaccine 0.5 milliLiter(s) IntraMuscular once  labetalol 600 milliGRAM(s) Oral three times a day  lidocaine   Patch 1 Patch Transdermal daily  losartan 100 milliGRAM(s) Oral daily  montelukast 10 milliGRAM(s) Oral at bedtime  pantoprazole    Tablet 40 milliGRAM(s) Oral before breakfast  potassium chloride    Tablet ER 20 milliEquivalent(s) Oral daily  spironolactone 25 milliGRAM(s) Oral daily  tiotropium 18 MICROgram(s) Capsule 1 Capsule(s) Inhalation daily  tiotropium 18 MICROgram(s) Capsule 1 Capsule(s) Inhalation daily    PRN Inpatient Medications  acetaminophen   Tablet .. 650 milliGRAM(s) Oral every 6 hours PRN  benzonatate 100 milliGRAM(s) Oral three times a day PRN  guaiFENesin   Syrup  (Sugar-Free) 200 milliGRAM(s) Oral every 6 hours PRN      REVIEW OF SYSTEMS  --------------------------------------------------------------------------------    Gen: denies fatigue, fevers/chills,  CVS: denies chest pain/palpitations  Resp: denies SOB/Cough  GI: Denies N/V/Abd pain  : Denies dysuria/oliguria/hematuria    All other systems were reviewed and are negative, except as noted.    VITALS/PHYSICAL EXAM  --------------------------------------------------------------------------------  T(C): 37.1 (12-25-18 @ 05:10), Max: 37.7 (12-24-18 @ 19:42)  HR: 78 (12-25-18 @ 05:10) (64 - 83)  BP: 129/73 (12-25-18 @ 05:10) (129/73 - 146/75)  RR: 18 (12-25-18 @ 05:10) (18 - 18)  SpO2: 94% (12-25-18 @ 05:10) (92% - 94%)  Wt(kg): --        12-24-18 @ 07:01  -  12-25-18 @ 07:00  --------------------------------------------------------  IN: 1060 mL / OUT: 3300 mL / NET: -2240 mL    12-25-18 @ 07:01  -  12-25-18 @ 11:43  --------------------------------------------------------  IN: 240 mL / OUT: 700 mL / NET: -460 mL      Physical Exam:  	  Gen: Non toxic comfortable appearing   	no jvd , supple neck,   	Pulm: decrease bs  no rales or ronchi or wheezing  	CV: RRR, S1S2; no rub  	Abd: +BS, soft, nontender/nondistended  	: No suprapubic tenderness  	UE: Warm, no cyanosis  no clubbing,  no edema; no asterixis  	LE: Warm, no cyanosis  no clubbing, no edema  	Neuro: alert and oriented. speech coherent   		  	    LABS/STUDIES  --------------------------------------------------------------------------------              10.4   13.1  >-----------<  452      [12-25-18 @ 06:34]              30.1     135  |  98  |  17  ----------------------------<  117      [12-25-18 @ 06:34]  4.2   |  24  |  0.96        Ca     9.4     [12-25-18 @ 06:34]            Creatinine Trend:  SCr 0.96 [12-25 @ 06:34]  SCr 0.92 [12-24 @ 06:30]  SCr 0.84 [12-23 @ 06:41]  SCr 0.88 [12-22 @ 02:27]  SCr 0.87 [12-18 @ 06:02]              Urinalysis - [12-12-18 @ 19:03]      Color Yellow / Appearance Clear / SG 1.023 / pH 6.0      Gluc Negative / Ketone Negative  / Bili Negative / Urobili Negative       Blood Negative / Protein Trace / Leuk Est Negative / Nitrite Negative      RBC  / WBC  / Hyaline  / Gran  / Sq Epi  / Non Sq Epi  / Bacteria       HbA1c 5.7      [12-08-18 @ 20:30]

## 2019-01-02 RX ORDER — DUPILUMAB 300 MG/2ML
300 INJECTION, SOLUTION SUBCUTANEOUS
Qty: 1 | Refills: 2 | Status: DISCONTINUED | COMMUNITY
Start: 2017-09-14 | End: 2019-01-02

## 2019-01-02 RX ORDER — EPINEPHRINE 0.3 MG/.3ML
0.3 INJECTION INTRAMUSCULAR
Refills: 0 | Status: DISCONTINUED | COMMUNITY
End: 2019-01-02

## 2019-01-02 RX ORDER — DUPILUMAB 300 MG/2ML
300 INJECTION, SOLUTION SUBCUTANEOUS
Qty: 1 | Refills: 0 | Status: DISCONTINUED | COMMUNITY
Start: 2017-09-14 | End: 2019-01-02

## 2019-01-02 RX ORDER — ACETAMINOPHEN 325 MG/1
325 TABLET, FILM COATED ORAL EVERY 6 HOURS
Refills: 0 | Status: ACTIVE | COMMUNITY

## 2019-01-02 RX ORDER — LABETALOL HYDROCHLORIDE 200 MG/1
200 TABLET, FILM COATED ORAL 3 TIMES DAILY
Qty: 270 | Refills: 1 | Status: DISCONTINUED | COMMUNITY
Start: 2018-12-21 | End: 2019-01-02

## 2019-01-02 RX ORDER — LOSARTAN POTASSIUM 50 MG/1
50 TABLET, FILM COATED ORAL
Qty: 90 | Refills: 3 | Status: DISCONTINUED | COMMUNITY
Start: 2018-12-21 | End: 2019-01-02

## 2019-01-02 RX ORDER — OXYCODONE HYDROCHLORIDE AND ACETAMINOPHEN 5; 325 MG/1; MG/1
5-325 TABLET ORAL
Refills: 0 | Status: DISCONTINUED | COMMUNITY
Start: 2018-12-21 | End: 2019-01-02

## 2019-01-02 RX ORDER — POTASSIUM CHLORIDE 1500 MG/1
20 TABLET, FILM COATED, EXTENDED RELEASE ORAL DAILY
Refills: 0 | Status: DISCONTINUED | COMMUNITY
Start: 2018-12-21 | End: 2019-01-02

## 2019-01-02 RX ORDER — PANTOPRAZOLE 40 MG/1
40 TABLET, DELAYED RELEASE ORAL DAILY
Qty: 30 | Refills: 0 | Status: DISCONTINUED | COMMUNITY
Start: 2018-12-21 | End: 2019-01-02

## 2019-01-07 ENCOUNTER — APPOINTMENT (OUTPATIENT)
Dept: CARDIOTHORACIC SURGERY | Facility: CLINIC | Age: 39
End: 2019-01-07
Payer: COMMERCIAL

## 2019-01-07 VITALS
WEIGHT: 273.37 LBS | OXYGEN SATURATION: 98 % | TEMPERATURE: 98.4 F | BODY MASS INDEX: 33.99 KG/M2 | SYSTOLIC BLOOD PRESSURE: 149 MMHG | HEART RATE: 70 BPM | DIASTOLIC BLOOD PRESSURE: 78 MMHG | HEIGHT: 75 IN | RESPIRATION RATE: 14 BRPM

## 2019-01-07 DIAGNOSIS — Z09 ENCOUNTER FOR FOLLOW-UP EXAMINATION AFTER COMPLETED TREATMENT FOR CONDITIONS OTHER THAN MALIGNANT NEOPLASM: ICD-10-CM

## 2019-01-07 PROCEDURE — 99024 POSTOP FOLLOW-UP VISIT: CPT

## 2019-01-07 RX ORDER — FUROSEMIDE 40 MG/1
40 TABLET ORAL DAILY
Qty: 30 | Refills: 3 | Status: DISCONTINUED | COMMUNITY
Start: 2018-12-21 | End: 2019-01-07

## 2019-01-07 RX ORDER — SPIRONOLACTONE 25 MG/1
25 TABLET ORAL DAILY
Refills: 0 | Status: DISCONTINUED | COMMUNITY
End: 2019-01-07

## 2019-01-07 RX ORDER — HYDROCORTISONE 10 MG/G
1 CREAM TOPICAL TWICE DAILY
Refills: 0 | Status: DISCONTINUED | COMMUNITY
End: 2019-01-07

## 2019-01-15 ENCOUNTER — APPOINTMENT (OUTPATIENT)
Dept: UROLOGY | Facility: CLINIC | Age: 39
End: 2019-01-15
Payer: COMMERCIAL

## 2019-01-15 PROCEDURE — 99214 OFFICE O/P EST MOD 30 MIN: CPT

## 2019-01-15 NOTE — ASSESSMENT
[FreeTextEntry1] : Patient is a 38-year-old male with hypogonadotrophic hypogonadism who presents for followup. Patient has been on Pregnyl 3 times weekly and has been doing very well from a energy and libido standpoint, and his erections have been greatly improved as well. Patient is still interested in future paternity. His testosterone recently was 211. He is now on 4000 units 3x weekly. He came in for injection training today because he never underwent teaching. He demonstrated good technqiue. We injected into the left vastus muscle.  We will refill his medication at 4000 unit 3x weekly of pregynl. Labs will be obtained next week since he has been on this regimen for the past 2 weeks.

## 2019-01-15 NOTE — END OF VISIT
[>50% of Time Spent on Counseling and Coordination of Care for  ___] : Greater than 50% of the encounter time was spent on counseling and coordination of care for [unfilled] [>50% of Time Spent on Counseling for ____] : Greater than 50% of the encounter time was spent on counseling for [unfilled] [Time Spent: ___ minutes] : I have spent [unfilled] minutes of face to face time with the patient

## 2019-01-15 NOTE — PHYSICAL EXAM
[General Appearance - Well Developed] : well developed [General Appearance - Well Nourished] : well nourished [Normal Appearance] : normal appearance [Well Groomed] : well groomed [General Appearance - In No Acute Distress] : no acute distress [Bowel Sounds] : normal bowel sounds [Abdomen Soft] : soft [Abdomen Tenderness] : non-tender [Costovertebral Angle Tenderness] : no ~M costovertebral angle tenderness [Urethral Meatus] : meatus normal [Penis Abnormality] : normal uncircumcised penis [Urinary Bladder Findings] : the bladder was normal on palpation [Scrotum] : the scrotum was normal [Testes Mass (___cm)] : there were no testicular masses [No Prostate Nodules] : no prostate nodules [Skin Color & Pigmentation] : normal skin color and pigmentation [Skin Turgor] : supple [Heart Rate And Rhythm] : Heart rate and rhythm were normal [Edema] : no peripheral edema [] : no respiratory distress [Respiration, Rhythm And Depth] : normal respiratory rhythm and effort [Exaggerated Use Of Accessory Muscles For Inspiration] : no accessory muscle use [Oriented To Time, Place, And Person] : oriented to person, place, and time [Affect] : the affect was normal [Mood] : the mood was normal [Not Anxious] : not anxious [Normal Station and Gait] : the gait and station were normal for the patient's age [No Focal Deficits] : no focal deficits [No Palpable Adenopathy] : no palpable adenopathy [FreeTextEntry1] : small testes bilaterally

## 2019-01-15 NOTE — HISTORY OF PRESENT ILLNESS
[FreeTextEntry1] : Patient is a 38-year-old male with hypogonadotrophic hypogonadism presents for followup. He stated that in December he had an aortic dissection. He required an open repair. He has been doing well since. He's been on pregnyl 3 times weekly since March of 2018, 3000 units 3x weekly. His T was 211 during the fall time and we increased dose to 4000 units 3x week. He feels well, besides recovering from surgery. He has a partner and would like to have future paternity. Aug 2018 labs show a testosterone of 211. \par \par PMH: He has a history of hypogonadotropic hypogonadism presenting today for male subfertility. He is 36 years old. His wife is 27 years old. She has had 2 prior pregnancies. He has not had any children from any relationship. He would like to conceive with his partner.\par \par He was previously diagnosed with hypogonadotropic hypogonadism at age ~13. He has sense of smell. 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 DEXA was normal. He has seen multiple endocrinologists in the past as well as Dr. Tate recently. He was previously on gonadotropins as a teenager, then subsequently transitioned to androgel then back to HCG and Menopur. He is off all medications since August 2017.\par \par He underwent a testis biopsy (details unclear from patient) but no sperm was cryopreserved. He was unable to afford gonadotropins due to insurance issues. Is currently on Medicaid.\par

## 2019-01-23 ENCOUNTER — APPOINTMENT (OUTPATIENT)
Dept: CARDIOLOGY | Facility: CLINIC | Age: 39
End: 2019-01-23
Payer: COMMERCIAL

## 2019-01-23 ENCOUNTER — NON-APPOINTMENT (OUTPATIENT)
Age: 39
End: 2019-01-23

## 2019-01-23 VITALS
HEIGHT: 75 IN | WEIGHT: 273 LBS | HEART RATE: 64 BPM | OXYGEN SATURATION: 98 % | DIASTOLIC BLOOD PRESSURE: 76 MMHG | SYSTOLIC BLOOD PRESSURE: 132 MMHG | RESPIRATION RATE: 16 BRPM | BODY MASS INDEX: 33.94 KG/M2

## 2019-01-23 PROCEDURE — 93000 ELECTROCARDIOGRAM COMPLETE: CPT

## 2019-01-23 PROCEDURE — 99205 OFFICE O/P NEW HI 60 MIN: CPT

## 2019-01-23 RX ORDER — DILTIAZEM HYDROCHLORIDE 60 MG/1
60 TABLET ORAL EVERY 6 HOURS
Refills: 0 | Status: DISCONTINUED | COMMUNITY
End: 2019-01-23

## 2019-01-23 NOTE — REASON FOR VISIT
[FreeTextEntry1] : I had the pleasure of evaluating your patient, Mr. JOANNE SALGUERO, who had presented last December to [facility] with acute onset increase in blood pressures associated with chest pain with radiation to his back.  \par As you know, he is a 37-year-old man S/P Emergent repair of type A aortic Dissection with resection and replacement of ascending aorta and hemiarch utilizing a 28 mm Hemashield graft on 12/8/18. Post op course was complicated by uncontrolled hypertension.  He was placed on Cardene gtt which was transitioned to PO Labetalol.  His post-operative course was further complicated by need for readmission for shortness of breath and wheezing. Reports unable to successfully use his albuterol inhaler because he was in pain from recent surgery. Chest CTA revealed no evidence of PE.  He was then readmitted once again on 12/22/2018 for HTN management. During that stay he had 1 episode of Atrial flutter with continued HTN which was treated with Losartan and Cardizem.\par \par Mr. Salguero had just seen Dr. Toledo in the office.\par \par 12-lead ECG today demonstrates NSR, normal axis, normal intervals.  Physical examination demonstrates clear lungs bilaterally, stable sternum with incision clean, dry, and intact. No peripheral edema noted.\par  He had just seen his nephrologist who is helping manage his anti-hypertensive regimen. I advised that I am also available to assist.  Since his regimen is actively being titrated by another physician, I will defer at this time.  Will arrange for a renal artery duplex US.  He can see me again for follow-up in 3 months.\par \par Thank you for including me in his care.\par \par Warmest regards,\par Trey Johnson\par

## 2019-01-23 NOTE — PHYSICAL EXAM
[General Appearance - Well Developed] : well developed [Normal Appearance] : normal appearance [Well Groomed] : well groomed [General Appearance - Well Nourished] : well nourished [No Deformities] : no deformities [General Appearance - In No Acute Distress] : no acute distress [Normal Conjunctiva] : the conjunctiva exhibited no abnormalities [Eyelids - No Xanthelasma] : the eyelids demonstrated no xanthelasmas [Normal Oral Mucosa] : normal oral mucosa [No Oral Pallor] : no oral pallor [No Oral Cyanosis] : no oral cyanosis [Normal Jugular Venous A Waves Present] : normal jugular venous A waves present [Normal Jugular Venous V Waves Present] : normal jugular venous V waves present [No Jugular Venous Ulloa A Waves] : no jugular venous ulloa A waves [Heart Rate And Rhythm] : heart rate and rhythm were normal [Heart Sounds] : normal S1 and S2 [Murmurs] : no murmurs present [Respiration, Rhythm And Depth] : normal respiratory rhythm and effort [Exaggerated Use Of Accessory Muscles For Inspiration] : no accessory muscle use [Auscultation Breath Sounds / Voice Sounds] : lungs were clear to auscultation bilaterally [Abdomen Soft] : soft [Abdomen Tenderness] : non-tender [Abdomen Mass (___ Cm)] : no abdominal mass palpated [Abnormal Walk] : normal gait [Gait - Sufficient For Exercise Testing] : the gait was sufficient for exercise testing [Nail Clubbing] : no clubbing of the fingernails [Cyanosis, Localized] : no localized cyanosis [Petechial Hemorrhages (___cm)] : no petechial hemorrhages [Skin Color & Pigmentation] : normal skin color and pigmentation [] : no rash [No Venous Stasis] : no venous stasis [Skin Lesions] : no skin lesions [No Skin Ulcers] : no skin ulcer [No Xanthoma] : no  xanthoma was observed [Oriented To Time, Place, And Person] : oriented to person, place, and time [Affect] : the affect was normal [Mood] : the mood was normal [No Anxiety] : not feeling anxious

## 2019-01-28 ENCOUNTER — APPOINTMENT (OUTPATIENT)
Dept: CV DIAGNOSITCS | Facility: HOSPITAL | Age: 39
End: 2019-01-28

## 2019-02-08 ENCOUNTER — MEDICATION RENEWAL (OUTPATIENT)
Age: 39
End: 2019-02-08

## 2019-02-14 ENCOUNTER — APPOINTMENT (OUTPATIENT)
Dept: CV DIAGNOSITCS | Facility: HOSPITAL | Age: 39
End: 2019-02-14
Payer: COMMERCIAL

## 2019-02-14 ENCOUNTER — OUTPATIENT (OUTPATIENT)
Dept: OUTPATIENT SERVICES | Facility: HOSPITAL | Age: 39
LOS: 1 days | End: 2019-02-14

## 2019-02-14 DIAGNOSIS — I10 ESSENTIAL (PRIMARY) HYPERTENSION: ICD-10-CM

## 2019-02-14 DIAGNOSIS — Z98.890 OTHER SPECIFIED POSTPROCEDURAL STATES: Chronic | ICD-10-CM

## 2019-02-14 PROCEDURE — 93975 VASCULAR STUDY: CPT | Mod: 26

## 2019-02-19 LAB
BASOPHILS # BLD AUTO: 0.02 K/UL
BASOPHILS NFR BLD AUTO: 0.3 %
EOSINOPHIL # BLD AUTO: 0.18 K/UL
EOSINOPHIL NFR BLD AUTO: 3.1 %
ESTRADIOL SERPL-MCNC: 29 PG/ML
FSH SERPL-MCNC: 0.6 IU/L
HCT VFR BLD CALC: 38.3 %
HGB BLD-MCNC: 11.9 G/DL
IMM GRANULOCYTES NFR BLD AUTO: 0.2 %
LH SERPL-ACNC: 0.5 IU/L
LYMPHOCYTES # BLD AUTO: 1.68 K/UL
LYMPHOCYTES NFR BLD AUTO: 29.4 %
MAN DIFF?: NORMAL
MCHC RBC-ENTMCNC: 26.6 PG
MCHC RBC-ENTMCNC: 31.1 GM/DL
MCV RBC AUTO: 85.5 FL
MONOCYTES # BLD AUTO: 0.49 K/UL
MONOCYTES NFR BLD AUTO: 8.6 %
NEUTROPHILS # BLD AUTO: 3.34 K/UL
NEUTROPHILS NFR BLD AUTO: 58.4 %
PLATELET # BLD AUTO: 263 K/UL
RBC # BLD: 4.48 M/UL
RBC # FLD: 14.8 %
TESTOST BND SERPL-MCNC: 9.4 PG/ML
TESTOST SERPL-MCNC: 288.4 NG/DL
WBC # FLD AUTO: 5.72 K/UL

## 2019-03-11 ENCOUNTER — TRANSCRIPTION ENCOUNTER (OUTPATIENT)
Age: 39
End: 2019-03-11

## 2019-04-10 NOTE — DISCHARGE NOTE ADULT - OTHER SIGNIFICANT FINDINGS
No significant past medical history S  I feel better  no sob  no fever or chills"  COR  RRr  MT  stable  GI  soft ND  NT  ext   plus one pedal edema   no calf tenderness

## 2019-05-02 ENCOUNTER — NON-APPOINTMENT (OUTPATIENT)
Age: 39
End: 2019-05-02

## 2019-05-02 ENCOUNTER — APPOINTMENT (OUTPATIENT)
Dept: CARDIOLOGY | Facility: CLINIC | Age: 39
End: 2019-05-02
Payer: COMMERCIAL

## 2019-05-02 VITALS
HEART RATE: 62 BPM | DIASTOLIC BLOOD PRESSURE: 84 MMHG | BODY MASS INDEX: 36.43 KG/M2 | HEIGHT: 75 IN | SYSTOLIC BLOOD PRESSURE: 130 MMHG | WEIGHT: 293 LBS | OXYGEN SATURATION: 97 %

## 2019-05-02 PROCEDURE — 99214 OFFICE O/P EST MOD 30 MIN: CPT

## 2019-05-02 PROCEDURE — 93000 ELECTROCARDIOGRAM COMPLETE: CPT

## 2019-05-02 NOTE — REASON FOR VISIT
[FreeTextEntry1] : I had the pleasure of re-evaluating your patient, Mr. JOANNE QUINTANA, who had presented last December to [facility] with acute onset increase in blood pressures associated with chest pain with radiation to his back. I last saw him in the office in January 2019, when he established care with us.  Since then, his BPs have been much better controlled on his current regimen that includes labetalol, losartan, and diltiazem.  He is complaining of having daily morning bouts of dizziness after he take his medications.  His BPs are within an acceptable range (SBPs 130s mmHg, a marked improvement compared to before).  I recommended that he take the Losartan in the evening to see if his symptoms of dizziness improves.\par \par He had an unremarkable renal artery duplex US.  Reviewed results with him.\par \par As you know, he is a 38-year-old man S/P Emergent repair of type A aortic Dissection with resection and replacement of ascending aorta and hemiarch utilizing a 28 mm Hemashield graft on 12/8/18. Post op course was complicated by uncontrolled hypertension.  He was placed on Cardene gtt which was transitioned to PO Labetalol.  His post-operative course was further complicated by need for readmission for shortness of breath and wheezing. Reports unable to successfully use his albuterol inhaler because he was in pain from recent surgery. Chest CTA revealed no evidence of PE.  He was then readmitted once again on 12/22/2018 for HTN management. During that stay he had 1 episode of Atrial flutter with continued HTN which was treated with Losartan and Cardizem.\par \par 12-lead ECG today demonstrates NSR, normal axis, normal intervals.  Physical examination demonstrates clear lungs bilaterally, stable sternum with incision clean, dry, and intact. No peripheral edema noted.\par  \par He can see me again for follow-up in 3 to 4 months.\par \par Thank you for including me in his care.\par \par Warmest regards,\par Trey Johnson\par

## 2019-05-02 NOTE — PHYSICAL EXAM
[General Appearance - Well Developed] : well developed [Well Groomed] : well groomed [Normal Appearance] : normal appearance [General Appearance - Well Nourished] : well nourished [No Deformities] : no deformities [Eyelids - No Xanthelasma] : the eyelids demonstrated no xanthelasmas [General Appearance - In No Acute Distress] : no acute distress [Normal Conjunctiva] : the conjunctiva exhibited no abnormalities [Normal Oral Mucosa] : normal oral mucosa [No Oral Pallor] : no oral pallor [No Oral Cyanosis] : no oral cyanosis [Normal Jugular Venous A Waves Present] : normal jugular venous A waves present [Normal Jugular Venous V Waves Present] : normal jugular venous V waves present [Respiration, Rhythm And Depth] : normal respiratory rhythm and effort [No Jugular Venous Ulloa A Waves] : no jugular venous ulloa A waves [Exaggerated Use Of Accessory Muscles For Inspiration] : no accessory muscle use [Auscultation Breath Sounds / Voice Sounds] : lungs were clear to auscultation bilaterally [Heart Rate And Rhythm] : heart rate and rhythm were normal [Heart Sounds] : normal S1 and S2 [Murmurs] : no murmurs present [Abdomen Soft] : soft [Abdomen Tenderness] : non-tender [Abdomen Mass (___ Cm)] : no abdominal mass palpated [Gait - Sufficient For Exercise Testing] : the gait was sufficient for exercise testing [Abnormal Walk] : normal gait [Nail Clubbing] : no clubbing of the fingernails [Petechial Hemorrhages (___cm)] : no petechial hemorrhages [Cyanosis, Localized] : no localized cyanosis [] : no ischemic changes [Skin Color & Pigmentation] : normal skin color and pigmentation [No Skin Ulcers] : no skin ulcer [No Venous Stasis] : no venous stasis [Skin Lesions] : no skin lesions [No Xanthoma] : no  xanthoma was observed [Oriented To Time, Place, And Person] : oriented to person, place, and time [No Anxiety] : not feeling anxious [Affect] : the affect was normal [Mood] : the mood was normal

## 2019-06-21 ENCOUNTER — APPOINTMENT (OUTPATIENT)
Dept: PULMONOLOGY | Facility: CLINIC | Age: 39
End: 2019-06-21
Payer: COMMERCIAL

## 2019-06-21 VITALS
BODY MASS INDEX: 36.06 KG/M2 | OXYGEN SATURATION: 95 % | WEIGHT: 290 LBS | HEIGHT: 75 IN | RESPIRATION RATE: 16 BRPM | SYSTOLIC BLOOD PRESSURE: 130 MMHG | HEART RATE: 63 BPM | DIASTOLIC BLOOD PRESSURE: 80 MMHG

## 2019-06-21 DIAGNOSIS — Z77.22 CONTACT WITH AND (SUSPECTED) EXPOSURE TO ENVIRONMENTAL TOBACCO SMOKE (ACUTE) (CHRONIC): ICD-10-CM

## 2019-06-21 LAB
24R-OH-CALCIDIOL SERPL-MCNC: 64.6 PG/ML
25(OH)D3 SERPL-MCNC: 44.5 NG/ML
BASOPHILS # BLD AUTO: 0.05 K/UL
BASOPHILS NFR BLD AUTO: 0.6 %
EOSINOPHIL # BLD AUTO: 0.49 K/UL
EOSINOPHIL NFR BLD AUTO: 5.7 %
HCT VFR BLD CALC: 42.6 %
HGB BLD-MCNC: 13.9 G/DL
IMM GRANULOCYTES NFR BLD AUTO: 0.3 %
LYMPHOCYTES # BLD AUTO: 1.83 K/UL
LYMPHOCYTES NFR BLD AUTO: 21.2 %
MAN DIFF?: NORMAL
MCHC RBC-ENTMCNC: 27.7 PG
MCHC RBC-ENTMCNC: 32.6 GM/DL
MCV RBC AUTO: 84.9 FL
MONOCYTES # BLD AUTO: 0.61 K/UL
MONOCYTES NFR BLD AUTO: 7.1 %
NEUTROPHILS # BLD AUTO: 5.63 K/UL
NEUTROPHILS NFR BLD AUTO: 65.1 %
PLATELET # BLD AUTO: 264 K/UL
RBC # BLD: 5.02 M/UL
RBC # FLD: 13.5 %
TOTAL IGE SMQN RAST: 15 KU/L
WBC # FLD AUTO: 8.64 K/UL

## 2019-06-21 PROCEDURE — 94060 EVALUATION OF WHEEZING: CPT

## 2019-06-21 PROCEDURE — 94729 DIFFUSING CAPACITY: CPT

## 2019-06-21 PROCEDURE — 99204 OFFICE O/P NEW MOD 45 MIN: CPT | Mod: 25

## 2019-06-21 PROCEDURE — ZZZZZ: CPT

## 2019-06-21 PROCEDURE — 95012 NITRIC OXIDE EXP GAS DETER: CPT

## 2019-06-21 PROCEDURE — 71046 X-RAY EXAM CHEST 2 VIEWS: CPT

## 2019-06-21 PROCEDURE — 94618 PULMONARY STRESS TESTING: CPT

## 2019-06-21 PROCEDURE — 94727 GAS DIL/WSHOT DETER LNG VOL: CPT

## 2019-06-21 RX ORDER — CHORIOGONADOTROPIN ALFA 10000 UNIT
10000 KIT INTRAMUSCULAR
Qty: 15 | Refills: 0 | Status: DISCONTINUED | COMMUNITY
Start: 2019-01-15 | End: 2019-06-21

## 2019-06-21 NOTE — ADDENDUM
[FreeTextEntry1] : Documented by Gavino Connolly acting as a scribe for Dr. Blade Veloz on 06/21/2019.\par All medical record entries made by the Scribe were at my, Dr. Blade Veloz's, direction and personally dictated by me on 06/21/2019. I have reviewed the chart and agree that the record accurately reflects my personal performance of the history, physical exam, assessment and plan. I have also personally directed, reviewed, and agree with the discharge instructions.

## 2019-06-21 NOTE — PHYSICAL EXAM
[General Appearance - Well Developed] : well developed [Normal Appearance] : normal appearance [Well Groomed] : well groomed [No Deformities] : no deformities [General Appearance - Well Nourished] : well nourished [General Appearance - In No Acute Distress] : no acute distress [Normal Conjunctiva] : the conjunctiva exhibited no abnormalities [Eyelids - No Xanthelasma] : the eyelids demonstrated no xanthelasmas [Neck Appearance] : the appearance of the neck was normal [Normal Oropharynx] : normal oropharynx [Neck Cervical Mass (___cm)] : no neck mass was observed [Jugular Venous Distention Increased] : there was no jugular-venous distention [Thyroid Diffuse Enlargement] : the thyroid was not enlarged [Thyroid Nodule] : there were no palpable thyroid nodules [Heart Rate And Rhythm] : heart rate and rhythm were normal [Heart Sounds] : normal S1 and S2 [Respiration, Rhythm And Depth] : normal respiratory rhythm and effort [Exaggerated Use Of Accessory Muscles For Inspiration] : no accessory muscle use [Abdomen Soft] : soft [Abdomen Tenderness] : non-tender [Abdomen Mass (___ Cm)] : no abdominal mass palpated [Abnormal Walk] : normal gait [Gait - Sufficient For Exercise Testing] : the gait was sufficient for exercise testing [Nail Clubbing] : no clubbing of the fingernails [Cyanosis, Localized] : no localized cyanosis [Petechial Hemorrhages (___cm)] : no petechial hemorrhages [Skin Color & Pigmentation] : normal skin color and pigmentation [Skin Turgor] : normal skin turgor [] : no rash [Deep Tendon Reflexes (DTR)] : deep tendon reflexes were 2+ and symmetric [Sensation] : the sensory exam was normal to light touch and pinprick [No Focal Deficits] : no focal deficits [Oriented To Time, Place, And Person] : oriented to person, place, and time [Impaired Insight] : insight and judgment were intact [Affect] : the affect was normal [III] : III [FreeTextEntry1] : I:E 1:3; expiratory wheezes bilaterally.

## 2019-06-21 NOTE — HISTORY OF PRESENT ILLNESS
[FreeTextEntry1] : Mr. QUINTANA is a 38 year year old male with a history of emergent type A aortic dissection repair and replacement 12/2018, HTN, azoospermia, elevated cholesterol, asthma, atopic dermatitis, OW who presents for an initial pulmonary evaluation. His chief complaint is s/p aortic dissection repair / asthma / congestion.\par -he is s/p aortic dissection repair 12/8/2018\par -he has had asthma for the past 6 years\par -cigarette smoking is his #1 asthma trigger\par -if he eats anything in the tobacco family like tomatoes he gets rashes\par -he gets shortness of breath and wheezing with asthma\par -he does not get coughing with asthma\par -he is unable to smell secondary to his sinuses\par -he denies getting sick recently\par -he has itchy eyes since his surgery\par -he is sleeping well\par -he probably snores but does not hear himself\par -his neck size is 18.5\par -when he wakes up he does not feel tired but he has never been a morning person\par -since his surgery he can feel his heart beating\par -when he is well, he does not wheeze\par -he falls asleep easily when he is not wheezing\par -he is congested and his sense of smell is impaired\par -his weight is stable - he is trying to lose weight\par -he feels shortness of breath on stairs\par -he denies any headaches, nausea, vomiting, fever, chills, sweats, chest pain, chest pressure, diarrhea, constipation, dysphagia, dizziness, leg swelling, leg pain, itchy ears, heartburn, reflux, or sour taste in the mouth.

## 2019-06-21 NOTE — REASON FOR VISIT
[Initial Evaluation] : an initial evaluation [FreeTextEntry1] : s/p aortic dissection repair / asthma / congestion; h/o emergent type A aortic dissection repair and replacement 12/2018, HTN, azoospermia, elevated cholesterol, asthma, atopic dermatitis, OW

## 2019-06-21 NOTE — PROCEDURE
[FreeTextEntry1] : CXR reveals a normal sized heart; s/p median sternotomy; no evidence of infiltrate or effusion.\par \par PFT- spi/DL reveals mild restrictive and moderate obstructive dysfunction; FEV1 was 2.30 L which is 45% of predicted; 25% improvement with bronchodilator; normal lung volumes at 6.65 which is 81% of predicted; low-normal diffusion at 27.4 which is 73% of predicted; normal flow volume loop. \par \par 6 minute walk test reveals a low saturation of 93% with no evidence of dyspnea or fatigue; walked 570.5 meters.\par \par FENO was 33; a normal value being less than 25\par \par Fractional exhaled nitric oxide (FENO) is regarded as a simple, noninvasive method for assessing eosinophilic airway inflammation. Produced by a variety of cells within the lung, nitric oxide (NO) concentrations are generally low in healthy individuals. However, high concentrations of NO appear to be involved in nonspecific host defense mechanisms and chronic inflammatory diseases such as asthma. The American Thoracic Society (ATS) therefore has recommended using FENO to aid in the diagnosis and monitoring of eosinophilic airway inflammation and asthma, and for identifying steroid responsive individuals whose chronic respiratory symptoms may be caused by airway inflammation.

## 2019-06-21 NOTE — ASSESSMENT
[FreeTextEntry1] : Mr. QUINTANA is a 38 year year old male with a history of emergent type A aortic dissection repair and replacement 12/2018, HTN, azoospermia, elevated cholesterol, asthma, atopic dermatitis, OW who presents for an initial pulmonary evaluation. His chief complaint is s/p aortic dissection repair / asthma / congestion.\par \par The patient's SOB is felt to be multifactorial:\par - Overweight\par - Out-of-shape\par - Poor breathing mechanics\par - restrictive dysfunction\par - posture\par - Mild Intermittent Asthma\par - Cardiac Disease (Dr. Trey Johnson)\par \par Problem 1: r/o tracheomalacia\par -add Dynamic CT\par \par Tracheomalacia is usually acquired in adults and common causes include damage by tracheostomy or endotracheal intubation damaging the tracheal cartilage with increase risk with multiple intubations, prolonged intubation, and concurrent high dose steroid therapy; external chest wall trauma and surgery; chronic compression of the trachea by benign etiologies (eg, benign mediastinal goiter) or malignancy; relapsing polychondritis; or recurrent infection. Tracheomalacia can be asymptomatic, however signs or symptoms can develop as the severity of the airway narrowing progresses with major symptoms include dyspnea, cough, and sputum retention. Other symptoms include severe paroxysms of coughing, wheezing or stridor, barking cough and may be exacerbated by forced expiration, cough, and valsalva maneuver. Tracheomalacia is diagnosed by a bronchoscopic visualization of dynamic airway collapse on dynamic chest CT. Therapy is warranted in symptomatic patients with severe tracheomalacia and includes surgical repair as tracheobronchoplasty. The patient was referred to Dr. Deshawn Hammond or Dr. Bob Grajeda, at [facility] for a surgical consult. \par \par Problem 2: moderate-severe asthma\par -add Prednisone 20mg x 7 days then 10mg x 7 days\par Information sheet given to the patient to be reviewed, this medication is never to be used without consulting the prescribing physician. Proper dietary restraint is necessary specifically salt containing foods, if any reaction may occur should be reported.\par \par -add Blood work for allergies: asthma panel, food IgE level, eosinophil level, Vitamin D level. \par -add Bevespi 2 inhalations BID\par -add Alvesco 160mg 2 inhalations BID\par -continue Singulair 10mg QHS\par -continue Duoneb by nebulizer when sick\par \par Asthma is believed to be caused by inherited (genetic) and environmental factor, but its exact cause is unknown. Asthma may be triggered by allergens, lung infections, or irritants in the air. Asthma triggers are different for each person.\par \par -Inhaler technique reviewed as well as oral hygiene techniques reviewed with patient. Avoidance of cold air, extremes of temperature, rescue inhaler should be used before exercise. Order of medication reviewed with patient. Recommended use of a cool mist humidifier in the bedroom. \par \par Problem 3: atopic dermatitis\par -add blood work: IgE level, eosinophil level, vitamin D levels\par -candidate for Dupixent\par \par -The safety and efficacy of Nucala was established in three double-blind, randomized, placebo-controlled trials in patients with severe asthma. Compared to a placebo, patients with severe asthma receiving Nucala had fewer exacerbation requiring hospitalization and/or emergency department visits, and a longer time to first exacerbation. In addition, patients with severe asthma receiving Nucala or Fasenra experienced greater reductions in their daily maintenance oral corticosteroid dose, while maintaining asthma control compared with patients receiving placebo. Treatment with Nucala did not result in a significant improvement in lung function, as measured by the volume of air exhaled by patients in one second. The most common side effects include: headache, injection site reactions, back pain, weakness, and fatigue; hypersensitivity reactions can occur within hours or days including swelling of the face, mouth, and tongue, fainting, dizziness, hives, breathing problems, and rash; herpes zoster infections have occurred. The drug is a monoclonal antibody that inhibits interleukin-5 which helps regular eosinophils, a type of white blood cell that contributes to asthma. The over-production of eosinophils can cause inflammation in the lungs, increasing the frequency of asthma attacks. Patients must also take other medications, including high dose inhaled corticosteroids and at least one additional asthma drug. \par \par Problem 4: allergy / sinus\par -add Olopatadine 0.6% 1 sniff/nostril BID\par -recommend Navage\par \par Environmental measures for allergies were encouraged including mattress and pillow cover, air purifier, and environmental controls. \par \par Problem 5: r/o sleep apnea\par -add home sleep study\par \par Sleep apnea is associated with adverse clinical consequences which an affect most organ systems. Cardiovascular disease risk includes arrhythmias, atrial fibrillation, hypertension, coronary artery disease, and stroke. Metabolic disorders include diabetes type 2, non-alcoholic fatty liver disease. Mood disorder especially depression; and cognitive decline especially in the elderly. Associations with chronic reflux/Wright’s esophagus some but not all inclusive. \par -Reasons include arousal consistent with hypopnea; respiratory events most prominent in REM sleep or supine position; therefore sleep staging and body position are important for accurate diagnosis and estimation of AHI.\par \par Treatment options discussed including CPAP/BiPAP machine, oral appliance, ProVent therapy, Oxy-Aid by Respitec, new technologies, or positional sleep.Recommended use of the CPAP machine for moderate (AHI >15), moderate to severe (AHI 15-30) and severe patients (AHI > 30). Recommended weight loss which can reduce AHI especially in weight loss of greater than 5% of BMI. Positional sleep is recommended in those with low AHI, low-moderate BMI, and younger age. For severe sleep apnea, the hypoglossal nerve stimulator was recommended as well. \par \par Problem 6: thoracic aortic aneurysm\par -follow-up with Dr. Scar Toledo\par \par Problem 7: cardiac disease\par -follow-up with cardiologist (Dr. Trey Johnson)\par \par Problem 8: poor mechanics of breathing\par Proper breathing techniques were reviewed with an emphasis of exhalation. Patient instructed to breath in for 1 second and out for four seconds. Patient was encouraged to not talk while walking. \par \par Problem 9: overweight\par Weight loss, exercise, and diet control were discussed and are highly encouraged. Treatment options were given such as, aqua therapy, and contacting a nutritionist. Recommended to use the elliptical, stationary bike, less use of treadmill. Mindful eating was explained to the patient Obesity is associated with worsening asthma, shortness of breath, and potential for cardiac disease, diabetes, and other underlying medical conditions. \par \par Problem 10: health maintenance\par -recommend Dr. Ashish Wallace as PCP\par -s/p influenza vaccine\par -recommended strep pneumonia vaccines: Prevnar-13 vaccine, followed by Pneumo vaccine 23 one year following \par -recommended early intervention for URIs \par -recommended regular osteoporosis evaluations \par -recommended early dermatological evaluations \par -recommended after the age of 50 to the age of 70, colonoscopy every 5 years \par \par Follow-up in 6 weeks\par Patient is encouraged to call or fax the office with any changes, questions, or concerns.\par Explained to the patient in full detail with demonstrations how to use the inhalers and inhaler hygiene.\par -Education provided to the patient regarding their visit and conditions

## 2019-06-27 ENCOUNTER — APPOINTMENT (OUTPATIENT)
Dept: UROLOGY | Facility: CLINIC | Age: 39
End: 2019-06-27
Payer: COMMERCIAL

## 2019-06-27 PROCEDURE — 99214 OFFICE O/P EST MOD 30 MIN: CPT

## 2019-06-27 NOTE — PHYSICAL EXAM
[General Appearance - Well Developed] : well developed [General Appearance - Well Nourished] : well nourished [Normal Appearance] : normal appearance [Well Groomed] : well groomed [Bowel Sounds] : normal bowel sounds [General Appearance - In No Acute Distress] : no acute distress [Abdomen Soft] : soft [Abdomen Tenderness] : non-tender [Costovertebral Angle Tenderness] : no ~M costovertebral angle tenderness [Urethral Meatus] : meatus normal [Penis Abnormality] : normal uncircumcised penis [Urinary Bladder Findings] : the bladder was normal on palpation [Scrotum] : the scrotum was normal [Testes Mass (___cm)] : there were no testicular masses [No Prostate Nodules] : no prostate nodules [Skin Color & Pigmentation] : normal skin color and pigmentation [Skin Turgor] : supple [Heart Rate And Rhythm] : Heart rate and rhythm were normal [Edema] : no peripheral edema [Respiration, Rhythm And Depth] : normal respiratory rhythm and effort [] : no respiratory distress [Exaggerated Use Of Accessory Muscles For Inspiration] : no accessory muscle use [Oriented To Time, Place, And Person] : oriented to person, place, and time [Affect] : the affect was normal [Mood] : the mood was normal [Normal Station and Gait] : the gait and station were normal for the patient's age [Not Anxious] : not anxious [No Focal Deficits] : no focal deficits [No Palpable Adenopathy] : no palpable adenopathy [FreeTextEntry1] : small testes bilaterally

## 2019-06-27 NOTE — HISTORY OF PRESENT ILLNESS
[FreeTextEntry1] : Patient is a 38-year-old male with hypogonadotrophic hypogonadism presents for followup. He stated that in December he had an aortic dissection. He required an open repair. He has been doing well since. He's been on pregnyl 3 times weekly since March of 2018, 4000 units 3x weekly. His T was 288 in Feb 2019. He has a partner and would like to have future paternity. 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 He is 36 years old. His wife is 27 years old. She has had 2 prior pregnancies. He has not had any children from any relationship. He would like to conceive with his partner.\par \par He was previously diagnosed with hypogonadotropic hypogonadism at age ~13. He has sense of smell. 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 DEXA was normal. He has seen multiple endocrinologists in the past as well as Dr. Tate recently. He was previously on gonadotropins as a teenager, then subsequently transitioned to androgel then back to HCG and Menopur. He is off all medications since August 2017.\par \par He underwent a testis biopsy (details unclear from patient) but no sperm was cryopreserved. He was unable to afford gonadotropins due to insurance issues. Is currently on Medicaid.\par

## 2019-06-27 NOTE — ASSESSMENT
[FreeTextEntry1] : Patient is a 38-year-old male with hypogonadotrophic hypogonadism who presents for followup. Patient has been on Pregnyl 4000 units 3 times weekly and has been doing very well from a energy and libido standpoint, and his erections have been greatly improved as well. Patient is still interested in future paternity. His testosterone recently was 288.   We will refill his medication at 4000 unit 3x weekly of pregynl. Labs will be obtained and we will consider an FSH anal;og on FU if his testosterone is within normal limits.

## 2019-07-12 ENCOUNTER — FORM ENCOUNTER (OUTPATIENT)
Age: 39
End: 2019-07-12

## 2019-07-13 ENCOUNTER — APPOINTMENT (OUTPATIENT)
Dept: CT IMAGING | Facility: IMAGING CENTER | Age: 39
End: 2019-07-13
Payer: COMMERCIAL

## 2019-07-13 ENCOUNTER — OUTPATIENT (OUTPATIENT)
Dept: OUTPATIENT SERVICES | Facility: HOSPITAL | Age: 39
LOS: 1 days | End: 2019-07-13
Payer: COMMERCIAL

## 2019-07-13 DIAGNOSIS — J39.8 OTHER SPECIFIED DISEASES OF UPPER RESPIRATORY TRACT: ICD-10-CM

## 2019-07-13 DIAGNOSIS — R06.02 SHORTNESS OF BREATH: ICD-10-CM

## 2019-07-13 DIAGNOSIS — Z98.890 OTHER SPECIFIED POSTPROCEDURAL STATES: Chronic | ICD-10-CM

## 2019-07-13 PROCEDURE — 71250 CT THORAX DX C-: CPT | Mod: 26

## 2019-07-13 PROCEDURE — 71250 CT THORAX DX C-: CPT

## 2019-07-24 LAB
BASOPHILS # BLD AUTO: 0.03 K/UL
BASOPHILS NFR BLD AUTO: 0.5 %
EOSINOPHIL # BLD AUTO: 0.27 K/UL
EOSINOPHIL NFR BLD AUTO: 4.3 %
HCT VFR BLD CALC: 41.9 %
HGB BLD-MCNC: 13.7 G/DL
IMM GRANULOCYTES NFR BLD AUTO: 0.3 %
LYMPHOCYTES # BLD AUTO: 1.45 K/UL
LYMPHOCYTES NFR BLD AUTO: 23.3 %
MAN DIFF?: NORMAL
MCHC RBC-ENTMCNC: 27.8 PG
MCHC RBC-ENTMCNC: 32.7 GM/DL
MCV RBC AUTO: 85.2 FL
MONOCYTES # BLD AUTO: 0.46 K/UL
MONOCYTES NFR BLD AUTO: 7.4 %
NEUTROPHILS # BLD AUTO: 3.98 K/UL
NEUTROPHILS NFR BLD AUTO: 64.2 %
PLATELET # BLD AUTO: 238 K/UL
RBC # BLD: 4.92 M/UL
RBC # FLD: 13.2 %
WBC # FLD AUTO: 6.21 K/UL

## 2019-07-29 ENCOUNTER — RX RENEWAL (OUTPATIENT)
Age: 39
End: 2019-07-29

## 2019-07-30 LAB
ESTRADIOL SERPL-MCNC: 28 PG/ML
FSH SERPL-MCNC: 0.9 IU/L
LH SERPL-ACNC: 0.7 IU/L
PROLACTIN SERPL-MCNC: 11.7 NG/ML
TESTOST BND SERPL-MCNC: 10.6 PG/ML
TESTOST SERPL-MCNC: 294.2 NG/DL
TSH SERPL-ACNC: 2.96 UIU/ML

## 2019-08-06 ENCOUNTER — APPOINTMENT (OUTPATIENT)
Dept: UROLOGY | Facility: CLINIC | Age: 39
End: 2019-08-06
Payer: COMMERCIAL

## 2019-08-06 PROCEDURE — 99214 OFFICE O/P EST MOD 30 MIN: CPT

## 2019-08-06 NOTE — ASSESSMENT
[FreeTextEntry1] : The patient returns for followup to review his recent blood studies on pregnyl and to discuss options for therapy.\par \par He has been on Pregnyl 4000 units 3 times weekly and has been doing very well from a energy and libido standpoint, and his erections have been greatly improved as well. Patient is still interested in future paternity  We will refill his medication at 4000 unit 3x weekly of pregynl. Labs will be obtained and we will consider an FSH anal;og on FU if his testosterone is within normal limits. Testosterone is within normal limits and have given him a prescription for gonadal F. inject. He will return with a medications for control of how to use it prior to beginning. We will then follow him back in a month for blood studies and in several months for semen analysis.\par Ref: [id number]

## 2019-08-06 NOTE — HISTORY OF PRESENT ILLNESS
[FreeTextEntry1] : The patient returns for followup to review his recent blood studies on pregnyl and to discuss options for therapy.\par \par PMH: Patient is a 38-year-old male with hypogonadotrophic hypogonadism presents for followup. He stated that in December he had an aortic dissection. He required an open repair. He has been doing well since. He's been on pregnyl 3 times weekly since March of 2018, 4000 units 3x weekly. His T was 288 in Feb 2019. He has a partner and would like to have future paternity. 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 He is 36 years old. His wife is 27 years old. She has had 2 prior pregnancies. He has not had any children from any relationship. He would like to conceive with his partner.\par \par He was previously diagnosed with hypogonadotropic hypogonadism at age ~13. He has sense of smell. 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 DEXA was normal. He has seen multiple endocrinologists in the past as well as Dr. Tate recently. He was previously on gonadotropins as a teenager, then subsequently transitioned to androgel then back to HCG and Menopur. He is off all medications since August 2017.\par \par He underwent a testis biopsy (details unclear from patient) but no sperm was cryopreserved. He was unable to afford gonadotropins due to insurance issues. Is currently on Medicaid.\par

## 2019-08-08 ENCOUNTER — NON-APPOINTMENT (OUTPATIENT)
Age: 39
End: 2019-08-08

## 2019-08-08 ENCOUNTER — APPOINTMENT (OUTPATIENT)
Dept: PULMONOLOGY | Facility: CLINIC | Age: 39
End: 2019-08-08
Payer: COMMERCIAL

## 2019-08-08 ENCOUNTER — APPOINTMENT (OUTPATIENT)
Dept: SLEEP CENTER | Facility: CLINIC | Age: 39
End: 2019-08-08

## 2019-08-08 VITALS
DIASTOLIC BLOOD PRESSURE: 70 MMHG | RESPIRATION RATE: 16 BRPM | HEART RATE: 72 BPM | HEIGHT: 75 IN | SYSTOLIC BLOOD PRESSURE: 130 MMHG | WEIGHT: 304 LBS | OXYGEN SATURATION: 98 % | BODY MASS INDEX: 37.8 KG/M2

## 2019-08-08 PROCEDURE — 94010 BREATHING CAPACITY TEST: CPT

## 2019-08-08 PROCEDURE — 99214 OFFICE O/P EST MOD 30 MIN: CPT | Mod: 25

## 2019-08-08 PROCEDURE — 95012 NITRIC OXIDE EXP GAS DETER: CPT

## 2019-08-08 RX ORDER — PREDNISONE 10 MG/1
10 TABLET ORAL
Qty: 50 | Refills: 0 | Status: DISCONTINUED | COMMUNITY
Start: 2019-06-21 | End: 2019-08-08

## 2019-08-08 NOTE — ADDENDUM
[FreeTextEntry1] : Documented by Ramone King acting as a scribe for Dr. Blade Veloz on 08/08/2019.\par \par All medical record entries made by the Scribe were at my, Dr. Blade Veloz's, direction and personally dictated by me on 08/08/2019. I have reviewed the chart and agree that the record accurately reflects my personal performance of the history, physical exam, assessment and plan. I have also personally directed, reviewed, and agree with the discharge instructions.

## 2019-08-08 NOTE — HISTORY OF PRESENT ILLNESS
[FreeTextEntry1] : Mr. QUINTANA is a 38 year year old male with a history of emergent type A aortic dissection repair and replacement 12/2018, HTN, azoospermia, elevated cholesterol, asthma, atopic dermatitis, OW, snoring, and SOB who presents for a follow up pulmonary re-evaluation. His chief complaint is his weight.\par -he reports feeling improved\par -he reports having palpitations last night\par -he hasn’t had any SOB since starting his inhalers, and his asthma is now controlled\par -he reports he generally sleeps well, but occasionally wakes up tired. He is unsure if he snores, but his wife doesn’t mention it\par -he reports he doesn’t usually wake up tired\par -he notes he feels having something in his ears\par -He notes His bowels are regular \par -he reports taking his inhalers just before work and just after work\par -he reports his dermatitis is still active, and it was present before taking any medication (except HGG)\par -he denies any headaches, wheezing, chest pain, chest pressure, diarrhea, constipation, dysphagia, dizziness, sour taste in the mouth, heartburn, reflux

## 2019-08-08 NOTE — REASON FOR VISIT
[Follow-Up] : a follow-up visit [FreeTextEntry1] : s/p aortic dissection repair / asthma / congestion; h/o emergent type A aortic dissection repair and replacement 12/2018, asthma, atopic dermatitis, OW

## 2019-08-08 NOTE — PROCEDURE
[FreeTextEntry1] : PFT revealed mild restrictive dysfunction, with a FEV1 of 3.87L, which is 78% of predicted, with a normal flow volume loop \par \par FENO was 25; a normal value being less than 25\par Fractional exhaled nitric oxide (FENO) is regarded as a simple, noninvasive method for assessing eosinophilic airway inflammation. Produced by a variety of cells within the lung, nitric oxide (NO) concentrations are generally low in healthy individuals. However, high concentrations of NO appear to be involved in nonspecific host defense mechanisms and chronic inflammatory diseases such as asthma. The American Thoracic Society (ATS) therefore has recommended using FENO to aid in the diagnosis and monitoring of eosinophilic airway inflammation and asthma, and for identifying steroid responsive individuals whose chronic respiratory symptoms may be caused by airway inflammation. \par \par Chest CT (7.13.19) reveals no convincing CT findings to suggest tracheomalacia.  The aortic root is dilated and measures 4.7 cm at the sinuses of Valsalva.  Descending thoracic aorta is aneurysmal and measures 3.9 cm at the main pulmonary artery.

## 2019-08-08 NOTE — ASSESSMENT
[FreeTextEntry1] : Mr. QUINTANA is a 38 year year old male with a history of emergent type A aortic dissection repair and replacement 12/2018, HTN, azoospermia, elevated cholesterol, asthma, atopic dermatitis, OW who presents for a follow up pulmonary re-evaluation. His chief complaint is his weight / itchy sensation.\par \par The patient's SOB is felt to be multifactorial:\par - Overweight\par - Out-of-shape\par - Poor breathing mechanics\par - restrictive dysfunction\par - posture\par - Mild Intermittent Asthma\par - Cardiac Disease (Dr. Trey Johnson)\par \par Problem 1: No tracheomalacia\par -s/p Dynamic CT - no evidence of Dz\par \par Tracheomalacia is usually acquired in adults and common causes include damage by tracheostomy or endotracheal intubation damaging the tracheal cartilage with increase risk with multiple intubations, prolonged intubation, and concurrent high dose steroid therapy; external chest wall trauma and surgery; chronic compression of the trachea by benign etiologies (eg, benign mediastinal goiter) or malignancy; relapsing polychondritis; or recurrent infection. Tracheomalacia can be asymptomatic, however signs or symptoms can develop as the severity of the airway narrowing progresses with major symptoms include dyspnea, cough, and sputum retention. Other symptoms include severe paroxysms of coughing, wheezing or stridor, barking cough and may be exacerbated by forced expiration, cough, and valsalva maneuver. Tracheomalacia is diagnosed by a bronchoscopic visualization of dynamic airway collapse on dynamic chest CT. Therapy is warranted in symptomatic patients with severe tracheomalacia and includes surgical repair as tracheobronchoplasty. The patient was referred to Dr. Deshawn Hammond or Dr. Bob Grajeda, at [facility] for a surgical consult. \par \par Problem 2: moderate-severe asthma - stalbe\par -s/p Blood work for allergies: asthma panel, food IgE level (wnl), eosinophil level, Vitamin D level (wnl). - all negative\par -add Bevespi 2 inhalations BID\par -add Alvesco 160mg 2 inhalations BID\par -continue Singulair 10mg QHS\par -continue Duoneb by nebulizer when sick\par \par Asthma is believed to be caused by inherited (genetic) and environmental factor, but its exact cause is unknown. Asthma may be triggered by allergens, lung infections, or irritants in the air. Asthma triggers are different for each person.\par \par -Inhaler technique reviewed as well as oral hygiene techniques reviewed with patient. Avoidance of cold air, extremes of temperature, rescue inhaler should be used before exercise. Order of medication reviewed with patient. Recommended use of a cool mist humidifier in the bedroom. \par \par Problem 3: atopic dermatitis\par -s/p blood work: IgE level (-), eosinophil level (-), vitamin D levels (-)\par -candidate for Dupixent\par \par -The safety and efficacy of Nucala was established in three double-blind, randomized, placebo-controlled trials in patients with severe asthma. Compared to a placebo, patients with severe asthma receiving Nucala had fewer exacerbation requiring hospitalization and/or emergency department visits, and a longer time to first exacerbation. In addition, patients with severe asthma receiving Nucala or Fasenra experienced greater reductions in their daily maintenance oral corticosteroid dose, while maintaining asthma control compared with patients receiving placebo. Treatment with Nucala did not result in a significant improvement in lung function, as measured by the volume of air exhaled by patients in one second. The most common side effects include: headache, injection site reactions, back pain, weakness, and fatigue; hypersensitivity reactions can occur within hours or days including swelling of the face, mouth, and tongue, fainting, dizziness, hives, breathing problems, and rash; herpes zoster infections have occurred. The drug is a monoclonal antibody that inhibits interleukin-5 which helps regular eosinophils, a type of white blood cell that contributes to asthma. The over-production of eosinophils can cause inflammation in the lungs, increasing the frequency of asthma attacks. Patients must also take other medications, including high dose inhaled corticosteroids and at least one additional asthma drug. \par \par Problem 4: allergy / sinus\par -continue Olopatadine 0.6% 1 sniff/nostril BID\par -Add Omnaris 1 sniff BID\par -recommend Navage\par \par Environmental measures for allergies were encouraged including mattress and pillow cover, air purifier, and environmental controls. \par \par Problem 5: r/o sleep apnea\par -complete a home sleep study due to his EDS and snoring \par \par Sleep apnea is associated with adverse clinical consequences which an affect most organ systems. Cardiovascular disease risk includes arrhythmias, atrial fibrillation, hypertension, coronary artery disease, and stroke. Metabolic disorders include diabetes type 2, non-alcoholic fatty liver disease. Mood disorder especially depression; and cognitive decline especially in the elderly. Associations with chronic reflux/Wright’s esophagus some but not all inclusive. \par -Reasons include arousal consistent with hypopnea; respiratory events most prominent in REM sleep or supine position; therefore sleep staging and body position are important for accurate diagnosis and estimation of AHI.\par \par Treatment options discussed including CPAP/BiPAP machine, oral appliance, ProVent therapy, Oxy-Aid by Respitec, new technologies, or positional sleep.Recommended use of the CPAP machine for moderate (AHI >15), moderate to severe (AHI 15-30) and severe patients (AHI > 30). Recommended weight loss which can reduce AHI especially in weight loss of greater than 5% of BMI. Positional sleep is recommended in those with low AHI, low-moderate BMI, and younger age. For severe sleep apnea, the hypoglossal nerve stimulator was recommended as well. \par \par Problem 6: thoracic aortic aneurysm\par -follow-up with Dr. Scar Toledo\par \par Problem 7: cardiac disease\par -follow-up with cardiologist (Dr. Trey Johnson)\par \par Problem 8: poor mechanics of breathing\par Proper breathing techniques were reviewed with an emphasis of exhalation. Patient instructed to breath in for 1 second and out for four seconds. Patient was encouraged to not talk while walking. \par \par Problem 9: overweight\par Weight loss, exercise, and diet control were discussed and are highly encouraged. Treatment options were given such as, aqua therapy, and contacting a nutritionist. Recommended to use the elliptical, stationary bike, less use of treadmill. Mindful eating was explained to the patient Obesity is associated with worsening asthma, shortness of breath, and potential for cardiac disease, diabetes, and other underlying medical conditions. \par \par Problem 10: health maintenance\par -recommend Dr. Ashish Wallace as PCP\par -s/p influenza vaccine 2018\par -recommended strep pneumonia vaccines: Prevnar-13 vaccine, followed by Pneumo vaccine 23 one year following \par -recommended early intervention for URIs \par -recommended regular osteoporosis evaluations \par -recommended early dermatological evaluations \par -recommended after the age of 50 to the age of 70, colonoscopy every 5 years \par \par Follow-up in 3-4 months with SPI and NiOx\par Patient is encouraged to call or fax the office with any changes, questions, or concerns.\par Explained to the patient in full detail with demonstrations how to use the inhalers and inhaler hygiene.\par -Education provided to the patient regarding their visit and conditions

## 2019-08-08 NOTE — REVIEW OF SYSTEMS
[Negative] : Sleep Disorder [Ear Disturbance] : ear disturbance [Palpitations] : palpitations [As Noted in HPI] : as noted in HPI [Wheezing] : no wheezing [Dyspnea] : no dyspnea [Chest Discomfort] : no chest discomfort [Heartburn] : no heartburn [Reflux] : no reflux [Dysphagia] : no dysphagia [Diarrhea] : no diarrhea [Constipation] : no constipation

## 2019-08-08 NOTE — PHYSICAL EXAM
[General Appearance - Well Developed] : well developed [Normal Appearance] : normal appearance [Well Groomed] : well groomed [General Appearance - Well Nourished] : well nourished [General Appearance - In No Acute Distress] : no acute distress [No Deformities] : no deformities [Eyelids - No Xanthelasma] : the eyelids demonstrated no xanthelasmas [Normal Conjunctiva] : the conjunctiva exhibited no abnormalities [Normal Oropharynx] : normal oropharynx [III] : III [Neck Cervical Mass (___cm)] : no neck mass was observed [Neck Appearance] : the appearance of the neck was normal [Jugular Venous Distention Increased] : there was no jugular-venous distention [Thyroid Diffuse Enlargement] : the thyroid was not enlarged [Thyroid Nodule] : there were no palpable thyroid nodules [Heart Rate And Rhythm] : heart rate and rhythm were normal [Heart Sounds] : normal S1 and S2 [Respiration, Rhythm And Depth] : normal respiratory rhythm and effort [Exaggerated Use Of Accessory Muscles For Inspiration] : no accessory muscle use [Abdomen Tenderness] : non-tender [Abdomen Soft] : soft [Abdomen Mass (___ Cm)] : no abdominal mass palpated [Abnormal Walk] : normal gait [Nail Clubbing] : no clubbing of the fingernails [Gait - Sufficient For Exercise Testing] : the gait was sufficient for exercise testing [Petechial Hemorrhages (___cm)] : no petechial hemorrhages [Cyanosis, Localized] : no localized cyanosis [No Venous Stasis] : no venous stasis [Skin Lesions] : no skin lesions [No Xanthoma] : no  xanthoma was observed [No Skin Ulcers] : no skin ulcer [Deep Tendon Reflexes (DTR)] : deep tendon reflexes were 2+ and symmetric [Sensation] : the sensory exam was normal to light touch and pinprick [No Focal Deficits] : no focal deficits [Impaired Insight] : insight and judgment were intact [Oriented To Time, Place, And Person] : oriented to person, place, and time [Affect] : the affect was normal [Skin Color & Pigmentation] : normal skin color and pigmentation [Skin Turgor] : normal skin turgor [] : no rash [Auscultation Breath Sounds / Voice Sounds] : lungs were clear to auscultation bilaterally [FreeTextEntry1] : 3/6 systolic murmur noted.

## 2019-09-05 ENCOUNTER — APPOINTMENT (OUTPATIENT)
Dept: UROLOGY | Facility: CLINIC | Age: 39
End: 2019-09-05
Payer: COMMERCIAL

## 2019-09-05 ENCOUNTER — APPOINTMENT (OUTPATIENT)
Dept: CARDIOLOGY | Facility: CLINIC | Age: 39
End: 2019-09-05
Payer: COMMERCIAL

## 2019-09-05 VITALS
WEIGHT: 309 LBS | OXYGEN SATURATION: 97 % | DIASTOLIC BLOOD PRESSURE: 76 MMHG | HEART RATE: 65 BPM | SYSTOLIC BLOOD PRESSURE: 118 MMHG | HEIGHT: 75 IN | BODY MASS INDEX: 38.42 KG/M2

## 2019-09-05 PROCEDURE — 99214 OFFICE O/P EST MOD 30 MIN: CPT

## 2019-09-05 PROCEDURE — 93000 ELECTROCARDIOGRAM COMPLETE: CPT

## 2019-09-05 NOTE — ASSESSMENT
[FreeTextEntry1] : The patient returns for followup for injection training with skip HARTMAN. Showed him how to use the pain to inject 150 units. He did it without any difficulty today. See him back in 3 weeks for followup and blood studies.\par \par He has been on, and will continue on, Pregnyl 4000 units 3 times weekly and has been doing very well from a energy and libido standpoint, and his erections have been greatly improved as well. I also renewed his pregnyl.\par Ref: [id number]

## 2019-09-05 NOTE — HISTORY OF PRESENT ILLNESS
[FreeTextEntry1] : The patient returns for followup for injection training with gonal F.\par \par PMH: Patient is a 38-year-old male with hypogonadotrophic hypogonadism presents for followup. He stated that in December he had an aortic dissection. He required an open repair. He has been doing well since. He's been on pregnyl 3 times weekly since March of 2018, 4000 units 3x weekly. His T was 288 in Feb 2019. He has a partner and would like to have future paternity. 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 He is 36 years old. His wife is 27 years old. She has had 2 prior pregnancies. He has not had any children from any relationship. He would like to conceive with his partner.\par \par He was previously diagnosed with hypogonadotropic hypogonadism at age ~13. He has sense of smell. 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 DEXA was normal. He has seen multiple endocrinologists in the past as well as Dr. Tate recently. He was previously on gonadotropins as a teenager, then subsequently transitioned to androgel then back to HCG and Menopur. He is off all medications since August 2017.\par \par He underwent a testis biopsy (details unclear from patient) but no sperm was cryopreserved. He was unable to afford gonadotropins due to insurance issues. Is currently on Medicaid.\par

## 2019-10-03 ENCOUNTER — APPOINTMENT (OUTPATIENT)
Dept: UROLOGY | Facility: CLINIC | Age: 39
End: 2019-10-03
Payer: COMMERCIAL

## 2019-10-03 PROCEDURE — 99214 OFFICE O/P EST MOD 30 MIN: CPT

## 2019-10-03 NOTE — PHYSICAL EXAM
[General Appearance - Well Developed] : well developed [General Appearance - Well Nourished] : well nourished [Normal Appearance] : normal appearance [General Appearance - In No Acute Distress] : no acute distress [Well Groomed] : well groomed [Urethral Meatus] : meatus normal [Abdomen Soft] : soft [Testes Mass (___cm)] : there were no testicular masses [Scrotum] : the scrotum was normal [Urinary Bladder Findings] : the bladder was normal on palpation [Skin Turgor] : supple [Mood] : the mood was normal [Affect] : the affect was normal [Oriented To Time, Place, And Person] : oriented to person, place, and time [Not Anxious] : not anxious [Normal Station and Gait] : the gait and station were normal for the patient's age

## 2019-10-03 NOTE — ASSESSMENT
[FreeTextEntry1] : The patient returns for followup. He has been injecting 150 units gonyl F 3x/week and Pregnyl 4000 units 3 times weekly and has been doing very well from a energy and libido standpoint, His erections have been greatly improved as well. \par \par I renewed his pregnyl last visit. I will see him back in 1 month in follow up..\par

## 2019-10-03 NOTE — HISTORY OF PRESENT ILLNESS
[FreeTextEntry1] : The patient returns for followup. He has been injecting 150 units gonyl F 3x/week and Pregnyl 4000 units 3 times weekly and has been doing very well from a energy and libido standpoint, His erections have been greatly improved as well. \par \par PMH: Patient initially presented with hypogonadotrophic hypogonadism presents for followup. He stated that in December he had an aortic dissection. He required an open repair. He has been doing well since. He's been on pregnyl 3 times weekly since March of 2018, 4000 units 3x weekly. His T was 288 in Feb 2019. He has a partner and would like to have future paternity. 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 He is 36 years old. His wife is 27 years old. She has had 2 prior pregnancies. He has not had any children from any relationship. He would like to conceive with his partner.\par \par He was previously diagnosed with hypogonadotropic hypogonadism at age ~13. He has sense of smell. 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 DEXA was normal. He has seen multiple endocrinologists in the past as well as Dr. Tate recently. He was previously on gonadotropins as a teenager, then subsequently transitioned to androgel then back to HCG and Menopur. He is off all medications since August 2017.\par \par He underwent a testis biopsy (details unclear from patient) but no sperm was cryopreserved. He was unable to afford gonadotropins due to insurance issues. Is currently on Medicaid.\par

## 2019-10-08 LAB
BASOPHILS # BLD AUTO: 0.03 K/UL
BASOPHILS NFR BLD AUTO: 0.4 %
EOSINOPHIL # BLD AUTO: 0.36 K/UL
EOSINOPHIL NFR BLD AUTO: 4.7 %
ESTRADIOL SERPL-MCNC: 32 PG/ML
FSH SERPL-MCNC: 2.7 IU/L
HCT VFR BLD CALC: 43.3 %
HGB BLD-MCNC: 14.2 G/DL
IMM GRANULOCYTES NFR BLD AUTO: 0.4 %
LH SERPL-ACNC: 0.5 IU/L
LYMPHOCYTES # BLD AUTO: 1.72 K/UL
LYMPHOCYTES NFR BLD AUTO: 22.5 %
MAN DIFF?: NORMAL
MCHC RBC-ENTMCNC: 28.4 PG
MCHC RBC-ENTMCNC: 32.8 GM/DL
MCV RBC AUTO: 86.6 FL
MONOCYTES # BLD AUTO: 0.52 K/UL
MONOCYTES NFR BLD AUTO: 6.8 %
NEUTROPHILS # BLD AUTO: 5 K/UL
NEUTROPHILS NFR BLD AUTO: 65.2 %
PLATELET # BLD AUTO: 271 K/UL
PROLACTIN SERPL-MCNC: 8.4 NG/ML
RBC # BLD: 5 M/UL
RBC # FLD: 13.4 %
TESTOST BND SERPL-MCNC: 15.5 PG/ML
TESTOST SERPL-MCNC: 241.4 NG/DL
WBC # FLD AUTO: 7.66 K/UL

## 2019-10-31 ENCOUNTER — MEDICATION RENEWAL (OUTPATIENT)
Age: 39
End: 2019-10-31

## 2019-11-12 NOTE — ED PROVIDER NOTE - CADM POA URETHRAL CATHETER
Chief Complaint   Patient presents with   • URI     cough, congestions, sore throat     HISTORY OF PRESENT ILLNESS:  1. The patient is a 60 year old male who is here today with a chief complaint of productive cough pharyngitis, and congestion for the last couple of days. Patient also reports generalized fatigue and shortness of breath, no wheezing. No significant rhinorrhea, No fever or chills. No vomiting or diarrhea. No known exposure to illness, including influenza, strep, or pneumonia. No recent flying or travelling. Patient has been taking OTC Mucinex in this regard and taking Advair, but has not been using his albuterol inhaler.     2. Asthma: Patient is clinically stable and tolerating medications well. Medications were reviewed per Smart Chart, no side effects of medications. No new or worsening symptoms. Patient has not been using the albuterol inhaler regularly.     3. Gastroesophageal reflux disease:  Patient is doing generally well, denies any recent flare-up of acid reflux.  No abdominal pain, no melena, no hematochezia, no hematemesis or hemoptysis.  Patient is taking omeprazole on an as needed basis and is tolerating medications well.  This was reviewed in Smart Chart.  Patient is currently not taking any nonsteroidal anti-inflammatory drugs on a regular basis.       No visits with results within 2 Month(s) from this visit.   Latest known visit with results is:   Lab Services on 05/20/2019   Component Date Value Ref Range Status   • FASTING STATUS 05/20/2019 12.0  hrs Final   • CHOLESTEROL 05/20/2019 135  <200 mg/dL Final    Comment:    Desirable            <200  Borderline High      200 to 239  High                 >=240        • CALCULATED LDL 05/20/2019 78  <130 mg/dL Final    Comment: OPTIMAL               <100  NEAR OPTIMAL          100-129  BORDERLINE HIGH       130-159  HIGH                  160-189  VERY HIGH             >=190     • HDL 05/20/2019 39* >39 mg/dL Final    Comment: Low             <40  Borderline Low 40 to 49  Near Optimal   50 to 59  Optimal        >=60     • TRIGLYCERIDE 05/20/2019 89  <150 mg/dL Final    Comment: Normal                   <150  Borderline High          150 to 199  High                     200 to 499  Very High                >=500     • CALCULATED NON HDL 05/20/2019 96  mg/dL Final    Comment:    Therapeutic Target:  CHD and risk equivalents <130  Multiple risk factors    <160  0 to 1 risk factors      <190        • CHOL/HDL 05/20/2019 3.5  <4.5 Final   • Fasting Status 05/20/2019 12.0  hrs Final   • Sodium 05/20/2019 144  135 - 145 mmol/L Final   • Potassium 05/20/2019 4.1  3.4 - 5.1 mmol/L Final   • Chloride 05/20/2019 109* 98 - 107 mmol/L Final   • Carbon Dioxide 05/20/2019 28  21 - 32 mmol/L Final   • Anion Gap 05/20/2019 11  10 - 20 mmol/L Final   • Glucose 05/20/2019 95  65 - 99 mg/dL Final   • BUN 05/20/2019 16  6 - 20 mg/dL Final   • Creatinine 05/20/2019 1.06  0.67 - 1.17 mg/dL Final   • GFR Estimate,  05/20/2019 87   Final    eGFR 60 - 89 mL/min/1.73m2 = Mild decrease in kidney function.   • GFR Estimate, Non  05/20/2019 75   Final    eGFR 60 - 89 mL/min/1.73m2 = Mild decrease in kidney function.   • BUN/Creatinine Ratio 05/20/2019 15  7 - 25 Final   • CALCIUM 05/20/2019 9.0  8.4 - 10.2 mg/dL Final   • TOTAL BILIRUBIN 05/20/2019 0.3  0.2 - 1.0 mg/dL Final   • AST/SGOT 05/20/2019 14  <38 Units/L Final   • ALT/SGPT 05/20/2019 35  <64 Units/L Final   • ALK PHOSPHATASE 05/20/2019 67  45 - 117 Units/L Final   • TOTAL PROTEIN 05/20/2019 6.6  6.4 - 8.2 g/dL Final   • Albumin 05/20/2019 4.0  3.6 - 5.1 g/dL Final   • GLOBULIN 05/20/2019 2.6  2.0 - 4.0 g/dL Final   • A/G Ratio, Serum 05/20/2019 1.5  1.0 - 2.4 Final   • WBC 05/20/2019 5.2  4.2 - 11.0 K/mcL Final   • RBC 05/20/2019 4.85  4.50 - 5.90 mil/mcL Final   • HGB 05/20/2019 15.0  13.0 - 17.0 g/dL Final   • HCT 05/20/2019 43.6  39.0 - 51.0 % Final   • MCV 05/20/2019 89.9  78.0 - 100.0 fl  Final   • MCH 05/20/2019 30.9  26.0 - 34.0 pg Final   • MCHC 05/20/2019 34.4  32.0 - 36.5 g/dL Final   • RDW-CV 05/20/2019 12.7  11.0 - 15.0 % Final   • PLT 05/20/2019 159  140 - 450 K/mcL Final   • DIFF TYPE 05/20/2019 AUTOMATED DIFFERENTIAL   Final   • Neutrophil 05/20/2019 60  % Final   • LYMPH 05/20/2019 30  % Final   • MONO 05/20/2019 7  % Final   • EOSIN 05/20/2019 3  % Final   • BASO 05/20/2019 0  % Final   • Absolute Neutrophil 05/20/2019 3.1  1.8 - 7.7 K/mcL Final   • Absolute Lymph 05/20/2019 1.6  1.0 - 4.0 K/mcL Final   • Absolute Mono 05/20/2019 0.3  0.3 - 0.9 K/mcL Final   • Absolute Eos 05/20/2019 0.2  0.1 - 0.5 K/mcL Final   • Absolute Baso 05/20/2019 0.0  0.0 - 0.3 K/mcL Final   • MICROALBUMIN, UA (TTL) 05/20/2019 0.72  mg/dL Final   • CREATININE, URINE (TOTAL) 05/20/2019 175.00  mg/dL Final   • MICROALBUMIN/CREATININE 05/20/2019 4.1  <30 mg/g Final   • COLOR 05/20/2019 YELLOW  YELLOW Final   • APPEARANCE 05/20/2019 CLEAR   Final   • GLUCOSE(URINE) 05/20/2019 NEGATIVE  NEGATIVE mg/dL Final   • BILIRUBIN 05/20/2019 NEGATIVE  NEGATIVE Final   • KETONES 05/20/2019 NEGATIVE  NEGATIVE mg/dL Final   • SPECIFIC GRAVITY 05/20/2019 1.020  1.005 - 1.030 Final   • BLOOD 05/20/2019 NEGATIVE  NEGATIVE Final   • pH 05/20/2019 6.5  5.0 - 7.0 Units Final   • PROTEIN(URINE) 05/20/2019 NEGATIVE  NEGATIVE mg/dL Final   • UROBILINOGEN 05/20/2019 0.2  0.0 - 1.0 mg/dL Final   • NITRITE 05/20/2019 NEGATIVE  NEGATIVE Final   • LEUKOCYTE ESTERASE 05/20/2019 NEGATIVE  NEGATIVE Final   • Squamous EPI'S 05/20/2019 NONE SEEN  0 - 5 /hpf Final   • RBC 05/20/2019 1 to 2  0 - 2 /hpf Final   • WBC 05/20/2019 NONE SEEN  0 - 5 /hpf Final   • BACTERIA 05/20/2019 NONE SEEN  NONE SEEN /hpf Final   • Hyaline Casts 05/20/2019 NONE SEEN  0 - 5 /lpf Final   • SPECIMEN TYPE 05/20/2019 URINE, CLEAN CATCH/MIDSTREAM   Final       No results found for this or any previous visit.    REVIEW OF SYSTEMS:  All systems are reviewed and are essentially  negative except for as per history of present illness.    PAST MEDICAL HISTORY:  Past Medical History:   Diagnosis Date   • Anxiety disorder    • Bronchial asthma    • Depression    • Dyslipidemia    • Eczema    • GERD (gastroesophageal reflux disease)    • Hiatal hernia    • Inguinal hernia    • Recovering alcoholic (CMS/[facility])    • Seasonal allergic rhinitis        PAST SURGICAL HISTORY:  Past Surgical History:   Procedure Laterality Date   • Colonoscopy  2016    Dr. Carballo-tubular adenoma, Repeat in 3 years   • Esophagogastroduodenoscopy     • Hernia repair  2016    Dr. Carballo- Right inguinal hernia repair with mesh   • Sinus surgery         SOCIAL HISTORY:  Social History     Socioeconomic History   • Marital status: Single     Spouse name: Not on file   • Number of children: 0   • Years of education: Not on file   • Highest education level: Not on file   Occupational History     Comment: Works in a [employer]   Social Needs   • Financial resource strain: Not on file   • Food insecurity:     Worry: Not on file     Inability: Not on file   • Transportation needs:     Medical: Not on file     Non-medical: Not on file   Tobacco Use   • Smoking status: Former Smoker     Last attempt to quit: 8/10/1989     Years since quittin.2   • Smokeless tobacco: Former User     Quit date: 3/13/1989   Substance and Sexual Activity   • Alcohol use: No     Alcohol/week: 0.0 standard drinks     Comment: Alcohol drinking , recovering alcoholic   • Drug use: No   • Sexual activity: Not on file   Lifestyle   • Physical activity:     Days per week: Not on file     Minutes per session: Not on file   • Stress: Not on file   Relationships   • Social connections:     Talks on phone: Not on file     Gets together: Not on file     Attends Zoroastrianism service: Not on file     Active member of club or organization: Not on file     Attends meetings of clubs or organizations: Not on file     Relationship status: Not on file   •  Intimate partner violence:     Fear of current or ex partner: Not on file     Emotionally abused: Not on file     Physically abused: Not on file     Forced sexual activity: Not on file   Other Topics Concern   • Not on file   Social History Narrative   • Not on file       FAMILY HISTORY:   Family History   Problem Relation Age of Onset   • Diabetes Father    • Stroke Father    • Arrhythmia Father         Status post pacemaker placement   • Diabetes Maternal Grandmother    • Cancer Paternal Grandfather         From colon cancer or pancreatic cancer is not clear to the patient   • Dementia/Alzheimers Mother         Alzheimer's dementia       Current Outpatient Medications   Medication Sig Dispense Refill   • fluticasone-salmeterol (ADVAIR DISKUS) 250-50 MCG/DOSE inhaler Inhale 1 puff into the lungs two times daily. Rinse mouth after each use 60 each 3   • sertraline (ZOLOFT) 100 MG tablet Take 1 tablet by mouth daily. 90 tablet 3   • risperiDONE (RISPERDAL) 0.5 MG tablet Take 3 tablets at night 270 tablet 3   • buPROPion (WELLBUTRIN XL) 300 MG 24 hr tablet Take 1 tablet by mouth daily. 90 tablet 3   • pantoprazole (PROTONIX) 40 MG tablet Take 1 tablet by mouth daily. 90 tablet 1   • tamsulosin (FLOMAX) 0.4 MG Cap Take 1 capsule by mouth daily after a meal. 90 capsule 1   • CETIRIZINE HCL PO      • budesonide-formoterol (SYMBICORT) 160-4.5 MCG/ACT inhaler Inhale 2 puffs into the lungs 2 times daily. 10.2 g 12   • fluticasone (FLONASE) 50 MCG/ACT nasal spray Spray 2 sprays in each nostril daily. 16 g 1   • azithromycin (ZITHROMAX Z-KENDRICK) 250 MG tablet 2 tab po on day one then 1 tab po daily on days 2-5. 6 tablet 0   • albuterol (PROVENTIL HFA) 108 (90 Base) MCG/ACT inhaler Inhale 2 puffs into the lungs every 4 hours as needed for Shortness of Breath or Wheezing. 1 Inhaler 3     No current facility-administered medications for this visit.        ALLERGIES:   Allergen Reactions   • Seasonal Other (See Comments)     Per pt  sinus congestion in the spring          PHYSICAL EXAMINATION:   GENERAL:  The patient is awake, alert, oriented x3, in no acute distress.   Blood pressure 110/62, pulse 68, temperature 97.5 °F (36.4 °C), temperature source Oral, resp. rate 24, height 5' 10\" (1.778 m), weight 82.8 kg.  HEENT:  Pupils equal, round, reactive to light and accommodation.  Extraocular muscles are intact.  Sclerae anicteric.  Conjunctivae clear.  Oropharynx is clear.  No ulcers, no exudate, no erythema, no abscess formation.  Tympanic membranes are clear and intact bilaterally.  No frontal or maxillary sinus tenderness bilaterally.  No mastoid tenderness bilaterally.  External auditory canals are clear bilaterally.  Airway is intact.   NECK:  Supple, no jugular venous distention.  No submandibular, cervical or supraclavicular lymphadenopathy.  No carotid bruits.  Normal symmetric carotid upstrokes.  No thyromegaly.  No thyroid nodules.  No palpable mass.   HEART:  Regular rate and rhythm.  S1 and S2 are normal.  No S3, S4, no murmur, no rub.   LUNGS:  Clear to auscultation bilaterally.  No rales or rhonchi with good air entry bilaterally.  No respiratory distress.  Congested cough was noted.  ABDOMEN:  Soft, normoactive bowel sounds.  No tenderness.  No rebound tenderness. No palpable mass.  No pulsatile mass.  No hepatosplenomegaly.  No inguinal lymphadenopathy.  No CVA (costovertebral angle) tenderness bilaterally.   EXTREMITIES:  All 4 extremities with full range of movement.  No edema, no calf tenderness bilaterally, posterior tibial 2+ and equal bilaterally.   NEUROLOGICAL:  Cranial nerves 2-12 are intact.  Motor is 5/5 and equal in bilateral upper and lower extremities.  Deep tendon reflexes 2+ and equal in bilateral upper and lower extremities and symmetric.  Gait is steady.  Sensation is intact.  No acute motor or sensory deficits.   SKIN:  Warm and dry.  No rash.  No petechiae.     ASSESSMENT:     Orders Placed This Encounter   •  azithromycin (ZITHROMAX Z-KENDRICK) 250 MG tablet   • fluticasone-salmeterol (ADVAIR DISKUS) 250-50 MCG/DOSE inhaler   • albuterol (PROVENTIL HFA) 108 (90 Base) MCG/ACT inhaler       (J06.9) Upper respiratory tract infection, unspecified type  (primary encounter diagnosis)  Plan: I am starting the patient on azithromycin (ZITHROMAX Z-KENDRICK) 250 MG tablet, 2 tab po on day one then 1 tab po daily on days 2-5.   Patient is clinically stable. Discussed with patient that this is likely an upper respiratory infection. Advised patient to use his albuterol inhaler for shortness of breath and wheezing. Advised the patient to take over the counter pro-biotics and to eat yogurt, to reduce the side effects of antibiotics. Instructed the patient to complete the full course of antibiotic treatment. Patient was advised to maintain hydration and drink plenty of water and gargle salt water. Return to the clinic for re-evaluation with any new or worsening symptoms.    (J45.30) Mild persistent asthma without complication  Plan: I have refilled fluticasone-salmeterol (ADVAIR DISKUS) 250-50 MCG/DOSE inhaler, Inhale 1 puff into the lungs two times daily. Rinse mouth after each use., and albuterol (PROVENTIL HFA) 108 (90 Base) MCG/ACT inhaler, Inhale 2 puffs into the lungs every 4 hours as needed for Shortness of Breath or Wheezing.   Patient is clinically stable. Advised the patient of the importance of taking all medications as prescribed. Reevaluate in 3 months.    (K21.9) Gastroesophageal reflux disease, esophagitis presence not specified  Patient is clinically stable and asymptomatic. Advised patient to continue taking omeprazole on an as needed basis. Advised patient if persistent cough we will re-evaluate his acid reflux given that cough may be a symptom of his acid reflux.    On 11/12/2019, Carey DEAN scribed the services personally performed by Tejas Spivey MD.     The documentation recorded by the scribe accurately and  completely reflects the service(s) I personally performed and the decisions made by me.          No

## 2019-11-18 ENCOUNTER — TRANSCRIPTION ENCOUNTER (OUTPATIENT)
Age: 39
End: 2019-11-18

## 2019-11-26 ENCOUNTER — APPOINTMENT (OUTPATIENT)
Dept: UROLOGY | Facility: CLINIC | Age: 39
End: 2019-11-26
Payer: COMMERCIAL

## 2019-11-26 PROCEDURE — 99214 OFFICE O/P EST MOD 30 MIN: CPT

## 2019-11-26 NOTE — HISTORY OF PRESENT ILLNESS
[FreeTextEntry1] : The patient returns for followup. He has been injecting 150 units gonyl F 3x/week and Pregnyl 4000 units 3 times weekly and has been doing very well from a energy and libido standpoint. His erections have been greatly improved as well. \par \par PMH: Patient initially presented with hypogonadotrophic hypogonadism presents for followup. He stated that in December he had an aortic dissection. He required an open repair. He has been doing well since. He's been on pregnyl 3 times weekly since March of 2018, 4000 units 3x weekly. His T was 288 in Feb 2019. He has a partner and would like to have future paternity. 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 He is 36 years old. His wife is 27 years old. She has had 2 prior pregnancies. He has not had any children from any relationship. He would like to conceive with his partner.\par \par He was previously diagnosed with hypogonadotropic hypogonadism at age ~13. He has sense of smell. 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 DEXA was normal. He has seen multiple endocrinologists in the past as well as Dr. Tate recently. He was previously on gonadotropins as a teenager, then subsequently transitioned to androgel then back to HCG and Menopur. He is off all medications since August 2017.\par \par He underwent a testis biopsy (details unclear from patient) but no sperm was cryopreserved. He was unable to afford gonadotropins due to insurance issues. Is currently on Medicaid.\par

## 2019-11-26 NOTE — END OF VISIT
[>50% of Time Spent on Counseling and Coordination of Care for  ___] : Greater than 50% of the encounter time was spent on counseling and coordination of care for [unfilled] [Time Spent: ___ minutes] : I have spent [unfilled] minutes of face to face time with the patient [>50% of Time Spent on Counseling for ____] : Greater than 50% of the encounter time was spent on counseling for [unfilled]

## 2019-11-26 NOTE — ASSESSMENT
[FreeTextEntry1] : The patient returns for followup. He has been injecting 150 units gonyl F 3x/week and Pregnyl 4000 units 3 times weekly and has been doing very well from a energy and libido standpoint, His erections have been greatly improved as well. \par \par I renewed his pregnyl and gonyl F.  I will see him back in 2 months in follow up. We will obtain blood studies at that time. I will request a semen analysis and possible cryopreservation at that time.\par

## 2019-11-26 NOTE — PHYSICAL EXAM
[General Appearance - Well Developed] : well developed [General Appearance - Well Nourished] : well nourished [Normal Appearance] : normal appearance [General Appearance - In No Acute Distress] : no acute distress [Abdomen Soft] : soft [Well Groomed] : well groomed [Scrotum] : the scrotum was normal [Urinary Bladder Findings] : the bladder was normal on palpation [Urethral Meatus] : meatus normal [Testes Mass (___cm)] : there were no testicular masses [Skin Turgor] : supple [Oriented To Time, Place, And Person] : oriented to person, place, and time [Mood] : the mood was normal [Not Anxious] : not anxious [Normal Station and Gait] : the gait and station were normal for the patient's age [Affect] : the affect was normal

## 2019-11-27 ENCOUNTER — RX RENEWAL (OUTPATIENT)
Age: 39
End: 2019-11-27

## 2019-12-03 ENCOUNTER — APPOINTMENT (OUTPATIENT)
Dept: CV DIAGNOSITCS | Facility: HOSPITAL | Age: 39
End: 2019-12-03

## 2019-12-04 ENCOUNTER — APPOINTMENT (OUTPATIENT)
Dept: CV DIAGNOSITCS | Facility: HOSPITAL | Age: 39
End: 2019-12-04

## 2019-12-04 ENCOUNTER — APPOINTMENT (OUTPATIENT)
Dept: CARDIOLOGY | Facility: CLINIC | Age: 39
End: 2019-12-04
Payer: COMMERCIAL

## 2019-12-04 ENCOUNTER — OUTPATIENT (OUTPATIENT)
Dept: OUTPATIENT SERVICES | Facility: HOSPITAL | Age: 39
LOS: 1 days | End: 2019-12-04
Payer: COMMERCIAL

## 2019-12-04 VITALS
OXYGEN SATURATION: 97 % | RESPIRATION RATE: 16 BRPM | WEIGHT: 305 LBS | SYSTOLIC BLOOD PRESSURE: 132 MMHG | BODY MASS INDEX: 37.92 KG/M2 | HEIGHT: 75 IN | HEART RATE: 78 BPM | DIASTOLIC BLOOD PRESSURE: 84 MMHG

## 2019-12-04 DIAGNOSIS — Z98.890 OTHER SPECIFIED POSTPROCEDURAL STATES: Chronic | ICD-10-CM

## 2019-12-04 DIAGNOSIS — I50.22 CHRONIC SYSTOLIC (CONGESTIVE) HEART FAILURE: ICD-10-CM

## 2019-12-04 DIAGNOSIS — R42 DIZZINESS AND GIDDINESS: ICD-10-CM

## 2019-12-04 PROCEDURE — 99215 OFFICE O/P EST HI 40 MIN: CPT

## 2019-12-04 PROCEDURE — 93000 ELECTROCARDIOGRAM COMPLETE: CPT

## 2019-12-04 PROCEDURE — 93306 TTE W/DOPPLER COMPLETE: CPT | Mod: 26

## 2019-12-04 RX ORDER — ATORVASTATIN CALCIUM 20 MG/1
20 TABLET, FILM COATED ORAL
Qty: 90 | Refills: 2 | Status: DISCONTINUED | COMMUNITY
Start: 2018-12-21 | End: 2019-12-04

## 2019-12-10 ENCOUNTER — APPOINTMENT (OUTPATIENT)
Dept: PULMONOLOGY | Facility: CLINIC | Age: 39
End: 2019-12-10
Payer: COMMERCIAL

## 2019-12-10 ENCOUNTER — NON-APPOINTMENT (OUTPATIENT)
Age: 39
End: 2019-12-10

## 2019-12-10 VITALS
OXYGEN SATURATION: 97 % | DIASTOLIC BLOOD PRESSURE: 70 MMHG | BODY MASS INDEX: 37.92 KG/M2 | RESPIRATION RATE: 17 BRPM | WEIGHT: 305 LBS | HEIGHT: 75 IN | HEART RATE: 81 BPM | SYSTOLIC BLOOD PRESSURE: 122 MMHG

## 2019-12-10 PROCEDURE — 95012 NITRIC OXIDE EXP GAS DETER: CPT

## 2019-12-10 PROCEDURE — 99214 OFFICE O/P EST MOD 30 MIN: CPT | Mod: 25

## 2019-12-10 PROCEDURE — 94010 BREATHING CAPACITY TEST: CPT

## 2019-12-10 NOTE — HISTORY OF PRESENT ILLNESS
[FreeTextEntry1] : Mr. QUINTANA is a 38 year year old male with a history of emergent type A aortic dissection repair and replacement 12/2018, HTN, azoospermia, elevated cholesterol, asthma, atopic dermatitis, OW, snoring, and SOB who presents for a follow up pulmonary re-evaluation. His chief complaint is sinus issues.\par -He states that he has generally been feeling well  and has been off Lipitor. he feels this medication was causing him some brain fog and forgetfulness\par -he states that he has been sleeping generally well, getting about 7 hours per night\par -he notes that his sinuses have been very dry and congested. he adds that he has no sense of smell, and he feels that the nasal sprays have not been helping\par -he reports that his wheeze/cough have improved. he has been tolerating his inhalers well\par -his weight has been stable, although still high compared to before his surgery\par -he occasionally has itchy eyes\par -he rates his energy level a 7/10\par -he notes that his rash has improved, and has stopped wearing leather clothing products\par -he denies any headaches, nausea, vomiting, fever, chills, sweats, chest pain, chest pressure, diarrhea, constipation, dysphagia, dizziness, leg swelling, leg pain, itchy ears, heartburn, reflux, or sour taste in the mouth.

## 2019-12-10 NOTE — ADDENDUM
[FreeTextEntry1] : All medical record entries made by myles Mac were at Dr. Blade Veloz's direction and personally dictated by me on 12/10/2019. I have reviewed the chart and agree that the record accurately reflects my personal performance of the history, physical exam, assessment and plan. I have also personally directed, reviewed, and agree with the discharge instructions.

## 2019-12-10 NOTE — PHYSICAL EXAM
[Well Groomed] : well groomed [General Appearance - Well Developed] : well developed [Normal Appearance] : normal appearance [No Deformities] : no deformities [General Appearance - Well Nourished] : well nourished [General Appearance - In No Acute Distress] : no acute distress [Normal Oropharynx] : normal oropharynx [Normal Conjunctiva] : the conjunctiva exhibited no abnormalities [Eyelids - No Xanthelasma] : the eyelids demonstrated no xanthelasmas [III] : III [Neck Appearance] : the appearance of the neck was normal [Neck Cervical Mass (___cm)] : no neck mass was observed [Thyroid Nodule] : there were no palpable thyroid nodules [Jugular Venous Distention Increased] : there was no jugular-venous distention [Thyroid Diffuse Enlargement] : the thyroid was not enlarged [Heart Rate And Rhythm] : heart rate and rhythm were normal [Heart Sounds] : normal S1 and S2 [Exaggerated Use Of Accessory Muscles For Inspiration] : no accessory muscle use [Respiration, Rhythm And Depth] : normal respiratory rhythm and effort [Abdomen Soft] : soft [Auscultation Breath Sounds / Voice Sounds] : lungs were clear to auscultation bilaterally [Abdomen Mass (___ Cm)] : no abdominal mass palpated [Abdomen Tenderness] : non-tender [Nail Clubbing] : no clubbing of the fingernails [Gait - Sufficient For Exercise Testing] : the gait was sufficient for exercise testing [Abnormal Walk] : normal gait [Petechial Hemorrhages (___cm)] : no petechial hemorrhages [Cyanosis, Localized] : no localized cyanosis [No Venous Stasis] : no venous stasis [Skin Lesions] : no skin lesions [No Xanthoma] : no  xanthoma was observed [No Skin Ulcers] : no skin ulcer [Sensation] : the sensory exam was normal to light touch and pinprick [Deep Tendon Reflexes (DTR)] : deep tendon reflexes were 2+ and symmetric [No Focal Deficits] : no focal deficits [Impaired Insight] : insight and judgment were intact [Oriented To Time, Place, And Person] : oriented to person, place, and time [Affect] : the affect was normal [Skin Turgor] : normal skin turgor [Skin Color & Pigmentation] : normal skin color and pigmentation [] : no rash [FreeTextEntry1] : I:E 1:3; clear

## 2019-12-10 NOTE — ASSESSMENT
[FreeTextEntry1] : Mr. QUINTANA is a 38 year year old male with a history of emergent type A aortic dissection repair and replacement 12/2018, HTN, azoospermia, elevated cholesterol, asthma, atopic dermatitis, OW who presents for a follow up pulmonary re-evaluation. His chief complaint is his weight / itchy sensation (rash) / sinus issues.\par \par The patient's SOB is felt to be multifactorial:\par - Overweight /  Out-of-shape\par - Poor breathing mechanics\par - Restrictive lung dysfunction -(posture)\par - Mild Intermittent Asthma\par - Cardiac Disease (Dr. Trey Johnson)\par \par Problem 1: No tracheomalacia\par -s/p Dynamic CT - no evidence of Dz\par -Tracheomalacia is usually acquired in adults and common causes include damage by tracheostomy or endotracheal intubation damaging the tracheal cartilage with increase risk with multiple intubations, prolonged intubation, and concurrent high dose steroid therapy; external chest wall trauma and surgery; chronic compression of the trachea by benign etiologies (eg, benign mediastinal goiter) or malignancy; relapsing polychondritis; or recurrent infection. Tracheomalacia can be asymptomatic, however signs or symptoms can develop as the severity of the airway narrowing progresses with major symptoms include dyspnea, cough, and sputum retention. Other symptoms include severe paroxysms of coughing, wheezing or stridor, barking cough and may be exacerbated by forced expiration, cough, and valsalva maneuver. Tracheomalacia is diagnosed by a bronchoscopic visualization of dynamic airway collapse on dynamic chest CT. Therapy is warranted in symptomatic patients with severe tracheomalacia and includes surgical repair as tracheobronchoplasty. The patient was referred to Dr. Deshawn Hammond or Dr. Bob Grajeda, at [facility] for a surgical consult. \par \par Problem 2a: moderate-severe persistent asthma -(stable)\par -s/p Blood work for allergies: asthma panel, food IgE level (wnl), eosinophil level, Vitamin D level (wnl). - all negative\par -continue Bevespi 2 inhalations BID\par -continue Alvesco 160 mg 2 inhalations BID\par -continue Singulair 10 mg QHS\par -continue DuoNeb by nebulizer, prn when sick\par -Asthma is believed to be caused by inherited (genetic) and environmental factor, but its exact cause is unknown. Asthma may be triggered by allergens, lung infections, or irritants in the air. Asthma triggers are different for each person.\par -Inhaler technique reviewed as well as oral hygiene techniques reviewed with patient. Avoidance of cold air, extremes of temperature, rescue inhaler should be used before exercise. Order of medication reviewed with patient. Recommended use of a cool mist humidifier in the bedroom. \par \par problem 2b: eosinophilic asthma\par -candidate for Nucala / Dupixent\par -The safety and efficacy of Nucala was established in three double-blind, randomized, placebo-controlled trials in patients with severe asthma. Compared to a placebo, patients with severe asthma receiving Nucala had fewer exacerbation requiring hospitalization and/or emergency department visits, and a longer time to first exacerbation. In addition, patients with severe asthma receiving Nucala or Fasenra experienced greater reductions in their daily maintenance oral corticosteroid dose, while maintaining asthma control compared with patients receiving placebo. Treatment with Nucala did not result in a significant improvement in lung function, as measured by the volume of air exhaled by patients in one second. The most common side effects include: headache, injection site reactions, back pain, weakness, and fatigue; hypersensitivity reactions can occur within hours or days including swelling of the face, mouth, and tongue, fainting, dizziness, hives, breathing problems, and rash; herpes zoster infections have occurred. The drug is a monoclonal antibody that inhibits interleukin -5 which helps regular eosinophils, a type of white blood cell that contributes to asthma. The over-production of eosinophils can cause inflammation in the lungs, increasing the frequency of asthma attacks. Patients must also take other medications, including high dose inhaled corticosteroids and at least one additional asthma drug \par \par Problem 3: atopic dermatitis -(eosinophilic)\par -s/p blood work: IgE level (-), eosinophil level (+), vitamin D levels (-)\par -candidate for Dupixent -(set up)\par -Dupixent is a prescription medicine used with other asthma medicines for the maintenance treatment of moderate-to-severe asthma in people aged 12 years and older whose asthma is not controlled with their current asthma medicines. Dupixent helps prevent severe asthma attacks (exacerbations) and can improve your breathing. Dupixent may also help reduce the amount of oral corticosteroids you need while preventing severe asthma attacks and improving your breathing. Dupixent is not used to treat sudden breathing problems. Risks and side effect of Dupixent were discussed and reviewed with patient. \par \par Problem 4: allergy / sinus\par -continue Olopatadine 0.6% 1 sniff/nostril BID\par -continue Omnaris 1 sniff BID\par -add Clarinex 5 mg QAM\par -recommended to use "Navage" nasal rinse device \par -set up Dupixent\par Environmental measures for allergies were encouraged including mattress and pillow cover, air purifier, and environmental controls. \par \par Problem 5: r/o sleep apnea\par -complete a home sleep study due to his EDS and snoring \par Sleep apnea is associated with adverse clinical consequences which an affect most organ systems. Cardiovascular disease risk includes arrhythmias, atrial fibrillation, hypertension, coronary artery disease, and stroke. Metabolic disorders include diabetes type 2, non-alcoholic fatty liver disease. Mood disorder especially depression; and cognitive decline especially in the elderly. Associations with chronic reflux/Wright’s esophagus some but not all inclusive. \par -Reasons include arousal consistent with hypopnea; respiratory events most prominent in REM sleep or supine position; therefore sleep staging and body position are important for accurate diagnosis and estimation of AHI.\par -Treatment options discussed including CPAP/BiPAP machine, oral appliance, ProVent therapy, Oxy-Aid by Respitec, new technologies, or positional sleep.Recommended use of the CPAP machine for moderate (AHI >15), moderate to severe (AHI 15-30) and severe patients (AHI > 30). Recommended weight loss which can reduce AHI especially in weight loss of greater than 5% of BMI. Positional sleep is recommended in those with low AHI, low-moderate BMI, and younger age. For severe sleep apnea, the hypoglossal nerve stimulator was recommended as well. \par \par Problem 6: thoracic aortic aneurysm\par -follow-up with Dr. Scar Toledo\par \par Problem 7: cardiac disease\par -follow-up with cardiologist (Dr. Trey Johnson)\par \par Problem 8: poor mechanics of breathing\par Proper breathing techniques were reviewed with an emphasis of exhalation. Patient instructed to breath in for 1 second and out for four seconds. Patient was encouraged to not talk while walking. \par \par Problem 9: overweight / out of shape\par Weight loss, exercise, and diet control were discussed and are highly encouraged. Treatment options were given such as, aqua therapy, and contacting a nutritionist. Recommended to use the elliptical, stationary bike, less use of treadmill. Mindful eating was explained to the patient Obesity is associated with worsening asthma, shortness of breath, and potential for cardiac disease, diabetes, and other underlying medical conditions. \par \par Problem 10: health maintenance\par -recommend Dr. Ashish Wallace as PCP\par -refused influenza vaccine 2019\par -recommended strep pneumonia vaccines: Prevnar-13 vaccine, followed by Pneumo vaccine 23 one year following \par -recommended early intervention for URIs \par -recommended regular osteoporosis evaluations \par -recommended early dermatological evaluations \par -recommended after the age of 50 to the age of 70, colonoscopy every 5 years \par \par Follow-up in 3-4 months with SPI and NiOx\par Patient is encouraged to call or fax the office with any changes, questions, or concerns.\par Explained to the patient in full detail with demonstrations how to use the inhalers and inhaler hygiene.\par -Education provided to the patient regarding their visit and conditions

## 2019-12-10 NOTE — REVIEW OF SYSTEMS
[Ear Disturbance] : ear disturbance [As Noted in HPI] : as noted in HPI [Palpitations] : palpitations [Negative] : Pulmonary Hypertension [Dyspnea] : no dyspnea [Wheezing] : no wheezing [Chest Discomfort] : no chest discomfort [Heartburn] : no heartburn [Reflux] : no reflux [Dysphagia] : no dysphagia [Constipation] : no constipation [Diarrhea] : no diarrhea

## 2019-12-10 NOTE — PROCEDURE
[FreeTextEntry1] : PFT - spi reveals mild restrictive / obstructive dysfunction; FEV1 is 3.44 which is 69% of predicted, normal flow volume loop.\par \par FENO was 32; a normal value being less than 25\par Fractional exhaled nitric oxide (FENO) is regarded as a simple, noninvasive method for assessing eosinophilic airway inflammation. Produced by a variety of cells within the lung, nitric oxide (NO) concentrations are generally low in healthy individuals. However, high concentrations of NO appear to be involved in nonspecific host defense mechanisms and chronic inflammatory diseases such as asthma. The American Thoracic Society (ATS) therefore has recommended using FENO to aid in the diagnosis and monitoring of eosinophilic airway inflammation and asthma, and for identifying steroid responsive individuals whose chronic respiratory symptoms may be caused by airway inflammation.

## 2019-12-13 ENCOUNTER — RX CHANGE (OUTPATIENT)
Age: 39
End: 2019-12-13

## 2019-12-13 RX ORDER — DESLORATADINE 5 MG/1
5 TABLET ORAL DAILY
Qty: 90 | Refills: 1 | Status: DISCONTINUED | COMMUNITY
Start: 2019-12-10 | End: 2019-12-13

## 2019-12-18 ENCOUNTER — RX RENEWAL (OUTPATIENT)
Age: 39
End: 2019-12-18

## 2019-12-27 ENCOUNTER — TRANSCRIPTION ENCOUNTER (OUTPATIENT)
Age: 39
End: 2019-12-27

## 2020-01-08 ENCOUNTER — APPOINTMENT (OUTPATIENT)
Dept: CARDIOTHORACIC SURGERY | Facility: CLINIC | Age: 40
End: 2020-01-08
Payer: COMMERCIAL

## 2020-01-08 VITALS
DIASTOLIC BLOOD PRESSURE: 84 MMHG | SYSTOLIC BLOOD PRESSURE: 132 MMHG | RESPIRATION RATE: 16 BRPM | HEART RATE: 64 BPM | OXYGEN SATURATION: 97 %

## 2020-01-08 PROCEDURE — 99213 OFFICE O/P EST LOW 20 MIN: CPT

## 2020-01-08 RX ORDER — DOCUSATE SODIUM 100 MG/1
100 CAPSULE, LIQUID FILLED ORAL 3 TIMES DAILY
Refills: 0 | Status: DISCONTINUED | COMMUNITY
End: 2020-01-08

## 2020-01-13 ENCOUNTER — RX RENEWAL (OUTPATIENT)
Age: 40
End: 2020-01-13

## 2020-01-14 NOTE — END OF VISIT
[FreeTextEntry3] : Scribe Attestation:\par I personally scribed for JOSE SALGUERO on Jan 8 2020  7:15AM . \par \par \par \par \par Physician Attestation:\par Documented by MJ TREVINO acting as a scribe for JOSE SALGUERO 01/08/2020 . \par                 All medical record entries made by the Scribe were at my, JOSE SALGUERO , direction and personally dictated by me on 01/08/2020 . I have reviewed the chart and agree that the record accurately reflects my personal performance of the history, physical exam, assessment and plan. \par \par

## 2020-01-14 NOTE — HISTORY OF PRESENT ILLNESS
[FreeTextEntry1] : 39 year old male with a past medical history of Kallmann syndrome, HTN, HLD, asthma, hypogonadism, obesity, ?MARYLOU, s/p Emergency repair of type A aortic dissection with ascending aorta and hemiarch replacement on 12/8/18 by Dr. Scar Toledo. Patient presents for aortic followup, as referred by Dr. Trey Johnson.\par Post op course significant for cardiogenic shock requiring pressor support, hypertension requiring cardene gtt, volume overload,  and leukocytosis. \par \par CT chest without contrast 7/13/19: residual dissection remains within the aortic arch. \par * Aortic root: 4.7 cm at the sinuses of Valsalva \par * Ascending aorta at the main pulmonary artery: 3.3 cm \par * Aortic arch just proximal to the brachiocephalic artery: 3.5 cm \par * At the aortic isthmus: 3.7 cm \par * Descending thoracic aorta at the main pulmonary artery 3.9 cm \par * Descending thoracic aorta at the left inferior pulmonary vein 3.3 cm \par * Aorta at the diaphragmatic hiatus: 3.0 cm. \par The main pulmonary artery is normal in caliber.\par \par Echocardiogram 12/4/19 revealed:\par -EF 71%. normal trileaflet aortic valve. no aortic valve regurgitation noted. the aortic root measures 4.7cm. \par \par Of note, patient was evaluated by Dr. Blade Veloz for evaluation of shortness of breath and referred to [facility] thoracic surgery for tracheomalacia consult.\par

## 2020-01-14 NOTE — PHYSICAL EXAM
[Sclera] : the sclera and conjunctiva were normal [PERRL With Normal Accommodation] : pupils were equal in size, round, and reactive to light [Extraocular Movements] : extraocular movements were intact [Neck Cervical Mass (___cm)] : no neck mass was observed [Jugular Venous Distention Increased] : there was no jugular-venous distention [Neck Appearance] : the appearance of the neck was normal [Thyroid Diffuse Enlargement] : the thyroid was not enlarged [Thyroid Nodule] : there were no palpable thyroid nodules [Auscultation Breath Sounds / Voice Sounds] : lungs were clear to auscultation bilaterally [Heart Sounds] : normal S1 and S2 [Heart Rate And Rhythm] : heart rate was normal and rhythm regular [Murmurs] : no murmurs [Heart Sounds Gallop] : no gallops [Heart Sounds Pericardial Friction Rub] : no pericardial rub [Diminished Respiratory Excursion] : normal chest expansion [Examination Of The Chest] : the chest was normal in appearance [Chest Visual Inspection Thoracic Asymmetry] : no chest asymmetry [2+] : right 2+ [Bowel Sounds] : normal bowel sounds [Abdomen Tenderness] : non-tender [Abdomen Soft] : soft [Abdomen Mass (___ Cm)] : no abdominal mass palpated [Cervical Lymph Nodes Enlarged Posterior Bilaterally] : posterior cervical [No CVA Tenderness] : no ~M costovertebral angle tenderness [Nail Clubbing] : no clubbing  or cyanosis of the fingernails [No Spinal Tenderness] : no spinal tenderness [Abnormal Walk] : normal gait [Motor Tone] : muscle strength and tone were normal [Musculoskeletal - Swelling] : no joint swelling seen [Skin Color & Pigmentation] : normal skin color and pigmentation [Skin Turgor] : normal skin turgor [Deep Tendon Reflexes (DTR)] : deep tendon reflexes were 2+ and symmetric [] : no rash [Sensation] : the sensory exam was normal to light touch and pinprick [No Focal Deficits] : no focal deficits [Affect] : the affect was normal [Oriented To Time, Place, And Person] : oriented to person, place, and time [Impaired Insight] : insight and judgment were intact [FreeTextEntry1] : deferred

## 2020-01-14 NOTE — CONSULT LETTER
[Dear  ___] : Dear  [unfilled], [FreeTextEntry2] : Trey Johnson MD\par [facility] - Dept of Cardiology \par [phone] [place] [address]\par [address]\[place] [address] \par  [FreeTextEntry1] : I had the pleasure of seeing your patient, JOANNE QUINTANA , in my office today. We take a multidisciplinary team approach to patient care and consider you, the referring physician, an extension of our team. We will maintain an open line of communication with you throughout your patient's treatment course.  \par \par Mr. QUINTANA presents for an initial evaluation and management of thoracic aortic aneurysm. He underwent a type A aortic dissection repair 1/7/2018. I have reviewed all of his  medical records and diagnostic images at the time of his office consultation. I have enclosed a copy for your records. \par \par I have also reviewed the indications for surgery, and used our webpage <<[email or web address]>> to illustrate the aorta and anatomy of the heart. The patient does not meet size criteria for surgical intervention at the time. \par \par Therefore, I have recommended that the patient will require a follow up appointment in six months with CTA chest, abdomen and pelvis to monitor his aortic pathology. My office will assist the patient with his upcoming appointment and I will update you on his progress at that time.\par \par I have discussed with the patient that we will continue to monitor his  aortic pathology closely at the [facility] at [facility] that encompasses the entire health care system and is one of the largest in the nation at this point.\par \par It is our commitment to provide your patient with the highest quality of advanced therapeutic options. On behalf of the Cardiothoracic Surgery Team, thank you for sending your patient to the [facility] based at [facility].  If there are any questions or concerns, please call me directly at [phone].  \par \par Please see my note below. \par \par Sincerely, \par \par \par \par \par Gaetano John M.D.\par Professor of Cardiovascular and Thoracic Surgery\par Minimally Invasive Valve Surgeon\par Director of Aortic Surgery, [facility]\par Cell: [phone]\par Email: [email or web address] \par \par [facility]:\par [address], [address] Floor, [place] [address]\par Office: [phone]\par Fax: [phone]\par \par [facility]:\par Department of Cardiovascular and Thoracic Surgery\par [address] [place] [address], [address]\par Office: [phone]\par Fax: [phone]\par \par Practice Manager: Ms. Janette Patel\par Email: [email or web address]\par Phone: [phone]\par \par

## 2020-01-14 NOTE — PROCEDURE
[FreeTextEntry1] : Dr. John reviewed the indications for surgery, and used our webpage [email or web address] <[email or web address]> to illustrate the aorta and anatomy of the heart. Those indications are the following: size greater than 5.0 cm, symptomatic aneurysms, family history of aortic dissection or aneurysm death with a size greater than 4.5 cm, other necessary cardiac procedures such as coronary artery bypass grafting or valvular disorders with an aneurysm greater than 4.5 cm, or connective tissue disorders with an aneurysm size greater than 4.5 cm. The patient does not meet size criteria for surgical intervention at the time.\par \par Dr. John discussed activity restrictions with the patient, and would advise exercise at a moderate amount with no heavy lifting over one third of body weight, and avoiding heart rates that exceed 140 beats per minute. In addition, every patient should abstain from tobacco abuse and to avoid all illicit drug use, especially stimulants such as cocaine or methamphetamine. Dr. John also counseled regarding maintaining a healthy heart diet, and losing any excessive weight as this also put undue stress on both the aorta and entire cardiovascular system. First degree family members should be screened for bicuspid valve disease, and ascending aortic aneurysms. \par \par

## 2020-01-14 NOTE — ASSESSMENT
[FreeTextEntry1] : 39 year old male with a past medical history of Kallmann syndrome, HTN, HLD, asthma, hypogonadism, obesity, ?MARYLOU, s/p Emergency repair of type A aortic dissection with ascending aorta and hemiarch replacement on 12/8/18 by Dr. Scar Toledo, presents for a follow up visit. \par \par I have reviewed the patient's medical records, diagnostic images during the time of this office consultation and have made the following recommendation. The surgical repair is intact and stable.\par Plan\par \par 1. Follow up in [facility] in six months with gated CTA chest, abdomen and pelvis. \par 2. Continue medication regimen.\par 3. Follow up with cardiologist and PCP.

## 2020-01-17 ENCOUNTER — TRANSCRIPTION ENCOUNTER (OUTPATIENT)
Age: 40
End: 2020-01-17

## 2020-01-28 ENCOUNTER — APPOINTMENT (OUTPATIENT)
Dept: UROLOGY | Facility: CLINIC | Age: 40
End: 2020-01-28
Payer: COMMERCIAL

## 2020-01-28 PROCEDURE — 99214 OFFICE O/P EST MOD 30 MIN: CPT

## 2020-01-28 NOTE — ASSESSMENT
[FreeTextEntry1] : The patient returns for followup. He has been injecting 150 units gonyl F 3x/week and Pregnyl 4000 units 3 times weekly and has been doing very well from a energy and libido standpoint, His erections have been greatly improved as well. \par \par I renewed his pregnyl and gonyl F.  I will see him back in 2 months in follow up. We will obtain blood studies today. I have also requested a semen analysis and possible cryopreservation in 1 to 2 months.\par \par REF: [id number]

## 2020-01-28 NOTE — HISTORY OF PRESENT ILLNESS
[FreeTextEntry1] : The patient returns for followup. He has been injecting 150 units gonyl F 3x/week and Pregnyl 4000 units 3 times weekly and has been doing very well from a energy and libido standpoint. His erections have been greatly improved as well. He has not been taking the Gonyl F for the past month or 2 because of insurance issues.\par \par PMH: Patient initially presented with hypogonadotrophic hypogonadism presents for followup. He stated that in December he had an aortic dissection. He required an open repair. He has been doing well since. He's been on pregnyl 3 times weekly since March of 2018, 4000 units 3x weekly. His T was 288 in Feb 2019. He has a partner and would like to have future paternity. 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 He was first seen when he was 36 years old and his wife wis 27 years old. She has had 2 prior pregnancies. He has not had any children from any relationship. He would like to conceive with his partner.\par \par He was previously diagnosed with hypogonadotropic hypogonadism at age ~13. He has sense of smell. 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 DEXA was normal. He has seen multiple endocrinologists in the past as well as Dr. Tate recently. He was previously on gonadotropins as a teenager, then subsequently transitioned to androgel then back to HCG and Menopur. He is off all medications since August 2017.\par \par He underwent a testis biopsy (details unclear from patient) but no sperm was cryopreserved. He was unable to afford gonadotropins due to insurance issues. Is currently on Medicaid.\par

## 2020-01-28 NOTE — PHYSICAL EXAM
[General Appearance - Well Developed] : well developed [General Appearance - Well Nourished] : well nourished [Normal Appearance] : normal appearance [Well Groomed] : well groomed [General Appearance - In No Acute Distress] : no acute distress [Urethral Meatus] : meatus normal [Abdomen Soft] : soft [Urinary Bladder Findings] : the bladder was normal on palpation [Scrotum] : the scrotum was normal [Testes Mass (___cm)] : there were no testicular masses [Oriented To Time, Place, And Person] : oriented to person, place, and time [Skin Turgor] : supple [Affect] : the affect was normal [Mood] : the mood was normal [Not Anxious] : not anxious [Normal Station and Gait] : the gait and station were normal for the patient's age

## 2020-01-29 LAB
BASOPHILS # BLD AUTO: 0.04 K/UL
BASOPHILS NFR BLD AUTO: 0.5 %
EOSINOPHIL # BLD AUTO: 0.33 K/UL
EOSINOPHIL NFR BLD AUTO: 4.1 %
ESTRADIOL SERPL-MCNC: 32 PG/ML
FSH SERPL-MCNC: 0.6 IU/L
HCT VFR BLD CALC: 43.4 %
HGB BLD-MCNC: 14.5 G/DL
IMM GRANULOCYTES NFR BLD AUTO: 0.2 %
LH SERPL-ACNC: 0.3 IU/L
LYMPHOCYTES # BLD AUTO: 2.33 K/UL
LYMPHOCYTES NFR BLD AUTO: 28.8 %
MAN DIFF?: NORMAL
MCHC RBC-ENTMCNC: 28.8 PG
MCHC RBC-ENTMCNC: 33.4 GM/DL
MCV RBC AUTO: 86.1 FL
MONOCYTES # BLD AUTO: 0.66 K/UL
MONOCYTES NFR BLD AUTO: 8.1 %
NEUTROPHILS # BLD AUTO: 4.72 K/UL
NEUTROPHILS NFR BLD AUTO: 58.3 %
PLATELET # BLD AUTO: 250 K/UL
PROLACTIN SERPL-MCNC: 13.2 NG/ML
RBC # BLD: 5.04 M/UL
RBC # FLD: 13 %
WBC # FLD AUTO: 8.1 K/UL

## 2020-02-03 LAB
TESTOST BND SERPL-MCNC: 9.3 PG/ML
TESTOST SERPL-MCNC: 290.7 NG/DL

## 2020-02-05 ENCOUNTER — TRANSCRIPTION ENCOUNTER (OUTPATIENT)
Age: 40
End: 2020-02-05

## 2020-02-17 ENCOUNTER — RX RENEWAL (OUTPATIENT)
Age: 40
End: 2020-02-17

## 2020-02-20 ENCOUNTER — TRANSCRIPTION ENCOUNTER (OUTPATIENT)
Age: 40
End: 2020-02-20

## 2020-03-03 ENCOUNTER — RX RENEWAL (OUTPATIENT)
Age: 40
End: 2020-03-03

## 2020-03-17 ENCOUNTER — APPOINTMENT (OUTPATIENT)
Dept: PULMONOLOGY | Facility: CLINIC | Age: 40
End: 2020-03-17
Payer: COMMERCIAL

## 2020-03-17 VITALS
BODY MASS INDEX: 38.67 KG/M2 | HEIGHT: 75 IN | RESPIRATION RATE: 17 BRPM | DIASTOLIC BLOOD PRESSURE: 80 MMHG | WEIGHT: 311 LBS | SYSTOLIC BLOOD PRESSURE: 120 MMHG | OXYGEN SATURATION: 98 % | HEART RATE: 68 BPM | TEMPERATURE: 97.8 F

## 2020-03-17 PROCEDURE — 99214 OFFICE O/P EST MOD 30 MIN: CPT | Mod: 25

## 2020-03-17 PROCEDURE — 95012 NITRIC OXIDE EXP GAS DETER: CPT

## 2020-03-17 NOTE — ASSESSMENT
[FreeTextEntry1] : Mr. QUINTANA is a 39 year year old male with a history of emergent type A aortic dissection repair and replacement 12/2018, HTN, azoospermia, elevated cholesterol, asthma, atopic dermatitis, OW who presents for a follow up pulmonary re-evaluation. His chief complaint is his weight / itchy sensation (rash) / sinus issues (still - despite Dupixent)\par \par The patient's SOB is felt to be multifactorial:\par - Overweight /  Out-of-shape\par - Poor breathing mechanics\par - Restrictive lung dysfunction -(posture)\par - Mild Intermittent Asthma\par - Cardiac Disease (Dr. Trey Johnson)\par \par Problem 1: No tracheomalacia\par -s/p Dynamic CT - no evidence of Dz\par -Tracheomalacia is usually acquired in adults and common causes include damage by tracheostomy or endotracheal intubation damaging the tracheal cartilage with increase risk with multiple intubations, prolonged intubation, and concurrent high dose steroid therapy; external chest wall trauma and surgery; chronic compression of the trachea by benign etiologies (eg, benign mediastinal goiter) or malignancy; relapsing polychondritis; or recurrent infection. Tracheomalacia can be asymptomatic, however signs or symptoms can develop as the severity of the airway narrowing progresses with major symptoms include dyspnea, cough, and sputum retention. Other symptoms include severe paroxysms of coughing, wheezing or stridor, barking cough and may be exacerbated by forced expiration, cough, and valsalva maneuver. Tracheomalacia is diagnosed by a bronchoscopic visualization of dynamic airway collapse on dynamic chest CT. Therapy is warranted in symptomatic patients with severe tracheomalacia and includes surgical repair as tracheobronchoplasty. The patient was referred to Dr. Deshawn Hammond or Dr. Bob Grajeda, at [facility] for a surgical consult. \par \par Problem 2a: moderate-severe persistent asthma -(stable) - improved\par -s/p Blood work for allergies: asthma panel, food IgE level (wnl), eosinophil level, Vitamin D level (wnl). - all negative\par -continue Bevespi 2 inhalations BID\par -continue Alvesco 160 mg 2 inhalations BID\par -continue Singulair 10 mg QHS\par -continue DuoNeb by nebulizer, prn when sick\par -Asthma is believed to be caused by inherited (genetic) and environmental factor, but its exact cause is unknown. Asthma may be triggered by allergens, lung infections, or irritants in the air. Asthma triggers are different for each person.\par -Inhaler technique reviewed as well as oral hygiene techniques reviewed with patient. Avoidance of cold air, extremes of temperature, rescue inhaler should be used before exercise. Order of medication reviewed with patient. Recommended use of a cool mist humidifier in the bedroom. \par \par problem 2b: eosinophilic asthma\par - Dupixent to continue (since 1/2020)\par -The safety and efficacy of Nucala was established in three double-blind, randomized, placebo-controlled trials in patients with severe asthma. Compared to a placebo, patients with severe asthma receiving Nucala had fewer exacerbation requiring hospitalization and/or emergency department visits, and a longer time to first exacerbation. In addition, patients with severe asthma receiving Nucala or Fasenra experienced greater reductions in their daily maintenance oral corticosteroid dose, while maintaining asthma control compared with patients receiving placebo. Treatment with Nucala did not result in a significant improvement in lung function, as measured by the volume of air exhaled by patients in one second. The most common side effects include: headache, injection site reactions, back pain, weakness, and fatigue; hypersensitivity reactions can occur within hours or days including swelling of the face, mouth, and tongue, fainting, dizziness, hives, breathing problems, and rash; herpes zoster infections have occurred. The drug is a monoclonal antibody that inhibits interleukin -5 which helps regular eosinophils, a type of white blood cell that contributes to asthma. The over-production of eosinophils can cause inflammation in the lungs, increasing the frequency of asthma attacks. Patients must also take other medications, including high dose inhaled corticosteroids and at least one additional asthma drug \par \par Problem 3: atopic dermatitis -(eosinophilic)\par -s/p blood work: IgE level (-), eosinophil level (+), vitamin D levels (-)\par - Dupixent since 1/2020\par -Dupixent is a prescription medicine used with other asthma medicines for the maintenance treatment of moderate-to-severe asthma in people aged 12 years and older whose asthma is not controlled with their current asthma medicines. Dupixent helps prevent severe asthma attacks (exacerbations) and can improve your breathing. Dupixent may also help reduce the amount of oral corticosteroids you need while preventing severe asthma attacks and improving your breathing. Dupixent is not used to treat sudden breathing problems. Risks and side effect of Dupixent were discussed and reviewed with patient. \par \par Problem 4: allergy / sinus\par -continue Olopatadine 0.6% 1 sniff/nostril BID\par -continue Omnaris 1 sniff BID\par -continue Clarinex 5 mg QAM\par -recommended to use "Navage" nasal rinse device \par \par Environmental measures for allergies were encouraged including mattress and pillow cover, air purifier, and environmental controls. \par \par Problem 5: r/o sleep apnea\par -complete a home sleep study due to his EDS and snoring \par Sleep apnea is associated with adverse clinical consequences which an affect most organ systems. Cardiovascular disease risk includes arrhythmias, atrial fibrillation, hypertension, coronary artery disease, and stroke. Metabolic disorders include diabetes type 2, non-alcoholic fatty liver disease. Mood disorder especially depression; and cognitive decline especially in the elderly. Associations with chronic reflux/Wright’s esophagus some but not all inclusive. \par -Reasons include arousal consistent with hypopnea; respiratory events most prominent in REM sleep or supine position; therefore sleep staging and body position are important for accurate diagnosis and estimation of AHI.\par -Treatment options discussed including CPAP/BiPAP machine, oral appliance, ProVent therapy, Oxy-Aid by Respitec, new technologies, or positional sleep.Recommended use of the CPAP machine for moderate (AHI >15), moderate to severe (AHI 15-30) and severe patients (AHI > 30). Recommended weight loss which can reduce AHI especially in weight loss of greater than 5% of BMI. Positional sleep is recommended in those with low AHI, low-moderate BMI, and younger age. For severe sleep apnea, the hypoglossal nerve stimulator was recommended as well. \par \par Problem 6: thoracic aortic aneurysm\par -follow-up with Dr. Scar Toledo\par \par Problem 7: cardiac disease\par -follow-up with cardiologist (Dr. Trey Johnson)\par \par Problem 8: poor mechanics of breathing\par Proper breathing techniques were reviewed with an emphasis of exhalation. Patient instructed to breath in for 1 second and out for four seconds. Patient was encouraged to not talk while walking. \par \par Problem 9: overweight / out of shape\par Weight loss, exercise, and diet control were discussed and are highly encouraged. Treatment options were given such as, aqua therapy, and contacting a nutritionist. Recommended to use the elliptical, stationary bike, less use of treadmill. Mindful eating was explained to the patient Obesity is associated with worsening asthma, shortness of breath, and potential for cardiac disease, diabetes, and other underlying medical conditions. \par \par Problem 10: health maintenance\par -recommend Dr. Ashish Wallace as PCP\par -refused influenza vaccine 2019\par -recommended strep pneumonia vaccines: Prevnar-13 vaccine, followed by Pneumo vaccine 23 one year following \par -recommended early intervention for URIs \par -recommended regular osteoporosis evaluations \par -recommended early dermatological evaluations \par -recommended after the age of 50 to the age of 70, colonoscopy every 5 years \par \par Follow-up in 3-4 months with SPI and NiOx\par Patient is encouraged to call or fax the office with any changes, questions, or concerns.\par Explained to the patient in full detail with demonstrations how to use the inhalers and inhaler hygiene.\par -Education provided to the patient regarding their visit and conditions

## 2020-03-17 NOTE — ADDENDUM
[FreeTextEntry1] : Documented by Ramone King acting as a scribe for Dr. Blade Veloz on 03/17/2020.\par \par All medical record entries made by the Scribe were at my, Dr. Blade Veloz's, direction and personally dictated by me on 03/17/2020. I have reviewed the chart and agree that the record accurately reflects my personal performance of the history, physical exam, assessment and plan. I have also personally directed, reviewed, and agree with the discharge instructions.

## 2020-03-17 NOTE — PHYSICAL EXAM
[General Appearance - Well Developed] : well developed [Normal Appearance] : normal appearance [Well Groomed] : well groomed [General Appearance - Well Nourished] : well nourished [No Deformities] : no deformities [General Appearance - In No Acute Distress] : no acute distress [Normal Conjunctiva] : the conjunctiva exhibited no abnormalities [Eyelids - No Xanthelasma] : the eyelids demonstrated no xanthelasmas [Normal Oropharynx] : normal oropharynx [III] : III [Neck Appearance] : the appearance of the neck was normal [Neck Cervical Mass (___cm)] : no neck mass was observed [Jugular Venous Distention Increased] : there was no jugular-venous distention [Thyroid Diffuse Enlargement] : the thyroid was not enlarged [Thyroid Nodule] : there were no palpable thyroid nodules [Heart Rate And Rhythm] : heart rate and rhythm were normal [Heart Sounds] : normal S1 and S2 [Respiration, Rhythm And Depth] : normal respiratory rhythm and effort [Exaggerated Use Of Accessory Muscles For Inspiration] : no accessory muscle use [Auscultation Breath Sounds / Voice Sounds] : lungs were clear to auscultation bilaterally [Abdomen Soft] : soft [Abdomen Tenderness] : non-tender [Abdomen Mass (___ Cm)] : no abdominal mass palpated [Abnormal Walk] : normal gait [Gait - Sufficient For Exercise Testing] : the gait was sufficient for exercise testing [Nail Clubbing] : no clubbing of the fingernails [Cyanosis, Localized] : no localized cyanosis [Petechial Hemorrhages (___cm)] : no petechial hemorrhages [No Venous Stasis] : no venous stasis [Skin Lesions] : no skin lesions [No Skin Ulcers] : no skin ulcer [No Xanthoma] : no  xanthoma was observed [Deep Tendon Reflexes (DTR)] : deep tendon reflexes were 2+ and symmetric [Sensation] : the sensory exam was normal to light touch and pinprick [No Focal Deficits] : no focal deficits [Oriented To Time, Place, And Person] : oriented to person, place, and time [Impaired Insight] : insight and judgment were intact [Affect] : the affect was normal [Skin Color & Pigmentation] : normal skin color and pigmentation [Skin Turgor] : normal skin turgor [] : no rash [FreeTextEntry1] : I:E 1:3; clear

## 2020-03-17 NOTE — PROCEDURE
[FreeTextEntry1] : FENO was 17; a normal value being less than 25\par Fractional exhaled nitric oxide (FENO) is regarded as a simple, noninvasive method for assessing eosinophilic airway inflammation. Produced by a variety of cells within the lung, nitric oxide (NO) concentrations are generally low in healthy individuals. However, high concentrations of NO appear to be involved in nonspecific host defense mechanisms and chronic inflammatory diseases such as asthma. The American Thoracic Society (ATS) therefore has recommended using FENO to aid in the diagnosis and monitoring of eosinophilic airway inflammation and asthma, and for identifying steroid responsive individuals whose chronic respiratory symptoms may be caused by airway inflammation.

## 2020-03-17 NOTE — HISTORY OF PRESENT ILLNESS
[FreeTextEntry1] : Mr. QUINTANA is a 39 year year old male with a history of emergent type A aortic dissection repair and replacement 12/2018, HTN, azoospermia, elevated cholesterol, asthma, atopic dermatitis, OW, snoring, and SOB who presents for a follow up pulmonary re-evaluation. His chief complaint is nasal congestion.\par -he has been using Dupixent, and he is feeling improved. He missed one dose of his shot.\par -he notes his sinuses are stuffy, as much as usual, and his sense of smell is poor. He is using 2 nasal sprays\par -he notes his itchiness on his legs have returned during the last 3 weeks\par -he denies taking any new medications, vitamins, or supplements, except for a multivitamin and fish oil\par -he reports having a dry mouth\par -he reports he is sleeping well, with about 7 hours of sleep\par -he notes he is not exercising as much as he would like. He returned to the surgeon, and was cleared to lift weights\par -he notes his asthma is under better control with dupixent, and is now able to walk up stairs without becoming SOB\par -he denies any sour taste in the mouth, leg swelling, leg pain, heartburn, reflux, myalgias or arthralgias.

## 2020-03-17 NOTE — REVIEW OF SYSTEMS
[Nasal Congestion] : nasal congestion [Dry Mouth] : dry mouth [Itch] : itch [Negative] : Endocrine [SOB on Exertion] : no sob on exertion [Edema] : no edema

## 2020-04-02 NOTE — PHYSICAL THERAPY INITIAL EVALUATION ADULT - ONSET DATE, REHAB EVAL
Patient left a message stating she had a hip replacement about 2.5 weeks ago. Patient had a RTHA with Dr. De Souza on 3/16/2020. Patient reports she removed her bandage on Monday and steri strips remain that are slowly falling off. She is wondering when she can put hand cream on the incision or if she should leave it alone.    08-Dec-2018

## 2020-04-08 ENCOUNTER — APPOINTMENT (OUTPATIENT)
Dept: CARDIOLOGY | Facility: CLINIC | Age: 40
End: 2020-04-08

## 2020-04-30 ENCOUNTER — APPOINTMENT (OUTPATIENT)
Dept: UROLOGY | Facility: CLINIC | Age: 40
End: 2020-04-30
Payer: COMMERCIAL

## 2020-04-30 ENCOUNTER — APPOINTMENT (OUTPATIENT)
Dept: UROLOGY | Facility: CLINIC | Age: 40
End: 2020-04-30

## 2020-04-30 PROCEDURE — 99214 OFFICE O/P EST MOD 30 MIN: CPT | Mod: 95

## 2020-04-30 NOTE — ASSESSMENT
[FreeTextEntry1] : The patient presents for telehealth consultation.  He had been injecting 150 units gonyl F 3x/week and Novarel 4000 units 3 times weekly and has been doing very well from a energy and libido standpoint.  However, he recently changed pharmacies and has been having trouble getting authorization for his Gonal-f and therefore has not been taking it.  He has also not obtained a semen analysis.  His recent blood studies were good.  His erections have been greatly improved as well. \par \par I have put him in touch with  [facility] for prior authorization.  I renewed his  Novarel.  We discussed that he needs a semen analysis at this time with possible sperm cryopreservation.  I gave him numbers for several labs to see which is open during this corona virus health emergency.  I will discuss with him the results of the semen analysis when they return.  We will also keep me updated with the prior authorization for the Gonal-f.  We will obtain repeat blood testing in 2 to 3 months.\par \par REF#: [id number]\par \par Telehealth Consultation: 30 minutes  10 minutes reviewing his history and discussing prior results.  20 minutes discussing various treatment options, writing medication prescriptions (HCS), and writing his note. There was also additional time in preparing for the visit and assisting the patient with technology issues he was having with the telehealth platform.\par \par REF: [id number]

## 2020-05-28 ENCOUNTER — APPOINTMENT (OUTPATIENT)
Dept: CARDIOLOGY | Facility: CLINIC | Age: 40
End: 2020-05-28
Payer: COMMERCIAL

## 2020-05-28 ENCOUNTER — NON-APPOINTMENT (OUTPATIENT)
Age: 40
End: 2020-05-28

## 2020-05-28 VITALS
HEART RATE: 65 BPM | HEIGHT: 75 IN | BODY MASS INDEX: 38.67 KG/M2 | OXYGEN SATURATION: 97 % | TEMPERATURE: 97.2 F | SYSTOLIC BLOOD PRESSURE: 103 MMHG | WEIGHT: 311 LBS | DIASTOLIC BLOOD PRESSURE: 65 MMHG

## 2020-05-28 PROCEDURE — 93000 ELECTROCARDIOGRAM COMPLETE: CPT

## 2020-05-28 PROCEDURE — 99214 OFFICE O/P EST MOD 30 MIN: CPT

## 2020-06-17 ENCOUNTER — APPOINTMENT (OUTPATIENT)
Dept: PULMONOLOGY | Facility: CLINIC | Age: 40
End: 2020-06-17
Payer: COMMERCIAL

## 2020-06-17 VITALS
OXYGEN SATURATION: 98 % | WEIGHT: 311 LBS | RESPIRATION RATE: 17 BRPM | DIASTOLIC BLOOD PRESSURE: 80 MMHG | TEMPERATURE: 98.7 F | SYSTOLIC BLOOD PRESSURE: 120 MMHG | HEART RATE: 66 BPM | BODY MASS INDEX: 38.67 KG/M2 | HEIGHT: 75 IN

## 2020-06-17 PROCEDURE — 99214 OFFICE O/P EST MOD 30 MIN: CPT

## 2020-06-17 NOTE — HISTORY OF PRESENT ILLNESS
[FreeTextEntry1] : Mr. QUINTANA is a 39 year year old male with a history of emergent type A aortic dissection repair and replacement 12/2018, HTN, azoospermia, elevated cholesterol, asthma, atopic dermatitis, OW, snoring, and SOB who presents for a follow up pulmonary re-evaluation. His chief complaint is rash. Pt states:\par -he is always constipated\par -he f/u [facility] in 3 weeks\par    -he notes his BP was 107/70 \par -he feels like "he doesn't want to do anything"\par -would like to change his weight, he is stable\par -his sinuses are bleeding \par -breathing is under control, continues Dupixent x 2/mo\par -he has not done the home sleep study\par -he is taking omega-3, multi vitamins \par -he sleeps 5-7 hours/night \par -he continues working in IT\par -notes diffused rash on LLE, groin and back for years\par -today he denies any headaches, nausea, vomiting, fever, chills, sweats, chest pain, chest pressure, diarrhea, constipation, dysphagia, dizziness, leg swelling, leg pain, itchy eyes, itchy ears, heartburn, reflux, or sour taste in the mouth.

## 2020-06-17 NOTE — ADDENDUM
[FreeTextEntry1] : Documented by Jt Basurto acting as a scribe for Dr. Blade Veloz on 06/17/2020.\par \par All medical record entries made by the Scribe were at my, Dr. Blade Veloz's, direction and personally dictated by me on 06/17/2020. I have reviewed the chart and agree that the record accurately reflects my personal performance of the history, physical exam, assessment and plan. I have also personally directed, reviewed, and agree with the discharge instructions.

## 2020-06-17 NOTE — ASSESSMENT
[FreeTextEntry1] : Mr. QUINTANA is a 39 year year old male with a history of emergent type A aortic dissection repair and replacement 12/2018, HTN, azoospermia, elevated cholesterol, asthma, atopic dermatitis, OW who presents for a follow up pulmonary re-evaluation. His chief complaint is his weight / itchy sensation (rash) / sinus issues (still - despite Dupixent)/(still)\par \par The patient's SOB is felt to be multifactorial:\par - Overweight /  Out-of-shape\par - Poor breathing mechanics\par - Restrictive lung dysfunction -(posture)\par - Mild Intermittent Asthma\par - Cardiac Disease (Dr. Trey Johnson)\par \par Problem 1: No tracheomalacia\par -s/p Dynamic CT - no evidence of Dz\par -Tracheomalacia is usually acquired in adults and common causes include damage by tracheostomy or endotracheal intubation damaging the tracheal cartilage with increase risk with multiple intubations, prolonged intubation, and concurrent high dose steroid therapy; external chest wall trauma and surgery; chronic compression of the trachea by benign etiologies (eg, benign mediastinal goiter) or malignancy; relapsing polychondritis; or recurrent infection. Tracheomalacia can be asymptomatic, however signs or symptoms can develop as the severity of the airway narrowing progresses with major symptoms include dyspnea, cough, and sputum retention. Other symptoms include severe paroxysms of coughing, wheezing or stridor, barking cough and may be exacerbated by forced expiration, cough, and valsalva maneuver. Tracheomalacia is diagnosed by a bronchoscopic visualization of dynamic airway collapse on dynamic chest CT. Therapy is warranted in symptomatic patients with severe tracheomalacia and includes surgical repair as tracheobronchoplasty. The patient was referred to Dr. Deshawn Hammond or Dr. Bob Grajeda, at [facility] for a surgical consult. \par \par Problem 2a: moderate-severe persistent asthma -(stable) - improved\par -s/p Blood work for allergies: asthma panel, food IgE level (wnl), eosinophil level, Vitamin D level (wnl). - all negative\par -continue Bevespi 2 inhalations BID\par -continue Alvesco 160 mg 2 inhalations BID\par -continue Singulair 10 mg QHS\par -continue DuoNeb by nebulizer, prn when sick\par -Asthma is believed to be caused by inherited (genetic) and environmental factor, but its exact cause is unknown. Asthma may be triggered by allergens, lung infections, or irritants in the air. Asthma triggers are different for each person.\par -Inhaler technique reviewed as well as oral hygiene techniques reviewed with patient. Avoidance of cold air, extremes of temperature, rescue inhaler should be used before exercise. Order of medication reviewed with patient. Recommended use of a cool mist humidifier in the bedroom. 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 Compared to a placebo, patients with severe asthma receiving Nucala had fewer exacerbation requiring hospitalization and/or emergency department visits, and a longer time to first exacerbation. In addition, patients with severe asthma receiving Nucala or Fasenra experienced greater reductions in their daily maintenance oral corticosteroid dose, while maintaining asthma control compared with patients receiving placebo. Treatment with Nucala did not result in a significant improvement in lung function, as measured by the volume of air exhaled by patients in one second. The most common side effects include: headache, injection site reactions, back pain, weakness, and fatigue; hypersensitivity reactions can occur within hours or days including swelling of the face, mouth, and tongue, fainting, dizziness, hives, breathing problems, and rash; herpes zoster infections have occurred. The drug is a monoclonal antibody that inhibits interleukin -5 which helps regular eosinophils, a type of white blood cell that contributes to asthma. The over-production of eosinophils can cause inflammation in the lungs, increasing the frequency of asthma attacks. Patients must also take other medications, including high dose inhaled corticosteroids and at least one additional asthma drug \par \par Problem 2c: atopic dermatitis\par -functional medicine diet\par -recommend read "wheat belly"\par \par Problem 3: atopic dermatitis -(eosinophilic)\par -s/p blood work: IgE level (-), eosinophil level (+), vitamin D levels (-)\par - Dupixent since 1/2020\par -Dupixent is a prescription medicine used with other asthma medicines for the maintenance treatment of moderate-to-severe asthma in people aged 12 years and older whose asthma is not controlled with their current asthma medicines. Dupixent helps prevent severe asthma attacks (exacerbations) and can improve your breathing. Dupixent may also help reduce the amount of oral corticosteroids you need while preventing severe asthma attacks and improving your breathing. Dupixent is not used to treat sudden breathing problems. Risks and side effect of Dupixent were discussed and reviewed with patient. \par \par Problem 4: allergy / sinus\par -continue Olopatadine 0.6% 1 sniff/nostril BID\par -continue Omnaris 1 sniff BID\par -continue Clarinex 5 mg QAM\par -recommended to use "Navage" nasal rinse device \par \par Environmental measures for allergies were encouraged including mattress and pillow cover, air purifier, and environmental controls. \par \par Problem 5: r/o sleep apnea (vintuox)\par -complete a home sleep study due to his EDS and snoring \par Sleep apnea is associated with adverse clinical consequences which an affect most organ systems. Cardiovascular disease risk includes arrhythmias, atrial fibrillation, hypertension, coronary artery disease, and stroke. Metabolic disorders include diabetes type 2, non-alcoholic fatty liver disease. Mood disorder especially depression; and cognitive decline especially in the elderly. Associations with chronic reflux/Wright’s esophagus some but not all inclusive. \par -Reasons include arousal consistent with hypopnea; respiratory events most prominent in REM sleep or supine position; therefore sleep staging and body position are important for accurate diagnosis and estimation of AHI.\par -Treatment options discussed including CPAP/BiPAP machine, oral appliance, ProVent therapy, Oxy-Aid by Respitec, new technologies, or positional sleep.Recommended use of the CPAP machine for moderate (AHI >15), moderate to severe (AHI 15-30) and severe patients (AHI > 30). Recommended weight loss which can reduce AHI especially in weight loss of greater than 5% of BMI. Positional sleep is recommended in those with low AHI, low-moderate BMI, and younger age. For severe sleep apnea, the hypoglossal nerve stimulator was recommended as well. \par \par Problem 6: thoracic aortic aneurysm\par -follow-up with Dr. Scar Toledo\par \par Problem 7: cardiac disease\par -follow-up with cardiologist (Dr. Trey Johnson)\par \par Problem 8: poor mechanics of breathing\par Proper breathing techniques were reviewed with an emphasis of exhalation. Patient instructed to breath in for 1 second and out for four seconds. Patient was encouraged to not talk while walking. \par \par Problem 9: overweight / out of shape\par Weight loss, exercise, and diet control were discussed and are highly encouraged. Treatment options were given such as, aqua therapy, and contacting a nutritionist. Recommended to use the elliptical, stationary bike, less use of treadmill. Mindful eating was explained to the patient Obesity is associated with worsening asthma, shortness of breath, and potential for cardiac disease, diabetes, and other underlying medical conditions. \par \par Problem 10: Health Maintenance/COVID19 Precautions \par - Clean your hands often. Wash your hands often with soap and water for at least 20 seconds, especially after blowing your nose, coughing, or sneezing, or having been in a public place.\par - If soap and water are not available, use a hand  that contains at least 60% alcohol.\par - To the extent possible, avoid touching high-touch surfaces in public places - elevator buttons, door handles, handrails, handshaking with people, etc. Use a tissue or your sleeve to cover your hand or finger if you must touch something.\par - Wash your hands after touching surfaces in public places.\par - Avoid touching your face, nose, eyes, etc.\par - Clean and disinfect your home to remove germs: practice routine cleaning of frequently touched surfaces (for example: tables, doorknobs, light switches, handles, desks, toilets, faucets, sinks & cell phones)\par - Avoid crowds, especially in poorly ventilated spaces. Your risk of exposure to respiratory viruses like COVID-19 may increase in crowded, closed-in settings with little air circulation if there are people in the crowd who are sick. All patients are recommended to practice social distancing and stay at least 6 feet away from others. \par - Avoid all non-essential travel including plane trips, and especially avoid embarking on cruise ships.\par -If COVID-19 is spreading in your community, take extra measures to put distance between yourself and other people to further reduce your risk of being exposed to this new virus.\par -Stay home as much as possible.\par - Consider ways of getting food brought to your house through family, social, or commercial networks\par -Be aware that the virus has been known to live in the air up to 3 hours post exposure, cardboard up to 24 hours post exposure, copper up to 4 hours post exposure, steel and plastic up to 2-3 days post exposure. Risk of transmission from these surfaces are affected by many variables.\par COVID-19 precautionary Immune Support Recommendations:\par -OTC Vitamin C 500mg BID \par -OTC Quercetin 250-500mg BID \par -OTC Zinc 75-100mg per day \par -OTC Melatonin 1 or 2mg a night \par -OTC Vitamin D 1-4000mg per day \par -OTC Tonic Water 8oz per day\par -Water 0.5-1 gallon per day\par Asthma and COVID19:\par You need to make sure your asthma is under control. This often requires the use of inhaled corticosteroids (and sometimes oral corticosteroids). Inhaled corticosteroids do not likely reduce your immune system’s ability to fight infections, but oral corticosteroids may. It is important to use the steps above to protect yourself to limit your exposure to any respiratory virus. \par \par Problem 11: health maintenance\par -recommend Dr. Ashish Wallace as PCP\par -refused influenza vaccine 2019\par -recommended strep pneumonia vaccines: Prevnar-13 vaccine, followed by Pneumo vaccine 23 one year following \par -recommended early intervention for URIs \par -recommended regular osteoporosis evaluations \par -recommended early dermatological evaluations \par -recommended after the age of 50 to the age of 70, colonoscopy every 5 years \par \par Follow-up in 3-4 months with SPI and NiOx\par Patient is encouraged to call or fax the office with any changes, questions, or concerns.\par Explained to the patient in full detail with demonstrations how to use the inhalers and inhaler hygiene.\par -Education provided to the patient regarding their visit and conditions

## 2020-06-17 NOTE — PHYSICAL EXAM
[General Appearance - Well Developed] : well developed [Well Groomed] : well groomed [Normal Appearance] : normal appearance [No Deformities] : no deformities [General Appearance - Well Nourished] : well nourished [General Appearance - In No Acute Distress] : no acute distress [Normal Conjunctiva] : the conjunctiva exhibited no abnormalities [Eyelids - No Xanthelasma] : the eyelids demonstrated no xanthelasmas [III] : III [Normal Oropharynx] : normal oropharynx [Neck Appearance] : the appearance of the neck was normal [Jugular Venous Distention Increased] : there was no jugular-venous distention [Thyroid Diffuse Enlargement] : the thyroid was not enlarged [Neck Cervical Mass (___cm)] : no neck mass was observed [Heart Rate And Rhythm] : heart rate and rhythm were normal [Heart Sounds] : normal S1 and S2 [Thyroid Nodule] : there were no palpable thyroid nodules [Respiration, Rhythm And Depth] : normal respiratory rhythm and effort [Exaggerated Use Of Accessory Muscles For Inspiration] : no accessory muscle use [Auscultation Breath Sounds / Voice Sounds] : lungs were clear to auscultation bilaterally [Abdomen Soft] : soft [Abdomen Tenderness] : non-tender [Abdomen Mass (___ Cm)] : no abdominal mass palpated [Gait - Sufficient For Exercise Testing] : the gait was sufficient for exercise testing [Abnormal Walk] : normal gait [Cyanosis, Localized] : no localized cyanosis [Nail Clubbing] : no clubbing of the fingernails [Petechial Hemorrhages (___cm)] : no petechial hemorrhages [No Venous Stasis] : no venous stasis [Skin Lesions] : no skin lesions [No Xanthoma] : no  xanthoma was observed [No Skin Ulcers] : no skin ulcer [No Focal Deficits] : no focal deficits [Sensation] : the sensory exam was normal to light touch and pinprick [Deep Tendon Reflexes (DTR)] : deep tendon reflexes were 2+ and symmetric [Oriented To Time, Place, And Person] : oriented to person, place, and time [Affect] : the affect was normal [Impaired Insight] : insight and judgment were intact [Skin Color & Pigmentation] : normal skin color and pigmentation [] : no rash [Skin Turgor] : normal skin turgor [FreeTextEntry1] : atopic dermatitis - diffused rash on LLE, groin and back for years

## 2020-06-24 ENCOUNTER — APPOINTMENT (OUTPATIENT)
Dept: UROLOGY | Facility: CLINIC | Age: 40
End: 2020-06-24
Payer: COMMERCIAL

## 2020-06-24 ENCOUNTER — TRANSCRIPTION ENCOUNTER (OUTPATIENT)
Age: 40
End: 2020-06-24

## 2020-06-24 PROCEDURE — 99214 OFFICE O/P EST MOD 30 MIN: CPT | Mod: 95

## 2020-06-24 NOTE — PHYSICAL EXAM
[General Appearance - Well Developed] : well developed [Normal Appearance] : normal appearance [Well Groomed] : well groomed [General Appearance - Well Nourished] : well nourished [Oriented To Time, Place, And Person] : oriented to person, place, and time [General Appearance - In No Acute Distress] : no acute distress [Affect] : the affect was normal [Mood] : the mood was normal [Not Anxious] : not anxious

## 2020-06-24 NOTE — HISTORY OF PRESENT ILLNESS
[Other Location: e.g. Home (Enter Location, City,State)___] : at [unfilled] [Home] : at home, [unfilled] , at the time of the visit. [Self] : self [Patient] : the patient [FreeTextEntry1] : The patient-doctor. relationship has been established in a face-to-face fashion on real-time video audio HIPAA compliant communication using telemedicine software.  The patient's identity has been confirmed.  The patient was previously emailed a copy of the telemedicine consent.  The patient has had a chance to review and has now given verbal consent and has requested care to be assessed and treated through telemedicine. The patient understands there may be limitations in this process and that they need not need further follow-up care in the office and/or hospital settings. \par \par Verbal consent was given on Thursday, April 30, 2020 at 11:38 AM by the patient.  It was requested by the physician.  A written consent was previously sent for the patient to sign and return.\par \par The patient presents for telehealth consultation.  He has been off the FSH analog since November.  He has had insurance problems.  He last took his Novarel this past Saturday.  He is feeling well.  He has not yet obtained blood tests and or a semen analysis as requested.  He had been injecting 150 units gonyl F 3x/week and Novarel 4000 units 3 times weekly  His erections have been greatly improved as well. \par \par PMH: Patient initially presented with hypogonadotrophic hypogonadism presents for followup. He stated that in December he had an aortic dissection. He required an open repair. He has been doing well since. He's been on pregnyl 3 times weekly since March of 2018, 4000 units 3x weekly. His T was 288 in Feb 2019. He has a partner and would like to have future paternity. 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 He was first seen when he was 36 years old and his wife wis 27 years old. She has had 2 prior pregnancies. He has not had any children from any relationship. He would like to conceive with his partner.\par \par He was previously diagnosed with hypogonadotropic hypogonadism at age ~13. He has sense of smell. 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 DEXA was normal. He has seen multiple endocrinologists in the past as well as Dr. Tate recently. He was previously on gonadotropins as a teenager, then subsequently transitioned to androgel then back to HCG and Menopur. He is off all medications since August 2017.\par \par He underwent a testis biopsy (details unclear from patient) but no sperm was cryopreserved. He was unable to afford gonadotropins due to insurance issues. Is currently on Medicaid.\par

## 2020-06-24 NOTE — ASSESSMENT
[FreeTextEntry1] : The patient presents for telehealth consultation.  He has been off the FSH analog since November.  He has had insurance problems.  He last took his Novarel this past Saturday.  He is feeling well.  He has not yet obtained blood tests and or a semen analysis as requested.  He had been injecting 150 units gonyl F 3x/week and Novarel 4000 units 3 times weekly  His erections have been greatly improved as well. \par \par Alannah for prior authorization.  I renewed his  Novare and Follistim which seems to be covered by his insurance.  I have also requested blood studies to be done in approximately 3 weeks and then to discuss them with me via telehealth in 4 to 6 weeks.  We discussed that he needs a semen analysis at this time with possible sperm cryopreservation.  I gave him numbers for several labs.  I will discuss with him the results of the semen analysis when they return. \par \par REF: [id number]\par \par Telehealth Consultation: 30 minutes  10 minutes reviewing his history and discussing prior results.  20 minutes discussing various treatment options, writing medication prescriptions (HCS), and writing his note. There was also additional time in preparing for the visit and assisting the patient with technology issues he was having with the telehealth platform.\par \par REF: [id number]

## 2020-06-30 ENCOUNTER — APPOINTMENT (OUTPATIENT)
Dept: CT IMAGING | Facility: IMAGING CENTER | Age: 40
End: 2020-06-30
Payer: COMMERCIAL

## 2020-06-30 ENCOUNTER — RESULT REVIEW (OUTPATIENT)
Age: 40
End: 2020-06-30

## 2020-06-30 ENCOUNTER — RX RENEWAL (OUTPATIENT)
Age: 40
End: 2020-06-30

## 2020-06-30 ENCOUNTER — OUTPATIENT (OUTPATIENT)
Dept: OUTPATIENT SERVICES | Facility: HOSPITAL | Age: 40
LOS: 1 days | End: 2020-06-30
Payer: COMMERCIAL

## 2020-06-30 DIAGNOSIS — Z98.890 OTHER SPECIFIED POSTPROCEDURAL STATES: Chronic | ICD-10-CM

## 2020-06-30 DIAGNOSIS — I71.9 AORTIC ANEURYSM OF UNSPECIFIED SITE, WITHOUT RUPTURE: ICD-10-CM

## 2020-06-30 PROCEDURE — 71275 CT ANGIOGRAPHY CHEST: CPT | Mod: 26

## 2020-06-30 PROCEDURE — 74174 CTA ABD&PLVS W/CONTRAST: CPT

## 2020-06-30 PROCEDURE — 82565 ASSAY OF CREATININE: CPT

## 2020-06-30 PROCEDURE — 74174 CTA ABD&PLVS W/CONTRAST: CPT | Mod: 26

## 2020-06-30 PROCEDURE — 71275 CT ANGIOGRAPHY CHEST: CPT

## 2020-07-01 ENCOUNTER — APPOINTMENT (OUTPATIENT)
Dept: CARDIOTHORACIC SURGERY | Facility: CLINIC | Age: 40
End: 2020-07-01
Payer: COMMERCIAL

## 2020-07-01 ENCOUNTER — TRANSCRIPTION ENCOUNTER (OUTPATIENT)
Age: 40
End: 2020-07-01

## 2020-07-01 PROCEDURE — 99214 OFFICE O/P EST MOD 30 MIN: CPT | Mod: 95

## 2020-07-02 NOTE — REASON FOR VISIT
[Follow-Up: _____] : a [unfilled] follow-up visit [Medical Office: ([facility])___] : at the medical office located in  [Home] : at home, [unfilled] , at the time of the visit. [Verbal consent obtained from patient] : the patient, [unfilled]

## 2020-07-07 NOTE — ASSESSMENT
[FreeTextEntry1] : 39 year old male with a past medical history of Kallmann syndrome, HTN, HLD, asthma, hypogonadism, obesity, ?MARYLOU, s/p Emergency repair of type A aortic dissection with ascending aorta and hemiarch replacement on 12/8/18 by Dr. Scar Toledo. Patient presents for a follow up appointment. \par \par CTA chest abdomen pelvis 06/30/2020:\par   Aortic root: 5.0 cm, Previously 4.7 cm\par   Mid-ascending aorta: 3.3 cm, Previously 3.3 cm\par   Transverse aortic arch: 3.8 cm, Previously 3.7 cm\par   Mid-descending aorta: 4.7 cm, Previously 3.9 cm\par   Level of diaphragm: 3.0 cm, Previously 3.0 cm\par Dissection flap terminates just below the renal artery origin. Aortic measurements at the level above, at, and below the renal arteries are 3.3 cm, 3.2 cm, and 3.0 cm.\par My measurement of the aortic root is 4.8cm. \par \par The patient's medical records and diagnostic images were reviewed at the time of this office visit, and the following recommendation was made. Review of the imaging shows aortic pathology has remained stable and does not require surgical intervention at this time.\par \par Plan:\par 1. Continue medication regimen\par 2. Follow up with PCP and cardiologist\par 3. BP control- I have recommended the patient to monitor his blood pressure closely. I have also advised the patient to take daily blood pressures at home and adhere to medication regimen.\par 4. Return to [facility] in one year with CTA chest, abdomen and pelvis. \par

## 2020-07-07 NOTE — PROCEDURE
[FreeTextEntry1] : \par Dr. John discussed activity restrictions with the patient, and would advise exercise at a moderate amount with no heavy lifting over one third of body weight, and avoiding heart rates that exceed 140 beats per minute. Every patient must abstain from tobacco and illicit drug use, especially stimulants such as cocaine or methamphetamine. The patient was also counseled on maintaining a healthy heart diet, and losing any excessive weight as this also put undue stress on both the aorta and entire cardiovascular system. Patient was counseled on the importance of medication adherence for blood pressure control, as hypertension is a significant risk factor associated with aortic dissections. First degree family members should be screened for bicuspid valve disease, and ascending aortic aneurysms.\par \par Patient also was advised to view our educational video prior to this visit regarding aortic pathology, lifestyle modifications, risk factors and procedures, retrieved at [email or web address].\par \par

## 2020-07-07 NOTE — HISTORY OF PRESENT ILLNESS
[FreeTextEntry1] : 39 year old male with a past medical history of Kallmann syndrome, HTN, HLD, asthma, hypogonadism, obesity, ?MARYLOU, s/p Emergency repair of type A aortic dissection with ascending aorta and hemiarch replacement on 12/8/18 by Dr. Scar Toledo. Patient presents for aortic followup, as originally referred by Dr. Trey Johnson. Post op course significant for cardiogenic shock requiring pressor support, hypertension requiring cardene gtt, volume overload, and leukocytosis. \par \par CTA chest abdomen pelvis 06/30/2020:\par   Aortic root: 5.0 cm, Previously 4.7 cm\par   Mid-ascending aorta: 3.3 cm, Previously 3.3 cm\par   Transverse aortic arch: 3.8 cm, Previously 3.7 cm\par   Mid-descending aorta: 4.7 cm, Previously 3.9 cm\par   Level of diaphragm: 3.0 cm, Previously 3.0 cm\par Dissection flap terminates just below the renal artery origin. Aortic measurements at the level above, at, and below the renal arteries are 3.3 cm, 3.2 cm, and 3.0 cm.\par My measurement of the aortic root is 4.8cm. \par \par Patient is doing well and denies recent hospitalization, ER visits, or surgeries. He  denies fever, chills, fatigue, headache, blurred vision, dizziness, syncope, chest pain, palpitations, shortness of breath, orthopnea, paroxysmal nocturnal dyspnea, nausea, vomiting, abdominal pain, back pain, BRBPR or swelling to legs.

## 2020-07-07 NOTE — END OF VISIT
[FreeTextEntry3] : \par I, MJ SHELLU , am scribing for and in the presence of JOSE SALGUERO the following sections: History of present illness, past Medical/family/surgical/family/social history, review of systems, and disposition.\par \par I personally performed the services described in the documentation, reviewed the documentation recorded by the scribe in my presence and it accurately and completely records my words and actions.\par \par \par

## 2020-07-07 NOTE — DATA REVIEWED
[FreeTextEntry1] : \par CT chest without contrast 7/13/19: residual dissection remains within the aortic arch. \par * Aortic root: 4.7 cm at the sinuses of Valsalva \par * Ascending aorta at the main pulmonary artery: 3.3 cm \par * Aortic arch just proximal to the brachiocephalic artery: 3.5 cm \par * At the aortic isthmus: 3.7 cm \par * Descending thoracic aorta at the main pulmonary artery 3.9 cm \par * Descending thoracic aorta at the left inferior pulmonary vein 3.3 cm \par * Aorta at the diaphragmatic hiatus: 3.0 cm. \par The main pulmonary artery is normal in caliber.\par \par Echocardiogram 12/4/19 revealed:\par -EF 71%. normal trileaflet aortic valve. no aortic valve regurgitation noted. the aortic root measures 4.7cm. \par

## 2020-07-11 ENCOUNTER — RX RENEWAL (OUTPATIENT)
Age: 40
End: 2020-07-11

## 2020-07-22 ENCOUNTER — APPOINTMENT (OUTPATIENT)
Dept: PULMONOLOGY | Facility: CLINIC | Age: 40
End: 2020-07-22
Payer: COMMERCIAL

## 2020-07-22 VITALS — WEIGHT: 310 LBS | BODY MASS INDEX: 38.75 KG/M2

## 2020-07-22 PROCEDURE — 99442: CPT

## 2020-07-22 NOTE — REVIEW OF SYSTEMS
[EDS] : eds [Seasonal Allergies] : seasonal allergies [Nausea] : nausea [Diarrhea] : diarrhea [Negative] : Endocrine

## 2020-07-22 NOTE — DISCUSSION/SUMMARY
[FreeTextEntry1] : Discussed options for mild sleep apnea treatment. \par \par - Explained Dr. Veloz's recommendation for Oral Appliance d/t mild case of sleep apnea with AHI of 10.1.\par - Explained that PAP therapy could also be considered with mild MARYLOU and EDS.\par \par Patient decided to try oral appliance treatment option at this time. \par

## 2020-07-22 NOTE — ASSESSMENT
[FreeTextEntry1] : The plan for the patient is as follows:\par \par 1 MARYLOU:\par - Patient to contact dentists (names and contact information given for Dr. Ferris, Dr. Horton, and Dr. Davidson) to obtain oral appliance to help treat his MARYLOU.\par - After receiving oral appliance, patient to contact office to have f/u HST with device in place to evaluate effectiveness/treatment. \par \par Patient to follow-up with Dr. Veloz as scheduled for October.\par Patient advised to call with further questions or concerns.\par Patient verbalizes understanding and is agreeable. \par \par **I have spent 12 minutes on the phone with patient.**

## 2020-07-22 NOTE — REASON FOR VISIT
[Home] : at home, [unfilled] , at the time of the visit. [Medical Office: ([facility])___] : at the medical office located in  [Verbal consent obtained from patient] : the patient, [unfilled] [Follow-Up] : a follow-up visit [Sleep Apnea] : sleep apnea

## 2020-07-22 NOTE — HISTORY OF PRESENT ILLNESS
[Awakes with Dry Mouth] : awakes with dry mouth [Daytime Somnolence] : daytime somnolence [Obstructive Sleep Apnea] : obstructive sleep apnea [Awakes Unrefreshed] : awakes unrefreshed [Fatigue] : fatigue [Nonrestorative Sleep] : nonrestorative sleep [Snoring] : snoring [Home] : home [TextBox_100] : 7/2/2020 [TextBox_4] : Mr. QUINTANA is a 39 year year old male with a history of emergent type A aortic dissection repair and replacement 12/2018, HTN, azoospermia, elevated cholesterol, asthma, atopic dermatitis, OW, snoring, and SOB who presents for a follow up evaluation via telephone. \par \par His chief complaint is discussing sleep study results. \par \par Patient is s/p HST from 7/2/20. Patient's RDI was 10.1 which is consistent with a a diagnosis of mild MARYLOU.  His sleep disturbances consisted of mostly hypopneas.  His nehemias SPO2 was 81% RA and he spent 7 minutes below 88% SPO2.  His HR ranged from 44-75 BPM during night of study.  Patient notes the night of his study, the piece of equipment he wore around his chest was bothersome to him.\par \par Patient admits to snoring, nonrestorative sleep, EDS, and multiple nighttime awakenings.  He notes that he has experienced increased difficulty concentrating in regards to his [employer] and notices his memory has not been as good as it used to be.  Patient feels that symptoms worsened after his Aortic dissection and resection on 12/8/2018.  He notes he watched a [employer] tele[employer] series on [email or web address] on 2017, but doesn't remember anything about it.\par \par Patient notes yesterday he started to feel nauseous and had experienced loose stools, but believes that it was something he ate.  Patient notes to be feeling improved today. \par \par Patient denies fever/chills, fatigue, decreased appetite, SOB @ rest or exertion, chest tightness, wheezing, cough, nasal or sinus congestion or reflux at this time.  [TextBox_116] : 81 [TextBox_108] : 10.1 [TextBox_112] : 27

## 2020-08-03 ENCOUNTER — TRANSCRIPTION ENCOUNTER (OUTPATIENT)
Age: 40
End: 2020-08-03

## 2020-08-21 LAB
ALBUMIN SERPL ELPH-MCNC: 4.7 G/DL
ALP BLD-CCNC: 91 U/L
ALT SERPL-CCNC: 33 U/L
ANION GAP SERPL CALC-SCNC: 12 MMOL/L
AST SERPL-CCNC: 23 U/L
BASOPHILS # BLD AUTO: 0.05 K/UL
BASOPHILS NFR BLD AUTO: 0.8 %
BILIRUB SERPL-MCNC: 1.4 MG/DL
BUN SERPL-MCNC: 14 MG/DL
CALCIUM SERPL-MCNC: 9.7 MG/DL
CHLORIDE SERPL-SCNC: 101 MMOL/L
CO2 SERPL-SCNC: 27 MMOL/L
CREAT SERPL-MCNC: 0.96 MG/DL
EOSINOPHIL # BLD AUTO: 0.32 K/UL
EOSINOPHIL NFR BLD AUTO: 4.9 %
ESTRADIOL SERPL-MCNC: 27 PG/ML
FSH SERPL-MCNC: 0.4 IU/L
GLUCOSE SERPL-MCNC: 116 MG/DL
HCT VFR BLD CALC: 47.4 %
HGB BLD-MCNC: 15.6 G/DL
IMM GRANULOCYTES NFR BLD AUTO: 0.3 %
LH SERPL-ACNC: 0.4 IU/L
LYMPHOCYTES # BLD AUTO: 1.9 K/UL
LYMPHOCYTES NFR BLD AUTO: 28.9 %
MAN DIFF?: NORMAL
MCHC RBC-ENTMCNC: 28.8 PG
MCHC RBC-ENTMCNC: 32.9 GM/DL
MCV RBC AUTO: 87.5 FL
MONOCYTES # BLD AUTO: 0.55 K/UL
MONOCYTES NFR BLD AUTO: 8.4 %
NEUTROPHILS # BLD AUTO: 3.73 K/UL
NEUTROPHILS NFR BLD AUTO: 56.7 %
PLATELET # BLD AUTO: 226 K/UL
POTASSIUM SERPL-SCNC: 4.8 MMOL/L
PROLACTIN SERPL-MCNC: 8.7 NG/ML
PROT SERPL-MCNC: 7.6 G/DL
RBC # BLD: 5.42 M/UL
RBC # FLD: 13.3 %
SODIUM SERPL-SCNC: 139 MMOL/L
WBC # FLD AUTO: 6.57 K/UL

## 2020-09-01 LAB
TESTOST BND SERPL-MCNC: 7.8 PG/ML
TESTOST SERPL-MCNC: 360.5 NG/DL

## 2020-09-09 ENCOUNTER — RX RENEWAL (OUTPATIENT)
Age: 40
End: 2020-09-09

## 2020-09-11 ENCOUNTER — TRANSCRIPTION ENCOUNTER (OUTPATIENT)
Age: 40
End: 2020-09-11

## 2020-10-02 ENCOUNTER — APPOINTMENT (OUTPATIENT)
Dept: UROLOGY | Facility: CLINIC | Age: 40
End: 2020-10-02
Payer: COMMERCIAL

## 2020-10-02 PROCEDURE — 99214 OFFICE O/P EST MOD 30 MIN: CPT | Mod: 95

## 2020-10-02 NOTE — ASSESSMENT
[FreeTextEntry1] : The patient presents for telehealth consultation.  He has been off the FSH analog since November.  He has had insurance problems.  He last took his Novarel two weeks prior.  He is feeling well.  He had recent blood test which we needed to review today..  He had been injecting 150 units gonyl F 3x/week and Novarel 4000 units 3 times weekly  His erections have been greatly improved as well. \par \par Alannah for prior authorization.  I renewed his  Novarell and Follistim which seems to be covered by his insurance.  I have also requested blood studies to be done in approximately 3 weeks and then to discuss them with me via telehealth in 4 to 6 weeks.  We discussed that he needs a semen analysis at this time with possible sperm cryopreservation.  I will request the semen analysis in approximately 1 to 2 months with possible cryopreservation.  He needs to be back on the medication.\par \par REF: [id number]\par \par Telehealth Consultation: 30 minutes  10 minutes reviewing his history and discussing prior results.  20 minutes discussing various treatment options, writing medication prescriptions (HCS), and writing his note. There was also additional time in preparing for the visit and assisting the patient with technology issues he was having with the telehealth platform.\par \par REF: [id number]

## 2020-10-02 NOTE — HISTORY OF PRESENT ILLNESS
[Home] : at home, [unfilled] , at the time of the visit. [Other Location: e.g. Home (Enter Location, City,State)___] : at [unfilled] [Patient] : the patient [Self] : self [FreeTextEntry1] : The patient-doctor. relationship has been established in a face-to-face fashion on real-time video audio HIPAA compliant communication using telemedicine software.  The patient's identity has been confirmed.  The patient was previously emailed a copy of the telemedicine consent.  The patient has had a chance to review and has now given verbal consent and has requested care to be assessed and treated through telemedicine. The patient understands there may be limitations in this process and that they need not need further follow-up care in the office and/or hospital settings. \par \par Verbal consent was given on Thursday, April 30, 2020 at 11:38 AM by the patient.  It was requested by the physician.  A written consent was previously sent for the patient to sign and return.\par \par The patient presents for telehealth consultation.  He has been off the FSH analog since November.  He has had insurance problems.  He last took his Novarel two weeks prior.  He is feeling well.  He had recent blood test which we needed to review today..  He had been injecting 150 units gonyl F 3x/week and Novarel 4000 units 3 times weekly  His erections have been greatly improved as well. \par \par PMH: Patient initially presented with hypogonadotrophic hypogonadism presents for followup. He stated that in December he had an aortic dissection. He required an open repair. He has been doing well since. He's been on pregnyl 3 times weekly since March of 2018, 4000 units 3x weekly. His T was 288 in Feb 2019. He has a partner and would like to have future paternity. 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 He was first seen when he was 36 years old and his wife wis 27 years old. She has had 2 prior pregnancies. He has not had any children from any relationship. He would like to conceive with his partner.\par \par He was previously diagnosed with hypogonadotropic hypogonadism at age ~13. He has sense of smell. 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 DEXA was normal. He has seen multiple endocrinologists in the past as well as Dr. Tate recently. He was previously on gonadotropins as a teenager, then subsequently transitioned to androgel then back to HCG and Menopur. He is off all medications since August 2017.\par \par He underwent a testis biopsy (details unclear from patient) but no sperm was cryopreserved. He was unable to afford gonadotropins due to insurance issues. Is currently on Medicaid.\par

## 2020-10-06 ENCOUNTER — TRANSCRIPTION ENCOUNTER (OUTPATIENT)
Age: 40
End: 2020-10-06

## 2020-10-16 ENCOUNTER — TRANSCRIPTION ENCOUNTER (OUTPATIENT)
Age: 40
End: 2020-10-16

## 2020-10-19 ENCOUNTER — APPOINTMENT (OUTPATIENT)
Dept: PULMONOLOGY | Facility: CLINIC | Age: 40
End: 2020-10-19
Payer: COMMERCIAL

## 2020-10-19 VITALS
SYSTOLIC BLOOD PRESSURE: 118 MMHG | WEIGHT: 300 LBS | TEMPERATURE: 97.6 F | RESPIRATION RATE: 17 BRPM | BODY MASS INDEX: 37.3 KG/M2 | HEIGHT: 75 IN | DIASTOLIC BLOOD PRESSURE: 80 MMHG | OXYGEN SATURATION: 98 % | HEART RATE: 71 BPM

## 2020-10-19 PROCEDURE — 99214 OFFICE O/P EST MOD 30 MIN: CPT | Mod: 25

## 2020-10-19 PROCEDURE — 99072 ADDL SUPL MATRL&STAF TM PHE: CPT

## 2020-10-19 RX ORDER — MOMETASONE FUROATE 1 MG/G
0.1 CREAM TOPICAL TWICE DAILY
Qty: 3 | Refills: 1 | Status: ACTIVE | COMMUNITY
Start: 2020-10-19 | End: 1900-01-01

## 2020-10-19 NOTE — ADDENDUM
[FreeTextEntry1] : Documented by Avila Diaz acting as a scribe for Dr. Blade Veloz on 10/19/2020.\par \par All medical record entries made by the Scribe were at my, Dr. Blade Veloz's, direction and personally dictated by me on 10/19/2020 . I have reviewed the chart and agree that the record accurately reflects my personal performance of the history, physical exam, assessment and plan. I have also personally directed, reviewed, and agree with the discharge instructions. \par

## 2020-10-19 NOTE — PROCEDURE
[FreeTextEntry1] : \par Sleep study (7.2.2020) revealed sleep apnea with an AHI/ETTA of 10.1 and a low oxygen saturation of 81%\par \par CT Chest (6.30.2020) reveals dilated aortic root again seen at the level of the sinuses of Valsalva measuring 5.0 cm, previously 4.7 cm. increased diameter of the mid descending thoracic aorta measuring 4.7 cm, previously 4.9 cm. otherwise stable aortic measurements as above. \par  ANXIETY

## 2020-10-19 NOTE — ASSESSMENT
[FreeTextEntry1] : Mr. QUINTANA is a 39 year old male with a history of emergent type A aortic dissection repair and replacement 12/2018, HTN, azoospermia, elevated cholesterol, asthma, atopic dermatitis, OW who presents for a follow up pulmonary re-evaluation. His chief complaint is his itchy sensation (rash) / sinus issues (off Dupixent)\par \par The patient's SOB is felt to be multifactorial:\par - Overweight /  Out-of-shape\par - Poor breathing mechanics\par - Restrictive lung dysfunction -(posture)\par - Mild Intermittent Asthma\par - Cardiac Disease (Dr. Trey Johnson)\par \par Problem 1: No tracheomalacia\par -s/p Dynamic CT - no evidence of Dz\par -Tracheomalacia is usually acquired in adults and common causes include damage by tracheostomy or endotracheal intubation damaging the tracheal cartilage with increase risk with multiple intubations, prolonged intubation, and concurrent high dose steroid therapy; external chest wall trauma and surgery; chronic compression of the trachea by benign etiologies (eg, benign mediastinal goiter) or malignancy; relapsing polychondritis; or recurrent infection. Tracheomalacia can be asymptomatic, however signs or symptoms can develop as the severity of the airway narrowing progresses with major symptoms include dyspnea, cough, and sputum retention. Other symptoms include severe paroxysms of coughing, wheezing or stridor, barking cough and may be exacerbated by forced expiration, cough, and valsalva maneuver. Tracheomalacia is diagnosed by a bronchoscopic visualization of dynamic airway collapse on dynamic chest CT. Therapy is warranted in symptomatic patients with severe tracheomalacia and includes surgical repair as tracheobronchoplasty. The patient was referred to Dr. Deshawn Hammond or Dr. Bob Grajeda, at [facility] for a surgical consult. \par \par Problem 2a: moderate-severe persistent asthma -(stable) - improved\par -s/p Blood work for allergies: asthma panel, food IgE level (wnl), eosinophil level, Vitamin D level (wnl). - all negative\par -continue Bevespi 2 inhalations BID\par -continue Alvesco 160 mg 2 inhalations BID\par -continue Singulair 10 mg QHS\par -continue DuoNeb by nebulizer, prn when sick\par -Asthma is believed to be caused by inherited (genetic) and environmental factor, but its exact cause is unknown. Asthma may be triggered by allergens, lung infections, or irritants in the air. Asthma triggers are different for each person.\par -Inhaler technique reviewed as well as oral hygiene techniques reviewed with patient. Avoidance of cold air, extremes of temperature, rescue inhaler should be used before exercise. Order of medication reviewed with patient. Recommended use of a cool mist humidifier in the bedroom. 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 Compared to a placebo, patients with severe asthma receiving Nucala had fewer exacerbation requiring hospitalization and/or emergency department visits, and a longer time to first exacerbation. In addition, patients with severe asthma receiving Nucala or Fasenra experienced greater reductions in their daily maintenance oral corticosteroid dose, while maintaining asthma control compared with patients receiving placebo. Treatment with Nucala did not result in a significant improvement in lung function, as measured by the volume of air exhaled by patients in one second. The most common side effects include: headache, injection site reactions, back pain, weakness, and fatigue; hypersensitivity reactions can occur within hours or days including swelling of the face, mouth, and tongue, fainting, dizziness, hives, breathing problems, and rash; herpes zoster infections have occurred. The drug is a monoclonal antibody that inhibits interleukin -5 which helps regular eosinophils, a type of white blood cell that contributes to asthma. The over-production of eosinophils can cause inflammation in the lungs, increasing the frequency of asthma attacks. Patients must also take other medications, including high dose inhaled corticosteroids and at least one additional asthma drug \par \par Problem 2c: atopic dermatitis\par -functional medicine diet\par -recommend read "wheat belly"\par -recommended borage and flax seed oil\par \par Problem 3: atopic dermatitis -(eosinophilic)\par -s/p blood work: IgE level (-), eosinophil level (+), vitamin D levels (-)\par - Dupixent since 1/2020 (off since 7/2020)\par -Dupixent is a prescription medicine used with other asthma medicines for the maintenance treatment of moderate-to-severe asthma in people aged 12 years and older whose asthma is not controlled with their current asthma medicines. Dupixent helps prevent severe asthma attacks (exacerbations) and can improve your breathing. Dupixent may also help reduce the amount of oral corticosteroids you need while preventing severe asthma attacks and improving your breathing. Dupixent is not used to treat sudden breathing problems. Risks and side effect of Dupixent were discussed and reviewed with patient. \par \par Problem 4: allergy / sinus\par -continue Olopatadine 0.6% 1 sniff/nostril BID\par -continue Omnaris 1 sniff BID\par -continue Clarinex 5 mg QAM\par -recommended mometasone cream\par -recommended to use "Navage" nasal rinse device \par \par Environmental measures for allergies were encouraged including mattress and pillow cover, air purifier, and environmental controls. \par \par Problem 5: (+) sleep apnea (vintuox)\par -s/p a home sleep study due to his EDS and snoring \par -recommended dental device\par Sleep apnea is associated with adverse clinical consequences which an affect most organ systems. Cardiovascular disease risk includes arrhythmias, atrial fibrillation, hypertension, coronary artery disease, and stroke. Metabolic disorders include diabetes type 2, non-alcoholic fatty liver disease. Mood disorder especially depression; and cognitive decline especially in the elderly. Associations with chronic reflux/Wright’s esophagus some but not all inclusive. \par -Reasons include arousal consistent with hypopnea; respiratory events most prominent in REM sleep or supine position; therefore sleep staging and body position are important for accurate diagnosis and estimation of AHI.\par -Treatment options discussed including CPAP/BiPAP machine, oral appliance, ProVent therapy, Oxy-Aid by Respitec, new technologies, or positional sleep.Recommended use of the CPAP machine for moderate (AHI >15), moderate to severe (AHI 15-30) and severe patients (AHI > 30). Recommended weight loss which can reduce AHI especially in weight loss of greater than 5% of BMI. Positional sleep is recommended in those with low AHI, low-moderate BMI, and younger age. For severe sleep apnea, the hypoglossal nerve stimulator was recommended as well. \par \par Problem 6: thoracic aortic aneurysm\par -follow-up with Dr. Scar Toledo\par \par Problem 7: cardiac disease\par -follow-up with cardiologist (Dr. Trey Johnson)\par \par Problem 8: poor mechanics of breathing\par Proper breathing techniques were reviewed with an emphasis of exhalation. Patient instructed to breath in for 1 second and out for four seconds. Patient was encouraged to not talk while walking. \par \par Problem 9: overweight / out of shape\par Weight loss, exercise, and diet control were discussed and are highly encouraged. Treatment options were given such as, aqua therapy, and contacting a nutritionist. Recommended to use the elliptical, stationary bike, less use of treadmill. Mindful eating was explained to the patient Obesity is associated with worsening asthma, shortness of breath, and potential for cardiac disease, diabetes, and other underlying medical conditions. \par \par Problem 10: Health Maintenance/COVID19 Precautions \par - Clean your hands often. Wash your hands often with soap and water for at least 20 seconds, especially after blowing your nose, coughing, or sneezing, or having been in a public place.\par - If soap and water are not available, use a hand  that contains at least 60% alcohol.\par - To the extent possible, avoid touching high-touch surfaces in public places - elevator buttons, door handles, handrails, handshaking with people, etc. Use a tissue or your sleeve to cover your hand or finger if you must touch something.\par - Wash your hands after touching surfaces in public places.\par - Avoid touching your face, nose, eyes, etc.\par - Clean and disinfect your home to remove germs: practice routine cleaning of frequently touched surfaces (for example: tables, doorknobs, light switches, handles, desks, toilets, faucets, sinks & cell phones)\par - Avoid crowds, especially in poorly ventilated spaces. Your risk of exposure to respiratory viruses like COVID-19 may increase in crowded, closed-in settings with little air circulation if there are people in the crowd who are sick. All patients are recommended to practice social distancing and stay at least 6 feet away from others. \par - Avoid all non-essential travel including plane trips, and especially avoid embarking on cruise ships.\par -If COVID-19 is spreading in your community, take extra measures to put distance between yourself and other people to further reduce your risk of being exposed to this new virus.\par -Stay home as much as possible.\par - Consider ways of getting food brought to your house through family, social, or commercial networks\par -Be aware that the virus has been known to live in the air up to 3 hours post exposure, cardboard up to 24 hours post exposure, copper up to 4 hours post exposure, steel and plastic up to 2-3 days post exposure. Risk of transmission from these surfaces are affected by many variables.\par COVID-19 precautionary Immune Support Recommendations:\par -OTC Vitamin C 500mg BID \par -OTC Quercetin 250-500mg BID \par -OTC Zinc 75-100mg per day \par -OTC Melatonin 1 or 2mg a night \par -OTC Vitamin D 1-4000mg per day \par -OTC Tonic Water 8oz per day\par -Water 0.5-1 gallon per day\par Asthma and COVID19:\par You need to make sure your asthma is under control. This often requires the use of inhaled corticosteroids (and sometimes oral corticosteroids). Inhaled corticosteroids do not likely reduce your immune system’s ability to fight infections, but oral corticosteroids may. It is important to use the steps above to protect yourself to limit your exposure to any respiratory virus. \par \par Problem 11: health maintenance\par -recommend Dr. Ashish Wallace as PCP\par -refused influenza vaccine 2020\par -recommended strep pneumonia vaccines: Prevnar-13 vaccine, followed by Pneumo vaccine 23 one year following \par -recommended early intervention for URIs \par -recommended regular osteoporosis evaluations \par -recommended early dermatological evaluations \par -recommended after the age of 50 to the age of 70, colonoscopy every 5 years \par \par Follow-up in 3-4 months with SPI and NiOx\par Patient is encouraged to call or fax the office with any changes, questions, or concerns.\par Explained to the patient in full detail with demonstrations how to use the inhalers and inhaler hygiene.\par -Education provided to the patient regarding their visit and conditions

## 2020-10-19 NOTE — PHYSICAL EXAM
[General Appearance - Well Developed] : well developed [Well Groomed] : well groomed [Normal Appearance] : normal appearance [No Deformities] : no deformities [General Appearance - Well Nourished] : well nourished [General Appearance - In No Acute Distress] : no acute distress [Normal Conjunctiva] : the conjunctiva exhibited no abnormalities [Eyelids - No Xanthelasma] : the eyelids demonstrated no xanthelasmas [Normal Oropharynx] : normal oropharynx [III] : III [Jugular Venous Distention Increased] : there was no jugular-venous distention [Neck Appearance] : the appearance of the neck was normal [Thyroid Diffuse Enlargement] : the thyroid was not enlarged [Neck Cervical Mass (___cm)] : no neck mass was observed [Thyroid Nodule] : there were no palpable thyroid nodules [Heart Sounds] : normal S1 and S2 [Heart Rate And Rhythm] : heart rate and rhythm were normal [Respiration, Rhythm And Depth] : normal respiratory rhythm and effort [Auscultation Breath Sounds / Voice Sounds] : lungs were clear to auscultation bilaterally [Abdomen Soft] : soft [Exaggerated Use Of Accessory Muscles For Inspiration] : no accessory muscle use [Abdomen Tenderness] : non-tender [Abdomen Mass (___ Cm)] : no abdominal mass palpated [Abnormal Walk] : normal gait [Gait - Sufficient For Exercise Testing] : the gait was sufficient for exercise testing [Petechial Hemorrhages (___cm)] : no petechial hemorrhages [Cyanosis, Localized] : no localized cyanosis [Nail Clubbing] : no clubbing of the fingernails [No Venous Stasis] : no venous stasis [Skin Lesions] : no skin lesions [No Xanthoma] : no  xanthoma was observed [No Skin Ulcers] : no skin ulcer [Deep Tendon Reflexes (DTR)] : deep tendon reflexes were 2+ and symmetric [Sensation] : the sensory exam was normal to light touch and pinprick [No Focal Deficits] : no focal deficits [Impaired Insight] : insight and judgment were intact [Oriented To Time, Place, And Person] : oriented to person, place, and time [Skin Color & Pigmentation] : normal skin color and pigmentation [Skin Turgor] : normal skin turgor [Affect] : the affect was normal [] : no rash [FreeTextEntry1] : atopic dermatitis - diffused rash on LLE, groin and back for years

## 2020-10-19 NOTE — HISTORY OF PRESENT ILLNESS
[FreeTextEntry1] : Mr. QUINTANA is a 39 year old male with a history of emergent type A aortic dissection repair and replacement 12/2018, HTN, azoospermia, elevated cholesterol, asthma, atopic dermatitis, OW, snoring, and SOB who presents for a follow up pulmonary re-evaluation. His chief complaint is \par \par -he notes skin rash on elbows, behind legs, on lower back exacerbated worse during transition of spring into summer, with no treatments alleviating symptoms\par -he notes nausea and vomiting exacerbated by morning medications, onset since surgery\par -he notes regular bowel movements \par -he notes issues staying asleep\par -he notes itchy ears exacerbated by skin rash treated with ear drops\par -he notes improved diet\par -he notes weight down 11 lbs\par -he denies cough\par -he denies wheeze\par -he notes active sinuses\par -he notes senses of smell and taste are good \par \par -denies any chest pain, chest pressure, diarrhea, constipation, dysphagia, dizziness, leg swelling, leg pain, myalgias, arthralgias, itchy eyes, itchy ears, heartburn, reflux, or sour taste in the mouth.

## 2020-10-19 NOTE — REASON FOR VISIT
[Follow-Up] : a follow-up visit [FreeTextEntry1] : s/p aortic dissection repair / asthma / congestion; h/o emergent type A aortic dissection repair and replacement 12/2018, asthma, atopic dermatitis, OW, MARYLOU

## 2020-11-11 ENCOUNTER — TRANSCRIPTION ENCOUNTER (OUTPATIENT)
Age: 40
End: 2020-11-11

## 2020-11-19 ENCOUNTER — TRANSCRIPTION ENCOUNTER (OUTPATIENT)
Age: 40
End: 2020-11-19

## 2020-11-25 ENCOUNTER — TRANSCRIPTION ENCOUNTER (OUTPATIENT)
Age: 40
End: 2020-11-25

## 2020-12-02 ENCOUNTER — TRANSCRIPTION ENCOUNTER (OUTPATIENT)
Age: 40
End: 2020-12-02

## 2020-12-10 ENCOUNTER — TRANSCRIPTION ENCOUNTER (OUTPATIENT)
Age: 40
End: 2020-12-10

## 2021-04-01 ENCOUNTER — TRANSCRIPTION ENCOUNTER (OUTPATIENT)
Age: 41
End: 2021-04-01

## 2021-04-02 ENCOUNTER — APPOINTMENT (OUTPATIENT)
Dept: PULMONOLOGY | Facility: CLINIC | Age: 41
End: 2021-04-02
Payer: COMMERCIAL

## 2021-04-02 ENCOUNTER — NON-APPOINTMENT (OUTPATIENT)
Age: 41
End: 2021-04-02

## 2021-04-02 VITALS
OXYGEN SATURATION: 97 % | WEIGHT: 306 LBS | DIASTOLIC BLOOD PRESSURE: 80 MMHG | HEART RATE: 73 BPM | TEMPERATURE: 96.9 F | HEIGHT: 75 IN | BODY MASS INDEX: 38.05 KG/M2 | SYSTOLIC BLOOD PRESSURE: 120 MMHG | RESPIRATION RATE: 17 BRPM

## 2021-04-02 PROCEDURE — 95012 NITRIC OXIDE EXP GAS DETER: CPT

## 2021-04-02 PROCEDURE — ZZZZZ: CPT

## 2021-04-02 PROCEDURE — 94010 BREATHING CAPACITY TEST: CPT

## 2021-04-02 PROCEDURE — 99214 OFFICE O/P EST MOD 30 MIN: CPT | Mod: 25

## 2021-04-02 PROCEDURE — 99072 ADDL SUPL MATRL&STAF TM PHE: CPT

## 2021-04-02 RX ORDER — IPRATROPIUM BROMIDE AND ALBUTEROL SULFATE 2.5; .5 MG/3ML; MG/3ML
0.5-2.5 (3) SOLUTION RESPIRATORY (INHALATION)
Qty: 360 | Refills: 1 | Status: ACTIVE | COMMUNITY
Start: 2018-12-21 | End: 1900-01-01

## 2021-04-02 NOTE — HISTORY OF PRESENT ILLNESS
[FreeTextEntry1] : Mr. QUINTANA is a 40 year old male with a history of emergent type A aortic dissection repair and replacement 12/2018, HTN, azoospermia, elevated cholesterol, asthma, atopic dermatitis, OW, snoring, and SOB who presents for a follow up pulmonary re-evaluation. His chief complaint is \par -s/p prednisone off  for one week (helped rash on his leg) and Abx for sinus infection\par -he note ganing weight since surgery\par -he notes COVID 19 infection 2/2021 (asymptomatic)\par -he notes itchy eyes and ears due to allergies\par -he notes being on dupixent\par -he notes a recent sinus infection \par \par \par patient denies any headaches, nausea, vomiting, fever, chills, sweats, chest pain, chest pressure, palpitations,coughing, wheezing, fatigue, diarrhea, constipation, dysphagia, myalgias, dizziness, leg swelling, leg pain, heartburn, reflux or sour taste in the mouth

## 2021-04-02 NOTE — ADDENDUM
[FreeTextEntry1] : Documented by Roland Eagle acting as a scribe for Dr. Blade Veloz on (04/02/2021).\par \par All medical record entries made by the Scribe were at my, Dr. Blade Veloz's, direction and personally dictated by me on (04/02/2021). I have reviewed the chart and agree that the record accurately reflects my personal performance of the history, physical exam, assessment and plan. I have also personally directed, reviewed, and agree with the discharge instructions.\par

## 2021-04-02 NOTE — PROCEDURE
[FreeTextEntry1] : PFT revealed mild restrictive and mild obstructive dysfunction, with a FEV1 of 3.21L, which is 69% of predicted, with a normal flow volume loop\par  \par \par Feno was 15; a normal value being less than 25. Fractional exhaled nitric oxide (FENO) is regarded as a simple, noninvasive method for assessing eosinophilic airway inflammation. Produced by a variety of cells within the lung, nitric oxide (NO) concentrations are generally low in healthy individuals. However, high concentrations of NO appear to be involved in nonspecific host defense mechanisms and chronic inflammatory  diseases such as asthma. The American Thoracic Society (ATS) therefore recommended using FENO to aid in the diagnosis and monitoring of eosinophilic airway inflammation and asthma, and for identifying steroid responsive individuals whose chronic respiratory symptoms may be caused by airway inflammation

## 2021-04-02 NOTE — ASSESSMENT
[FreeTextEntry1] : Mr. QUINTANA is a 40 year old male with a history of emergent type A aortic dissection repair and replacement 12/2018, HTN, azoospermia, elevated cholesterol, asthma, atopic dermatitis(on Dupixent), OW who presents for a follow up pulmonary re-evaluation. His chief complaint is s/p sinusitis - resolved\par \par The patient's SOB is felt to be multifactorial:\par - Overweight /  Out-of-shape\par - Poor breathing mechanics\par - Restrictive lung dysfunction -(posture)\par - Mild Intermittent Asthma\par - Cardiac Disease (Dr. Trey Johnson)\par \par Problem 1: No tracheomalacia\par -s/p Dynamic CT - no evidence of Dz\par -Tracheomalacia is usually acquired in adults and common causes include damage by tracheostomy or endotracheal intubation damaging the tracheal cartilage with increase risk with multiple intubations, prolonged intubation, and concurrent high dose steroid therapy; external chest wall trauma and surgery; chronic compression of the trachea by benign etiologies (eg, benign mediastinal goiter) or malignancy; relapsing polychondritis; or recurrent infection. Tracheomalacia can be asymptomatic, however signs or symptoms can develop as the severity of the airway narrowing progresses with major symptoms include dyspnea, cough, and sputum retention. Other symptoms include severe paroxysms of coughing, wheezing or stridor, barking cough and may be exacerbated by forced expiration, cough, and valsalva maneuver. Tracheomalacia is diagnosed by a bronchoscopic visualization of dynamic airway collapse on dynamic chest CT. Therapy is warranted in symptomatic patients with severe tracheomalacia and includes surgical repair as tracheobronchoplasty. The patient was referred to Dr. Deshawn Hammond or Dr. Bob Grajeda, at [facility] for a surgical consult. \par \par Problem 2a: moderate-severe persistent asthma -(stable) - improved\par -s/p Blood work for allergies: asthma panel, food IgE level (wnl), eosinophil level, Vitamin D level (wnl). - all negative\par -continue Bevespi 2 inhalations BID\par -continue Alvesco 160 mg 2 inhalations BID\par -continue Singulair 10 mg QHS\par -continue DuoNeb by nebulizer, prn when sick\par -Asthma is believed to be caused by inherited (genetic) and environmental factor, but its exact cause is unknown. Asthma may be triggered by allergens, lung infections, or irritants in the air. Asthma triggers are different for each person.\par -Inhaler technique reviewed as well as oral hygiene techniques reviewed with patient. Avoidance of cold air, extremes of temperature, rescue inhaler should be used before exercise. Order of medication reviewed with patient. Recommended use of a cool mist humidifier in the bedroom. 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 Compared to a placebo, patients with severe asthma receiving Nucala had fewer exacerbation requiring hospitalization and/or emergency department visits, and a longer time to first exacerbation. In addition, patients with severe asthma receiving Nucala or Fasenra experienced greater reductions in their daily maintenance oral corticosteroid dose, while maintaining asthma control compared with patients receiving placebo. Treatment with Nucala did not result in a significant improvement in lung function, as measured by the volume of air exhaled by patients in one second. The most common side effects include: headache, injection site reactions, back pain, weakness, and fatigue; hypersensitivity reactions can occur within hours or days including swelling of the face, mouth, and tongue, fainting, dizziness, hives, breathing problems, and rash; herpes zoster infections have occurred. The drug is a monoclonal antibody that inhibits interleukin -5 which helps regular eosinophils, a type of white blood cell that contributes to asthma. The over-production of eosinophils can cause inflammation in the lungs, increasing the frequency of asthma attacks. Patients must also take other medications, including high dose inhaled corticosteroids and at least one additional asthma drug \par \par Problem 2c: atopic dermatitis\par -Add mometasone cream\par -functional medicine diet\par -recommend read "wheat belly"\par -recommended borage and flax seed oil\par \par Problem 3: atopic dermatitis -(eosinophilic)\par -s/p blood work: IgE level (-), eosinophil level (+), vitamin D levels (-)\par - Dupixent since 1/2020 (off since 7/2020)\par -Dupixent is a prescription medicine used with other asthma medicines for the maintenance treatment of moderate-to-severe asthma in people aged 12 years and older whose asthma is not controlled with their current asthma medicines. Dupixent helps prevent severe asthma attacks (exacerbations) and can improve your breathing. Dupixent may also help reduce the amount of oral corticosteroids you need while preventing severe asthma attacks and improving your breathing. Dupixent is not used to treat sudden breathing problems. Risks and side effect of Dupixent were discussed and reviewed with patient. \par \par Problem 4: allergy / sinus\par -continue Olopatadine 0.6% 1 sniff/nostril BID\par -continue Omnaris 1 sniff BID\par -continue Clarinex 5 mg QAM\par -recommended mometasone cream\par -recommended to use "Navage" nasal rinse device \par \par Environmental measures for allergies were encouraged including mattress and pillow cover, air purifier, and environmental controls. \par \par Problem 5: (+) sleep apnea (vintuox)\par -s/p a home sleep study due to his EDS and snoring \par -recommended dental device\par Sleep apnea is associated with adverse clinical consequences which an affect most organ systems. Cardiovascular disease risk includes arrhythmias, atrial fibrillation, hypertension, coronary artery disease, and stroke. Metabolic disorders include diabetes type 2, non-alcoholic fatty liver disease. Mood disorder especially depression; and cognitive decline especially in the elderly. Associations with chronic reflux/Wright’s esophagus some but not all inclusive. \par -Reasons include arousal consistent with hypopnea; respiratory events most prominent in REM sleep or supine position; therefore sleep staging and body position are important for accurate diagnosis and estimation of AHI.\par -Treatment options discussed including CPAP/BiPAP machine, oral appliance, ProVent therapy, Oxy-Aid by Respitec, new technologies, or positional sleep.Recommended use of the CPAP machine for moderate (AHI >15), moderate to severe (AHI 15-30) and severe patients (AHI > 30). Recommended weight loss which can reduce AHI especially in weight loss of greater than 5% of BMI. Positional sleep is recommended in those with low AHI, low-moderate BMI, and younger age. For severe sleep apnea, the hypoglossal nerve stimulator was recommended as well. \par \par Problem 6: thoracic aortic aneurysm\par -follow-up with Dr. Scar Toledo\par \par Problem 7: cardiac disease\par -follow-up with cardiologist (Dr. Trey Johnson)\par \par Problem 8: poor mechanics of breathing\par Proper breathing techniques were reviewed with an emphasis of exhalation. Patient instructed to breath in for 1 second and out for four seconds. Patient was encouraged to not talk while walking. \par \par Problem 9: overweight / out of shape\par Weight loss, exercise, and diet control were discussed and are highly encouraged. Treatment options were given such as, aqua therapy, and contacting a nutritionist. Recommended to use the elliptical, stationary bike, less use of treadmill. Mindful eating was explained to the patient Obesity is associated with worsening asthma, shortness of breath, and potential for cardiac disease, diabetes, and other underlying medical conditions. \par \par Problem 10: Health Maintenance/COVID19 Precautions \par -discussed COVID 19 vaccine with patient\par - Clean your hands often. Wash your hands often with soap and water for at least 20 seconds, especially after blowing your nose, coughing, or sneezing, or having been in a public place.\par - If soap and water are not available, use a hand  that contains at least 60% alcohol.\par - To the extent possible, avoid touching high-touch surfaces in public places - elevator buttons, door handles, handrails, handshaking with people, etc. Use a tissue or your sleeve to cover your hand or finger if you must touch something.\par - Wash your hands after touching surfaces in public places.\par - Avoid touching your face, nose, eyes, etc.\par - Clean and disinfect your home to remove germs: practice routine cleaning of frequently touched surfaces (for example: tables, doorknobs, light switches, handles, desks, toilets, faucets, sinks & cell phones)\par - Avoid crowds, especially in poorly ventilated spaces. Your risk of exposure to respiratory viruses like COVID-19 may increase in crowded, closed-in settings with little air circulation if there are people in the crowd who are sick. All patients are recommended to practice social distancing and stay at least 6 feet away from others. \par - Avoid all non-essential travel including plane trips, and especially avoid embarking on cruise ships.\par -If COVID-19 is spreading in your community, take extra measures to put distance between yourself and other people to further reduce your risk of being exposed to this new virus.\par -Stay home as much as possible.\par - Consider ways of getting food brought to your house through family, social, or commercial networks\par -Be aware that the virus has been known to live in the air up to 3 hours post exposure, cardboard up to 24 hours post exposure, copper up to 4 hours post exposure, steel and plastic up to 2-3 days post exposure. Risk of transmission from these surfaces are affected by many variables.\par COVID-19 precautionary Immune Support Recommendations:\par -OTC Vitamin C 500mg BID \par -OTC Quercetin 250-500mg BID \par -OTC Zinc 75-100mg per day \par -OTC Melatonin 1 or 2mg a night \par -OTC Vitamin D 1-4000mg per day \par -OTC Tonic Water 8oz per day\par -Water 0.5-1 gallon per day\par Asthma and COVID19:\par You need to make sure your asthma is under control. This often requires the use of inhaled corticosteroids (and sometimes oral corticosteroids). Inhaled corticosteroids do not likely reduce your immune system’s ability to fight infections, but oral corticosteroids may. It is important to use the steps above to protect yourself to limit your exposure to any respiratory virus. \par \par Problem 11: health maintenance\par -recommend Dr. Ashish Wallace as PCP\par -refused influenza vaccine 2020\par -recommended strep pneumonia vaccines: Prevnar-13 vaccine, followed by Pneumo vaccine 23 one year following \par -recommended early intervention for URIs \par -recommended regular osteoporosis evaluations \par -recommended early dermatological evaluations \par -recommended after the age of 50 to the age of 70, colonoscopy every 5 years \par \par Follow-up in 3-4 months with SPI and NiOx\par Patient is encouraged to call or fax the office with any changes, questions, or concerns.\par Explained to the patient in full detail with demonstrations how to use the inhalers and inhaler hygiene.\par -Education provided to the patient regarding their visit and conditions

## 2021-04-07 ENCOUNTER — TRANSCRIPTION ENCOUNTER (OUTPATIENT)
Age: 41
End: 2021-04-07

## 2021-04-20 ENCOUNTER — APPOINTMENT (OUTPATIENT)
Dept: UROLOGY | Facility: CLINIC | Age: 41
End: 2021-04-20

## 2021-04-26 ENCOUNTER — APPOINTMENT (OUTPATIENT)
Dept: UROLOGY | Facility: CLINIC | Age: 41
End: 2021-04-26
Payer: COMMERCIAL

## 2021-04-26 PROCEDURE — 99214 OFFICE O/P EST MOD 30 MIN: CPT | Mod: 95

## 2021-04-26 NOTE — ASSESSMENT
[FreeTextEntry1] : The patient presents for telehealth consultation.  He has been off the FSH analog since January. He has had insurance problems.  He has been taking hCG only 1000 units a week since he has not renewed his medication.  He is feeling well.   He had been injecting 150 units gonyl F 3x/week and Novarel 4000 units 3 times weekly  His erections have been greatly improved as well. \par \par [place] for prior authorization.  I renewed his  HCG and gonal F which seems to be covered by his insurance.  I have also requested blood studies to be done in approximately 3 weeks and then to discuss them with me via telehealth in 4 to 6 weeks.  We discussed that he needs a semen analysis at this time with possible sperm cryopreservation.  I will request the semen analysis in approximately 1 to 2 months with possible cryopreservation.  He needs to be back on the medication.\par \par REF: [id number]\par \par Telehealth Consultation: 30 minutes  10 minutes reviewing his history and discussing prior results.  20 minutes discussing various treatment options, writing medication prescriptions (HCS), and writing his note. There was also additional time in preparing for the visit and assisting the patient with technology issues he was having with the telehealth platform.

## 2021-04-26 NOTE — HISTORY OF PRESENT ILLNESS
[Home] : at home, [unfilled] , at the time of the visit. [Other Location: e.g. Home (Enter Location, City,State)___] : at [unfilled] [Patient] : the patient [Self] : self [FreeTextEntry1] : The patient-doctor. relationship has been established in a face-to-face fashion on real-time video audio HIPAA compliant communication using telemedicine software.  The patient's identity has been confirmed.  The patient was previously emailed a copy of the telemedicine consent.  The patient has had a chance to review and has now given verbal consent and has requested care to be assessed and treated through telemedicine. The patient understands there may be limitations in this process and that they need not need further follow-up care in the office and/or hospital settings. \par \par Verbal consent was given on Thursday, April 30, 2020 at 11:38 AM by the patient.  It was requested by the physician.  A written consent was previously sent for the patient to sign and return.\par \par The patient presents for telehealth consultation.  He has been off the FSH analog since January. He has had insurance problems.  He has been taking hCG only 1000 units a week since he has not renewed his medication.  He is feeling well.   He had been injecting 150 units gonyl F 3x/week and Novarel 4000 units 3 times weekly  His erections have been greatly improved as well. \par \par PMH: Patient initially presented with hypogonadotrophic hypogonadism presents for followup. He stated that in December he had an aortic dissection. He required an open repair. He has been doing well since. He's been on pregnyl 3 times weekly since March of 2018, 4000 units 3x weekly. His T was 288 in Feb 2019. He has a partner and would like to have future paternity. 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 He was first seen when he was 36 years old and his wife wis 27 years old. She has had 2 prior pregnancies. He has not had any children from any relationship. He would like to conceive with his partner.\par \par He was previously diagnosed with hypogonadotropic hypogonadism at age ~13. He has sense of smell. 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 DEXA was normal. He has seen multiple endocrinologists in the past as well as Dr. Tate recently. He was previously on gonadotropins as a teenager, then subsequently transitioned to androgel then back to HCG and Menopur. He is off all medications since August 2017.\par \par He underwent a testis biopsy (details unclear from patient) but no sperm was cryopreserved. He was unable to afford gonadotropins due to insurance issues. Is currently on Medicaid.\par

## 2021-05-04 ENCOUNTER — TRANSCRIPTION ENCOUNTER (OUTPATIENT)
Age: 41
End: 2021-05-04

## 2021-05-12 NOTE — PATIENT PROFILE ADULT - NSPROMEDSPATCH_GEN_A_NUR
Patient Education        Stopping Smoking: Care Instructions  Your Care Instructions     Cigarette smokers crave the nicotine in cigarettes. Giving it up is much harder than simply changing a habit. Your body has to stop craving the nicotine. It is hard to quit, but you can do it. There are many tools that people use to quit smoking. You may find that combining tools works best for you. There are several steps to quitting. First you get ready to quit. Then you get support to help you. After that, you learn new skills and behaviors to become a nonsmoker. For many people, a necessary step is getting and using medicine. Your doctor will help you set up the plan that best meets your needs. You may want to attend a smoking cessation program to help you quit smoking. When you choose a program, look for one that has proven success. Ask your doctor for ideas. You will greatly increase your chances of success if you take medicine as well as get counseling or join a cessation program.  Some of the changes you feel when you first quit tobacco are uncomfortable. Your body will miss the nicotine at first, and you may feel short-tempered and grumpy. You may have trouble sleeping or concentrating. Medicine can help you deal with these symptoms. You may struggle with changing your smoking habits and rituals. The last step is the tricky one: Be prepared for the smoking urge to continue for a time. This is a lot to deal with, but keep at it. You will feel better. Follow-up care is a key part of your treatment and safety. Be sure to make and go to all appointments, and call your doctor if you are having problems. It's also a good idea to know your test results and keep a list of the medicines you take. How can you care for yourself at home? · Ask your family, friends, and coworkers for support. You have a better chance of quitting if you have help and support.   · Join a support group, such as Nicotine Anonymous, for people who are trying to quit smoking. · Consider signing up for a smoking cessation program, such as the American Lung Association's Freedom from Smoking program.  · Get text messaging support. Go to the website at www.smokefree. gov to sign up for the [facility] program.  · Set a quit date. Pick your date carefully so that it is not right in the middle of a big deadline or stressful time. Once you quit, do not even take a puff. Get rid of all ashtrays and lighters after your last cigarette. Clean your house and your clothes so that they do not smell of smoke. · Learn how to be a nonsmoker. Think about ways you can avoid those things that make you reach for a cigarette. ? Avoid situations that put you at greatest risk for smoking. For some people, it is hard to have a drink with friends without smoking. For others, they might skip a coffee break with coworkers who smoke. ? Change your daily routine. Take a different route to work or eat a meal in a different place. · Cut down on stress. Calm yourself or release tension by doing an activity you enjoy, such as reading a book, taking a hot bath, or gardening. · Talk to your doctor or pharmacist about nicotine replacement therapy, which replaces the nicotine in your body. You still get nicotine but you do not use tobacco. Nicotine replacement products help you slowly reduce the amount of nicotine you need. These products come in several forms, many of them available over-the-counter:  ? Nicotine patches  ? Nicotine gum and lozenges  ? Nicotine inhaler  · Ask your doctor about bupropion (Wellbutrin) or varenicline (Chantix), which are prescription medicines. They do not contain nicotine. They help you by reducing withdrawal symptoms, such as stress and anxiety. · Some people find hypnosis, acupuncture, and massage helpful for ending the smoking habit. · Eat a healthy diet and get regular exercise.  Having healthy habits will help your body move past its craving for none

## 2021-05-13 ENCOUNTER — RX RENEWAL (OUTPATIENT)
Age: 41
End: 2021-05-13

## 2021-05-13 RX ORDER — LEVOCETIRIZINE DIHYDROCHLORIDE 5 MG/1
5 TABLET ORAL
Qty: 30 | Refills: 5 | Status: ACTIVE | COMMUNITY
Start: 2019-12-13 | End: 1900-01-01

## 2021-05-19 RX ORDER — FOLLITROPIN 300 [IU]/.36ML
300 INJECTION, SOLUTION SUBCUTANEOUS
Qty: 12 | Refills: 1 | Status: ACTIVE | COMMUNITY
Start: 2020-05-14

## 2021-06-14 LAB
BASOPHILS # BLD AUTO: 0.06 K/UL
BASOPHILS NFR BLD AUTO: 0.9 %
EOSINOPHIL # BLD AUTO: 0.25 K/UL
EOSINOPHIL NFR BLD AUTO: 3.7 %
ESTIMATED AVERAGE GLUCOSE: 126 MG/DL
ESTRADIOL SERPL-MCNC: 50 PG/ML
FSH SERPL-MCNC: 1.8 IU/L
HBA1C MFR BLD HPLC: 6 %
HCT VFR BLD CALC: 44.2 %
HGB BLD-MCNC: 14.6 G/DL
IMM GRANULOCYTES NFR BLD AUTO: 0.3 %
LH SERPL-ACNC: 0.4 IU/L
LYMPHOCYTES # BLD AUTO: 1.85 K/UL
LYMPHOCYTES NFR BLD AUTO: 27.5 %
MAN DIFF?: NORMAL
MCHC RBC-ENTMCNC: 28.7 PG
MCHC RBC-ENTMCNC: 33 GM/DL
MCV RBC AUTO: 86.8 FL
MONOCYTES # BLD AUTO: 0.6 K/UL
MONOCYTES NFR BLD AUTO: 8.9 %
NEUTROPHILS # BLD AUTO: 3.94 K/UL
NEUTROPHILS NFR BLD AUTO: 58.7 %
PLATELET # BLD AUTO: 213 K/UL
RBC # BLD: 5.09 M/UL
RBC # FLD: 12.7 %
TSH SERPL-ACNC: 2.51 UIU/ML
WBC # FLD AUTO: 6.72 K/UL

## 2021-06-24 ENCOUNTER — APPOINTMENT (OUTPATIENT)
Dept: UROLOGY | Facility: CLINIC | Age: 41
End: 2021-06-24
Payer: COMMERCIAL

## 2021-06-24 PROCEDURE — 99072 ADDL SUPL MATRL&STAF TM PHE: CPT

## 2021-06-24 PROCEDURE — 99214 OFFICE O/P EST MOD 30 MIN: CPT

## 2021-06-24 RX ORDER — FOLLITROPIN 300 [IU]/.5ML
300 INJECTION, SOLUTION SUBCUTANEOUS
Qty: 12 | Refills: 0 | Status: ACTIVE | COMMUNITY
Start: 2019-08-06 | End: 1900-01-01

## 2021-06-24 NOTE — HISTORY OF PRESENT ILLNESS
[FreeTextEntry1] : The patient presents for follow up..  He has been off the FSH analog since November.  He has had insurance problems.  He is feeling well.  He had recent blood test which we needed to review today..  He had been injecting 150 units gonyl F 3x/week and Novarel 4000 units 3 times weekly  His erections have been greatly improved as well. \par \par PMH: Patient initially presented with hypogonadotrophic hypogonadism presents for followup. He stated that in December he had an aortic dissection. He required an open repair. He has been doing well since. He's been on pregnyl 3 times weekly since March of 2018, 4000 units 3x weekly. His T was 288 in Feb 2019. He has a partner and would like to have future paternity. 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 He was first seen when he was 36 years old and his wife wis 27 years old. She has had 2 prior pregnancies. He has not had any children from any relationship. He would like to conceive with his partner.\par \par He was previously diagnosed with hypogonadotropic hypogonadism at age ~13. He has sense of smell. 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 DEXA was normal. He has seen multiple endocrinologists in the past as well as Dr. Tate recently. He was previously on gonadotropins as a teenager, then subsequently transitioned to androgel then back to HCG and Menopur. He is off all medications since August 2017.\par \par He underwent a testis biopsy (details unclear from patient) but no sperm was cryopreserved. He was unable to afford gonadotropins due to insurance issues. Is currently on Medicaid.\par

## 2021-06-24 NOTE — ASSESSMENT
[FreeTextEntry1] : The patient presents for follow up. He had been injecting 150 units gonyl F 3x/week and Novarel 4000 units 3 times weekly  His erections have been greatly improved as well. Blood studies demonstrated a hematocrit of 44.2%.  Estradiol was 50 pg/mL.  LH was 0.4 IU/L and FSH 1.8 IU/L his hemoglobin A1c was 6% and medical evaluation was recommended.Testosterone was not done.  I have repeated it today.  I will review the results with him in 2 weeks by telehealth.\par \par We again discussed that he needs a semen analysis at this time with possible sperm cryopreservation.  I will request the semen analysis in approximately 1 to 2 months with possible cryopreservation.  \par \par REF: [id number]\par \par Consultation: 30 minutes:  10 minutes reviewing his history and performing a physical examination.  20 minutes reviewing his evaluation, writing for prescription medications (HCS) and blood studies ,discussing treatment options and writing his note. There was also additional time in preparation for today's visit.\par \par \par

## 2021-06-27 LAB
25(OH)D3 SERPL-MCNC: 35.7 NG/ML
BASOPHILS # BLD AUTO: 0.05 K/UL
BASOPHILS NFR BLD AUTO: 0.8 %
EOSINOPHIL # BLD AUTO: 0.25 K/UL
EOSINOPHIL NFR BLD AUTO: 3.8 %
ESTRADIOL SERPL-MCNC: 43 PG/ML
FSH SERPL-MCNC: 2.6 IU/L
HCT VFR BLD CALC: 42.4 %
HGB BLD-MCNC: 14.4 G/DL
IMM GRANULOCYTES NFR BLD AUTO: 0.5 %
LH SERPL-ACNC: 0.4 IU/L
LYMPHOCYTES # BLD AUTO: 1.75 K/UL
LYMPHOCYTES NFR BLD AUTO: 26.4 %
MAN DIFF?: NORMAL
MCHC RBC-ENTMCNC: 29 PG
MCHC RBC-ENTMCNC: 34 GM/DL
MCV RBC AUTO: 85.3 FL
MONOCYTES # BLD AUTO: 0.58 K/UL
MONOCYTES NFR BLD AUTO: 8.7 %
NEUTROPHILS # BLD AUTO: 3.98 K/UL
NEUTROPHILS NFR BLD AUTO: 59.8 %
PLATELET # BLD AUTO: 248 K/UL
PROLACTIN SERPL-MCNC: 13.9 NG/ML
RBC # BLD: 4.97 M/UL
RBC # FLD: 12.5 %
TESTOST BND SERPL-MCNC: 15.4 PG/ML
TESTOSTERONE TOTAL S: 345 NG/DL
WBC # FLD AUTO: 6.64 K/UL

## 2021-07-06 LAB
TESTOST BND SERPL-MCNC: 15.9 PG/ML
TESTOST SERPL-MCNC: 412 NG/DL

## 2021-07-22 ENCOUNTER — TRANSCRIPTION ENCOUNTER (OUTPATIENT)
Age: 41
End: 2021-07-22

## 2021-07-22 ENCOUNTER — NON-APPOINTMENT (OUTPATIENT)
Age: 41
End: 2021-07-22

## 2021-07-23 ENCOUNTER — APPOINTMENT (OUTPATIENT)
Dept: PULMONOLOGY | Facility: CLINIC | Age: 41
End: 2021-07-23
Payer: COMMERCIAL

## 2021-07-23 VITALS
OXYGEN SATURATION: 95 % | TEMPERATURE: 97.2 F | BODY MASS INDEX: 40.56 KG/M2 | DIASTOLIC BLOOD PRESSURE: 80 MMHG | RESPIRATION RATE: 16 BRPM | WEIGHT: 306 LBS | HEART RATE: 66 BPM | SYSTOLIC BLOOD PRESSURE: 120 MMHG | HEIGHT: 73 IN

## 2021-07-23 PROCEDURE — 99214 OFFICE O/P EST MOD 30 MIN: CPT

## 2021-07-23 PROCEDURE — 99072 ADDL SUPL MATRL&STAF TM PHE: CPT

## 2021-07-23 NOTE — HISTORY OF PRESENT ILLNESS
[TextBox_4] : Mr. QUINTANA is a 40 year old male with a history of emergent type A aortic dissection repair and replacement 12/2018, HTN, azoospermia, elevated cholesterol, asthma, atopic dermatitis, OW, snoring, and SOB who presents for a follow up pulmonary re-evaluation. \par \par His chief concern is "bad cough" x 2-3 weeks, but worsened within the past week. \par \par Patient states "I want to cough right now, I am just holding it in".  He states he feels something in his throat, describes it as a "tickling". When he coughs he experiences a sharp sinus headache & lightheadedness. He does experience facial tenderness due to sinus congestion.  Patient c/o wheezing to the point of awakening him & bloody nasal mucus x 1 week. He notes he is now experiencing muscle soreness in his mid back and right lateral chest from coughing. Patient states his sinus congestion and post nasal drip started prior to worsening cough and he believes that is what worsened his asthma. \par \par Patient states he has been nebulizing w/ Albuterol TID & using the Navage rinse system daily.  He states he ran out of maintenance inhalers 3 weeks ago and ran out of nasal sprays over 1 month ago. He notes his pharmacy does not have Bevespi, Alvesco, or nasal sprays in stock, so even though they were reordered through our office he has not been able to add them to his regimen.\par \par Patient denies fever/chills, decreased appetite, SOB @ rest or exertion, chest tightness, or any other issues at this time. \par

## 2021-07-23 NOTE — PHYSICAL EXAM
[No Acute Distress] : no acute distress [Normal Oropharynx] : normal oropharynx [II] : Mallampati Class: II [Normal Appearance] : normal appearance [No Neck Mass] : no neck mass [Normal Rate/Rhythm] : normal rate/rhythm [Normal S1, S2] : normal s1, s2 [No Murmurs] : no murmurs [Clear to Auscultation Bilaterally] : clear to auscultation bilaterally [Wheeze] : wheeze [No Abnormalities] : no abnormalities [Normal Gait] : normal gait [No Clubbing] : no clubbing [No Cyanosis] : no cyanosis [No Edema] : no edema [FROM] : FROM [Normal Color/ Pigmentation] : normal color/ pigmentation [No Focal Deficits] : no focal deficits [Oriented x3] : oriented x3 [Normal Affect] : normal affect [TextBox_68] : Bilateral forced expiratory wheezes auscultated in all lobes. Cough response elicited with deep inhalation.

## 2021-07-23 NOTE — ASSESSMENT
[FreeTextEntry1] : Mr. QUINTANA is a 40 year old male with a history of emergent type A aortic dissection repair and replacement 12/2018, HTN, azoospermia, elevated cholesterol, asthma, atopic dermatitis(on Dupixent), OW who presents for a follow up pulmonary re-evaluation. \par \par 1. moderate-severe persistent asthma exacerbated state:\par - Add Prednisone: Take 30mg x 5 days, 20 mg x 5 days, and 10mg x 5 days.\par - Increase DuoNeb by nebulizer to QID x 7 days and then can decreased to BID then PRN. \par - continue Bevespi 2 inhalations BID\par - continue Alvesco 160 mg 2 inhalations BID - rinse and gargle after use.\par - pt's pharmacy does not have Bevespi or Alvesco at this time - 14 day samle of Trelagy 200 mcg provided to patient in office - advised to use 1 inhale Q AM post neb and then once complete can switch back to maintenance therapy or once he receives it can switch back.  Explained Trelegy is a 3-in-1 inhaler and not to take all of them at the same time. \par - continue Singulair 10 mg QHS\par \par 2. Sinusitis/Post nasal drip:\par - Add Augmentin 875 mg BID x 10 days (pt states recent allergy testing showed no allergy to PCN family).\par - Continue use of Navage daily.\par - Add Azelastine Nasal Spray. 2 squirts each nostril BID (pt states Olopatadine was $90). \par - Continue Omnaris 1 sniff BID - pt notes pharmacy had to order for him as they did not have it in stock. \par - Continue Clarinex 5 mg QAM\par \par 3. (+) sleep apnea: \par - Patient is awaiting dental device to be approved. \par \par Patient to follow up with Dr. Veloz as scheduled for 8/3/21 - PFT's to be added to visit. Patient is not vaccinated and will need covid testing prior to.\par Patient to call with further questions and concerns.\par Patient verbalizes understanding of care plan and is agreeable.\par \par \par

## 2021-07-26 ENCOUNTER — TRANSCRIPTION ENCOUNTER (OUTPATIENT)
Age: 41
End: 2021-07-26

## 2021-07-27 ENCOUNTER — RX RENEWAL (OUTPATIENT)
Age: 41
End: 2021-07-27

## 2021-07-28 ENCOUNTER — TRANSCRIPTION ENCOUNTER (OUTPATIENT)
Age: 41
End: 2021-07-28

## 2021-08-01 LAB — SARS-COV-2 N GENE NPH QL NAA+PROBE: NOT DETECTED

## 2021-08-03 ENCOUNTER — NON-APPOINTMENT (OUTPATIENT)
Age: 41
End: 2021-08-03

## 2021-08-03 ENCOUNTER — APPOINTMENT (OUTPATIENT)
Dept: PULMONOLOGY | Facility: CLINIC | Age: 41
End: 2021-08-03
Payer: COMMERCIAL

## 2021-08-03 VITALS
SYSTOLIC BLOOD PRESSURE: 130 MMHG | HEART RATE: 75 BPM | HEIGHT: 73 IN | OXYGEN SATURATION: 96 % | BODY MASS INDEX: 40.02 KG/M2 | DIASTOLIC BLOOD PRESSURE: 80 MMHG | RESPIRATION RATE: 16 BRPM | TEMPERATURE: 97.3 F | WEIGHT: 302 LBS

## 2021-08-03 DIAGNOSIS — J45.901 UNSPECIFIED ASTHMA WITH (ACUTE) EXACERBATION: ICD-10-CM

## 2021-08-03 DIAGNOSIS — Z87.09 PERSONAL HISTORY OF OTHER DISEASES OF THE RESPIRATORY SYSTEM: ICD-10-CM

## 2021-08-03 DIAGNOSIS — R04.0 EPISTAXIS: ICD-10-CM

## 2021-08-03 PROCEDURE — 95012 NITRIC OXIDE EXP GAS DETER: CPT

## 2021-08-03 PROCEDURE — 94010 BREATHING CAPACITY TEST: CPT

## 2021-08-03 PROCEDURE — 99214 OFFICE O/P EST MOD 30 MIN: CPT | Mod: 25

## 2021-08-03 NOTE — PHYSICAL EXAM
[No Acute Distress] : no acute distress [Normal Oropharynx] : normal oropharynx [III] : Mallampati Class: III [Normal Appearance] : normal appearance [No Neck Mass] : no neck mass [Normal Rate/Rhythm] : normal rate/rhythm [Normal S1, S2] : normal s1, s2 [No Murmurs] : no murmurs [No Resp Distress] : no resp distress [Clear to Auscultation Bilaterally] : clear to auscultation bilaterally [No Abnormalities] : no abnormalities [Benign] : benign [Normal Gait] : normal gait [No Clubbing] : no clubbing [No Cyanosis] : no cyanosis [No Edema] : no edema [FROM] : FROM [Normal Color/ Pigmentation] : normal color/ pigmentation [No Focal Deficits] : no focal deficits [Oriented x3] : oriented x3 [Normal Affect] : normal affect [TextBox_2] : OW [TextBox_68] : I:E 1:3; Clear

## 2021-08-03 NOTE — PROCEDURE
[FreeTextEntry1] : PFT mild restrictive/ obstructive flow, with an FEV1 of  3.69 L, which is  75% of predicted, with normal flow volume loop\par \par  FENO was  28; normal value being less than 25\par Fractional exhaled nitric oxide (FENO) is regarded as a simple, noninvasive method for assessing eosinophilic airway inflammation. Produced by a variety of cells within the lung, nitric oxide (NO) concentrations are generally low in healthy individuals. However, high concentrations of NO appear to be involved in nonspecific host defense mechanisms and chronic inflammatory diseases such as asthma. The American Thoracic Society (ATS) therefore has recommended using FENO to aid in the diagnosis and monitoring of eosinophilic airway inflammation and asthma, and for identifying steroid responsive individuals whose chronic respiratory symptoms may be airway inflammation.

## 2021-08-03 NOTE — ADDENDUM
[FreeTextEntry1] : Documented by Merari Carrasquillo acting as a scribe for Dr. Blade Veloz on 08/03/2021 \par \par All medical record entries made by the Scribe were at my, Dr. Blade Veloz's, direction and personally dictated by me on 08/03/2021 . I have reviewed the chart and agree that the record accurately reflects my personal performance of the history, physical exam, assessment and plan. I have also personally directed, reviewed, and agree with the discharge instructions.

## 2021-08-03 NOTE — ASSESSMENT
[FreeTextEntry1] : Mr. QUINTANA is a 40 year old male with a history of emergent type A aortic dissection repair and replacement 12/2018, HTN, azoospermia, elevated cholesterol, asthma, atopic dermatitis(on Dupixent), OW who presents for a follow up pulmonary re-evaluation. His chief complaint is asthma flare/ Epistaxis \par \par The patient's SOB is felt to be multifactorial:\par - Overweight /  Out-of-shape\par - Poor breathing mechanics\par - Restrictive lung dysfunction -(posture)\par - Mild Intermittent Asthma\par - Cardiac Disease (Dr. Trey Johnson)\par \par Problem 1: No tracheomalacia\par -s/p Dynamic CT - no evidence of Dz\par -Tracheomalacia is usually acquired in adults and common causes include damage by tracheostomy or endotracheal intubation damaging the tracheal cartilage with increase risk with multiple intubations, prolonged intubation, and concurrent high dose steroid therapy; external chest wall trauma and surgery; chronic compression of the trachea by benign etiologies (eg, benign mediastinal goiter) or malignancy; relapsing polychondritis; or recurrent infection. Tracheomalacia can be asymptomatic, however signs or symptoms can develop as the severity of the airway narrowing progresses with major symptoms include dyspnea, cough, and sputum retention. Other symptoms include severe paroxysms of coughing, wheezing or stridor, barking cough and may be exacerbated by forced expiration, cough, and valsalva maneuver. Tracheomalacia is diagnosed by a bronchoscopic visualization of dynamic airway collapse on dynamic chest CT. Therapy is warranted in symptomatic patients with severe tracheomalacia and includes surgical repair as tracheobronchoplasty. The patient was referred to Dr. Deshawn Hammond or Dr. Bob Grajeda, at [facility] for a surgical consult. \par \par Problem 2a: moderate-severe persistent asthma -(stable) - improved\par -s/p Blood work for allergies: asthma panel, food IgE level (wnl), eosinophil level, Vitamin D level (wnl). - all negative)\par -continue Bevespi 2 inhalations BID or equivalent (Trelegy (100) 1 puff QD)\par -continue Alvesco 160 2 puffs BID \par -continue Singulair 10 mg QHS\par -continue DuoNeb by nebulizer, prn when sick\par -Asthma is believed to be caused by inherited (genetic) and environmental factor, but its exact cause is unknown. Asthma may be triggered by allergens, lung infections, or irritants in the air. Asthma triggers are different for each person.\par -Inhaler technique reviewed as well as oral hygiene techniques reviewed with patient. Avoidance of cold air, extremes of temperature, rescue inhaler should be used before exercise. Order of medication reviewed with patient. Recommended use of a cool mist humidifier in the bedroom. 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 Compared to a placebo, patients with severe asthma receiving Nucala had fewer exacerbation requiring hospitalization and/or emergency department visits, and a longer time to first exacerbation. In addition, patients with severe asthma receiving Nucala or Fasenra experienced greater reductions in their daily maintenance oral corticosteroid dose, while maintaining asthma control compared with patients receiving placebo. Treatment with Nucala did not result in a significant improvement in lung function, as measured by the volume of air exhaled by patients in one second. The most common side effects include: headache, injection site reactions, back pain, weakness, and fatigue; hypersensitivity reactions can occur within hours or days including swelling of the face, mouth, and tongue, fainting, dizziness, hives, breathing problems, and rash; herpes zoster infections have occurred. The drug is a monoclonal antibody that inhibits interleukin -5 which helps regular eosinophils, a type of white blood cell that contributes to asthma. The over-production of eosinophils can cause inflammation in the lungs, increasing the frequency of asthma attacks. Patients must also take other medications, including high dose inhaled corticosteroids and at least one additional asthma drug \par \par Problem 2c: atopic dermatitis\par -Add mometasone cream\par -functional medicine diet\par -recommend read "wheat belly"\par -recommended borage and flax seed oil\par \par Problem 3: atopic dermatitis -(eosinophilic)\par -s/p blood work: IgE level (-), eosinophil level (+), vitamin D levels (-)\par - Dupixent since 1/2020 (off since 7/2020) (insurance lapse)\par -Dupixent is a prescription medicine used with other asthma medicines for the maintenance treatment of moderate-to-severe asthma in people aged 12 years and older whose asthma is not controlled with their current asthma medicines. Dupixent helps prevent severe asthma attacks (exacerbations) and can improve your breathing. Dupixent may also help reduce the amount of oral corticosteroids you need while preventing severe asthma attacks and improving your breathing. Dupixent is not used to treat sudden breathing problems. Risks and side effect of Dupixent were discussed and reviewed with patient. \par \par Problem 4: allergy / sinus\par -continue Olopatadine 0.6% 1 sniff/nostril BID\par -continue Omnaris 1 sniff BID\par -continue Clarinex 5 mg QAM\par -recommended mometasone cream\par -recommended to use "Navage" nasal rinse device \par \par Environmental measures for allergies were encouraged including mattress and pillow cover, air purifier, and environmental controls. \par \par Problem 4a Epistaxis\par -add Darshan-Synephrine PRN\par -add Boroleum ointment  \par \par Problem 5: (+) sleep apnea (vintuox)\par -s/p a home sleep study due to his EDS and snoring \par -recommended dental device\par Sleep apnea is associated with adverse clinical consequences which an affect most organ systems. Cardiovascular disease risk includes arrhythmias, atrial fibrillation, hypertension, coronary artery disease, and stroke. Metabolic disorders include diabetes type 2, non-alcoholic fatty liver disease. Mood disorder especially depression; and cognitive decline especially in the elderly. Associations with chronic reflux/Wright’s esophagus some but not all inclusive. \par -Reasons include arousal consistent with hypopnea; respiratory events most prominent in REM sleep or supine position; therefore sleep staging and body position are important for accurate diagnosis and estimation of AHI.\par -Treatment options discussed including CPAP/BiPAP machine, oral appliance, ProVent therapy, Oxy-Aid by Respitec, new technologies, or positional sleep.Recommended use of the CPAP machine for moderate (AHI >15), moderate to severe (AHI 15-30) and severe patients (AHI > 30). Recommended weight loss which can reduce AHI especially in weight loss of greater than 5% of BMI. Positional sleep is recommended in those with low AHI, low-moderate BMI, and younger age. For severe sleep apnea, the hypoglossal nerve stimulator was recommended as well. \par \par Problem 6: thoracic aortic aneurysm\par -follow-up with Dr. Scar Toledo\par \par Problem 7: cardiac disease\par -follow-up with cardiologist (Dr. Trey Johnson)\par \par Problem 8: poor mechanics of breathing\par Proper breathing techniques were reviewed with an emphasis of exhalation. Patient instructed to breath in for 1 second and out for four seconds. Patient was encouraged to not talk while walking. \par \par Problem 9: overweight / out of shape\par Weight loss, exercise, and diet control were discussed and are highly encouraged. Treatment options were given such as, aqua therapy, and contacting a nutritionist. Recommended to use the elliptical, stationary bike, less use of treadmill. Mindful eating was explained to the patient Obesity is associated with worsening asthma, shortness of breath, and potential for cardiac disease, diabetes, and other underlying medical conditions. \par \par Problem 10: Health Maintenance/COVID19 Precautions \par -discussed COVID 19 vaccine with patient\par - Clean your hands often. Wash your hands often with soap and water for at least 20 seconds, especially after blowing your nose, coughing, or sneezing, or having been in a public place.\par - If soap and water are not available, use a hand  that contains at least 60% alcohol.\par - To the extent possible, avoid touching high-touch surfaces in public places - elevator buttons, door handles, handrails, handshaking with people, etc. Use a tissue or your sleeve to cover your hand or finger if you must touch something.\par - Wash your hands after touching surfaces in public places.\par - Avoid touching your face, nose, eyes, etc.\par - Clean and disinfect your home to remove germs: practice routine cleaning of frequently touched surfaces (for example: tables, doorknobs, light switches, handles, desks, toilets, faucets, sinks & cell phones)\par - Avoid crowds, especially in poorly ventilated spaces. Your risk of exposure to respiratory viruses like COVID-19 may increase in crowded, closed-in settings with little air circulation if there are people in the crowd who are sick. All patients are recommended to practice social distancing and stay at least 6 feet away from others. \par - Avoid all non-essential travel including plane trips, and especially avoid embarking on cruise ships.\par -If COVID-19 is spreading in your community, take extra measures to put distance between yourself and other people to further reduce your risk of being exposed to this new virus.\par -Stay home as much as possible.\par - Consider ways of getting food brought to your house through family, social, or commercial networks\par -Be aware that the virus has been known to live in the air up to 3 hours post exposure, cardboard up to 24 hours post exposure, copper up to 4 hours post exposure, steel and plastic up to 2-3 days post exposure. Risk of transmission from these surfaces are affected by many variables.\par COVID-19 precautionary Immune Support Recommendations:\par -OTC Vitamin C 500mg BID \par -OTC Quercetin 250-500mg BID \par -OTC Zinc 75-100mg per day \par -OTC Melatonin 1 or 2mg a night \par -OTC Vitamin D 1-4000mg per day \par -OTC Tonic Water 8oz per day\par -Water 0.5-1 gallon per day\par Asthma and COVID19:\par You need to make sure your asthma is under control. This often requires the use of inhaled corticosteroids (and sometimes oral corticosteroids). Inhaled corticosteroids do not likely reduce your immune system’s ability to fight infections, but oral corticosteroids may. It is important to use the steps above to protect yourself to limit your exposure to any respiratory virus. \par \par Problem 11: health maintenance\par -recommend Dr. Ashish Wallace as PCP\par -refused influenza vaccine 2020\par -recommended strep pneumonia vaccines: Prevnar-13 vaccine, followed by Pneumo vaccine 23 one year following \par -recommended early intervention for URIs \par -recommended regular osteoporosis evaluations \par -recommended early dermatological evaluations \par -recommended after the age of 50 to the age of 70, colonoscopy every 5 years \par \par Follow-up in 3-4 months with SPI and NiOx\par Patient is encouraged to call or fax the office with any changes, questions, or concerns.\par Explained to the patient in full detail with demonstrations how to use the inhalers and inhaler hygiene.\par -Education provided to the patient regarding their visit and conditions

## 2021-08-09 ENCOUNTER — APPOINTMENT (OUTPATIENT)
Dept: UROLOGY | Facility: CLINIC | Age: 41
End: 2021-08-09
Payer: COMMERCIAL

## 2021-08-09 PROCEDURE — 99214 OFFICE O/P EST MOD 30 MIN: CPT | Mod: 95

## 2021-08-09 NOTE — ASSESSMENT
[FreeTextEntry1] : The patient presents for follow up. He had been injecting 150 units gonyl F 3x/week and Novarel 4000 units 3 times weekly  His erections have been greatly improved as well. Blood studies demonstrated a hematocrit of 44.2%.  Estradiol was 50 pg/mL.  LH was 0.4 IU/L and FSH 1.8 IU/L his hemoglobin A1c was 6% and medical evaluation was recommended.Testosterone free was 15.4 pg/mL and total testosterone 345 ng/dL I have renewed his prescription for hCG.\par \par We again discussed that he needs a semen analysis at this time with possible sperm cryopreservation.  He stated he would set up the appointment and will discuss the results afterwards.\par \par REF: `[id number]\par \par Consultation: 30 minutes:  10 minutes reviewing his history and performing a physical examination.  20 minutes reviewing his evaluation, writing for prescription medications (HCS) ,discussing treatment options and writing his note. There was also additional time in preparation for today's visit.\par \par \par

## 2021-08-16 ENCOUNTER — NON-APPOINTMENT (OUTPATIENT)
Age: 41
End: 2021-08-16

## 2021-08-24 ENCOUNTER — NON-APPOINTMENT (OUTPATIENT)
Age: 41
End: 2021-08-24

## 2021-08-27 ENCOUNTER — TRANSCRIPTION ENCOUNTER (OUTPATIENT)
Age: 41
End: 2021-08-27

## 2021-12-03 ENCOUNTER — TRANSCRIPTION ENCOUNTER (OUTPATIENT)
Age: 41
End: 2021-12-03

## 2021-12-03 ENCOUNTER — NON-APPOINTMENT (OUTPATIENT)
Age: 41
End: 2021-12-03

## 2021-12-03 ENCOUNTER — APPOINTMENT (OUTPATIENT)
Dept: PULMONOLOGY | Facility: CLINIC | Age: 41
End: 2021-12-03

## 2022-02-26 ENCOUNTER — TRANSCRIPTION ENCOUNTER (OUTPATIENT)
Age: 42
End: 2022-02-26

## 2022-06-13 ENCOUNTER — APPOINTMENT (OUTPATIENT)
Dept: PULMONOLOGY | Facility: CLINIC | Age: 42
End: 2022-06-13
Payer: COMMERCIAL

## 2022-06-13 VITALS
TEMPERATURE: 95.6 F | RESPIRATION RATE: 17 BRPM | HEART RATE: 70 BPM | OXYGEN SATURATION: 97 % | DIASTOLIC BLOOD PRESSURE: 80 MMHG | WEIGHT: 301 LBS | HEIGHT: 73 IN | SYSTOLIC BLOOD PRESSURE: 120 MMHG | BODY MASS INDEX: 39.89 KG/M2

## 2022-06-13 PROCEDURE — 95012 NITRIC OXIDE EXP GAS DETER: CPT

## 2022-06-13 PROCEDURE — 94729 DIFFUSING CAPACITY: CPT

## 2022-06-13 PROCEDURE — 94010 BREATHING CAPACITY TEST: CPT

## 2022-06-13 PROCEDURE — 99214 OFFICE O/P EST MOD 30 MIN: CPT | Mod: 25

## 2022-06-13 PROCEDURE — 94727 GAS DIL/WSHOT DETER LNG VOL: CPT

## 2022-06-13 RX ORDER — GLYCOPYRROLATE AND FORMOTEROL FUMARATE 9; 4.8 UG/1; UG/1
9-4.8 AEROSOL, METERED RESPIRATORY (INHALATION)
Qty: 3 | Refills: 1 | Status: DISCONTINUED | COMMUNITY
Start: 2019-06-21 | End: 2022-06-13

## 2022-06-13 RX ORDER — PREDNISONE 10 MG/1
10 TABLET ORAL
Qty: 30 | Refills: 0 | Status: DISCONTINUED | COMMUNITY
Start: 2021-07-23 | End: 2022-06-13

## 2022-06-13 RX ORDER — TIOTROPIUM BROMIDE 18 UG/1
18 CAPSULE ORAL; RESPIRATORY (INHALATION) DAILY
Refills: 0 | Status: DISCONTINUED | COMMUNITY
Start: 2018-12-21 | End: 2022-06-13

## 2022-06-13 NOTE — ADDENDUM
[FreeTextEntry1] : Documented by Nichole De La Torre acting as a scribe for Dr. Blade Veloz on 06/13/2022 \par \par All medical record entries made by the Scribe were at my, Dr. Blade Veloz's, direction and personally dictated by me on 06/13/2022 . I have reviewed the chart and agree that the record accurately reflects my personal performance of the history, physical exam, assessment and plan. I have also personally directed, reviewed, and agree with the discharge instructions

## 2022-06-13 NOTE — PROCEDURE
[FreeTextEntry1] : FENO was 21 ; a normal value being less than 25\par Fractional exhaled nitric oxide (FENO) is regarded as a simple, noninvasive method for assessing eosinophilic airway inflammation. Produced by a variety of cells within the lung, nitric oxide (NO) concentrations are generally low in healthy individuals. However, high concentrations of NO appear to be involved in nonspecific host defense mechanisms and chronic inflammatory diseases such as asthma. The American Thoracic Society (ATS) therefore has recommended using FENO to aid in the diagnosis and monitoring of eosinophilic airway inflammation and asthma, and for identifying steroid responsive individuals whose chronic respiratory symptoms may be caused by airway inflammation. \par \par Full PFT revealed moderate obstructive dysfunction, with a FEV1 of 3.37L,which is 68% of predicted, low normal lung volumes, and low normal diffusion of 27.5 , which is 74% of predicted with a normal flow volume loop

## 2022-06-13 NOTE — HISTORY OF PRESENT ILLNESS
[FreeTextEntry1] : Mr. QUINTANA is a 41 year old male with a history of emergent type A aortic dissection repair and replacement 12/2018, HTN, azoospermia, elevated cholesterol, asthma, atopic dermatitis, OW, snoring, and SOB who presents for a follow up pulmonary re-evaluation. His chief complaint is \par \par -he notes generally feeling good\par -he notes he usually gets enough sleep \par -he notes intermittent nausea and vomiting due to medication \par -he notes intermittent diarrhea\par -he notes itchy eyes and ears due to allergies \par -he notes dermatitis on hands \par -he notes starting a new job  \par -he notes breathing has improved and has not needed inhalers recently \par -he notes going to [facility] in 3/2022 an was given Rx for blood pressure and asthma \par -he notes use of Zyrtec but has not seen a major improvement\par -he notes currently only using Telergy \par -he notes he stopped Dupixent since 11/2021\par -he notes bout with diverticulitis and hospitalized in [facility] for 20 day s\par -he notes poor appetite after diverticulitis and went don to 260lbs but regained weight after\par -he notes currently 300 lbs \par \par -denies any visual issues, headaches, fever, chills, sweats, chest pain, chest pressure, constipation, dysphagia, dizziness, leg swelling, leg pain, heartburn, reflux, or sour taste in the mouth.

## 2022-06-13 NOTE — ASSESSMENT
[FreeTextEntry1] : Mr. QUINTANA is a 41 year old male with a history of emergent type A aortic dissection repair and replacement 12/2018, HTN, azoospermia, elevated cholesterol, asthma, atopic dermatitis(on Dupixent), OW who presents for a follow up pulmonary re-evaluation. New job- better health- minimal travel\par \par The patient's SOB is felt to be multifactorial:\par - Overweight /  Out-of-shape\par - Poor breathing mechanics\par - Restrictive lung dysfunction -(posture)\par - Mild Intermittent Asthma\par - Cardiac Disease (Dr. Trey Johnson)\par \par Problem 1: No tracheomalacia\par -s/p Dynamic CT - no evidence of Dz\par -Tracheomalacia is usually acquired in adults and common causes include damage by tracheostomy or endotracheal intubation damaging the tracheal cartilage with increase risk with multiple intubations, prolonged intubation, and concurrent high dose steroid therapy; external chest wall trauma and surgery; chronic compression of the trachea by benign etiologies (eg, benign mediastinal goiter) or malignancy; relapsing polychondritis; or recurrent infection. Tracheomalacia can be asymptomatic, however signs or symptoms can develop as the severity of the airway narrowing progresses with major symptoms include dyspnea, cough, and sputum retention. Other symptoms include severe paroxysms of coughing, wheezing or stridor, barking cough and may be exacerbated by forced expiration, cough, and valsalva maneuver. Tracheomalacia is diagnosed by a bronchoscopic visualization of dynamic airway collapse on dynamic chest CT. Therapy is warranted in symptomatic patients with severe tracheomalacia and includes surgical repair as tracheobronchoplasty. The patient was referred to Dr. Deshawn Hammond or Dr. Bob Grajeda, at [facility] for a surgical consult. \par \par Problem 2a: moderate-severe persistent asthma -(stable) - improved\par -s/p Blood work for allergies: asthma panel, food IgE level (wnl), eosinophil level, Vitamin D level (wnl). - all negative)\par -continue Trelegy (100) 1 puff QD\par -continue Singulair 10 mg QHS\par -continue DuoNeb by nebulizer, prn when sick\par -Asthma is believed to be caused by inherited (genetic) and environmental factor, but its exact cause is unknown. Asthma may be triggered by allergens, lung infections, or irritants in the air. Asthma triggers are different for each person.\par -Inhaler technique reviewed as well as oral hygiene techniques reviewed with patient. Avoidance of cold air, extremes of temperature, rescue inhaler should be used before exercise. Order of medication reviewed with patient. Recommended use of a cool mist humidifier in the bedroom. 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 Compared to a placebo, patients with severe asthma receiving Nucala had fewer exacerbation requiring hospitalization and/or emergency department visits, and a longer time to first exacerbation. In addition, patients with severe asthma receiving Nucala or Fasenra experienced greater reductions in their daily maintenance oral corticosteroid dose, while maintaining asthma control compared with patients receiving placebo. Treatment with Nucala did not result in a significant improvement in lung function, as measured by the volume of air exhaled by patients in one second. The most common side effects include: headache, injection site reactions, back pain, weakness, and fatigue; hypersensitivity reactions can occur within hours or days including swelling of the face, mouth, and tongue, fainting, dizziness, hives, breathing problems, and rash; herpes zoster infections have occurred. The drug is a monoclonal antibody that inhibits interleukin -5 which helps regular eosinophils, a type of white blood cell that contributes to asthma. The over-production of eosinophils can cause inflammation in the lungs, increasing the frequency of asthma attacks. Patients must also take other medications, including high dose inhaled corticosteroids and at least one additional asthma drug \par \par Problem 2c: atopic dermatitis\par -Add mometasone cream\par -functional medicine diet\par -recommend read "wheat belly"\par -recommended borage and flax seed oil\par \par Problem 3: atopic dermatitis -(eosinophilic)\par -s/p blood work: IgE level (-), eosinophil level (+), vitamin D levels (-)\par - Dupixent since 1/2020 (off since 7/2020) (insurance lapse)\par -Dupixent is a prescription medicine used with other asthma medicines for the maintenance treatment of moderate-to-severe asthma in people aged 12 years and older whose asthma is not controlled with their current asthma medicines. Dupixent helps prevent severe asthma attacks (exacerbations) and can improve your breathing. Dupixent may also help reduce the amount of oral corticosteroids you need while preventing severe asthma attacks and improving your breathing. Dupixent is not used to treat sudden breathing problems. Risks and side effect of Dupixent were discussed and reviewed with patient. \par \par Problem 4: allergy / sinus\par -continue Olopatadine 0.6% 1 sniff/nostril BID\par -continue Omnaris 1 sniff BID\par -continue Zyrtec 10 mg QHS \par -recommended mometasone cream\par -recommended to use "Navage" nasal rinse device \par \par Environmental measures for allergies were encouraged including mattress and pillow cover, air purifier, and environmental controls. \par \par Problem 4a Epistaxis\par -add Darshan-Synephrine PRN\par -add Boroleum ointment  \par \par Problem 5: (+) sleep apnea (vintuox)\par -s/p a home sleep study due to his EDS and snoring \par -recommended dental device\par Sleep apnea is associated with adverse clinical consequences which an affect most organ systems. Cardiovascular disease risk includes arrhythmias, atrial fibrillation, hypertension, coronary artery disease, and stroke. Metabolic disorders include diabetes type 2, non-alcoholic fatty liver disease. Mood disorder especially depression; and cognitive decline especially in the elderly. Associations with chronic reflux/Wright’s esophagus some but not all inclusive. \par -Reasons include arousal consistent with hypopnea; respiratory events most prominent in REM sleep or supine position; therefore sleep staging and body position are important for accurate diagnosis and estimation of AHI.\par -Treatment options discussed including CPAP/BiPAP machine, oral appliance, ProVent therapy, Oxy-Aid by Respitec, new technologies, or positional sleep.Recommended use of the CPAP machine for moderate (AHI >15), moderate to severe (AHI 15-30) and severe patients (AHI > 30). Recommended weight loss which can reduce AHI especially in weight loss of greater than 5% of BMI. Positional sleep is recommended in those with low AHI, low-moderate BMI, and younger age. For severe sleep apnea, the hypoglossal nerve stimulator was recommended as well. \par \par Problem 6: thoracic aortic aneurysm\par -follow-up with Dr. Scar Toledo\par \par Problem 7: cardiac disease\par -follow-up with cardiologist (Dr. Trey Johnson)\par \par Problem 8: poor mechanics of breathing\par Proper breathing techniques were reviewed with an emphasis of exhalation. Patient instructed to breath in for 1 second and out for four seconds. Patient was encouraged to not talk while walking. \par \par Problem 9: overweight / out of shape\par -recommended Dr. Nishant Nichols 10 day detox \par Weight loss, exercise, and diet control were discussed and are highly encouraged. Treatment options were given such as, aqua therapy, and contacting a nutritionist. Recommended to use the elliptical, stationary bike, less use of treadmill. Mindful eating was explained to the patient Obesity is associated with worsening asthma, shortness of breath, and potential for cardiac disease, diabetes, and other underlying medical conditions. \par \par Problem 10: Health Maintenance/COVID19 Precautions \par -discussed COVID 19 vaccine with patient\par - Clean your hands often. Wash your hands often with soap and water for at least 20 seconds, especially after blowing your nose, coughing, or sneezing, or having been in a public place.\par - If soap and water are not available, use a hand  that contains at least 60% alcohol.\par - To the extent possible, avoid touching high-touch surfaces in public places - elevator buttons, door handles, handrails, handshaking with people, etc. Use a tissue or your sleeve to cover your hand or finger if you must touch something.\par - Wash your hands after touching surfaces in public places.\par - Avoid touching your face, nose, eyes, etc.\par - Clean and disinfect your home to remove germs: practice routine cleaning of frequently touched surfaces (for example: tables, doorknobs, light switches, handles, desks, toilets, faucets, sinks & cell phones)\par - Avoid crowds, especially in poorly ventilated spaces. Your risk of exposure to respiratory viruses like COVID-19 may increase in crowded, closed-in settings with little air circulation if there are people in the crowd who are sick. All patients are recommended to practice social distancing and stay at least 6 feet away from others. \par - Avoid all non-essential travel including plane trips, and especially avoid embarking on cruise ships.\par -If COVID-19 is spreading in your community, take extra measures to put distance between yourself and other people to further reduce your risk of being exposed to this new virus.\par -Stay home as much as possible.\par - Consider ways of getting food brought to your house through family, social, or commercial networks\par -Be aware that the virus has been known to live in the air up to 3 hours post exposure, cardboard up to 24 hours post exposure, copper up to 4 hours post exposure, steel and plastic up to 2-3 days post exposure. Risk of transmission from these surfaces are affected by many variables.\par COVID-19 precautionary Immune Support Recommendations:\par -OTC Vitamin C 500mg BID \par -OTC Quercetin 250-500mg BID \par -OTC Zinc 75-100mg per day \par -OTC Melatonin 1 or 2mg a night \par -OTC Vitamin D 1-4000mg per day \par -OTC Tonic Water 8oz per day\par -Water 0.5-1 gallon per day\par Asthma and COVID19:\par You need to make sure your asthma is under control. This often requires the use of inhaled corticosteroids (and sometimes oral corticosteroids). Inhaled corticosteroids do not likely reduce your immune system’s ability to fight infections, but oral corticosteroids may. It is important to use the steps above to protect yourself to limit your exposure to any respiratory virus. \par \par Problem 11: health maintenance\par -recommend Dr. Ashish Wallace as PCP\par -refused influenza vaccine 2020\par -recommended strep pneumonia vaccines: Prevnar-13 vaccine, followed by Pneumo vaccine 23 one year following \par -recommended early intervention for URIs \par -recommended regular osteoporosis evaluations \par -recommended early dermatological evaluations \par -recommended after the age of 50 to the age of 70, colonoscopy every 5 years \par \par Follow-up in 3-4 months with [facility] and NiOx\par Patient is encouraged to call or fax the office with any changes, questions, or concerns.\par Explained to the patient in full detail with demonstrations how to use the inhalers and inhaler hygiene.\par -Education provided to the patient regarding their visit and conditions

## 2022-06-13 NOTE — PHYSICAL EXAM
[No Acute Distress] : no acute distress [Normal Oropharynx] : normal oropharynx [III] : Mallampati Class: III [Normal Appearance] : normal appearance [No Neck Mass] : no neck mass [Normal S1, S2] : normal s1, s2 [Normal Rate/Rhythm] : normal rate/rhythm [No Murmurs] : no murmurs [No Resp Distress] : no resp distress [Clear to Auscultation Bilaterally] : clear to auscultation bilaterally [No Abnormalities] : no abnormalities [Normal Gait] : normal gait [Benign] : benign [No Clubbing] : no clubbing [No Cyanosis] : no cyanosis [No Edema] : no edema [FROM] : FROM [No Focal Deficits] : no focal deficits [Normal Color/ Pigmentation] : normal color/ pigmentation [Oriented x3] : oriented x3 [Normal Affect] : normal affect [TextBox_2] : OW [TextBox_54] : 3/6 systolic murmur  [TextBox_68] : I:E 1:3; Clear

## 2022-07-27 ENCOUNTER — APPOINTMENT (OUTPATIENT)
Dept: CARDIOLOGY | Facility: CLINIC | Age: 42
End: 2022-07-27

## 2022-07-27 ENCOUNTER — NON-APPOINTMENT (OUTPATIENT)
Age: 42
End: 2022-07-27

## 2022-07-27 VITALS
HEIGHT: 73 IN | OXYGEN SATURATION: 97 % | SYSTOLIC BLOOD PRESSURE: 121 MMHG | DIASTOLIC BLOOD PRESSURE: 83 MMHG | HEART RATE: 59 BPM | WEIGHT: 310 LBS | TEMPERATURE: 98.3 F | RESPIRATION RATE: 16 BRPM | BODY MASS INDEX: 40.9 KG/M2

## 2022-07-27 PROCEDURE — 99214 OFFICE O/P EST MOD 30 MIN: CPT | Mod: 25

## 2022-07-27 PROCEDURE — 93000 ELECTROCARDIOGRAM COMPLETE: CPT

## 2022-07-27 RX ORDER — DILTIAZEM HYDROCHLORIDE 300 MG/1
300 CAPSULE, EXTENDED RELEASE ORAL
Qty: 90 | Refills: 3 | Status: DISCONTINUED | COMMUNITY
Start: 2019-01-23 | End: 2022-07-27

## 2022-07-28 LAB
ALBUMIN SERPL ELPH-MCNC: 4.6 G/DL
ALP BLD-CCNC: 95 U/L
ALT SERPL-CCNC: 38 U/L
ANION GAP SERPL CALC-SCNC: 14 MMOL/L
AST SERPL-CCNC: 21 U/L
BASOPHILS # BLD AUTO: 0.05 K/UL
BASOPHILS NFR BLD AUTO: 0.9 %
BILIRUB SERPL-MCNC: 1 MG/DL
BUN SERPL-MCNC: 16 MG/DL
CALCIUM SERPL-MCNC: 9.6 MG/DL
CHLORIDE SERPL-SCNC: 101 MMOL/L
CHOLEST SERPL-MCNC: 171 MG/DL
CO2 SERPL-SCNC: 22 MMOL/L
CREAT SERPL-MCNC: 0.87 MG/DL
EGFR: 110 ML/MIN/1.73M2
EOSINOPHIL # BLD AUTO: 0.28 K/UL
EOSINOPHIL NFR BLD AUTO: 4.9 %
ESTIMATED AVERAGE GLUCOSE: 120 MG/DL
GLUCOSE SERPL-MCNC: 114 MG/DL
HBA1C MFR BLD HPLC: 5.8 %
HCT VFR BLD CALC: 42.9 %
HDLC SERPL-MCNC: 43 MG/DL
HGB BLD-MCNC: 14.6 G/DL
IMM GRANULOCYTES NFR BLD AUTO: 0.3 %
LDLC SERPL CALC-MCNC: 103 MG/DL
LYMPHOCYTES # BLD AUTO: 1.72 K/UL
LYMPHOCYTES NFR BLD AUTO: 29.9 %
MAN DIFF?: NORMAL
MCHC RBC-ENTMCNC: 28.9 PG
MCHC RBC-ENTMCNC: 34 GM/DL
MCV RBC AUTO: 84.8 FL
MONOCYTES # BLD AUTO: 0.49 K/UL
MONOCYTES NFR BLD AUTO: 8.5 %
NEUTROPHILS # BLD AUTO: 3.19 K/UL
NEUTROPHILS NFR BLD AUTO: 55.5 %
NONHDLC SERPL-MCNC: 128 MG/DL
PLATELET # BLD AUTO: 237 K/UL
POTASSIUM SERPL-SCNC: 4.4 MMOL/L
PROT SERPL-MCNC: 8.1 G/DL
PSA SERPL-MCNC: 0.17 NG/ML
RBC # BLD: 5.06 M/UL
RBC # FLD: 13 %
SODIUM SERPL-SCNC: 137 MMOL/L
TRIGL SERPL-MCNC: 126 MG/DL
TSH SERPL-ACNC: 2.71 UIU/ML
WBC # FLD AUTO: 5.75 K/UL

## 2022-08-11 ENCOUNTER — APPOINTMENT (OUTPATIENT)
Dept: UROLOGY | Facility: CLINIC | Age: 42
End: 2022-08-11

## 2022-08-11 VITALS — BODY MASS INDEX: 41.22 KG/M2 | WEIGHT: 311 LBS | HEIGHT: 73 IN

## 2022-08-11 VITALS — SYSTOLIC BLOOD PRESSURE: 121 MMHG | HEART RATE: 61 BPM | DIASTOLIC BLOOD PRESSURE: 79 MMHG

## 2022-08-11 PROCEDURE — 99214 OFFICE O/P EST MOD 30 MIN: CPT

## 2022-08-11 NOTE — HISTORY OF PRESENT ILLNESS
[FreeTextEntry1] : The patient presents for follow up. He has not been injecting since December 2021.   He had been injecting 150 units gonyl F 3x/week and Novarel 4000 units 3 times weekly  His erections have been greatly improved as well. \par \par PMH: Patient initially presented with hypogonadotrophic hypogonadism presents for followup. He stated that in December he had an aortic dissection. He required an open repair. He has been doing well since. He's been on pregnyl 3 times weekly since March of 2018, 4000 units 3x weekly. His T was 288 in Feb 2019. He has a partner and would like to have future paternity. 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 He was first seen when he was 36 years old and his wife wis 27 years old. She has had 2 prior pregnancies. He has not had any children from any relationship. He would like to conceive with his partner.\par \par He was previously diagnosed with hypogonadotropic hypogonadism at age ~13. He has sense of smell. 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 DEXA was normal. He has seen multiple endocrinologists in the past as well as Dr. Tate recently. He was previously on gonadotropins as a teenager, then subsequently transitioned to androgel then back to HCG and Menopur. He is off all medications since August 2017.\par \par He underwent a testis biopsy (details unclear from patient) but no sperm was cryopreserved. He was unable to afford gonadotropins due to insurance issues. Is currently on Medicaid.\par

## 2022-08-11 NOTE — ASSESSMENT
[FreeTextEntry1] : The patient presents for follow up. He has not been injecting since December 2021. I will obtain blood studies and re-start him on Novarel. Will try to re-start Gonal F in 1 to 2 months. I discussed this with the patient.I will discuss with him in 4 to 6 weeks.\par \par REF: [id number]\par \par Consultation: 30 minutes:  10 minutes reviewing his history and performing a physical examination.  20 minutes reviewing his evaluation, writing for prescription medications (HCS) and blood studies ,discussing treatment options and writing his note. There was also additional time in preparation for today's visit.\par \par \par

## 2022-08-12 LAB
25(OH)D3 SERPL-MCNC: 31.7 NG/ML
ALBUMIN SERPL ELPH-MCNC: 4.9 G/DL
ALP BLD-CCNC: 95 U/L
ALT SERPL-CCNC: 42 U/L
ANION GAP SERPL CALC-SCNC: 12 MMOL/L
APPEARANCE: CLEAR
AST SERPL-CCNC: 26 U/L
BASOPHILS # BLD AUTO: 0.04 K/UL
BASOPHILS NFR BLD AUTO: 0.8 %
BILIRUB SERPL-MCNC: 1 MG/DL
BILIRUBIN URINE: NEGATIVE
BLOOD URINE: NEGATIVE
BUN SERPL-MCNC: 14 MG/DL
CALCIUM SERPL-MCNC: 9.5 MG/DL
CHLORIDE SERPL-SCNC: 103 MMOL/L
CHOLEST SERPL-MCNC: 162 MG/DL
CO2 SERPL-SCNC: 23 MMOL/L
COLOR: YELLOW
CREAT SERPL-MCNC: 0.83 MG/DL
EGFR: 112 ML/MIN/1.73M2
EOSINOPHIL # BLD AUTO: 0.31 K/UL
EOSINOPHIL NFR BLD AUTO: 6.2 %
ESTIMATED AVERAGE GLUCOSE: 126 MG/DL
ESTRADIOL SERPL-MCNC: 19 PG/ML
FSH SERPL-MCNC: 1.1 IU/L
GLUCOSE QUALITATIVE U: NEGATIVE
GLUCOSE SERPL-MCNC: 126 MG/DL
HBA1C MFR BLD HPLC: 6 %
HCT VFR BLD CALC: 39.6 %
HDLC SERPL-MCNC: 44 MG/DL
HGB BLD-MCNC: 13.8 G/DL
IMM GRANULOCYTES NFR BLD AUTO: 0.2 %
KETONES URINE: NEGATIVE
LDLC SERPL CALC-MCNC: 93 MG/DL
LEUKOCYTE ESTERASE URINE: NEGATIVE
LH SERPL-ACNC: 0.7 IU/L
LYMPHOCYTES # BLD AUTO: 1.48 K/UL
LYMPHOCYTES NFR BLD AUTO: 29.6 %
MAN DIFF?: NORMAL
MCHC RBC-ENTMCNC: 29.8 PG
MCHC RBC-ENTMCNC: 34.8 GM/DL
MCV RBC AUTO: 85.5 FL
MONOCYTES # BLD AUTO: 0.38 K/UL
MONOCYTES NFR BLD AUTO: 7.6 %
NEUTROPHILS # BLD AUTO: 2.78 K/UL
NEUTROPHILS NFR BLD AUTO: 55.6 %
NITRITE URINE: NEGATIVE
NONHDLC SERPL-MCNC: 118 MG/DL
PH URINE: 5.5
PLATELET # BLD AUTO: 223 K/UL
POTASSIUM SERPL-SCNC: 4.5 MMOL/L
PROLACTIN SERPL-MCNC: 5.5 NG/ML
PROT SERPL-MCNC: 7.3 G/DL
PROTEIN URINE: NORMAL
PSA SERPL-MCNC: 0.09 NG/ML
RBC # BLD: 4.63 M/UL
RBC # FLD: 13 %
SODIUM SERPL-SCNC: 138 MMOL/L
SPECIFIC GRAVITY URINE: 1.02
TRIGL SERPL-MCNC: 129 MG/DL
TSH SERPL-ACNC: 2.76 UIU/ML
UROBILINOGEN URINE: NORMAL
WBC # FLD AUTO: 5 K/UL

## 2022-08-15 LAB
TESTOST FREE SERPL-MCNC: 3.8 PG/ML
TESTOST SERPL-MCNC: 21.1 NG/DL

## 2022-08-18 ENCOUNTER — TRANSCRIPTION ENCOUNTER (OUTPATIENT)
Age: 42
End: 2022-08-18

## 2022-09-08 NOTE — ED ADULT NURSE NOTE - PAIN RATING/NUMBER SCALE (0-10): REST
Pharmacy faxed in a request for prior authorization on:    Medication: SAXENDA   Dosage: 18MG/3ML PEN-INJECTORS   Signature:  ?  Quantity requested:  ?  Form Requested: n/a  ID Number:  ?  Insurance Phone Number: ?    Preferred pharmacy has been set up and verified.    Encounter has been started - please review PA and process accordingly      10

## 2022-09-27 ENCOUNTER — APPOINTMENT (OUTPATIENT)
Dept: UROLOGY | Facility: CLINIC | Age: 42
End: 2022-09-27

## 2022-09-27 DIAGNOSIS — I86.1 SCROTAL VARICES: ICD-10-CM

## 2022-09-27 PROCEDURE — 99214 OFFICE O/P EST MOD 30 MIN: CPT | Mod: 95

## 2022-09-27 NOTE — ASSESSMENT
[FreeTextEntry1] : The patient presents for follow up.  He has been injecting 150 units gonyl F 3x/week and Novarel 4000 units 3 times weekly for the last 4 weeks.  His erections have been greatly improved as well.  I have written a new prescription and have written for blood studies.  He will obtain those shortly and we will discuss results in 2 weeks.  Possibly, we will restart Gonal-f at that time.\par \par REF: [id number]\par \par Telehealth Consultation: 30 minutes  10 minutes reviewing his history and discussing prior results.  20 minutes discussing various treatment options, writing medication prescriptions (HCS), requests for lab testing and writing his note. There was also additional time in preparing for the visit and assisting the patient with technology issues he was having with the telehealth platform.\par \par \par

## 2022-09-27 NOTE — HISTORY OF PRESENT ILLNESS
[FreeTextEntry1] : The patient-doctor. relationship has been established in a face-to-face fashion on real-time video audio HIPAA compliant communication using telemedicine software. The patient was at home and the physician was in his office. The patient's identity has been confirmed.  The patient was previously emailed a copy of the telemedicine consent.  The patient has had a chance to review and has now given verbal consent and has requested care to be assessed and treated through telemedicine. The patient understands there may be limitations in this process and that they need not need further follow-up care in the office and/or hospital settings. We were unable to use the [employer] platform and an alternative platform was utilized.  The patient was at home and I was in the office.\par \par Verbal consent was given on Tuesday, September 22, 2022 at 8:50 AM by the patient.  It was requested by the physician.  A written consent was previously sent for the patient to sign and return.\par \par The patient presents for follow up.  He has been injecting 150 units gonyl F 3x/week and Novarel 4000 units 3 times weekly for the last 4 weeks.  His erections have been greatly improved as well. \par \par PMH: Patient initially presented with hypogonadotrophic hypogonadism presents for followup. He stated that in December he had an aortic dissection. He required an open repair. He has been doing well since. He's been on pregnyl 3 times weekly since March of 2018, 4000 units 3x weekly. His T was 288 in Feb 2019. He has a partner and would like to have future paternity. 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 He was first seen when he was 36 years old and his wife wis 27 years old. She has had 2 prior pregnancies. He has not had any children from any relationship. He would like to conceive with his partner.\par \par He was previously diagnosed with hypogonadotropic hypogonadism at age ~13. He has sense of smell. 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 DEXA was normal. He has seen multiple endocrinologists in the past as well as Dr. Tate recently. He was previously on gonadotropins as a teenager, then subsequently transitioned to androgel then back to HCG and Menopur. He is off all medications since August 2017.\par \par He underwent a testis biopsy (details unclear from patient) but no sperm was cryopreserved. He was unable to afford gonadotropins due to insurance issues. Is currently on Medicaid.\par

## 2022-10-12 LAB
25(OH)D3 SERPL-MCNC: 30.3 NG/ML
BASOPHILS # BLD AUTO: 0.05 K/UL
BASOPHILS NFR BLD AUTO: 0.7 %
EOSINOPHIL # BLD AUTO: 0.41 K/UL
EOSINOPHIL NFR BLD AUTO: 6 %
ESTRADIOL SERPL-MCNC: 22 PG/ML
FSH SERPL-MCNC: 0.6 IU/L
HCT VFR BLD CALC: 42.8 %
HGB BLD-MCNC: 14.7 G/DL
IMM GRANULOCYTES NFR BLD AUTO: 0.3 %
LH SERPL-ACNC: 0.6 IU/L
LYMPHOCYTES # BLD AUTO: 1.93 K/UL
LYMPHOCYTES NFR BLD AUTO: 28.3 %
MAN DIFF?: NORMAL
MCHC RBC-ENTMCNC: 29.6 PG
MCHC RBC-ENTMCNC: 34.3 GM/DL
MCV RBC AUTO: 86.3 FL
MONOCYTES # BLD AUTO: 0.6 K/UL
MONOCYTES NFR BLD AUTO: 8.8 %
NEUTROPHILS # BLD AUTO: 3.82 K/UL
NEUTROPHILS NFR BLD AUTO: 55.9 %
PLATELET # BLD AUTO: 238 K/UL
PROLACTIN SERPL-MCNC: 7.7 NG/ML
RBC # BLD: 4.96 M/UL
RBC # FLD: 13.1 %
TESTOST FREE SERPL-MCNC: 5.6 PG/ML
TESTOST SERPL-MCNC: 236 NG/DL
WBC # FLD AUTO: 6.83 K/UL

## 2022-10-17 ENCOUNTER — APPOINTMENT (OUTPATIENT)
Dept: PULMONOLOGY | Facility: CLINIC | Age: 42
End: 2022-10-17

## 2022-11-01 ENCOUNTER — TRANSCRIPTION ENCOUNTER (OUTPATIENT)
Age: 42
End: 2022-11-01

## 2022-11-01 ENCOUNTER — APPOINTMENT (OUTPATIENT)
Dept: UROLOGY | Facility: CLINIC | Age: 42
End: 2022-11-01

## 2022-11-01 PROCEDURE — 99214 OFFICE O/P EST MOD 30 MIN: CPT | Mod: 95

## 2022-11-01 NOTE — HISTORY OF PRESENT ILLNESS
[FreeTextEntry1] : The patient-doctor. relationship has been established in a face-to-face fashion on real-time video audio HIPAA compliant communication using telemedicine software. The patient was at home and the physician was in his office. The patient's identity has been confirmed.  The patient was previously emailed a copy of the telemedicine consent.  The patient has had a chance to review and has now given verbal consent and has requested care to be assessed and treated through telemedicine. The patient understands there may be limitations in this process and that they need not need further follow-up care in the office and/or hospital settings. We were unable to use the [employer] platform and an alternative platform was utilized.  The patient was at home and I was in the office.\par \par Verbal consent was given on Tuesday, September 22, 2022 at 8:50 AM by the patient.  It was requested by the physician.  A written consent was previously sent for the patient to sign and return.\par \par The patient presents for follow up.  He has been injecting 150 units gonyl F 3x/week and Novarel 4000 units 3 times weekly for the last 4 weeks.  His erections have been greatly improved as well. \par \par PMH: Patient initially presented with hypogonadotrophic hypogonadism presents for followup. He stated that in December he had an aortic dissection. He required an open repair. He has been doing well since. He's been on pregnyl 3 times weekly since March of 2018, 4000 units 3x weekly. His T was 288 in Feb 2019. He has a partner and would like to have future paternity. 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 He was first seen when he was 36 years old and his wife wis 27 years old. She has had 2 prior pregnancies. He has not had any children from any relationship. He would like to conceive with his partner.\par \par He was previously diagnosed with hypogonadotropic hypogonadism at age ~13. He has sense of smell. 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 DEXA was normal. He has seen multiple endocrinologists in the past as well as Dr. Tate recently. He was previously on gonadotropins as a teenager, then subsequently transitioned to androgel then back to HCG and Menopur. He is off all medications since August 2017.\par \par He underwent a testis biopsy (details unclear from patient) but no sperm was cryopreserved. He was unable to afford gonadotropins due to insurance issues. Is currently on Medicaid.\par

## 2022-11-01 NOTE — ASSESSMENT
[FreeTextEntry1] : The patient presents for follow up.  He has been injecting 4000 units of hCG 3 times weekly weekly.  I will have him restart 150 units gonyl F 3x/week.  Blood studies today demonstrated a low normal testosterone.  His erections have been greatly improved as well.  I have written a new prescription and have written for blood studies.  I have suggested he take both these medications for 2 months and will obtain a semen analysis and possible cryo at that time.  He has been having tremendous difficulty getting these prescriptions authorized by his insurer.  Once we have sperm cryopreserved we will then place him back on testosterone.  I have also asked him to get back in touch with his endocrinologist who he has not seen in quite a while.\par \par REF: [id number]\par \par Telehealth Consultation: 30 minutes  10 minutes reviewing his history and discussing prior results.  20 minutes discussing various treatment options, writing medication prescriptions (HCS), requests for lab testing and writing his note. There was also additional time in preparing for the visit and assisting the patient with technology issues he was having with the telehealth platform.\par \par \par

## 2023-01-17 ENCOUNTER — RX RENEWAL (OUTPATIENT)
Age: 43
End: 2023-01-17

## 2023-01-30 ENCOUNTER — OUTPATIENT (OUTPATIENT)
Dept: OUTPATIENT SERVICES | Facility: HOSPITAL | Age: 43
LOS: 1 days | End: 2023-01-30
Payer: COMMERCIAL

## 2023-01-30 ENCOUNTER — APPOINTMENT (OUTPATIENT)
Dept: CV DIAGNOSITCS | Facility: HOSPITAL | Age: 43
End: 2023-01-30

## 2023-01-30 DIAGNOSIS — I71.21 ANEURYSM OF THE ASCENDING AORTA, WITHOUT RUPTURE: ICD-10-CM

## 2023-01-30 DIAGNOSIS — Z98.890 OTHER SPECIFIED POSTPROCEDURAL STATES: Chronic | ICD-10-CM

## 2023-01-30 PROCEDURE — 93306 TTE W/DOPPLER COMPLETE: CPT | Mod: 26

## 2023-01-31 ENCOUNTER — TRANSCRIPTION ENCOUNTER (OUTPATIENT)
Age: 43
End: 2023-01-31

## 2023-02-01 ENCOUNTER — TRANSCRIPTION ENCOUNTER (OUTPATIENT)
Age: 43
End: 2023-02-01

## 2023-02-01 ENCOUNTER — APPOINTMENT (OUTPATIENT)
Dept: CARDIOLOGY | Facility: CLINIC | Age: 43
End: 2023-02-01

## 2023-02-02 ENCOUNTER — APPOINTMENT (OUTPATIENT)
Dept: UROLOGY | Facility: CLINIC | Age: 43
End: 2023-02-02
Payer: COMMERCIAL

## 2023-02-02 PROCEDURE — 99214 OFFICE O/P EST MOD 30 MIN: CPT | Mod: 95

## 2023-02-02 RX ORDER — CHORIONIC GONADOTROPIN 10000 UNIT
10000 KIT INTRAMUSCULAR
Qty: 14 | Refills: 0 | Status: ACTIVE | COMMUNITY
Start: 2019-06-27 | End: 1900-01-01

## 2023-02-02 RX ORDER — FOLLITROPIN ALFA 450 UNIT
450 KIT SUBCUTANEOUS
Qty: 6 | Refills: 3 | Status: ACTIVE | COMMUNITY
Start: 2022-11-01 | End: 1900-01-01

## 2023-02-02 NOTE — ASSESSMENT
[FreeTextEntry1] : The patient presents for follow up.  He has been injecting 4000 units of hCG 3 times weekly weekly.  He has restarted 150 units gonyl F 3x/week 3 months ago.  He has not yet tried to obtain a semen analysis.  Blood studies in October demonstrated a low normal testosterone.  His erections have been greatly improved.  I have written a new prescription and have written for blood studies.  He will obtain blood studies and obtain a semen analysis and possible cryopreservation.  Once we have sperm cryopreserved we will then place him back on testosterone.  I have also asked him again to get back in touch with his endocrinologist who he has not seen in quite a while.\par \par REF: [id number]\par \par Telehealth Consultation: 30 minutes  10 minutes reviewing his history and discussing prior results.  20 minutes discussing various treatment options, writing medication prescriptions (HCS), requests for lab testing and writing his note. There was also additional time in preparing for the visit and assisting the patient with technology issues he was having with the telehealth platform.\par \par \par

## 2023-02-02 NOTE — HISTORY OF PRESENT ILLNESS
[FreeTextEntry1] : The patient-doctor. relationship has been established in a face-to-face fashion on real-time video audio HIPAA compliant communication using telemedicine software. The patient was at home and the physician was in his office. The patient's identity has been confirmed.  The patient was previously emailed a copy of the telemedicine consent.  The patient has had a chance to review and has now given verbal consent and has requested care to be assessed and treated through telemedicine. The patient understands there may be limitations in this process and that they need not need further follow-up care in the office and/or hospital settings. We were unable to use the [employer] platform and an alternative platform was utilized.  The patient was at home and I was in the office.\par \par Verbal consent was given on Tuesday, September 22, 2022 at 8:50 AM by the patient.  It was requested by the physician.  A written consent was previously sent for the patient to sign and return.\par \par The patient presents for follow up.  He has been injecting 150 units gonyl F 3x/week and Novarel 4000 units 3 times weekly for the last 4 weeks.  His erections have been greatly improved as well. \par \par PMH: Patient initially presented with hypogonadotrophic hypogonadism presents for followup. He stated that in December he had an aortic dissection. He required an open repair. He has been doing well since. He's been on pregnyl 3 times weekly since March of 2018, 4000 units 3x weekly. His T was 288 in Feb 2019. He has a partner and would like to have future paternity. 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 He was first seen when he was 36 years old and his wife wis 27 years old. She has had 2 prior pregnancies. He has not had any children from any relationship. He would like to conceive with his partner.\par \par He was previously diagnosed with hypogonadotropic hypogonadism at age ~13. He has sense of smell. 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 DEXA was normal. He has seen multiple endocrinologists in the past as well as Dr. Tate recently. He was previously on gonadotropins as a teenager, then subsequently transitioned to androgel then back to HCG and Menopur. He is off all medications since August 2017.\par \par He underwent a testis biopsy (details unclear from patient) but no sperm was cryopreserved. He was unable to afford gonadotropins due to insurance issues. Is currently on Medicaid.\par

## 2023-02-03 ENCOUNTER — APPOINTMENT (OUTPATIENT)
Dept: CT IMAGING | Facility: CLINIC | Age: 43
End: 2023-02-03

## 2023-02-08 ENCOUNTER — NON-APPOINTMENT (OUTPATIENT)
Age: 43
End: 2023-02-08

## 2023-02-08 ENCOUNTER — APPOINTMENT (OUTPATIENT)
Dept: PULMONOLOGY | Facility: CLINIC | Age: 43
End: 2023-02-08
Payer: COMMERCIAL

## 2023-02-08 ENCOUNTER — APPOINTMENT (OUTPATIENT)
Dept: CARDIOLOGY | Facility: CLINIC | Age: 43
End: 2023-02-08
Payer: COMMERCIAL

## 2023-02-08 VITALS
RESPIRATION RATE: 17 BRPM | WEIGHT: 315 LBS | HEART RATE: 72 BPM | OXYGEN SATURATION: 97 % | DIASTOLIC BLOOD PRESSURE: 80 MMHG | BODY MASS INDEX: 41.75 KG/M2 | HEIGHT: 73 IN | SYSTOLIC BLOOD PRESSURE: 118 MMHG | TEMPERATURE: 97.3 F

## 2023-02-08 VITALS
OXYGEN SATURATION: 96 % | SYSTOLIC BLOOD PRESSURE: 137 MMHG | WEIGHT: 315 LBS | BODY MASS INDEX: 39.17 KG/M2 | RESPIRATION RATE: 16 BRPM | DIASTOLIC BLOOD PRESSURE: 83 MMHG | TEMPERATURE: 98.1 F | HEART RATE: 59 BPM | HEIGHT: 75 IN

## 2023-02-08 DIAGNOSIS — Z71.89 OTHER SPECIFIED COUNSELING: ICD-10-CM

## 2023-02-08 DIAGNOSIS — Z01.812 ENCOUNTER FOR PREPROCEDURAL LABORATORY EXAMINATION: ICD-10-CM

## 2023-02-08 PROCEDURE — 99214 OFFICE O/P EST MOD 30 MIN: CPT | Mod: 25

## 2023-02-08 PROCEDURE — 95012 NITRIC OXIDE EXP GAS DETER: CPT

## 2023-02-08 PROCEDURE — 93000 ELECTROCARDIOGRAM COMPLETE: CPT

## 2023-02-08 PROCEDURE — 94010 BREATHING CAPACITY TEST: CPT

## 2023-02-08 RX ORDER — AZELASTINE HYDROCHLORIDE 137 UG/1
0.1 SPRAY, METERED NASAL TWICE DAILY
Qty: 3 | Refills: 1 | Status: ACTIVE | COMMUNITY
Start: 2021-07-23 | End: 1900-01-01

## 2023-02-08 RX ORDER — AMOXICILLIN AND CLAVULANATE POTASSIUM 875; 125 MG/1; MG/1
875-125 TABLET, COATED ORAL
Qty: 20 | Refills: 0 | Status: DISCONTINUED | COMMUNITY
Start: 2021-07-23 | End: 2023-02-08

## 2023-02-08 NOTE — ASSESSMENT
[FreeTextEntry1] : Mr. QUINTANA is a 42 year old male with a history of emergent type A aortic dissection repair and replacement 12/2018, HTN, azoospermia, elevated cholesterol, asthma, atopic dermatitis (s/p Dupixent but off due to insurance 8/2021), OW who presents for a follow up pulmonary re-evaluation. New job- better health- off Dupixent/ active demratitis\par \par The patient's SOB is felt to be multifactorial:\par - Overweight /  Out-of-shape\par - Poor breathing mechanics\par - Restrictive lung dysfunction -(posture)\par - Mild Intermittent Asthma\par - Cardiac Disease (Dr. Trey Johnson)\par \par Problem 1: No tracheomalacia\par -s/p Dynamic CT - no evidence of Dz\par -Tracheomalacia is usually acquired in adults and common causes include damage by tracheostomy or endotracheal intubation damaging the tracheal cartilage with increase risk with multiple intubations, prolonged intubation, and concurrent high dose steroid therapy; external chest wall trauma and surgery; chronic compression of the trachea by benign etiologies (eg, benign mediastinal goiter) or malignancy; relapsing polychondritis; or recurrent infection. Tracheomalacia can be asymptomatic, however signs or symptoms can develop as the severity of the airway narrowing progresses with major symptoms include dyspnea, cough, and sputum retention. Other symptoms include severe paroxysms of coughing, wheezing or stridor, barking cough and may be exacerbated by forced expiration, cough, and valsalva maneuver. Tracheomalacia is diagnosed by a bronchoscopic visualization of dynamic airway collapse on dynamic chest CT. Therapy is warranted in symptomatic patients with severe tracheomalacia and includes surgical repair as tracheobronchoplasty. The patient was referred to Dr. Deshawn Hammond or Dr. Bob Grajeda, at [facility] for a surgical consult. \par \par Problem 2a: moderate-severe persistent asthma -(stable) - improved\par -s/p Blood work for allergies: asthma panel, food IgE level (wnl), eosinophil level, Vitamin D level (wnl). - all negative)\par -continue Trelegy (100) 1 puff QD\par -continue Singulair 10 mg QHS\par -continue DuoNeb by nebulizer, prn when sick\par -Asthma is believed to be caused by inherited (genetic) and environmental factor, but its exact cause is unknown. Asthma may be triggered by allergens, lung infections, or irritants in the air. Asthma triggers are different for each person.\par -Inhaler technique reviewed as well as oral hygiene techniques reviewed with patient. Avoidance of cold air, extremes of temperature, rescue inhaler should be used before exercise. Order of medication reviewed with patient. Recommended use of a cool mist humidifier in the bedroom. 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 Compared to a placebo, patients with severe asthma receiving Nucala had fewer exacerbation requiring hospitalization and/or emergency department visits, and a longer time to first exacerbation. In addition, patients with severe asthma receiving Nucala or Fasenra experienced greater reductions in their daily maintenance oral corticosteroid dose, while maintaining asthma control compared with patients receiving placebo. Treatment with Nucala did not result in a significant improvement in lung function, as measured by the volume of air exhaled by patients in one second. The most common side effects include: headache, injection site reactions, back pain, weakness, and fatigue; hypersensitivity reactions can occur within hours or days including swelling of the face, mouth, and tongue, fainting, dizziness, hives, breathing problems, and rash; herpes zoster infections have occurred. The drug is a monoclonal antibody that inhibits interleukin -5 which helps regular eosinophils, a type of white blood cell that contributes to asthma. The over-production of eosinophils can cause inflammation in the lungs, increasing the frequency of asthma attacks. Patients must also take other medications, including high dose inhaled corticosteroids and at least one additional asthma drug \par \par Problem 2c: atopic dermatitis- restart Dupixent \par -mometasone cream\par -functional medicine diet\par -recommend read "wheat belly"\par -recommended borage and flax seed oil\par \par Problem 3: atopic dermatitis -(eosinophilic)\par -s/p blood work: IgE level (-), eosinophil level (+), vitamin D levels (-)\par - Dupixent since 1/2020 (off since 7/2020) (insurance lapse)- 1/2021- restart\par -Dupixent is a prescription medicine used with other asthma medicines for the maintenance treatment of moderate-to-severe asthma in people aged 12 years and older whose asthma is not controlled with their current asthma medicines. Dupixent helps prevent severe asthma attacks (exacerbations) and can improve your breathing. Dupixent may also help reduce the amount of oral corticosteroids you need while preventing severe asthma attacks and improving your breathing. Dupixent is not used to treat sudden breathing problems. Risks and side effect of Dupixent were discussed and reviewed with patient. \par \par Problem 4: GERD\par -add Pepcid 40 mg QHS \par -Rule of 2s: avoid eating too much, eating too late, eating too spicy, eating two hours before bed.\par -Things to avoid including overeating, spicy foods, tight clothing, eating within three hours of bed, this list is not all inclusive.\par -For treatment of reflux, possible options discussed including diet control, H2 blockers, PPIs, as well as coating motility agents discussed as treatment options. Timing of meals and proximity of last meal to sleep were discussed. If symptoms persist, a formal gastrointestinal evaluation is needed. \par \par Problem 5: allergy / sinus- active \par -continue Olopatadine 0.6% 1 sniff/nostril BID\par -continue Omnaris 1 sniff BID\par -continue Zyrtec 10 mg QHS \par -recommended mometasone cream\par -recommended to use "Navage" nasal rinse device \par Environmental measures for allergies were encouraged including mattress and pillow cover, air purifier, and environmental controls. \par \par Problem 5a Epistaxis\par -add Darshan-Synephrine PRN\par -add Boroleum ointment  \par \par Problem 6: (+) sleep apnea (vintuox)\par -s/p a home sleep study due to his EDS and snoring \par -recommended dental device (Yessenia and Barrington)\par Sleep apnea is associated with adverse clinical consequences which an affect most organ systems. Cardiovascular disease risk includes arrhythmias, atrial fibrillation, hypertension, coronary artery disease, and stroke. Metabolic disorders include diabetes type 2, non-alcoholic fatty liver disease. Mood disorder especially depression; and cognitive decline especially in the elderly. Associations with chronic reflux/Wright’s esophagus some but not all inclusive. \par -Reasons include arousal consistent with hypopnea; respiratory events most prominent in REM sleep or supine position; therefore sleep staging and body position are important for accurate diagnosis and estimation of AHI.\par -Treatment options discussed including CPAP/BiPAP machine, oral appliance, ProVent therapy, Oxy-Aid by Respitec, new technologies, or positional sleep.Recommended use of the CPAP machine for moderate (AHI >15), moderate to severe (AHI 15-30) and severe patients (AHI > 30). Recommended weight loss which can reduce AHI especially in weight loss of greater than 5% of BMI. Positional sleep is recommended in those with low AHI, low-moderate BMI, and younger age. For severe sleep apnea, the hypoglossal nerve stimulator was recommended as well. \par \par Problem 7: thoracic aortic aneurysm\par -follow-up with Dr. Scar Toledo\par \par Problem 8: cardiac disease\par -follow-up with cardiologist (Dr. Trey Johnson)\par \par Problem 9: poor mechanics of breathing\par Proper breathing techniques were reviewed with an emphasis of exhalation. Patient instructed to breath in for 1 second and out for four seconds. Patient was encouraged to not talk while walking. \par \par Problem 10: overweight / out of shape\par -recommended Dr. Nishant Nichols 10 day detox \par Weight loss, exercise, and diet control were discussed and are highly encouraged. Treatment options were given such as, aqua therapy, and contacting a nutritionist. Recommended to use the elliptical, stationary bike, less use of treadmill. Mindful eating was explained to the patient Obesity is associated with worsening asthma, shortness of breath, and potential for cardiac disease, diabetes, and other underlying medical conditions. \par \par Problem 11: Health Maintenance/COVID19 Precautions \par -discussed COVID 19 vaccine with patient\par - Clean your hands often. Wash your hands often with soap and water for at least 20 seconds, especially after blowing your nose, coughing, or sneezing, or having been in a public place.\par - If soap and water are not available, use a hand  that contains at least 60% alcohol.\par - To the extent possible, avoid touching high-touch surfaces in public places - elevator buttons, door handles, handrails, handshaking with people, etc. Use a tissue or your sleeve to cover your hand or finger if you must touch something.\par - Wash your hands after touching surfaces in public places.\par - Avoid touching your face, nose, eyes, etc.\par - Clean and disinfect your home to remove germs: practice routine cleaning of frequently touched surfaces (for example: tables, doorknobs, light switches, handles, desks, toilets, faucets, sinks & cell phones)\par - Avoid crowds, especially in poorly ventilated spaces. Your risk of exposure to respiratory viruses like COVID-19 may increase in crowded, closed-in settings with little air circulation if there are people in the crowd who are sick. All patients are recommended to practice social distancing and stay at least 6 feet away from others. \par - Avoid all non-essential travel including plane trips, and especially avoid embarking on cruise ships.\par -If COVID-19 is spreading in your community, take extra measures to put distance between yourself and other people to further reduce your risk of being exposed to this new virus.\par -Stay home as much as possible.\par - Consider ways of getting food brought to your house through family, social, or commercial networks\par -Be aware that the virus has been known to live in the air up to 3 hours post exposure, cardboard up to 24 hours post exposure, copper up to 4 hours post exposure, steel and plastic up to 2-3 days post exposure. Risk of transmission from these surfaces are affected by many variables.\par COVID-19 precautionary Immune Support Recommendations:\par -OTC Vitamin C 500mg BID \par -OTC Quercetin 250-500mg BID \par -OTC Zinc 75-100mg per day \par -OTC Melatonin 1 or 2mg a night \par -OTC Vitamin D 1-4000mg per day \par -OTC Tonic Water 8oz per day\par -Water 0.5-1 gallon per day\par Asthma and COVID19:\par You need to make sure your asthma is under control. This often requires the use of inhaled corticosteroids (and sometimes oral corticosteroids). Inhaled corticosteroids do not likely reduce your immune system’s ability to fight infections, but oral corticosteroids may. It is important to use the steps above to protect yourself to limit your exposure to any respiratory virus. \par \par Problem 12: health maintenance\par -recommended SaNOtize nasal spray \par -refused influenza vaccine 2022\par -recommended strep pneumonia vaccines: Prevnar-13 vaccine, followed by Pneumo vaccine 23 one year following \par -recommended early intervention for URIs \par -recommended regular osteoporosis evaluations \par -recommended early dermatological evaluations \par -recommended after the age of 50 to the age of 70, colonoscopy every 5 years \par \par Follow-up in 3-4 months with [facility] and NiOx\par Patient is encouraged to call or fax the office with any changes, questions, or concerns.\par Explained to the patient in full detail with demonstrations how to use the inhalers and inhaler hygiene.\par -Education provided to the patient regarding their visit and conditions

## 2023-02-08 NOTE — PHYSICAL EXAM
[No Acute Distress] : no acute distress [Normal Oropharynx] : normal oropharynx [III] : Mallampati Class: III [Normal Appearance] : normal appearance [No Neck Mass] : no neck mass [Normal Rate/Rhythm] : normal rate/rhythm [Normal S1, S2] : normal s1, s2 [No Murmurs] : no murmurs [No Resp Distress] : no resp distress [Clear to Auscultation Bilaterally] : clear to auscultation bilaterally [No Abnormalities] : no abnormalities [Benign] : benign [Normal Gait] : normal gait [No Clubbing] : no clubbing [No Cyanosis] : no cyanosis [No Edema] : no edema [FROM] : FROM [Normal Color/ Pigmentation] : normal color/ pigmentation [No Focal Deficits] : no focal deficits [Oriented x3] : oriented x3 [Normal Affect] : normal affect [TextBox_2] : OW [TextBox_54] : 3/6 systolic murmur  [TextBox_68] : I:E 1:3; Clear

## 2023-02-08 NOTE — PROCEDURE
[FreeTextEntry1] : PFT reveals mild to moderate restrictive dysfunction, with an FEV1 of 3.04 L, which is 63% of predicted, with a normal flow volume loop. \par \par FENO was 23; a normal value being less than 25\par Fractional exhaled nitric oxide (FENO) is regarded as a simple, noninvasive method for assessing eosinophilic airway inflammation. Produced by a variety of cells within the lung, nitric oxide (NO) concentrations are generally low in healthy individuals. However, high concentrations of NO appear to be involved in nonspecific host defense mechanisms and chronic inflammatory diseases such as asthma. The American Thoracic Society (ATS) therefore has recommended using FENO to aid in the diagnosis and monitoring of eosinophilic airway inflammation and asthma, and for identifying steroid responsive individuals whose chronic respiratory symptoms may be caused by airway inflammation.

## 2023-02-08 NOTE — ADDENDUM
[FreeTextEntry1] : Documented by Nichole De La Torre acting as a scribe for Dr. Blade Veloz on 02/08/2023 .\par \par All medical record entries made by the Scribe were at my, Dr. Blade Veloz's, direction and personally dictated by me on 02/08/2023. I have reviewed the chart and agree that the record accurately reflects my personal performance of the history, physical exam, assessment and plan. I have also personally directed, reviewed, and agree with the discharge instructions.

## 2023-02-08 NOTE — HISTORY OF PRESENT ILLNESS
[FreeTextEntry1] : Mr. QUINTANA is a 42 year old male with a history of emergent type A aortic dissection repair and replacement 12/2018, HTN, azoospermia, elevated cholesterol, asthma, atopic dermatitis, OW, snoring, and SOB who presents for a follow up pulmonary re-evaluation. His chief complaint is \par \par -he notes generally feeling good \par -he notes left sided chest pain \par -he notes constipation is improved with Colace \par -he notes recent heartburn with associated nausea post surgery \par -he notes vision is stable \par -he denies dysphonia  \par -he notes sinus congestion has improved but not resolved\par -he denies use of nasal sprays \par -he notes atopic dermatitis of the hands and eyes \par -he denies taking any new medications, vitamins, or supplements \par -he denies regular exercise \par \par -he denies any headaches, emesis, fever, chills, sweats, chest pressure, wheezing, coughing, palpitations, diarrhea, vertigo, dysphagia, itchy eyes, itchy ears, leg swelling, leg pain, arthralgias, myalgias, or sour taste in the mouth.

## 2023-03-08 ENCOUNTER — APPOINTMENT (OUTPATIENT)
Dept: CARDIOTHORACIC SURGERY | Facility: CLINIC | Age: 43
End: 2023-03-08
Payer: COMMERCIAL

## 2023-03-08 VITALS
RESPIRATION RATE: 16 BRPM | TEMPERATURE: 97.4 F | HEART RATE: 61 BPM | BODY MASS INDEX: 39.04 KG/M2 | SYSTOLIC BLOOD PRESSURE: 131 MMHG | OXYGEN SATURATION: 96 % | WEIGHT: 314 LBS | HEIGHT: 75 IN | DIASTOLIC BLOOD PRESSURE: 84 MMHG

## 2023-03-08 PROCEDURE — 99214 OFFICE O/P EST MOD 30 MIN: CPT

## 2023-03-09 RX ORDER — MONTELUKAST 10 MG/1
10 TABLET, FILM COATED ORAL
Qty: 90 | Refills: 1 | Status: COMPLETED | COMMUNITY
Start: 2019-06-21 | End: 2023-03-09

## 2023-03-09 RX ORDER — PANTOPRAZOLE 40 MG/1
40 TABLET, DELAYED RELEASE ORAL
Qty: 90 | Refills: 3 | Status: COMPLETED | COMMUNITY
Start: 2019-07-29 | End: 2023-03-09

## 2023-03-09 RX ORDER — CICLESONIDE 160 UG/1
160 AEROSOL, METERED RESPIRATORY (INHALATION) TWICE DAILY
Qty: 3 | Refills: 1 | Status: COMPLETED | COMMUNITY
Start: 2019-06-21 | End: 2023-03-09

## 2023-03-09 RX ORDER — CICLESONIDE 50 UG/1
50 SPRAY NASAL
Qty: 3 | Refills: 1 | Status: COMPLETED | COMMUNITY
Start: 2019-08-08 | End: 2023-03-09

## 2023-03-09 RX ORDER — OLOPATADINE HYDROCHLORIDE 665 UG/1
0.6 SPRAY, METERED NASAL
Qty: 3 | Refills: 1 | Status: COMPLETED | COMMUNITY
Start: 2019-06-21 | End: 2023-03-09

## 2023-03-09 RX ORDER — GUAIFENESIN 100 MG/5ML
100 LIQUID ORAL EVERY 6 HOURS
Refills: 0 | Status: COMPLETED | COMMUNITY
End: 2023-03-09

## 2023-03-09 RX ORDER — MONTELUKAST 10 MG/1
10 TABLET, FILM COATED ORAL
Qty: 90 | Refills: 3 | Status: COMPLETED | COMMUNITY
Start: 2018-12-21 | End: 2023-03-09

## 2023-03-09 RX ORDER — BENZONATATE 100 MG/1
100 CAPSULE ORAL 3 TIMES DAILY
Refills: 0 | Status: COMPLETED | COMMUNITY
End: 2023-03-09

## 2023-03-12 NOTE — DATA REVIEWED
[FreeTextEntry1] : 2/11/23 CTA CAP revealed Status post ascending thoracic aortic repair. The aortic root the epicenter Valsalva is dilated as discussed. There is a dissection of the aorta beginning at the distal surgical margin traversing throughout the course of the thoracic aorta into the abdominal aorta. Throughout the course of the false lumen is\par significantly larger than the true lumen as discussed above. The brachiocephalic artery and left common carotid artery originating from the true lumen. The left subclavian artery originates from the false lumen.\par \par - The aortic dissection continues into the abdominal aorta terminating just above the level of the inferior mesenteric artery. The false lumen throughout its course is larger than the true lumen as discussed. The celiac axis and superior mesenteric artery originate from the true lumen. Dual right renal arterial supply originates from the false lumen in the left main renal artery originates from the true lumen.\par - Fusiform aneurysmal dilatation of the infrarenal aorta.\par - Dilated common iliac arteries more so on the left.\par - Mild loculated pleural fluid parallels the descending thoracic aorta.\par - Moderate fatty infiltration liver.\par - Bilateral renal simple cortical cysts.\par - Left colonic diverticulosis without diverticulitis.\par - Moderate-sized fat-containing umbilical hernia.\par \par 1/30/23 TTE revealed Normal trileaflet aortic valve.Mild aortic regurgitation . The aortic root measures 4.8 cm. Patient is S/P Type A aortic dissection with resection and replacement of ascending aorta and hemiarch with a Hemashield graft. The ascending aorta was not well visualized but limited imaging suggests the continued presence of a residual dissection within the aortic arch (unchanged from 2019). Mild TR.\par \par CTA chest abdomen pelvis 06/30/2020:\par   Aortic root: 5.0 cm, Previously 4.7 cm\par   Mid-ascending aorta: 3.3 cm, Previously 3.3 cm\par   Transverse aortic arch: 3.8 cm, Previously 3.7 cm\par   Mid-descending aorta: 4.7 cm, Previously 3.9 cm\par   Level of diaphragm: 3.0 cm, Previously 3.0 cm\par Dissection flap terminates just below the renal artery origin. Aortic measurements at the level above, at, and below the renal arteries are 3.3 cm, 3.2 cm, and 3.0 cm.\par My measurement of the aortic root is 4.8cm. \par \par \par CT chest without contrast 7/13/19: residual dissection remains within the aortic arch. \par * Aortic root: 4.7 cm at the sinuses of Valsalva \par * Ascending aorta at the main pulmonary artery: 3.3 cm \par * Aortic arch just proximal to the brachiocephalic artery: 3.5 cm \par * At the aortic isthmus: 3.7 cm \par * Descending thoracic aorta at the main pulmonary artery 3.9 cm \par * Descending thoracic aorta at the left inferior pulmonary vein 3.3 cm \par * Aorta at the diaphragmatic hiatus: 3.0 cm. \par The main pulmonary artery is normal in caliber.\par \par Echocardiogram 12/4/19 revealed:\par -EF 71%. normal trileaflet aortic valve. no aortic valve regurgitation noted. the aortic root measures 4.7cm. \par

## 2023-03-12 NOTE — PROCEDURE
[FreeTextEntry1] : \par Dr. John discussed activity restrictions with the patient, and would advise exercise at a moderate amount with no heavy lifting over one third of body weight, and avoiding heart rates that exceed 140 beats per minute. Every patient must abstain from tobacco and illicit drug use, especially stimulants such as cocaine or methamphetamine. The patient was also counseled on maintaining a healthy heart diet, and losing any excessive weight as this also put undue stress on both the aorta and entire cardiovascular system. Patient was counseled on the importance of medication adherence for blood pressure control, as hypertension is a significant risk factor associated with aortic dissections. First degree family members should be screened for bicuspid valve disease, and ascending aortic aneurysms.\par \par Patient also was advised to view our educational video prior to this visit regarding aortic pathology, lifestyle modifications, risk factors and procedures, retrieved at [email or web address].\par \par

## 2023-03-12 NOTE — END OF VISIT
[FreeTextEntry3] : \par I personally scribed for JOSE SALGUERO on Mar  8 2023  7:00AM . \par \par I personally performed the services described in the documentation, reviewed the documentation recorded by the scribe in my presence and it accurately and completely records my words and actions.\par \par \par \par \par Physician Attestation:\par Documented by SVETLANA BEYER acting as a scribe for JOSE SALGUERO 03/08/2023 . \par                 All medical record entries made by the Scribe were at my, JOSE SALGUERO , direction and personally dictated by me on 03/08/2023 . I have reviewed the chart and agree that the record accurately reflects my personal performance of the history, physical exam, assessment and plan\par \par

## 2023-03-12 NOTE — ASSESSMENT
[FreeTextEntry1] : 39 year old male with a past medical history of Kallmann syndrome, HTN, HLD, asthma, hypogonadism, obesity, ?MARYLOU, s/p Emergency repair of type A aortic dissection with ascending aorta and hemiarch replacement on 12/8/18 by Dr. Scar Toledo. Patient presents for a follow up appointment. \par \par CTA chest abdomen pelvis 06/30/2020:\par   Aortic root: 5.0 cm, Previously 4.7 cm\par   Mid-ascending aorta: 3.3 cm, Previously 3.3 cm\par   Transverse aortic arch: 3.8 cm, Previously 3.7 cm\par   Mid-descending aorta: 4.7 cm, Previously 3.9 cm\par   Level of diaphragm: 3.0 cm, Previously 3.0 cm\par Dissection flap terminates just below the renal artery origin. Aortic measurements at the level above, at, and below the renal arteries are 3.3 cm, 3.2 cm, and 3.0 cm.\par My measurement of the aortic root is 4.8cm. \par \par The patient's medical records and diagnostic images were reviewed at the time of this office visit, and the following recommendation was made. Review of the imaging shows aortic pathology has remained stable and does not require surgical intervention at this time.\par \par Plan:\par 1. Continue medication regimen\par 2. Follow up with PCP and cardiologist\par 3. BP control- I have recommended the patient to monitor his blood pressure closely. I have also advised the patient to take daily blood pressures at home and adhere to medication regimen.\par 4. Return to [facility] one year with CTA chest, abdomen and pelvis. \par 5. Genetic screening \par \par

## 2023-03-12 NOTE — HISTORY OF PRESENT ILLNESS
[FreeTextEntry1] : 39 year old male with a past medical history of Kallmann syndrome, HTN, HLD, asthma, hypogonadism, obesity, ?MARYLOU, s/p Emergency repair of type A aortic dissection with ascending aorta and hemiarch replacement on 12/8/18 by Dr. Scar Toledo. Patient presents for aortic followup, as originally referred by Dr. Trey Johnson. Post op course significant for cardiogenic shock requiring pressor support, hypertension requiring cardene gtt, volume overload, and leukocytosis. \par \par Today he presents and reports that he has occasional dizziness/lightheadedness and Nausea in the morning. \par \par Patient is doing well and denies recent hospitalization, ER visits, or surgeries. He denies fever, chills, fatigue, headache, blurred vision,  syncope, chest pain, palpitations, shortness of breath, orthopnea, paroxysmal nocturnal dyspnea, nausea, vomiting, abdominal pain, back pain, headache, visual disturbances, CVA, PE, DVT, D/C, hematochezia, melena, dysuria, hematuria,  BRBPR or swelling to legs.\par \par \par \par 2/11/23 CTA CAP revealed Status post ascending thoracic aortic repair. The aortic root the epicenter Valsalva is dilated as discussed. There is a dissection of the aorta beginning at the distal surgical margin traversing throughout the course of the thoracic aorta into the abdominal aorta. Throughout the course of the false lumen is\par significantly larger than the true lumen as discussed above. The brachiocephalic artery and left common carotid artery originating from the true lumen. The left subclavian artery originates from the false lumen.\par \par - The aortic dissection continues into the abdominal aorta terminating just above the level of the inferior mesenteric artery. The false lumen throughout its course is larger than the true lumen as discussed. The celiac axis and superior mesenteric artery originate from the true lumen. Dual right renal arterial supply originates from the false lumen in the left main renal artery originates from the true lumen.\par - Fusiform aneurysmal dilatation of the infrarenal aorta.\par - Dilated common iliac arteries more so on the left.\par - Mild loculated pleural fluid parallels the descending thoracic aorta.\par - Moderate fatty infiltration liver.\par - Bilateral renal simple cortical cysts.\par - Left colonic diverticulosis without diverticulitis.\par - Moderate-sized fat-containing umbilical hernia.\par \par 1/30/23 TTE revealed Normal trileaflet aortic valve.Mild aortic regurgitation . The aortic root measures 4.8 cm. Patient is S/P Type A aortic dissection with resection and replacement of ascending aorta and hemiarch with a Hemashield graft. The ascending aorta was not well visualized but limited imaging suggests the continued presence of a residual dissection within the aortic arch (unchanged from 2019). Mild TR.\par \par \par

## 2023-03-12 NOTE — CONSULT LETTER
[Dear  ___] : Dear  [unfilled], [Courtesy Letter:] : I had the pleasure of seeing your patient, [unfilled], in my office today. [Please see my note below.] : Please see my note below. [Consult Closing:] : Thank you very much for allowing me to participate in the care of this patient.  If you have any questions, please do not hesitate to contact me. [Sincerely,] : Sincerely, [FreeTextEntry2] : Dr.Joe Johnson  [FreeTextEntry3] : Gaetano John M.D.\par Professor of Cardiovascular and Thoracic Surgery\par Minimally Invasive Valve Surgeon\par Director of Aortic Surgery, [facility]\par Cell: [phone]\par Email: [email or web address]\par

## 2023-03-22 NOTE — PHYSICAL THERAPY INITIAL EVALUATION ADULT - ADL SKILLS, REHAB EVAL
Marquis Lea is a 13 y.o. with history of asthma, allergies.  CC:  Here for follow up asthma.  This history is obtained from the patient, mother.  Records reviewed:  last seen by Dr. Downing 5/19/22    Asthma HPI:  Symptoms include:  URI with runny nose/cough/sore throat in January, February.   Cough: last about 3 weeks ago  Wheezing: once during URI  Problems with exercise induced coughing, wheezing, or shortness of breath?  Yes, hasn't been running much lately  Has sleep been disturbed due to symptoms: No  How often have you had to use your albuterol for relief of symptoms?  Once only  Have you needed prednisone since last visit?  Last used in December  Controller meds: flovent 2 puffs BID      Current Outpatient Medications:     albuterol (PROVENTIL) 2.5mg/3ml Nebu Soln solution for nebulization, Take 3 mL by nebulization every four hours as needed for Shortness of Breath., Disp: 60 mL, Rfl: 0    albuterol 108 (90 Base) MCG/ACT Aero Soln inhalation aerosol, INHALE 2 TO 4 PUFFS BY MOUTH EVERY 4 HOURS AS NEEDED FOR COUGH, WHEEZE., Disp: 9 g, Rfl: 0    fluticasone (FLOVENT HFA) 110 MCG/ACT Aerosol, Inhale 2 Puffs 2 times a day. Inhale 1 puff by mouth once daily, Disp: 12 g, Rfl: 6    ondansetron (ZOFRAN) 8 MG Tab, Take 1 Tablet by mouth every 8 hours as needed for Nausea/Vomiting., Disp: 15 Tablet, Rfl: 0    EPINEPHrine (EPIPEN) 0.3 MG/0.3ML Solution Auto-injector solution for injection, Inject 0.3 mL into the thigh one time for 1 dose., Disp: 2 Each, Rfl: 1    Respiratory Therapy Supplies (NEBULIZER/PEDIATRIC MASK) Kit, 1 Units every four hours as needed. Use q4hrs prn sob or wheeze as directed, Disp: 1 Kit, Rfl: 0    ibuprofen (MOTRIN) 100 MG/5ML Suspension, Take 16 mL by mouth every 6 hours as needed., Disp: 300 mL, Rfl: 3    Spacer/Aero-Holding Chambers Device, 1 Units by Does not apply route every four hours as needed., Disp: 1 Device, Rfl: 0    QVAR REDIHALER 80 MCG/ACT inhaler, Inhale 2 puffs by mouth  "twice daily (Patient not taking: Reported on 3/22/2023), Disp: 11 g, Rfl: 3    cefdinir (OMNICEF) 300 MG Cap, Take 1 Capsule by mouth 2 times a day. (Patient not taking: Reported on 3/22/2023), Disp: 20 Capsule, Rfl: 0    predniSONE (DELTASONE) 20 MG Tab, 2 tablets PO daily x 5 days prn wheezing (Patient not taking: Reported on 3/22/2023), Disp: 10 Tablet, Rfl: 1    loratadine (CLARITIN) 10 MG Tab, Take 1 Tablet by mouth every day. (Patient not taking: Reported on 3/22/2023), Disp: 30 Tablet, Rfl: 3      Allergy/sinus HPI:  History of allergies?   Allergies   Allergen Reactions    Peanut-Derived Anaphylaxis    Tree Nuts Food Allergy Shortness of Breath    Fish Shortness of Breath    Shellfish Allergy Shortness of Breath    Brazil Nuts     Eggs      Can have in food. Just not by itself    Other Environmental Itching     Multiple pollens, mold, pets and roaches      Pcn [Penicillins] Itching and Vomiting      Nasal congestion? None currently    Review of Systems:  Other: says ears are plugged      Environmental/Social history:    Tobacco exposure: denies  / in person school attendance: yes        Physical Examination:  Pulse 87   Resp 16   Ht 1.6 m (5' 2.99\")   Wt 42.6 kg (93 lb 14.7 oz)   SpO2 99%   BMI 16.64 kg/m²   General: alert, no distress  Eye Exam: Conjunctiva are pink and non-injected  Ears: copious cerumen bilaterally, tried to extract but unable to visualize TMs  Nose: normal  Oropharynx: no exudate, no erythema  Neck: supple, no adenopathy  Lungs: lungs clear to auscultation, good diaphragmatic excursion  Heart: regular rate & rhythm, no murmurs      IMPRESSION/PLAN:  1. Moderate persistent asthma without complication  Continue flovent daily  Use albuterol prn  Will have prednisone available on hand    - predniSONE (DELTASONE) 20 MG Tab; 2 tablets PO daily x 5 days prn wheezing  Dispense: 10 Tablet; Refill: 1  - fluticasone (FLOVENT HFA) 110 MCG/ACT Aerosol; Inhale 2 Puffs 2 times a day.  " Dispense: 12 g; Refill: 6  - albuterol 108 (90 Base) MCG/ACT Aero Soln inhalation aerosol; INHALE 2 TO 4 PUFFS BY MOUTH EVERY 4 HOURS AS NEEDED FOR COUGH, WHEEZE.  Dispense: 9 g; Refill: 4  - albuterol (PROVENTIL) 2.5mg/3ml Nebu Soln solution for nebulization; Take 3 mL by nebulization every four hours as needed for Shortness of Breath.  Dispense: 60 mL; Refill: 3    2. Seasonal allergies  Take zyrtec or other antihistamine daily  - cetirizine (ZYRTEC) 10 MG Tab; Take 1 Tablet by mouth every day.  Dispense: 30 Tablet; Refill: 6    3. Impacted cerumen of both ears  Suggested seeing PCP or ENT    Follow up in 6 months.  Kelly Rashid    independent

## 2023-04-03 ENCOUNTER — TRANSCRIPTION ENCOUNTER (OUTPATIENT)
Age: 43
End: 2023-04-03

## 2023-04-09 ENCOUNTER — RX RENEWAL (OUTPATIENT)
Age: 43
End: 2023-04-09

## 2023-04-17 ENCOUNTER — APPOINTMENT (OUTPATIENT)
Dept: CARDIOLOGY | Facility: CLINIC | Age: 43
End: 2023-04-17
Payer: COMMERCIAL

## 2023-04-17 VITALS — SYSTOLIC BLOOD PRESSURE: 124 MMHG | DIASTOLIC BLOOD PRESSURE: 81 MMHG

## 2023-04-17 VITALS — BODY MASS INDEX: 39.17 KG/M2 | WEIGHT: 315 LBS | OXYGEN SATURATION: 97 % | HEART RATE: 57 BPM | HEIGHT: 75 IN

## 2023-04-17 PROCEDURE — 99214 OFFICE O/P EST MOD 30 MIN: CPT | Mod: 25

## 2023-04-17 PROCEDURE — 93000 ELECTROCARDIOGRAM COMPLETE: CPT

## 2023-04-17 PROCEDURE — 99204 OFFICE O/P NEW MOD 45 MIN: CPT | Mod: 25

## 2023-04-17 NOTE — PHYSICAL EXAM
[Normal Uvula] : normal uvula [Normal] : normal palmar creases without syndactyly or other anomalies [Bifid Uvula] : no bifid uvula [Cleft Lip] : no cleft lip [Cleft Palate] : no cleft palate [Pectus Deformity] : no pectus deformity [Scoliosis] : no scoliosis [Tremor] : no tremor [de-identified] : large head [de-identified] : wears glasses [de-identified] : regular 3/6 murmur [FreeTextEntry3] : 205 [FreeTextEntry2] : 193 [FreeTextEntry4] : 1.06 [FreeTextEntry5] : 84 [FreeTextEntry6] : 109 [FreeTextEntry7] : 0.77 [TWNoteComboBox1] : 0 [TWNoteComboBox2] : 0 [TWNoteComboBox4] : 0 [TWNoteComboBox3] : 0 [TWNoteComboBox5] : 0 [de-identified] : 1 [de-identified] : 0 [de-identified] : 0 [de-identified] : 1 [de-identified] : 0 [Right] : Right: N [Left] : Left: N [] : No

## 2023-04-17 NOTE — FAMILY HISTORY
[FreeTextEntry1] : FamilyHistory_20_twCiteListControlStart FamilyHistory_20_twCiteListControlEnd [id number] \par A four-generation family history was constructed and scanned into [employer]. \par Family history is significant for: \par siblings\par sister 42 yo healthy , hx lazy eye: 3 daughters 13 yo hx lazy eye, 10 yo wears glasses, 8 yo \par brother identical twin 41 yo mild obesity: son 3 yo, daughter 8 yo and expecting all healthy \par brother  identical twin 41 yo hx nerve problem following car accident: no children\par brother 37 yo (born with problems due to mother on Lithium at the time of her pregnancy) developmental delay, \par \par half siblings shared father\par half sister 36 yo: 3 sons , one son with hx seizures\par \par half sister 31 yo hx down syndrome, independent\par half brother 23 yo\par half brother 24 yo\par \par Mother 68 yo hx bipolar do \par Maternal aunts  none\par Maternal uncles x1, 66 yo healthy, hx PTSD following wartime service: no children\par Maternal Grandmother dec 82 yo mental health disease from trauma\par Maternal Grandfather dec unk med hx \par \par Father 75 yo, Dm \par multiple aunts and uncles \par Paternal aunts\par Paternal uncles\par one uncle with > 20 surgeries\par Paternal Grandfather dec brother with maternal grandfather \par Paternal Grandmother dec 79 yo  hx DM, Le amputations \par \par children: none\par \par his maternal families originate from [place] and paternal families originate from [facility] [place]. \par No Ashkenazi Congregation ancestry. \par Family history was positive for consanguinity   paternal and maternal grandfathers are brothers, parents are 1st cousins \par No family history of SIDS\par  \par \par  [FreeTextEntry2] : [facility] [place] [FreeTextEntry3] : [facility] [place]

## 2023-04-17 NOTE — REASON FOR VISIT
[FreeTextEntry3] : Dear Dr. John and Dr. Johnson       . I saw your patient JOANNE QUINTANA on 04/17/2023 . Please see the note below for the assessment and plan. \par \par JOANNE QUINTANA  was seen  for an initial consultation at the [facility] at [facility] on 04/17/2023.   Mr. QUINTANA was referred by  Dr. John and Dr. Johnson   for hereditary cardiac predisposition risk assessment and counseling, due to hx of aortic aneurysm and dissection.\par \par

## 2023-04-17 NOTE — HISTORY OF PRESENT ILLNESS
[FreeTextEntry1] : JOANNE QUINTANA is a 41 yo M PMH Kallman syndrome,   Aortic root aneurysm and dissection s/p  emergent repair of the type A aortic dissection with resection and replacement of ascending aorta and hemiarch.\par Residual dissection in ascending aorta seen on TTE\par diagnosed at age 36 yo \par today he is referred for a cardiogenomic evaluation

## 2023-04-19 ENCOUNTER — NON-APPOINTMENT (OUTPATIENT)
Age: 43
End: 2023-04-19

## 2023-05-22 ENCOUNTER — TRANSCRIPTION ENCOUNTER (OUTPATIENT)
Age: 43
End: 2023-05-22

## 2023-06-12 ENCOUNTER — APPOINTMENT (OUTPATIENT)
Dept: CARDIOLOGY | Facility: CLINIC | Age: 43
End: 2023-06-12

## 2023-06-13 LAB
ESTRADIOL SERPL-MCNC: 27 PG/ML
FSH SERPL-MCNC: 1.4 IU/L
LH SERPL-ACNC: 0.7 IU/L
PROLACTIN SERPL-MCNC: 7.5 NG/ML
TESTOST FREE SERPL-MCNC: 5.3 PG/ML
TESTOST SERPL-MCNC: 183 NG/DL

## 2023-06-30 ENCOUNTER — RX RENEWAL (OUTPATIENT)
Age: 43
End: 2023-06-30

## 2023-06-30 RX ORDER — DILTIAZEM HYDROCHLORIDE 300 MG/1
300 CAPSULE, EXTENDED RELEASE ORAL DAILY
Qty: 90 | Refills: 3 | Status: ACTIVE | COMMUNITY
Start: 2022-03-23 | End: 1900-01-01

## 2023-06-30 RX ORDER — FAMOTIDINE 40 MG/1
40 TABLET, FILM COATED ORAL
Qty: 90 | Refills: 1 | Status: ACTIVE | COMMUNITY
Start: 2023-02-08 | End: 1900-01-01

## 2023-06-30 RX ORDER — LOSARTAN POTASSIUM 50 MG/1
50 TABLET, FILM COATED ORAL DAILY
Qty: 90 | Refills: 3 | Status: ACTIVE | COMMUNITY
Start: 2019-07-29 | End: 1900-01-01

## 2023-06-30 RX ORDER — AZELASTINE HYDROCHLORIDE 137 UG/1
137 SPRAY, METERED NASAL
Qty: 3 | Refills: 1 | Status: ACTIVE | COMMUNITY
Start: 2023-06-30 | End: 1900-01-01

## 2023-07-20 ENCOUNTER — INPATIENT (INPATIENT)
Facility: HOSPITAL | Age: 43
LOS: 3 days | Discharge: HOME | End: 2023-07-24
Attending: STUDENT IN AN ORGANIZED HEALTH CARE EDUCATION/TRAINING PROGRAM | Admitting: STUDENT IN AN ORGANIZED HEALTH CARE EDUCATION/TRAINING PROGRAM
Payer: COMMERCIAL

## 2023-07-20 VITALS
RESPIRATION RATE: 18 BRPM | SYSTOLIC BLOOD PRESSURE: 98 MMHG | HEIGHT: 75 IN | WEIGHT: 309.09 LBS | OXYGEN SATURATION: 95 % | HEART RATE: 62 BPM | TEMPERATURE: 99 F | DIASTOLIC BLOOD PRESSURE: 63 MMHG

## 2023-07-20 DIAGNOSIS — Z98.890 OTHER SPECIFIED POSTPROCEDURAL STATES: Chronic | ICD-10-CM

## 2023-07-20 LAB
ALBUMIN SERPL ELPH-MCNC: 3.2 G/DL — LOW (ref 3.3–5)
ALP SERPL-CCNC: 86 U/L — SIGNIFICANT CHANGE UP (ref 40–120)
ALT FLD-CCNC: 44 U/L — SIGNIFICANT CHANGE UP (ref 12–78)
ANION GAP SERPL CALC-SCNC: 5 MMOL/L — SIGNIFICANT CHANGE UP (ref 5–17)
APPEARANCE UR: CLEAR — SIGNIFICANT CHANGE UP
AST SERPL-CCNC: 35 U/L — SIGNIFICANT CHANGE UP (ref 15–37)
BACTERIA # UR AUTO: ABNORMAL
BASOPHILS # BLD AUTO: 0.04 K/UL — SIGNIFICANT CHANGE UP (ref 0–0.2)
BASOPHILS NFR BLD AUTO: 0.3 % — SIGNIFICANT CHANGE UP (ref 0–2)
BILIRUB SERPL-MCNC: 2.3 MG/DL — HIGH (ref 0.2–1.2)
BILIRUB UR-MCNC: ABNORMAL
BUN SERPL-MCNC: 19 MG/DL — SIGNIFICANT CHANGE UP (ref 7–23)
CALCIUM SERPL-MCNC: 9.2 MG/DL — SIGNIFICANT CHANGE UP (ref 8.5–10.1)
CHLORIDE SERPL-SCNC: 101 MMOL/L — SIGNIFICANT CHANGE UP (ref 96–108)
CO2 SERPL-SCNC: 25 MMOL/L — SIGNIFICANT CHANGE UP (ref 22–31)
COLOR SPEC: ABNORMAL
CREAT SERPL-MCNC: 1.37 MG/DL — HIGH (ref 0.5–1.3)
DIFF PNL FLD: NEGATIVE — SIGNIFICANT CHANGE UP
EGFR: 66 ML/MIN/1.73M2 — SIGNIFICANT CHANGE UP
EOSINOPHIL # BLD AUTO: 0.01 K/UL — SIGNIFICANT CHANGE UP (ref 0–0.5)
EOSINOPHIL NFR BLD AUTO: 0.1 % — SIGNIFICANT CHANGE UP (ref 0–6)
EPI CELLS # UR: SIGNIFICANT CHANGE UP
GLUCOSE SERPL-MCNC: 118 MG/DL — HIGH (ref 70–99)
GLUCOSE UR QL: NEGATIVE MG/DL — SIGNIFICANT CHANGE UP
HCT VFR BLD CALC: 37.9 % — LOW (ref 39–50)
HGB BLD-MCNC: 12.9 G/DL — LOW (ref 13–17)
IMM GRANULOCYTES NFR BLD AUTO: 0.5 % — SIGNIFICANT CHANGE UP (ref 0–0.9)
KETONES UR-MCNC: ABNORMAL
LEUKOCYTE ESTERASE UR-ACNC: ABNORMAL
LIDOCAIN IGE QN: 108 U/L — SIGNIFICANT CHANGE UP (ref 73–393)
LYMPHOCYTES # BLD AUTO: 0.86 K/UL — LOW (ref 1–3.3)
LYMPHOCYTES # BLD AUTO: 7.1 % — LOW (ref 13–44)
MCHC RBC-ENTMCNC: 29.7 PG — SIGNIFICANT CHANGE UP (ref 27–34)
MCHC RBC-ENTMCNC: 34 G/DL — SIGNIFICANT CHANGE UP (ref 32–36)
MCV RBC AUTO: 87.1 FL — SIGNIFICANT CHANGE UP (ref 80–100)
MONOCYTES # BLD AUTO: 1.27 K/UL — HIGH (ref 0–0.9)
MONOCYTES NFR BLD AUTO: 10.5 % — SIGNIFICANT CHANGE UP (ref 2–14)
NEUTROPHILS # BLD AUTO: 9.86 K/UL — HIGH (ref 1.8–7.4)
NEUTROPHILS NFR BLD AUTO: 81.5 % — HIGH (ref 43–77)
NITRITE UR-MCNC: POSITIVE
NRBC # BLD: 0 /100 WBCS — SIGNIFICANT CHANGE UP (ref 0–0)
PH UR: 5 — SIGNIFICANT CHANGE UP (ref 5–8)
PLATELET # BLD AUTO: 134 K/UL — LOW (ref 150–400)
POTASSIUM SERPL-MCNC: 5 MMOL/L — SIGNIFICANT CHANGE UP (ref 3.5–5.3)
POTASSIUM SERPL-SCNC: 5 MMOL/L — SIGNIFICANT CHANGE UP (ref 3.5–5.3)
PROT SERPL-MCNC: 7.3 GM/DL — SIGNIFICANT CHANGE UP (ref 6–8.3)
PROT UR-MCNC: 100 MG/DL
RBC # BLD: 4.35 M/UL — SIGNIFICANT CHANGE UP (ref 4.2–5.8)
RBC # FLD: 12.6 % — SIGNIFICANT CHANGE UP (ref 10.3–14.5)
RBC CASTS # UR COMP ASSIST: SIGNIFICANT CHANGE UP /HPF (ref 0–4)
SODIUM SERPL-SCNC: 131 MMOL/L — LOW (ref 135–145)
SP GR SPEC: 1.02 — SIGNIFICANT CHANGE UP (ref 1.01–1.02)
UROBILINOGEN FLD QL: 4 MG/DL
WBC # BLD: 12.1 K/UL — HIGH (ref 3.8–10.5)
WBC # FLD AUTO: 12.1 K/UL — HIGH (ref 3.8–10.5)
WBC UR QL: ABNORMAL

## 2023-07-20 PROCEDURE — 99285 EMERGENCY DEPT VISIT HI MDM: CPT

## 2023-07-20 RX ORDER — KETOROLAC TROMETHAMINE 30 MG/ML
15 SYRINGE (ML) INJECTION ONCE
Refills: 0 | Status: DISCONTINUED | OUTPATIENT
Start: 2023-07-20 | End: 2023-07-20

## 2023-07-20 RX ORDER — SODIUM CHLORIDE 9 MG/ML
1000 INJECTION INTRAMUSCULAR; INTRAVENOUS; SUBCUTANEOUS ONCE
Refills: 0 | Status: COMPLETED | OUTPATIENT
Start: 2023-07-20 | End: 2023-07-20

## 2023-07-20 RX ORDER — HYDROMORPHONE HYDROCHLORIDE 2 MG/ML
3 INJECTION INTRAMUSCULAR; INTRAVENOUS; SUBCUTANEOUS ONCE
Refills: 0 | Status: DISCONTINUED | OUTPATIENT
Start: 2023-07-20 | End: 2023-07-20

## 2023-07-20 RX ORDER — SODIUM CHLORIDE 9 MG/ML
1000 INJECTION INTRAMUSCULAR; INTRAVENOUS; SUBCUTANEOUS ONCE
Refills: 0 | Status: DISCONTINUED | OUTPATIENT
Start: 2023-07-20 | End: 2023-07-20

## 2023-07-20 RX ADMIN — SODIUM CHLORIDE 1000 MILLILITER(S): 9 INJECTION INTRAMUSCULAR; INTRAVENOUS; SUBCUTANEOUS at 22:28

## 2023-07-20 RX ADMIN — SODIUM CHLORIDE 1000 MILLILITER(S): 9 INJECTION INTRAMUSCULAR; INTRAVENOUS; SUBCUTANEOUS at 23:30

## 2023-07-20 RX ADMIN — Medication 15 MILLIGRAM(S): at 22:28

## 2023-07-20 NOTE — ED ADULT NURSE NOTE - ED STAT RN HANDOFF DETAILS
Report given to MORENO Ceballos on ed hold. RN made aware of patient PMH and HPI. RN made aware of IV access, medications given in ED and plan of care. Patient A&Ox4, VSS, no signs of distress noted or verbalized.

## 2023-07-20 NOTE — ED ADULT TRIAGE NOTE - CHIEF COMPLAINT QUOTE
complaining of LLQ abdominal pain started 4 days ago seen in UC ,CT done advised to come to the ED.  h/o diverticulitis, aortic repair 2018. Took Tylenol and motrin before coming. on metronidazole and Augmentin stated yesterday.

## 2023-07-20 NOTE — ED ADULT NURSE NOTE - NSFALLUNIVINTERV_ED_ALL_ED
Bed/Stretcher in lowest position, wheels locked, appropriate side rails in place/Call bell, personal items and telephone in reach/Instruct patient to call for assistance before getting out of bed/chair/stretcher/Non-slip footwear applied when patient is off stretcher/[place] to call system/Physically safe environment - no spills, clutter or unnecessary equipment/Purposeful proactive rounding/Room/bathroom lighting operational, light cord in reach

## 2023-07-20 NOTE — ED ADULT NURSE NOTE - OBJECTIVE STATEMENT
Patient A&Ox4 presents to ED c/o fever, dysuria, nausea, diarrhea, LLQ pain x 4 days. Patient seen at ; patient started on metronidazole and Augmentin yesterday and CT was performed. Recommended that patient come to ER to be evaluated for infection. PMH asthma, diverticulitis, aortic repair 2018. Patient denies CP, SOB, sick contacts, blood in stool, blood in emesis, blood thinners, headache, hematuria. NKDA. Of note, patient took tylenol and motrin around 20:00.

## 2023-07-21 DIAGNOSIS — N39.0 URINARY TRACT INFECTION, SITE NOT SPECIFIED: ICD-10-CM

## 2023-07-21 DIAGNOSIS — N17.9 ACUTE KIDNEY FAILURE, UNSPECIFIED: ICD-10-CM

## 2023-07-21 DIAGNOSIS — E87.1 HYPO-OSMOLALITY AND HYPONATREMIA: ICD-10-CM

## 2023-07-21 DIAGNOSIS — K57.20 DIVERTICULITIS OF LARGE INTESTINE WITH PERFORATION AND ABSCESS WITHOUT BLEEDING: ICD-10-CM

## 2023-07-21 LAB
ALBUMIN SERPL ELPH-MCNC: 2.9 G/DL — LOW (ref 3.3–5)
ALP SERPL-CCNC: 83 U/L — SIGNIFICANT CHANGE UP (ref 40–120)
ALT FLD-CCNC: 44 U/L — SIGNIFICANT CHANGE UP (ref 12–78)
ANION GAP SERPL CALC-SCNC: 5 MMOL/L — SIGNIFICANT CHANGE UP (ref 5–17)
AST SERPL-CCNC: 27 U/L — SIGNIFICANT CHANGE UP (ref 15–37)
BILIRUB SERPL-MCNC: 2.1 MG/DL — HIGH (ref 0.2–1.2)
BLD GP AB SCN SERPL QL: SIGNIFICANT CHANGE UP
BUN SERPL-MCNC: 15 MG/DL — SIGNIFICANT CHANGE UP (ref 7–23)
CALCIUM SERPL-MCNC: 8.6 MG/DL — SIGNIFICANT CHANGE UP (ref 8.5–10.1)
CHLORIDE SERPL-SCNC: 105 MMOL/L — SIGNIFICANT CHANGE UP (ref 96–108)
CO2 SERPL-SCNC: 25 MMOL/L — SIGNIFICANT CHANGE UP (ref 22–31)
CREAT SERPL-MCNC: 1.03 MG/DL — SIGNIFICANT CHANGE UP (ref 0.5–1.3)
EGFR: 92 ML/MIN/1.73M2 — SIGNIFICANT CHANGE UP
GLUCOSE SERPL-MCNC: 136 MG/DL — HIGH (ref 70–99)
HCT VFR BLD CALC: 35.2 % — LOW (ref 39–50)
HGB BLD-MCNC: 12.2 G/DL — LOW (ref 13–17)
INR BLD: 1.34 RATIO — HIGH (ref 0.88–1.16)
LACTATE SERPL-SCNC: 1.2 MMOL/L — SIGNIFICANT CHANGE UP (ref 0.7–2)
MAGNESIUM SERPL-MCNC: 1.8 MG/DL — SIGNIFICANT CHANGE UP (ref 1.6–2.6)
MCHC RBC-ENTMCNC: 29.8 PG — SIGNIFICANT CHANGE UP (ref 27–34)
MCHC RBC-ENTMCNC: 34.7 G/DL — SIGNIFICANT CHANGE UP (ref 32–36)
MCV RBC AUTO: 86.1 FL — SIGNIFICANT CHANGE UP (ref 80–100)
NRBC # BLD: 0 /100 WBCS — SIGNIFICANT CHANGE UP (ref 0–0)
PHOSPHATE SERPL-MCNC: 2 MG/DL — LOW (ref 2.5–4.5)
PLATELET # BLD AUTO: 127 K/UL — LOW (ref 150–400)
POTASSIUM SERPL-MCNC: 4.3 MMOL/L — SIGNIFICANT CHANGE UP (ref 3.5–5.3)
POTASSIUM SERPL-SCNC: 4.3 MMOL/L — SIGNIFICANT CHANGE UP (ref 3.5–5.3)
PROT SERPL-MCNC: 6.8 GM/DL — SIGNIFICANT CHANGE UP (ref 6–8.3)
PROTHROM AB SERPL-ACNC: 16 SEC — HIGH (ref 10.5–13.4)
RBC # BLD: 4.09 M/UL — LOW (ref 4.2–5.8)
RBC # FLD: 12.8 % — SIGNIFICANT CHANGE UP (ref 10.3–14.5)
SODIUM SERPL-SCNC: 135 MMOL/L — SIGNIFICANT CHANGE UP (ref 135–145)
WBC # BLD: 9.66 K/UL — SIGNIFICANT CHANGE UP (ref 3.8–10.5)
WBC # FLD AUTO: 9.66 K/UL — SIGNIFICANT CHANGE UP (ref 3.8–10.5)

## 2023-07-21 PROCEDURE — 99222 1ST HOSP IP/OBS MODERATE 55: CPT

## 2023-07-21 PROCEDURE — 74177 CT ABD & PELVIS W/CONTRAST: CPT | Mod: 26,MA

## 2023-07-21 PROCEDURE — 71045 X-RAY EXAM CHEST 1 VIEW: CPT | Mod: 26

## 2023-07-21 PROCEDURE — 93010 ELECTROCARDIOGRAM REPORT: CPT | Mod: 76

## 2023-07-21 PROCEDURE — 99223 1ST HOSP IP/OBS HIGH 75: CPT

## 2023-07-21 RX ORDER — LABETALOL HCL 100 MG
300 TABLET ORAL THREE TIMES A DAY
Refills: 0 | Status: DISCONTINUED | OUTPATIENT
Start: 2023-07-21 | End: 2023-07-24

## 2023-07-21 RX ORDER — LOSARTAN POTASSIUM 100 MG/1
1 TABLET, FILM COATED ORAL
Refills: 0 | DISCHARGE

## 2023-07-21 RX ORDER — CHORIONIC GONADOTROPIN, HUMAN 10000 UNIT
10000 VIAL (EA) INTRAMUSCULAR
Refills: 0 | DISCHARGE

## 2023-07-21 RX ORDER — PIPERACILLIN AND TAZOBACTAM 4; .5 G/20ML; G/20ML
3.38 INJECTION, POWDER, LYOPHILIZED, FOR SOLUTION INTRAVENOUS ONCE
Refills: 0 | Status: COMPLETED | OUTPATIENT
Start: 2023-07-21 | End: 2023-07-21

## 2023-07-21 RX ORDER — LABETALOL HCL 100 MG
1 TABLET ORAL
Refills: 0 | DISCHARGE

## 2023-07-21 RX ORDER — AZELASTINE 137 UG/1
2 SPRAY, METERED NASAL
Refills: 0 | DISCHARGE

## 2023-07-21 RX ORDER — MORPHINE SULFATE 50 MG/1
2 CAPSULE, EXTENDED RELEASE ORAL EVERY 6 HOURS
Refills: 0 | Status: DISCONTINUED | OUTPATIENT
Start: 2023-07-21 | End: 2023-07-24

## 2023-07-21 RX ORDER — BUDESONIDE AND FORMOTEROL FUMARATE DIHYDRATE 160; 4.5 UG/1; UG/1
2 AEROSOL RESPIRATORY (INHALATION)
Refills: 0 | Status: DISCONTINUED | OUTPATIENT
Start: 2023-07-21 | End: 2023-07-24

## 2023-07-21 RX ORDER — MORPHINE SULFATE 50 MG/1
4 CAPSULE, EXTENDED RELEASE ORAL ONCE
Refills: 0 | Status: DISCONTINUED | OUTPATIENT
Start: 2023-07-21 | End: 2023-07-21

## 2023-07-21 RX ORDER — ACETAMINOPHEN 500 MG
1000 TABLET ORAL ONCE
Refills: 0 | Status: COMPLETED | OUTPATIENT
Start: 2023-07-21 | End: 2023-07-21

## 2023-07-21 RX ORDER — FOLLITROPIN 300 [IU]/.36ML
75 INJECTION, SOLUTION SUBCUTANEOUS
Refills: 0 | DISCHARGE

## 2023-07-21 RX ORDER — ENOXAPARIN SODIUM 100 MG/ML
40 INJECTION SUBCUTANEOUS EVERY 24 HOURS
Refills: 0 | Status: DISCONTINUED | OUTPATIENT
Start: 2023-07-21 | End: 2023-07-24

## 2023-07-21 RX ORDER — SODIUM CHLORIDE 9 MG/ML
1000 INJECTION, SOLUTION INTRAVENOUS
Refills: 0 | Status: DISCONTINUED | OUTPATIENT
Start: 2023-07-21 | End: 2023-07-23

## 2023-07-21 RX ORDER — PIPERACILLIN AND TAZOBACTAM 4; .5 G/20ML; G/20ML
3.38 INJECTION, POWDER, LYOPHILIZED, FOR SOLUTION INTRAVENOUS EVERY 8 HOURS
Refills: 0 | Status: DISCONTINUED | OUTPATIENT
Start: 2023-07-21 | End: 2023-07-24

## 2023-07-21 RX ORDER — FLUTICASONE FUROATE, UMECLIDINIUM BROMIDE AND VILANTEROL TRIFENATATE 200; 62.5; 25 UG/1; UG/1; UG/1
1 POWDER RESPIRATORY (INHALATION)
Refills: 0 | DISCHARGE

## 2023-07-21 RX ORDER — PANTOPRAZOLE SODIUM 20 MG/1
40 TABLET, DELAYED RELEASE ORAL DAILY
Refills: 0 | Status: DISCONTINUED | OUTPATIENT
Start: 2023-07-21 | End: 2023-07-24

## 2023-07-21 RX ORDER — LOSARTAN POTASSIUM 100 MG/1
50 TABLET, FILM COATED ORAL DAILY
Refills: 0 | Status: DISCONTINUED | OUTPATIENT
Start: 2023-07-21 | End: 2023-07-24

## 2023-07-21 RX ORDER — FAMOTIDINE 10 MG/ML
1 INJECTION INTRAVENOUS
Refills: 0 | DISCHARGE

## 2023-07-21 RX ORDER — ONDANSETRON 8 MG/1
4 TABLET, FILM COATED ORAL EVERY 6 HOURS
Refills: 0 | Status: DISCONTINUED | OUTPATIENT
Start: 2023-07-21 | End: 2023-07-24

## 2023-07-21 RX ORDER — SODIUM CHLORIDE 9 MG/ML
1000 INJECTION, SOLUTION INTRAVENOUS ONCE
Refills: 0 | Status: COMPLETED | OUTPATIENT
Start: 2023-07-21 | End: 2023-07-21

## 2023-07-21 RX ORDER — DILTIAZEM HCL 120 MG
300 CAPSULE, EXT RELEASE 24 HR ORAL DAILY
Refills: 0 | Status: DISCONTINUED | OUTPATIENT
Start: 2023-07-21 | End: 2023-07-24

## 2023-07-21 RX ADMIN — MORPHINE SULFATE 2 MILLIGRAM(S): 50 CAPSULE, EXTENDED RELEASE ORAL at 12:58

## 2023-07-21 RX ADMIN — PIPERACILLIN AND TAZOBACTAM 200 GRAM(S): 4; .5 INJECTION, POWDER, LYOPHILIZED, FOR SOLUTION INTRAVENOUS at 01:29

## 2023-07-21 RX ADMIN — PIPERACILLIN AND TAZOBACTAM 25 GRAM(S): 4; .5 INJECTION, POWDER, LYOPHILIZED, FOR SOLUTION INTRAVENOUS at 05:34

## 2023-07-21 RX ADMIN — SODIUM CHLORIDE 1000 MILLILITER(S): 9 INJECTION INTRAMUSCULAR; INTRAVENOUS; SUBCUTANEOUS at 00:11

## 2023-07-21 RX ADMIN — MORPHINE SULFATE 4 MILLIGRAM(S): 50 CAPSULE, EXTENDED RELEASE ORAL at 02:15

## 2023-07-21 RX ADMIN — Medication 400 MILLIGRAM(S): at 18:57

## 2023-07-21 RX ADMIN — PIPERACILLIN AND TAZOBACTAM 25 GRAM(S): 4; .5 INJECTION, POWDER, LYOPHILIZED, FOR SOLUTION INTRAVENOUS at 12:31

## 2023-07-21 RX ADMIN — MORPHINE SULFATE 2 MILLIGRAM(S): 50 CAPSULE, EXTENDED RELEASE ORAL at 12:28

## 2023-07-21 RX ADMIN — SODIUM CHLORIDE 180 MILLILITER(S): 9 INJECTION, SOLUTION INTRAVENOUS at 22:45

## 2023-07-21 RX ADMIN — SODIUM CHLORIDE 180 MILLILITER(S): 9 INJECTION, SOLUTION INTRAVENOUS at 06:59

## 2023-07-21 RX ADMIN — Medication 15 MILLIGRAM(S): at 00:12

## 2023-07-21 RX ADMIN — SODIUM CHLORIDE 1000 MILLILITER(S): 9 INJECTION, SOLUTION INTRAVENOUS at 01:59

## 2023-07-21 RX ADMIN — SODIUM CHLORIDE 1000 MILLILITER(S): 9 INJECTION, SOLUTION INTRAVENOUS at 04:03

## 2023-07-21 RX ADMIN — PIPERACILLIN AND TAZOBACTAM 3.38 GRAM(S): 4; .5 INJECTION, POWDER, LYOPHILIZED, FOR SOLUTION INTRAVENOUS at 02:00

## 2023-07-21 RX ADMIN — BUDESONIDE AND FORMOTEROL FUMARATE DIHYDRATE 2 PUFF(S): 160; 4.5 AEROSOL RESPIRATORY (INHALATION) at 03:21

## 2023-07-21 RX ADMIN — MORPHINE SULFATE 4 MILLIGRAM(S): 50 CAPSULE, EXTENDED RELEASE ORAL at 01:59

## 2023-07-21 RX ADMIN — Medication 300 MILLIGRAM(S): at 14:28

## 2023-07-21 RX ADMIN — SODIUM CHLORIDE 1000 MILLILITER(S): 9 INJECTION INTRAMUSCULAR; INTRAVENOUS; SUBCUTANEOUS at 01:15

## 2023-07-21 RX ADMIN — PIPERACILLIN AND TAZOBACTAM 25 GRAM(S): 4; .5 INJECTION, POWDER, LYOPHILIZED, FOR SOLUTION INTRAVENOUS at 22:43

## 2023-07-21 RX ADMIN — Medication 1000 MILLIGRAM(S): at 07:08

## 2023-07-21 RX ADMIN — LOSARTAN POTASSIUM 50 MILLIGRAM(S): 100 TABLET, FILM COATED ORAL at 03:21

## 2023-07-21 RX ADMIN — Medication 300 MILLIGRAM(S): at 03:21

## 2023-07-21 RX ADMIN — Medication 400 MILLIGRAM(S): at 06:16

## 2023-07-21 RX ADMIN — BUDESONIDE AND FORMOTEROL FUMARATE DIHYDRATE 2 PUFF(S): 160; 4.5 AEROSOL RESPIRATORY (INHALATION) at 22:45

## 2023-07-21 RX ADMIN — Medication 300 MILLIGRAM(S): at 22:43

## 2023-07-21 RX ADMIN — PANTOPRAZOLE SODIUM 40 MILLIGRAM(S): 20 TABLET, DELAYED RELEASE ORAL at 12:30

## 2023-07-21 NOTE — ED PROVIDER NOTE - OBJECTIVE STATEMENT
42-year-old male with past medical history of diverticulitis hypertension hyperlipidemia presenting with left lower quadrant pain for 5 days.  When patient went to urgent care was told he might have perforated diverticulitis.  Patient states he has had this before and was admitted to outside hospital.  Denies any fever chills nausea vomiting diarrhea constipation

## 2023-07-21 NOTE — ED PROVIDER NOTE - PHYSICAL EXAMINATION
General: No acute distress, mentation at baseline,  well nourished, well developed  HEENT: NCAT, Neck supple without meningismus, PERRL, no conjunctival injection  Lungs: CTAB, No wheeze or crackles, No retractions, No increased work of breathing  Heart: S1S2 RRR, No M/R/G, Pules equal Bilaterally in upper and lower extremities distally  Abd: soft, TTP along LLQ, ND, No guarding, No rebound.  No hernias, no palpable masses.  Extrem: FROM in all joints, no gross deformities appreciated, no significant edema noted, No ulcers. Cap refil < 2sec.  Skin: No rash noted, warm dry.  Neuro:  Grossly normal.  No difficulty ambulating. No focal deficits.  Psychiatric: Appropriate mood and affect.

## 2023-07-21 NOTE — ED PROVIDER NOTE - CLINICAL SUMMARY MEDICAL DECISION MAKING FREE TEXT BOX
Differential diagnosis includes: diverticulitis (uncomplicated vs perforated) Abdominal exam without peritoneal signs. No evidence of acute abdomen at this time. Well appearing. Low suspicion for acute hepatobiliary disease (includng acute cholecystitis), acute pancreatitis, PUD (including perforation), acute infectious processes (pneumonia, hepatitis, pyelonephritis), acute appendicitis, vascular catastrophe, bowel obstruction . Presentation not consistent with other acute, emergent causes of abdominal pain at this time.    Plan: labs, UA, CT AP, pain control, serial reassessment

## 2023-07-21 NOTE — PATIENT PROFILE ADULT - FALL HARM RISK - HARM RISK INTERVENTIONS

## 2023-07-21 NOTE — H&P ADULT - PROBLEM SELECTOR PLAN 3
[facility] Emergency Department  [address] [address]  Phone:  [phone]  Fax:  [phone]                                    Law Guadalupe   MRN: [id number]    Department:  [facility] Emergency Department   Date of Visit:  6/30/2020           After Visit Summary Signature Page    I have received my discharge instructions, and my questions have been answered. I have discussed any challenges I see with this plan with the nurse or doctor.    ..........................................................................................................................................  Patient/Patient Representative Signature      ..........................................................................................................................................  Patient Representative Print Name and Relationship to Patient    ..................................................               ................................................  Date                                   Time    ..........................................................................................................................................  Reviewed by Signature/Title    ...................................................              ..............................................  Date                                               Time          22EPIC Rev 08/18        Abx

## 2023-07-21 NOTE — H&P ADULT - NSHPLABSRESULTS_GEN_ALL_CORE
12.9   12.10 )-----------( 134      ( 20 Jul 2023 22:22 )             37.9   07-20    131<L>  |  101  |  19  ----------------------------<  118<H>  5.0   |  25  |  1.37<H>    Ca    9.2      20 Jul 2023 22:22    TPro  7.3  /  Alb  3.2<L>  /  TBili  2.3<H>  /  DBili  x   /  AST  35  /  ALT  44  /  AlkPhos  86  07-20    < from: CT Abdomen and Pelvis w/ IV Cont (07.21.23 @ 00:07) >    FINDINGS:  LOWER CHEST: Bilateral gynecomastia. Status post repair of ascending   thoracic aorta. Stable appearance of aortic dissection flap extending   from the descending thoracic aorta through the abdominal aorta to the   level of the bifurcation. Trace left pleural effusion and mild adjacent   subsegmental atelectasis.    LIVER: Steatosis.  BILE DUCTS: Normal caliber.  GALLBLADDER: Within normal limits.  SPLEEN: Within normal limits.  PANCREAS: Within normal limits.  ADRENALS: Within normal limits.  KIDNEYS/URETERS: Bilateral renal cysts. No hydronephrosis.    BLADDER: Minimally distended. Contrast in the bladder.  REPRODUCTIVE ORGANS: Prostate within normal limits.    BOWEL: No bowel obstruction. Sigmoid colon diverticulosis. Sigmoid colon   wall thickening and extensive adjacent fat stranding and phlegmon along   the paracolic gutter, including trace free fluid and scattered locules of   free air localized to the sigmoid mesocolon, compatible with perforated   diverticulitis.  PERITONEUM: No ascites.  VESSELS: Stable appearance of aortic dissection. Stable aneurysmal   dilatation of the infrarenal abdominal aorta measuring 3.2 cm. Stable   dilatation of the left common iliac artery to 2.4 cm.  RETROPERITONEUM/LYMPH NODES: No lymphadenopathy.  ABDOMINAL WALL: Small fat-containing umbilical hernia.  BONES: Median sternotomy. Minimal degenerative changes in the spine.    IMPRESSION:  Perforated sigmoid diverticulitis.    Stable appearance of aortic dissection when compared with outsideCT   study from 2/11/2023.    < end of copied text >

## 2023-07-21 NOTE — H&P ADULT - HISTORY OF PRESENT ILLNESS
42M with PMHx of Kallmann syndrome, obesity, HTN, asthma, Type A aortic dissection s/p repair supracoronary with 26 Hemashield Graft hemiarch repair on 12/8/18 with post op course significant for cardiogenic shock requiring pressor support, diverticulitis treated with IV abx 2021 presents c/o severe LLQ abdominal pain x 5 days. Pain does not radiate. Pain is currently controlled on 30mg of Toradol that was given in the ER. Pt initially thought that the pain was gas. Had a 103.1 fever around 4pm. Pt took Tylenol and Motrin at home. Last BM/flatus were at 11pm. Last colonoscopy and EGD were in 2021 and patient does not remember the results. Patient denies nausea, vomiting, constipation, diarrhea, melena, hematochezia, dysuria, hematuria, chest pain, shortness of breath, dizziness, cough.

## 2023-07-21 NOTE — H&P ADULT - NSHPPHYSICALEXAM_GEN_ALL_CORE
PHYSICAL EXAM:  CONSTITUTIONAL: NAD, well-developed  HEAD:  Atraumatic, Normocephalic  EYES: Conjunctiva and sclera clear  ENMT: No tonsillar erythema, exudates, or enlargement; Moist mucous membranes, No lesions  NECK: Supple, No JVD, Normal thyroid  NERVOUS SYSTEM:  Alert & Oriented X3  RESPIRATORY: Clear to auscultation bilaterally; No rales, rhonchi, wheezing  CARDIOVASCULAR: Regular rate and rhythm. S1S2  GASTROINTESTINAL: Nondistended, +BS, soft, LLQ tenderness, no guarding, no rigidity   MUSCULOSKELETAL: 2+ Peripheral Pulses, No clubbing, cyanosis, or edema

## 2023-07-21 NOTE — H&P ADULT - ASSESSMENT
42M with PMHx of Kallmann syndrome, obesity, HTN, asthma, Type A aortic dissection s/p repair supracoronary with 26 Hemashield Graft hemiarch repair on 12/8/18 with post op course significant for cardiogenic shock requiring pressor support, diverticulitis treated with IV abx 2021 presents c/o severe LLQ abdominal pain x 5 days. Admitted with sigmoid diverticulitis with microperforation with phlegmon.

## 2023-07-21 NOTE — H&P ADULT - NSHPREVIEWOFSYSTEMS_GEN_ALL_CORE
REVIEW OF SYSTEMS:  CONSTITUTIONAL: +Fever at home. No weight loss, or fatigue  EYES: No eye pain, visual disturbances, discharge  ENMT:  No difficulty hearing, tinnitus, vertigo; No sinus or throat pain  NECK: No pain or stiffness  BREASTS: No pain, masses, or nipple discharge  RESPIRATORY: No cough, wheezing, chills or hemoptysis; No shortness of breath  CARDIOVASCULAR: No chest pain, palpitations, dizziness, or leg swelling  GASTROINTESTINAL: +LLQ pain. No nausea, vomiting, or hematemesis; No diarrhea or constipation. No melena or hematochezia.  GENITOURINARY: No dysuria, frequency, hematuria, or incontinence  NEUROLOGICAL: No headaches, memory loss, loss of strength, numbness, or tremors  SKIN: No itching, burning, rashes, or lesions   LYMPH NODES: No enlarged glands  ENDOCRINE: No heat or cold intolerance; No hair loss  MUSCULOSKELETAL: No joint pain or swelling; No muscle, back, or extremity pain  PSYCHIATRIC: No depression, anxiety, mood swings, or difficulty sleeping  HEME/LYMPH: No easy bruising, or bleeding gums  ALLERY AND IMMUNOLOGIC: No hives or eczema

## 2023-07-22 DIAGNOSIS — E23.0 HYPOPITUITARISM: ICD-10-CM

## 2023-07-22 LAB
ANION GAP SERPL CALC-SCNC: 4 MMOL/L — LOW (ref 5–17)
BASOPHILS # BLD AUTO: 0.02 K/UL — SIGNIFICANT CHANGE UP (ref 0–0.2)
BASOPHILS NFR BLD AUTO: 0.3 % — SIGNIFICANT CHANGE UP (ref 0–2)
BUN SERPL-MCNC: 7 MG/DL — SIGNIFICANT CHANGE UP (ref 7–23)
CALCIUM SERPL-MCNC: 8.9 MG/DL — SIGNIFICANT CHANGE UP (ref 8.5–10.1)
CHLORIDE SERPL-SCNC: 105 MMOL/L — SIGNIFICANT CHANGE UP (ref 96–108)
CO2 SERPL-SCNC: 29 MMOL/L — SIGNIFICANT CHANGE UP (ref 22–31)
CREAT SERPL-MCNC: 0.83 MG/DL — SIGNIFICANT CHANGE UP (ref 0.5–1.3)
EGFR: 111 ML/MIN/1.73M2 — SIGNIFICANT CHANGE UP
EOSINOPHIL # BLD AUTO: 0.03 K/UL — SIGNIFICANT CHANGE UP (ref 0–0.5)
EOSINOPHIL NFR BLD AUTO: 0.4 % — SIGNIFICANT CHANGE UP (ref 0–6)
GLUCOSE SERPL-MCNC: 146 MG/DL — HIGH (ref 70–99)
HCT VFR BLD CALC: 37.2 % — LOW (ref 39–50)
HGB BLD-MCNC: 12.6 G/DL — LOW (ref 13–17)
IMM GRANULOCYTES NFR BLD AUTO: 0.4 % — SIGNIFICANT CHANGE UP (ref 0–0.9)
LYMPHOCYTES # BLD AUTO: 1.05 K/UL — SIGNIFICANT CHANGE UP (ref 1–3.3)
LYMPHOCYTES # BLD AUTO: 13.7 % — SIGNIFICANT CHANGE UP (ref 13–44)
MAGNESIUM SERPL-MCNC: 2.2 MG/DL — SIGNIFICANT CHANGE UP (ref 1.6–2.6)
MCHC RBC-ENTMCNC: 29.4 PG — SIGNIFICANT CHANGE UP (ref 27–34)
MCHC RBC-ENTMCNC: 33.9 G/DL — SIGNIFICANT CHANGE UP (ref 32–36)
MCV RBC AUTO: 86.7 FL — SIGNIFICANT CHANGE UP (ref 80–100)
MONOCYTES # BLD AUTO: 0.97 K/UL — HIGH (ref 0–0.9)
MONOCYTES NFR BLD AUTO: 12.6 % — SIGNIFICANT CHANGE UP (ref 2–14)
NEUTROPHILS # BLD AUTO: 5.57 K/UL — SIGNIFICANT CHANGE UP (ref 1.8–7.4)
NEUTROPHILS NFR BLD AUTO: 72.6 % — SIGNIFICANT CHANGE UP (ref 43–77)
NRBC # BLD: 0 /100 WBCS — SIGNIFICANT CHANGE UP (ref 0–0)
PHOSPHATE SERPL-MCNC: 2.4 MG/DL — LOW (ref 2.5–4.5)
PLATELET # BLD AUTO: 143 K/UL — LOW (ref 150–400)
POTASSIUM SERPL-MCNC: 4.1 MMOL/L — SIGNIFICANT CHANGE UP (ref 3.5–5.3)
POTASSIUM SERPL-SCNC: 4.1 MMOL/L — SIGNIFICANT CHANGE UP (ref 3.5–5.3)
RBC # BLD: 4.29 M/UL — SIGNIFICANT CHANGE UP (ref 4.2–5.8)
RBC # FLD: 12.9 % — SIGNIFICANT CHANGE UP (ref 10.3–14.5)
SODIUM SERPL-SCNC: 138 MMOL/L — SIGNIFICANT CHANGE UP (ref 135–145)
WBC # BLD: 7.67 K/UL — SIGNIFICANT CHANGE UP (ref 3.8–10.5)
WBC # FLD AUTO: 7.67 K/UL — SIGNIFICANT CHANGE UP (ref 3.8–10.5)

## 2023-07-22 RX ORDER — ACETAMINOPHEN 500 MG
650 TABLET ORAL EVERY 6 HOURS
Refills: 0 | Status: DISCONTINUED | OUTPATIENT
Start: 2023-07-22 | End: 2023-07-24

## 2023-07-22 RX ADMIN — BUDESONIDE AND FORMOTEROL FUMARATE DIHYDRATE 2 PUFF(S): 160; 4.5 AEROSOL RESPIRATORY (INHALATION) at 05:05

## 2023-07-22 RX ADMIN — Medication 300 MILLIGRAM(S): at 05:04

## 2023-07-22 RX ADMIN — PANTOPRAZOLE SODIUM 40 MILLIGRAM(S): 20 TABLET, DELAYED RELEASE ORAL at 13:05

## 2023-07-22 RX ADMIN — SODIUM CHLORIDE 180 MILLILITER(S): 9 INJECTION, SOLUTION INTRAVENOUS at 13:06

## 2023-07-22 RX ADMIN — Medication 650 MILLIGRAM(S): at 05:00

## 2023-07-22 RX ADMIN — MORPHINE SULFATE 2 MILLIGRAM(S): 50 CAPSULE, EXTENDED RELEASE ORAL at 02:51

## 2023-07-22 RX ADMIN — Medication 650 MILLIGRAM(S): at 06:00

## 2023-07-22 RX ADMIN — SODIUM CHLORIDE 180 MILLILITER(S): 9 INJECTION, SOLUTION INTRAVENOUS at 05:05

## 2023-07-22 RX ADMIN — BUDESONIDE AND FORMOTEROL FUMARATE DIHYDRATE 2 PUFF(S): 160; 4.5 AEROSOL RESPIRATORY (INHALATION) at 18:20

## 2023-07-22 RX ADMIN — Medication 300 MILLIGRAM(S): at 21:56

## 2023-07-22 RX ADMIN — SODIUM CHLORIDE 180 MILLILITER(S): 9 INJECTION, SOLUTION INTRAVENOUS at 21:55

## 2023-07-22 RX ADMIN — LOSARTAN POTASSIUM 50 MILLIGRAM(S): 100 TABLET, FILM COATED ORAL at 05:04

## 2023-07-22 RX ADMIN — SODIUM CHLORIDE 180 MILLILITER(S): 9 INJECTION, SOLUTION INTRAVENOUS at 01:17

## 2023-07-22 RX ADMIN — Medication 300 MILLIGRAM(S): at 14:19

## 2023-07-22 RX ADMIN — MORPHINE SULFATE 2 MILLIGRAM(S): 50 CAPSULE, EXTENDED RELEASE ORAL at 01:51

## 2023-07-22 RX ADMIN — PIPERACILLIN AND TAZOBACTAM 25 GRAM(S): 4; .5 INJECTION, POWDER, LYOPHILIZED, FOR SOLUTION INTRAVENOUS at 05:05

## 2023-07-22 RX ADMIN — PIPERACILLIN AND TAZOBACTAM 25 GRAM(S): 4; .5 INJECTION, POWDER, LYOPHILIZED, FOR SOLUTION INTRAVENOUS at 14:18

## 2023-07-22 RX ADMIN — PIPERACILLIN AND TAZOBACTAM 25 GRAM(S): 4; .5 INJECTION, POWDER, LYOPHILIZED, FOR SOLUTION INTRAVENOUS at 21:56

## 2023-07-22 NOTE — CONSULT NOTE ADULT - PROBLEM SELECTOR RECOMMENDATION 9
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  As stated above he is not averse to the idea of having children.  There is no association between Marfan's and Kallmann syndrome.  History of spontaneous aortic dissection may point towards Marfan syndrome which may or may not be present and definitely not associated with Kallmann syndrome.  Patient has to have his treatment in 2 phases :-  Phase I : If he decides to have children he should have a thorough genetic and other hormonal work up to rule in his fertility.  Sperm analysis will be  a major component of that.  Patient could not give  a clear-cut answer about his sperm morphology ,when asked.  Also thorough anterior pituitary work-up, which may start in the hospital itself including MRI of the pituitary and anterior pituitary hormone levels.  It depends on how long the patient is going to stay in the hospital for his acute diverticulitis  Phase II: This phase will happen if the patient does not want any children to begin with or is found to be severely subfertile  or infertile with poorly functional spermatozoa or being azoospermic.  Incidentally the incidence of azoospermia is not increased with Kallmann syndrome.  If patient falls into this category, he can treated only with testosterone and hCG is not required.  hCG will stimulate both sperm production and testosterone activity whereas testosterone alone will improve the testosterone activity; but will suppress sperm production but this will be not  be of any importance in phase II  Thank You for the courtesy of this consultation !!!

## 2023-07-22 NOTE — CONSULT NOTE ADULT - SUBJECTIVE AND OBJECTIVE BOX
Patient is a 42y old  Male who presents with a chief complaint of Sigmoid diverticulitis with microperforation with phlegmon (22 Jul 2023 09:59)      Reason For Consult: Kallman syndrome     HPI:  42M with PMHx of Kallmann syndrome, obesity, HTN, asthma, Type A aortic dissection s/p repair supracoronary with 26 Hemashield Graft hemiarch repair on 12/8/18 with post op course significant for cardiogenic shock requiring pressor support, diverticulitis treated with IV abx 2021 presents c/o severe LLQ abdominal pain x 5 days. Pain does not radiate. Pain is currently controlled on 30mg of Toradol that was given in the ER. Pt initially thought that the pain was gas. Had a 103.1 fever around 4pm. Pt took Tylenol and Motrin at home. Last BM/flatus were at 11pm. Last colonoscopy and EGD were in 2021 and patient does not remember the results. Patient denies nausea, vomiting, constipation, diarrhea, melena, hematochezia, dysuria, hematuria, chest pain, shortness of breath, dizziness, cough.  (21 Jul 2023 02:06)  Patient has a history of Kallmann syndrome and aortic dissection who gets admitted with diverticulitis and is being conservatively managed for the same.  Patient gives a history of diagnosis of Kallmann syndrome approximately 10 years ago.  He has been tried both with testosterone and hCG but is having insurance problems and getting coverage for these medication.  He is 42 years of age and is not closed to the idea of having  children    PAST MEDICAL & SURGICAL HISTORY:  Hypertension, unspecified type      Asthma      Aortic dissection      S/P aortic dissection repair          FAMILY HISTORY:  FH: diabetes mellitus (Grandparent)          Social History:    MEDICATIONS  (STANDING):  budesonide  80 MICROgram(s)/formoterol 4.5 MICROgram(s) Inhaler 2 Puff(s) Inhalation two times a day  diltiazem    milliGRAM(s) Oral daily  enoxaparin Injectable 40 milliGRAM(s) SubCutaneous every 24 hours  labetalol 300 milliGRAM(s) Oral three times a day  lactated ringers. 1000 milliLiter(s) (180 mL/Hr) IV Continuous <Continuous>  losartan 50 milliGRAM(s) Oral daily  pantoprazole  Injectable 40 milliGRAM(s) IV Push daily  piperacillin/tazobactam IVPB.. 3.375 Gram(s) IV Intermittent every 8 hours    MEDICATIONS  (PRN):  acetaminophen     Tablet .. 650 milliGRAM(s) Oral every 6 hours PRN Temp greater or equal to 38C (100.4F), Mild Pain (1 - 3)  morphine  - Injectable 2 milliGRAM(s) IV Push every 6 hours PRN Severe Pain (7 - 10)  ondansetron Injectable 4 milliGRAM(s) IV Push every 6 hours PRN Nausea      REVIEW OF SYSTEMS:  CONSTITUTIONAL:  as per HPI  HEENT:  Eyes:  No diplopia or blurred vision.   ENT:  No earache, sore throat or runny nose.  CARDIOVASCULAR:  No chest pain .  RESPIRATORY:  No cough, shortness of breath, PND or orthopnea.  GASTROINTESTINAL:  No nausea, vomiting or diarrhea.  GENITOURINARY:  No dysuria, frequency or urgency. No Blood in urine  MUSCULOSKELETAL:  no joint aches, no muscle pain, myalgia  SKIN:  No change in skin, hair or nails.  NEUROLOGIC:  No paresthesias, fasciculations, seizures or weakness.  PSYCHIATRIC:  No disorder of thought or mood.  ENDOCRINE:  No heat or cold intolerance, polyuria or polydipsia. abnormal weight gain or loss, oral thrush  HEMATOLOGICAL:  No easy bruising or bleeding.     T(C): 37.1 (07-22-23 @ 18:06), Max: 38 (07-22-23 @ 04:44)  HR: 57 (07-22-23 @ 18:06) (57 - 71)  BP: 137/80 (07-22-23 @ 18:06) (117/61 - 140/80)  RR: 17 (07-22-23 @ 18:06) (16 - 18)  SpO2: 97% (07-22-23 @ 18:06) (96% - 97%)  Wt(kg): --    PHYSICAL EXAM: obese  GENERAL: NAD, well-groomed, well-developed  HEAD:  Atraumatic, Normocephalic  EYES: PERRLA, conjunctiva and sclera clear  ENMT: No  exudates,, Moist mucous membranes,, No lesions  NECK: Supple, No JVD,   NERVOUS SYSTEM:  Alert & Oriented   CHEST/LUNG: Clear to percussion bilaterally; No rales, rhonchi, wheezing, or rubs  HEART: Regular rate and rhythm; No murmurs, rubs, or gallops  ABDOMEN: Soft, Nontender, Nondistended; Bowel sounds present  EXTREMITIES:  2+ Peripheral Pulses, No clubbing, cyanosis, or edema  LYMPH: No lymphadenopathy noted  SKIN: No rashes or lesions    CAPILLARY BLOOD GLUCOSE                                12.6   7.67  )-----------( 143      ( 22 Jul 2023 13:48 )             37.2       CMP:  07-22 @ 13:48  SGPT --  Albumin --   Alk Phos --   Anion Gap 4   SGOT --   Total Bili --   BUN 7   Calcium Total 8.9   CO2 29   Chloride 105   Creatinine 0.83   eGFR if AA --   eGFR if non AA --   Glucose 146   Potassium 4.1   Protein --   Sodium 138      Thyroid Function Tests:      Diabetes Tests:     Parathyroids:     Adrenals:       Radiology:

## 2023-07-23 LAB
ALBUMIN SERPL ELPH-MCNC: 2.9 G/DL — LOW (ref 3.3–5)
ALP SERPL-CCNC: 78 U/L — SIGNIFICANT CHANGE UP (ref 40–120)
ALT FLD-CCNC: 36 U/L — SIGNIFICANT CHANGE UP (ref 12–78)
ANION GAP SERPL CALC-SCNC: 3 MMOL/L — LOW (ref 5–17)
ANION GAP SERPL CALC-SCNC: 3 MMOL/L — LOW (ref 5–17)
AST SERPL-CCNC: 20 U/L — SIGNIFICANT CHANGE UP (ref 15–37)
BASOPHILS # BLD AUTO: 0.02 K/UL — SIGNIFICANT CHANGE UP (ref 0–0.2)
BASOPHILS # BLD AUTO: 0.03 K/UL — SIGNIFICANT CHANGE UP (ref 0–0.2)
BASOPHILS NFR BLD AUTO: 0.3 % — SIGNIFICANT CHANGE UP (ref 0–2)
BASOPHILS NFR BLD AUTO: 0.4 % — SIGNIFICANT CHANGE UP (ref 0–2)
BILIRUB SERPL-MCNC: 1.2 MG/DL — SIGNIFICANT CHANGE UP (ref 0.2–1.2)
BUN SERPL-MCNC: 9 MG/DL — SIGNIFICANT CHANGE UP (ref 7–23)
BUN SERPL-MCNC: 9 MG/DL — SIGNIFICANT CHANGE UP (ref 7–23)
CALCIUM SERPL-MCNC: 8.6 MG/DL — SIGNIFICANT CHANGE UP (ref 8.5–10.1)
CALCIUM SERPL-MCNC: 8.7 MG/DL — SIGNIFICANT CHANGE UP (ref 8.5–10.1)
CHLORIDE SERPL-SCNC: 104 MMOL/L — SIGNIFICANT CHANGE UP (ref 96–108)
CHLORIDE SERPL-SCNC: 105 MMOL/L — SIGNIFICANT CHANGE UP (ref 96–108)
CO2 SERPL-SCNC: 30 MMOL/L — SIGNIFICANT CHANGE UP (ref 22–31)
CO2 SERPL-SCNC: 30 MMOL/L — SIGNIFICANT CHANGE UP (ref 22–31)
CREAT SERPL-MCNC: 0.8 MG/DL — SIGNIFICANT CHANGE UP (ref 0.5–1.3)
CREAT SERPL-MCNC: 0.9 MG/DL — SIGNIFICANT CHANGE UP (ref 0.5–1.3)
EGFR: 109 ML/MIN/1.73M2 — SIGNIFICANT CHANGE UP
EGFR: 113 ML/MIN/1.73M2 — SIGNIFICANT CHANGE UP
EOSINOPHIL # BLD AUTO: 0.06 K/UL — SIGNIFICANT CHANGE UP (ref 0–0.5)
EOSINOPHIL # BLD AUTO: 0.06 K/UL — SIGNIFICANT CHANGE UP (ref 0–0.5)
EOSINOPHIL NFR BLD AUTO: 0.9 % — SIGNIFICANT CHANGE UP (ref 0–6)
EOSINOPHIL NFR BLD AUTO: 0.9 % — SIGNIFICANT CHANGE UP (ref 0–6)
GLUCOSE SERPL-MCNC: 106 MG/DL — HIGH (ref 70–99)
GLUCOSE SERPL-MCNC: 178 MG/DL — HIGH (ref 70–99)
HCT VFR BLD CALC: 33.6 % — LOW (ref 39–50)
HCT VFR BLD CALC: 34.5 % — LOW (ref 39–50)
HGB BLD-MCNC: 11.5 G/DL — LOW (ref 13–17)
HGB BLD-MCNC: 11.7 G/DL — LOW (ref 13–17)
IMM GRANULOCYTES NFR BLD AUTO: 0.4 % — SIGNIFICANT CHANGE UP (ref 0–0.9)
IMM GRANULOCYTES NFR BLD AUTO: 0.8 % — SIGNIFICANT CHANGE UP (ref 0–0.9)
LYMPHOCYTES # BLD AUTO: 0.95 K/UL — LOW (ref 1–3.3)
LYMPHOCYTES # BLD AUTO: 1.32 K/UL — SIGNIFICANT CHANGE UP (ref 1–3.3)
LYMPHOCYTES # BLD AUTO: 14.9 % — SIGNIFICANT CHANGE UP (ref 13–44)
LYMPHOCYTES # BLD AUTO: 19.8 % — SIGNIFICANT CHANGE UP (ref 13–44)
MAGNESIUM SERPL-MCNC: 2.2 MG/DL — SIGNIFICANT CHANGE UP (ref 1.6–2.6)
MCHC RBC-ENTMCNC: 29.4 PG — SIGNIFICANT CHANGE UP (ref 27–34)
MCHC RBC-ENTMCNC: 29.7 PG — SIGNIFICANT CHANGE UP (ref 27–34)
MCHC RBC-ENTMCNC: 33.9 G/DL — SIGNIFICANT CHANGE UP (ref 32–36)
MCHC RBC-ENTMCNC: 34.2 G/DL — SIGNIFICANT CHANGE UP (ref 32–36)
MCV RBC AUTO: 86.7 FL — SIGNIFICANT CHANGE UP (ref 80–100)
MCV RBC AUTO: 86.8 FL — SIGNIFICANT CHANGE UP (ref 80–100)
MONOCYTES # BLD AUTO: 0.68 K/UL — SIGNIFICANT CHANGE UP (ref 0–0.9)
MONOCYTES # BLD AUTO: 1.01 K/UL — HIGH (ref 0–0.9)
MONOCYTES NFR BLD AUTO: 10.7 % — SIGNIFICANT CHANGE UP (ref 2–14)
MONOCYTES NFR BLD AUTO: 15.1 % — HIGH (ref 2–14)
NEUTROPHILS # BLD AUTO: 4.22 K/UL — SIGNIFICANT CHANGE UP (ref 1.8–7.4)
NEUTROPHILS # BLD AUTO: 4.6 K/UL — SIGNIFICANT CHANGE UP (ref 1.8–7.4)
NEUTROPHILS NFR BLD AUTO: 63.4 % — SIGNIFICANT CHANGE UP (ref 43–77)
NEUTROPHILS NFR BLD AUTO: 72.4 % — SIGNIFICANT CHANGE UP (ref 43–77)
NRBC # BLD: 0 /100 WBCS — SIGNIFICANT CHANGE UP (ref 0–0)
NRBC # BLD: 0 /100 WBCS — SIGNIFICANT CHANGE UP (ref 0–0)
PHOSPHATE SERPL-MCNC: 2.8 MG/DL — SIGNIFICANT CHANGE UP (ref 2.5–4.5)
PLATELET # BLD AUTO: 137 K/UL — LOW (ref 150–400)
PLATELET # BLD AUTO: 140 K/UL — LOW (ref 150–400)
POTASSIUM SERPL-MCNC: 3.9 MMOL/L — SIGNIFICANT CHANGE UP (ref 3.5–5.3)
POTASSIUM SERPL-MCNC: 4.1 MMOL/L — SIGNIFICANT CHANGE UP (ref 3.5–5.3)
POTASSIUM SERPL-SCNC: 3.9 MMOL/L — SIGNIFICANT CHANGE UP (ref 3.5–5.3)
POTASSIUM SERPL-SCNC: 4.1 MMOL/L — SIGNIFICANT CHANGE UP (ref 3.5–5.3)
PROT SERPL-MCNC: 7.1 GM/DL — SIGNIFICANT CHANGE UP (ref 6–8.3)
RBC # BLD: 3.87 M/UL — LOW (ref 4.2–5.8)
RBC # BLD: 3.98 M/UL — LOW (ref 4.2–5.8)
RBC # FLD: 12.6 % — SIGNIFICANT CHANGE UP (ref 10.3–14.5)
RBC # FLD: 12.8 % — SIGNIFICANT CHANGE UP (ref 10.3–14.5)
SODIUM SERPL-SCNC: 137 MMOL/L — SIGNIFICANT CHANGE UP (ref 135–145)
SODIUM SERPL-SCNC: 138 MMOL/L — SIGNIFICANT CHANGE UP (ref 135–145)
WBC # BLD: 6.36 K/UL — SIGNIFICANT CHANGE UP (ref 3.8–10.5)
WBC # BLD: 6.67 K/UL — SIGNIFICANT CHANGE UP (ref 3.8–10.5)
WBC # FLD AUTO: 6.36 K/UL — SIGNIFICANT CHANGE UP (ref 3.8–10.5)
WBC # FLD AUTO: 6.67 K/UL — SIGNIFICANT CHANGE UP (ref 3.8–10.5)

## 2023-07-23 RX ADMIN — Medication 300 MILLIGRAM(S): at 15:25

## 2023-07-23 RX ADMIN — PIPERACILLIN AND TAZOBACTAM 25 GRAM(S): 4; .5 INJECTION, POWDER, LYOPHILIZED, FOR SOLUTION INTRAVENOUS at 21:40

## 2023-07-23 RX ADMIN — PIPERACILLIN AND TAZOBACTAM 25 GRAM(S): 4; .5 INJECTION, POWDER, LYOPHILIZED, FOR SOLUTION INTRAVENOUS at 13:42

## 2023-07-23 RX ADMIN — SODIUM CHLORIDE 180 MILLILITER(S): 9 INJECTION, SOLUTION INTRAVENOUS at 13:43

## 2023-07-23 RX ADMIN — PIPERACILLIN AND TAZOBACTAM 25 GRAM(S): 4; .5 INJECTION, POWDER, LYOPHILIZED, FOR SOLUTION INTRAVENOUS at 06:39

## 2023-07-23 RX ADMIN — Medication 300 MILLIGRAM(S): at 06:51

## 2023-07-23 RX ADMIN — Medication 300 MILLIGRAM(S): at 21:40

## 2023-07-23 RX ADMIN — BUDESONIDE AND FORMOTEROL FUMARATE DIHYDRATE 2 PUFF(S): 160; 4.5 AEROSOL RESPIRATORY (INHALATION) at 06:37

## 2023-07-23 RX ADMIN — Medication 300 MILLIGRAM(S): at 06:37

## 2023-07-23 RX ADMIN — LOSARTAN POTASSIUM 50 MILLIGRAM(S): 100 TABLET, FILM COATED ORAL at 06:52

## 2023-07-23 RX ADMIN — PANTOPRAZOLE SODIUM 40 MILLIGRAM(S): 20 TABLET, DELAYED RELEASE ORAL at 13:50

## 2023-07-23 RX ADMIN — BUDESONIDE AND FORMOTEROL FUMARATE DIHYDRATE 2 PUFF(S): 160; 4.5 AEROSOL RESPIRATORY (INHALATION) at 17:11

## 2023-07-24 ENCOUNTER — TRANSCRIPTION ENCOUNTER (OUTPATIENT)
Age: 43
End: 2023-07-24

## 2023-07-24 VITALS
SYSTOLIC BLOOD PRESSURE: 149 MMHG | DIASTOLIC BLOOD PRESSURE: 88 MMHG | OXYGEN SATURATION: 96 % | RESPIRATION RATE: 18 BRPM | HEART RATE: 55 BPM | TEMPERATURE: 98 F

## 2023-07-24 LAB
ALBUMIN SERPL ELPH-MCNC: 2.8 G/DL — LOW (ref 3.3–5)
ALP SERPL-CCNC: 81 U/L — SIGNIFICANT CHANGE UP (ref 40–120)
ALT FLD-CCNC: 40 U/L — SIGNIFICANT CHANGE UP (ref 12–78)
ANION GAP SERPL CALC-SCNC: 6 MMOL/L — SIGNIFICANT CHANGE UP (ref 5–17)
AST SERPL-CCNC: 23 U/L — SIGNIFICANT CHANGE UP (ref 15–37)
BASOPHILS # BLD AUTO: 0.03 K/UL — SIGNIFICANT CHANGE UP (ref 0–0.2)
BASOPHILS NFR BLD AUTO: 0.5 % — SIGNIFICANT CHANGE UP (ref 0–2)
BILIRUB SERPL-MCNC: 1.1 MG/DL — SIGNIFICANT CHANGE UP (ref 0.2–1.2)
BUN SERPL-MCNC: 11 MG/DL — SIGNIFICANT CHANGE UP (ref 7–23)
CALCIUM SERPL-MCNC: 9.8 MG/DL — SIGNIFICANT CHANGE UP (ref 8.5–10.1)
CHLORIDE SERPL-SCNC: 104 MMOL/L — SIGNIFICANT CHANGE UP (ref 96–108)
CO2 SERPL-SCNC: 28 MMOL/L — SIGNIFICANT CHANGE UP (ref 22–31)
CREAT SERPL-MCNC: 0.91 MG/DL — SIGNIFICANT CHANGE UP (ref 0.5–1.3)
EGFR: 107 ML/MIN/1.73M2 — SIGNIFICANT CHANGE UP
EOSINOPHIL # BLD AUTO: 0.14 K/UL — SIGNIFICANT CHANGE UP (ref 0–0.5)
EOSINOPHIL NFR BLD AUTO: 2.2 % — SIGNIFICANT CHANGE UP (ref 0–6)
GLUCOSE SERPL-MCNC: 121 MG/DL — HIGH (ref 70–99)
HCT VFR BLD CALC: 36.1 % — LOW (ref 39–50)
HGB BLD-MCNC: 12 G/DL — LOW (ref 13–17)
IMM GRANULOCYTES NFR BLD AUTO: 0.8 % — SIGNIFICANT CHANGE UP (ref 0–0.9)
LYMPHOCYTES # BLD AUTO: 1.14 K/UL — SIGNIFICANT CHANGE UP (ref 1–3.3)
LYMPHOCYTES # BLD AUTO: 18 % — SIGNIFICANT CHANGE UP (ref 13–44)
MAGNESIUM SERPL-MCNC: 2.4 MG/DL — SIGNIFICANT CHANGE UP (ref 1.6–2.6)
MCHC RBC-ENTMCNC: 29.1 PG — SIGNIFICANT CHANGE UP (ref 27–34)
MCHC RBC-ENTMCNC: 33.2 G/DL — SIGNIFICANT CHANGE UP (ref 32–36)
MCV RBC AUTO: 87.4 FL — SIGNIFICANT CHANGE UP (ref 80–100)
MONOCYTES # BLD AUTO: 0.83 K/UL — SIGNIFICANT CHANGE UP (ref 0–0.9)
MONOCYTES NFR BLD AUTO: 13.1 % — SIGNIFICANT CHANGE UP (ref 2–14)
NEUTROPHILS # BLD AUTO: 4.16 K/UL — SIGNIFICANT CHANGE UP (ref 1.8–7.4)
NEUTROPHILS NFR BLD AUTO: 65.4 % — SIGNIFICANT CHANGE UP (ref 43–77)
NRBC # BLD: 0 /100 WBCS — SIGNIFICANT CHANGE UP (ref 0–0)
PHOSPHATE SERPL-MCNC: 3.6 MG/DL — SIGNIFICANT CHANGE UP (ref 2.5–4.5)
PLATELET # BLD AUTO: 154 K/UL — SIGNIFICANT CHANGE UP (ref 150–400)
POTASSIUM SERPL-MCNC: 4.2 MMOL/L — SIGNIFICANT CHANGE UP (ref 3.5–5.3)
POTASSIUM SERPL-SCNC: 4.2 MMOL/L — SIGNIFICANT CHANGE UP (ref 3.5–5.3)
PROT SERPL-MCNC: 7.3 GM/DL — SIGNIFICANT CHANGE UP (ref 6–8.3)
RBC # BLD: 4.13 M/UL — LOW (ref 4.2–5.8)
RBC # FLD: 12.5 % — SIGNIFICANT CHANGE UP (ref 10.3–14.5)
SODIUM SERPL-SCNC: 138 MMOL/L — SIGNIFICANT CHANGE UP (ref 135–145)
WBC # BLD: 6.35 K/UL — SIGNIFICANT CHANGE UP (ref 3.8–10.5)
WBC # FLD AUTO: 6.35 K/UL — SIGNIFICANT CHANGE UP (ref 3.8–10.5)

## 2023-07-24 RX ORDER — DILTIAZEM HCL 120 MG
1 CAPSULE, EXT RELEASE 24 HR ORAL
Qty: 0 | Refills: 0 | DISCHARGE
Start: 2023-07-24

## 2023-07-24 RX ORDER — ACETAMINOPHEN 500 MG
2 TABLET ORAL
Qty: 0 | Refills: 0 | DISCHARGE
Start: 2023-07-24

## 2023-07-24 RX ORDER — DILTIAZEM HCL 120 MG
1 CAPSULE, EXT RELEASE 24 HR ORAL
Refills: 0 | DISCHARGE

## 2023-07-24 RX ADMIN — BUDESONIDE AND FORMOTEROL FUMARATE DIHYDRATE 2 PUFF(S): 160; 4.5 AEROSOL RESPIRATORY (INHALATION) at 05:17

## 2023-07-24 RX ADMIN — Medication 300 MILLIGRAM(S): at 05:17

## 2023-07-24 RX ADMIN — LOSARTAN POTASSIUM 50 MILLIGRAM(S): 100 TABLET, FILM COATED ORAL at 05:17

## 2023-07-24 RX ADMIN — Medication 1 TABLET(S): at 05:22

## 2023-07-24 NOTE — DISCHARGE NOTE NURSING/CASE MANAGEMENT/SOCIAL WORK - NSDCPETBCESMAN_GEN_ALL_CORE
If you are a smoker, it is important for your health to stop smoking. Please be aware that second hand smoke is also harmful. <<----- Click to add NO significant Past Surgical History

## 2023-07-24 NOTE — DISCHARGE NOTE PROVIDER - CARE PROVIDER_API CALL
Gary Urena  Surgery  [address]  [place] [address]  Phone: [phone]  Fax: [phone]  Follow Up Time:     Pablito Miller  Endocrinology/Metab/Diabetes  [address], [address]  [place] [address]  Phone: [phone]  Fax: [phone]  Follow Up Time:

## 2023-07-24 NOTE — DISCHARGE NOTE NURSING/CASE MANAGEMENT/SOCIAL WORK - NSDCPEFALRISK_GEN_ALL_CORE
For information on Fall & Injury Prevention, visit: [email or web address] OR  [email or web address] OR  https://www.cdc.gov/steadi/patient.html

## 2023-07-24 NOTE — DISCHARGE NOTE PROVIDER - NSDCCPCAREPLAN_GEN_ALL_CORE_FT
PRINCIPAL DISCHARGE DIAGNOSIS  Diagnosis: Acute diverticulitis  Assessment and Plan of Treatment:

## 2023-07-24 NOTE — PROGRESS NOTE ADULT - ASSESSMENT
42M admitted with sigmoid diverticulitis with microperforation with phlegmon.     advance diet to full liquid diet  continue zosyn  f/u endo consult 
42M with diverticulitis with microperforation and phlegmon     tolerating diet, +GI function  abd not tender    abx transitioned to PO  appreciate endo recs, likely f/u OP  f/u AM labs
Kallman syndrome 
Kallman's syndrome 
42M admitted with sigmoid diverticulitis with microperforation with phlegmon.     advance diet to clears  continue zosyn  f/u endo consult

## 2023-07-24 NOTE — DISCHARGE NOTE PROVIDER - NSDCMRMEDTOKEN_GEN_ALL_CORE_FT
acetaminophen 325 mg oral tablet: 2 tab(s) orally every 6 hours As needed Temp greater or equal to 38C (100.4F), Mild Pain (1 - 3)  amoxicillin-clavulanate 875 mg-125 mg oral tablet: 1 tab(s) orally 2 times a day  azelastine 137 mcg/inh (0.1%) nasal spray: 2 intranasally 2 times a day  chorionic gonadotropin (HCG) 10,000 units intramuscular injection: 10,000 intramuscularly  dilTIAZem 300 mg/24 hours oral capsule, extended release: 1 cap(s) orally once a day  famotidine 20 mg oral tablet: 1 orally once a day  Gonal-F 450 intl units subcutaneous injection: 75 subcutaneously  labetalol 300 mg oral tablet: 1 orally 3 times a day  losartan 50 mg oral tablet: 1 orally once a day  Trelegy Ellipta 100 mcg-62.5 mcg-25 mcg/inh inhalation powder: 1 inhaled once a day

## 2023-07-24 NOTE — DISCHARGE NOTE PROVIDER - NSDCFUSCHEDAPPT_GEN_ALL_CORE_FT
Lukasz Singh  [facility]  UROLOGY [address]  Scheduled Appointment: 07/25/2023    Blade Veloz  [facility]  PULMMED [address]  Scheduled Appointment: 08/08/2023    Trey Johnson  [facility]  CARDIOLOGY [address]  Scheduled Appointment: 08/09/2023    Ghassan Morales  [facility]  CARDIOLOGY [address]  Scheduled Appointment: 08/21/2023    [facility]  CARDIOLOGY [address]  Scheduled Appointment: 08/21/2023

## 2023-07-24 NOTE — DISCHARGE NOTE PROVIDER - NSDCFUADDAPPT_GEN_ALL_CORE_FT
Please call and schedule appointment to be seen in 7-10days.  Follow-up with your urologist as previously scheduled to continue treatment of Kallmann's Syndrome

## 2023-07-24 NOTE — PROGRESS NOTE ADULT - REASON FOR ADMISSION
Sigmoid diverticulitis with microperforation with phlegmon

## 2023-07-24 NOTE — DISCHARGE NOTE PROVIDER - HOSPITAL COURSE
HPI:  42M with PMHx of Kallmann syndrome, obesity, HTN, asthma, Type A aortic dissection s/p repair supracoronary with 26 Hemashield Graft hemiarch repair on 12/8/18 with post op course significant for cardiogenic shock requiring pressor support, diverticulitis treated with IV abx 2021 presents c/o severe LLQ abdominal pain x 5 days. Pain does not radiate. Pain is currently controlled on 30mg of Toradol that was given in the ER. Pt initially thought that the pain was gas. Had a 103.1 fever around 4pm. Pt took Tylenol and Motrin at home. Last BM/flatus were at 11pm. Last colonoscopy and EGD were in 2021 and patient does not remember the results. Patient denies nausea, vomiting, constipation, diarrhea, melena, hematochezia, dysuria, hematuria, chest pain, shortness of breath, dizziness, cough.  (21 Jul 2023 02:06)    Pt admitted for Diverticulitis w/ microperf, pt treated conservatively with NPO and ABX, Hospital day #2 pt had improvement in abd, pain and was started on diet. Pt also had endocrine consult for Kallman syndrome who recommended outpatient workup. Today pat without abdominal pain, he is tolerating Low residue diet and has been trasitioned to PO abx to continue outpatient. Pt is stable for discharge.

## 2023-07-24 NOTE — PROGRESS NOTE ADULT - SUBJECTIVE AND OBJECTIVE BOX
SURGERY PROGRESS HPI:  Pt seen and examined at bedside. Pain is well controlled, pt denies complaints. No acute events overnight. He says he is hungry.       Vital Signs Last 24 Hrs  T(C): 37.6 (22 Jul 2023 06:00), Max: 38.1 (21 Jul 2023 17:46)  T(F): 99.6 (22 Jul 2023 06:00), Max: 100.5 (21 Jul 2023 17:46)  HR: 71 (22 Jul 2023 04:44) (65 - 72)  BP: 140/80 (22 Jul 2023 04:44) (112/74 - 140/80)  BP(mean): --  RR: 18 (22 Jul 2023 04:44) (18 - 19)  SpO2: 97% (22 Jul 2023 04:44) (92% - 97%)    Parameters below as of 22 Jul 2023 04:44  Patient On (Oxygen Delivery Method): room air          PHYSICAL EXAM:    GENERAL: NAD  HEAD:  Atraumatic, Normocephalic  CHEST/LUNG: Normal breathing  HEART: RRR  ABDOMEN: protuberant, non distended, soft, non tender, no guarding  EXTREMITIES:  calf soft, non tender b/l    I&O's Detail    21 Jul 2023 07:01  -  22 Jul 2023 07:00  --------------------------------------------------------  IN:  Total IN: 0 mL    OUT:    Oral Fluid: 0 mL    Voided (mL): 500 mL  Total OUT: 500 mL    Total NET: -500 mL          LABS:                        12.2   9.66  )-----------( 127      ( 21 Jul 2023 05:55 )             35.2     07-21    135  |  105  |  15  ----------------------------<  136<H>  4.3   |  25  |  1.03    Ca    8.6      21 Jul 2023 05:55  Phos  2.0     07-21  Mg     1.8     07-21    TPro  6.8  /  Alb  2.9<L>  /  TBili  2.1<H>  /  DBili  x   /  AST  27  /  ALT  44  /  AlkPhos  83  07-21    PT/INR - ( 21 Jul 2023 01:35 )   PT: 16.0 sec;   INR: 1.34 ratio           Urinalysis Basic - ( 21 Jul 2023 05:55 )    Color: x / Appearance: x / SG: x / pH: x  Gluc: 136 mg/dL / Ketone: x  / Bili: x / Urobili: x   Blood: x / Protein: x / Nitrite: x   Leuk Esterase: x / RBC: x / WBC x   Sq Epi: x / Non Sq Epi: x / Bacteria: x      
SURGERY PROGRESS HPI:  Pt seen and examined at bedside. Pain has improved, not needed any pain medication over night.  Tolerating clear liquid diet, passing gas and flatus.        Vital Signs Last 24 Hrs  T(C): 36.9 (23 Jul 2023 05:59), Max: 37.2 (22 Jul 2023 12:41)  T(F): 98.4 (23 Jul 2023 05:59), Max: 99 (22 Jul 2023 12:41)  HR: 61 (23 Jul 2023 05:59) (57 - 61)  BP: 157/95 (23 Jul 2023 05:59) (117/61 - 157/95)  BP(mean): --  RR: 18 (23 Jul 2023 05:59) (16 - 18)  SpO2: 96% (23 Jul 2023 05:59) (96% - 97%)    Parameters below as of 23 Jul 2023 05:59  Patient On (Oxygen Delivery Method): room air          PHYSICAL EXAM:    GENERAL: NAD  HEAD:  Atraumatic, Normocephalic  CHEST/LUNG: Breathing normally  HEART: RRR   ABDOMEN: non distended, soft, non tender, no guarding  EXTREMITIES:  calf soft, non tender b/l    I&O's Detail    22 Jul 2023 07:01  -  23 Jul 2023 07:00  --------------------------------------------------------  IN:    IV PiggyBack: 200 mL    Lactated Ringers: 1540 mL    Oral Fluid: 640 mL  Total IN: 2380 mL    OUT:  Total OUT: 0 mL    Total NET: 2380 mL          LABS:                        11.5   6.67  )-----------( 137      ( 23 Jul 2023 06:15 )             33.6     07-23    138  |  105  |  9   ----------------------------<  106<H>  4.1   |  30  |  0.80    Ca    8.7      23 Jul 2023 06:15  Phos  2.4     07-22  Mg     2.2     07-22        Urinalysis Basic - ( 23 Jul 2023 06:15 )    Color: x / Appearance: x / SG: x / pH: x  Gluc: 106 mg/dL / Ketone: x  / Bili: x / Urobili: x   Blood: x / Protein: x / Nitrite: x   Leuk Esterase: x / RBC: x / WBC x   Sq Epi: x / Non Sq Epi: x / Bacteria: x      Culture Results:   No growth at 24 hours (07-21 @ 11:20)  Culture Results:   No growth at 24 hours (07-21 @ 11:20)  
SURGERY PROGRESS HPI:  Pt seen and examined at bedside. Pt denies complaints. No acute events overnight. Pt tolerating diet. Pt denies nausea and vomiting.  +BM/flatus. Voiding well.        Vital Signs Last 24 Hrs  T(C): 36.9 (23 Jul 2023 23:13), Max: 37.2 (23 Jul 2023 12:01)  T(F): 98.5 (23 Jul 2023 23:13), Max: 99 (23 Jul 2023 12:01)  HR: 61 (23 Jul 2023 23:13) (58 - 63)  BP: 138/87 (23 Jul 2023 23:13) (129/77 - 157/95)  BP(mean): --  RR: 17 (23 Jul 2023 23:13) (17 - 18)  SpO2: 96% (23 Jul 2023 23:13) (94% - 96%)    Parameters below as of 23 Jul 2023 05:59  Patient On (Oxygen Delivery Method): room air          PHYSICAL EXAM:    GENERAL: NAD  HEAD:  Atraumatic, Normocephalic  CHEST/LUNG: Breathing normally  HEART: RRR   ABDOMEN: non distended, soft, non tender, no guarding  EXTREMITIES:  calf soft, non tender b/l    I&O's Detail    22 Jul 2023 07:01  -  23 Jul 2023 07:00  --------------------------------------------------------  IN:    IV PiggyBack: 200 mL    Lactated Ringers: 1540 mL    Oral Fluid: 640 mL  Total IN: 2380 mL    OUT:  Total OUT: 0 mL    Total NET: 2380 mL          LABS:                        11.7   6.36  )-----------( 140      ( 23 Jul 2023 10:37 )             34.5     07-23    137  |  104  |  9   ----------------------------<  178<H>  3.9   |  30  |  0.90    Ca    8.6      23 Jul 2023 10:37  Phos  2.8     07-23  Mg     2.2     07-23    TPro  7.1  /  Alb  2.9<L>  /  TBili  1.2  /  DBili  x   /  AST  20  /  ALT  36  /  AlkPhos  78  07-23      Urinalysis Basic - ( 23 Jul 2023 10:37 )    Color: x / Appearance: x / SG: x / pH: x  Gluc: 178 mg/dL / Ketone: x  / Bili: x / Urobili: x   Blood: x / Protein: x / Nitrite: x   Leuk Esterase: x / RBC: x / WBC x   Sq Epi: x / Non Sq Epi: x / Bacteria: x      Culture Results:   No growth at 48 Hours (07-21 @ 11:20)  Culture Results:   No growth at 48 Hours (07-21 @ 11:20)  
Patient is a 42y old  Male who presents with a chief complaint of Sigmoid diverticulitis with microperforation with phlegmon (24 Jul 2023 04:09)      Interval History: Currently no active intervention needs to be done while inpatient.  Patient is status post diverticulitis and is being managed conservatively    MEDICATIONS  (STANDING):  amoxicillin  875 milliGRAM(s)/clavulanate 1 Tablet(s) Oral two times a day  budesonide  80 MICROgram(s)/formoterol 4.5 MICROgram(s) Inhaler 2 Puff(s) Inhalation two times a day  diltiazem    milliGRAM(s) Oral daily  enoxaparin Injectable 40 milliGRAM(s) SubCutaneous every 24 hours  labetalol 300 milliGRAM(s) Oral three times a day  losartan 50 milliGRAM(s) Oral daily  pantoprazole  Injectable 40 milliGRAM(s) IV Push daily    MEDICATIONS  (PRN):  acetaminophen     Tablet .. 650 milliGRAM(s) Oral every 6 hours PRN Temp greater or equal to 38C (100.4F), Mild Pain (1 - 3)  morphine  - Injectable 2 milliGRAM(s) IV Push every 6 hours PRN Severe Pain (7 - 10)  ondansetron Injectable 4 milliGRAM(s) IV Push every 6 hours PRN Nausea      Allergies    No Known Drug Allergies  Allergy to Eggplant (Short breath; Rash)  Tomatoes (Short breath; Rash)  Potatoes (Short breath; Rash)    Intolerances        REVIEW OF SYSTEMS:  CONSTITUTIONAL: no changes  EYES: No eye pain, visual disturbances, or discharge  ENMT:  No difficulty hearing, No sinus or throat pain  NECK: No pain or stiffness  RESPIRATORY: No cough, wheezing, chills or hemoptysis; No shortness of breath  CARDIOVASCULAR: No chest pain, palpitations or leg swelling  GASTROINTESTINAL: No abdominal or epigastric pain. No nausea, vomiting, or hematemesis; No diarrhea or constipation. No melena or hematochezia.  GENITOURINARY: No dysuria, frequency, hematuria, or incontinence  NEUROLOGICAL: No headaches, memory loss, loss of strength, numbness, or tremors  SKIN: No itching, burning, rashes, or lesions   ENDOCRINE: No heat or cold intolerance; No hair loss  MUSCULOSKELETAL: No joint pain or swelling; No muscle, back, or extremity pain  PSYCHIATRIC: No depression, anxiety, mood swings, or difficulty sleeping  HEME/LYMPH: No easy bruising, or bleeding gums  ALLERY AND IMMUNOLOGIC: No hives or eczema    Vital Signs Last 24 Hrs  T(C): 36.8 (24 Jul 2023 05:35), Max: 37.2 (23 Jul 2023 12:01)  T(F): 98.2 (24 Jul 2023 05:35), Max: 99 (23 Jul 2023 12:01)  HR: 59 (24 Jul 2023 05:35) (58 - 63)  BP: 145/69 (24 Jul 2023 05:35) (129/77 - 145/69)  BP(mean): --  RR: 18 (24 Jul 2023 05:35) (17 - 18)  SpO2: 96% (24 Jul 2023 05:35) (94% - 96%)    Parameters below as of 24 Jul 2023 05:35  Patient On (Oxygen Delivery Method): room air        PHYSICAL EXAM:  GENERAL:   HEAD: Atraumatic, Normocephalic  EYES: PERRLA, conjunctiva and sclera clear  ENMT: No  exudates,; Moist mucous membranes,, No lesions  NECK: Supple, No JVD, Normal thyroid  NERVOUS SYSTEM:  Alert & Oriented,   CHEST/LUNG: Clear to auscultation bilaterally; No rales, rhonchi, wheezing, or rubs  HEART: Regular rate and rhythm; No murmurs, rubs, or gallops  ABDOMEN: Soft, Nontender, Nondistended; Bowel sounds present  EXTREMITIES:  2+ Peripheral Pulses, no edema  SKIN: No rashes or lesions    LABS:      Urinalysis Basic - ( 24 Jul 2023 06:40 )    Color: x / Appearance: x / SG: x / pH: x  Gluc: 121 mg/dL / Ketone: x  / Bili: x / Urobili: x   Blood: x / Protein: x / Nitrite: x   Leuk Esterase: x / RBC: x / WBC x   Sq Epi: x / Non Sq Epi: x / Bacteria: x      CAPILLARY BLOOD GLUCOSE        Lipid panel:           Thyroid:  Diabetes Tests:  Parathyroid Panel:  Adrenals:  RADIOLOGY & ADDITIONAL TESTS:    Imaging Personally Reviewed:  [ ] YES  [ ] NO    Consultant(s) Notes Reviewed:  [ ] YES  [ ] NO    Care Discussed with Consultants/Other Providers [ ] YES  [ ] NO
Patient is a 42y old  Male who presents with a chief complaint of Sigmoid diverticulitis with microperforation with phlegmon (24 Jul 2023 04:09)      Interval History: Endocrine wise stable.  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  Treated on the outside earlier with hCG and testosterone    MEDICATIONS  (STANDING):  amoxicillin  875 milliGRAM(s)/clavulanate 1 Tablet(s) Oral two times a day  budesonide  80 MICROgram(s)/formoterol 4.5 MICROgram(s) Inhaler 2 Puff(s) Inhalation two times a day  diltiazem    milliGRAM(s) Oral daily  enoxaparin Injectable 40 milliGRAM(s) SubCutaneous every 24 hours  labetalol 300 milliGRAM(s) Oral three times a day  losartan 50 milliGRAM(s) Oral daily  pantoprazole  Injectable 40 milliGRAM(s) IV Push daily    MEDICATIONS  (PRN):  acetaminophen     Tablet .. 650 milliGRAM(s) Oral every 6 hours PRN Temp greater or equal to 38C (100.4F), Mild Pain (1 - 3)  morphine  - Injectable 2 milliGRAM(s) IV Push every 6 hours PRN Severe Pain (7 - 10)  ondansetron Injectable 4 milliGRAM(s) IV Push every 6 hours PRN Nausea      Allergies    No Known Drug Allergies  Allergy to Eggplant (Short breath; Rash)  Tomatoes (Short breath; Rash)  Potatoes (Short breath; Rash)    Intolerances        REVIEW OF SYSTEMS:  CONSTITUTIONAL: no changes  EYES: No eye pain, visual disturbances, or discharge  ENMT:  No difficulty hearing, No sinus or throat pain  NECK: No pain or stiffness  RESPIRATORY: No cough, wheezing, chills or hemoptysis; No shortness of breath  CARDIOVASCULAR: No chest pain, palpitations or leg swelling  GASTROINTESTINAL: No abdominal or epigastric pain. No nausea, vomiting, or hematemesis; No diarrhea or constipation. No melena or hematochezia.  GENITOURINARY: No dysuria, frequency, hematuria, or incontinence  NEUROLOGICAL: No headaches, memory loss, loss of strength, numbness, or tremors  SKIN: No itching, burning, rashes, or lesions   ENDOCRINE: No heat or cold intolerance; No hair loss  MUSCULOSKELETAL: No joint pain or swelling; No muscle, back, or extremity pain  PSYCHIATRIC: No depression, anxiety, mood swings, or difficulty sleeping  HEME/LYMPH: No easy bruising, or bleeding gums  ALLERY AND IMMUNOLOGIC: No hives or eczema    Vital Signs Last 24 Hrs  T(C): 36.8 (24 Jul 2023 05:35), Max: 37.2 (23 Jul 2023 12:01)  T(F): 98.2 (24 Jul 2023 05:35), Max: 99 (23 Jul 2023 12:01)  HR: 59 (24 Jul 2023 05:35) (58 - 63)  BP: 145/69 (24 Jul 2023 05:35) (129/77 - 145/69)  BP(mean): --  RR: 18 (24 Jul 2023 05:35) (17 - 18)  SpO2: 96% (24 Jul 2023 05:35) (94% - 96%)    Parameters below as of 24 Jul 2023 05:35  Patient On (Oxygen Delivery Method): room air        PHYSICAL EXAM:  GENERAL:   HEAD: Atraumatic, Normocephalic  EYES: PERRLA, conjunctiva and sclera clear  ENMT: No  exudates,; Moist mucous membranes,, No lesions  NECK: Supple, No JVD, Normal thyroid  NERVOUS SYSTEM:  Alert & Oriented,   CHEST/LUNG: Clear to auscultation bilaterally; No rales, rhonchi, wheezing, or rubs  HEART: Regular rate and rhythm; No murmurs, rubs, or gallops  ABDOMEN: Soft, Nontender, Nondistended; Bowel sounds present  EXTREMITIES:  2+ Peripheral Pulses, no edema  SKIN: No rashes or lesions    LABS:      Urinalysis Basic - ( 24 Jul 2023 06:40 )    Color: x / Appearance: x / SG: x / pH: x  Gluc: 121 mg/dL / Ketone: x  / Bili: x / Urobili: x   Blood: x / Protein: x / Nitrite: x   Leuk Esterase: x / RBC: x / WBC x   Sq Epi: x / Non Sq Epi: x / Bacteria: x      CAPILLARY BLOOD GLUCOSE        Lipid panel:           Thyroid:  Diabetes Tests:  Parathyroid Panel:  Adrenals:  RADIOLOGY & ADDITIONAL TESTS:    Imaging Personally Reviewed:  [ ] YES  [ ] NO    Consultant(s) Notes Reviewed:  [ ] YES  [ ] NO    Care Discussed with Consultants/Other Providers [ ] YES  [ ] NO

## 2023-07-24 NOTE — PROGRESS NOTE ADULT - PROBLEM SELECTOR PLAN 1
Management as before.  Treatment and management can be done as an outpatient
Currently no active intervention required.  As indicated before patient needs to get treated for it as an outpatient.  But a definite plan should be in place

## 2023-07-25 ENCOUNTER — APPOINTMENT (OUTPATIENT)
Dept: UROLOGY | Facility: CLINIC | Age: 43
End: 2023-07-25
Payer: COMMERCIAL

## 2023-07-25 PROCEDURE — 99214 OFFICE O/P EST MOD 30 MIN: CPT | Mod: 95

## 2023-07-25 NOTE — ASSESSMENT
[FreeTextEntry1] : The patient presents for follow up.  He no longer is interested in trying to have sperm production.  He would like to go back to testosterone supplementation.  He has been off the medication for over a month.  He also feels his erections and ejaculation are poor and he cannot lose weight.  He also finds that insurance coverage of the medications are very variable.  He had been injecting 150 units gonyl F 3x/week and Novarel 4000 units 3 times weekly.\par \par He understands that going back to testosterone supplementation will turn off sperm production.  His wife is 33.  He feels that he needs to get everything else in line first and will consider going back to hCG and the FSH analog down the line.  We discussed the pros and cons of bias.  I prescribed testosterone cypionate and will have him in the office for injection training.\par \par REF: [id number]\par \par Telehealth Consultation: 30 minutes  10 minutes reviewing his history and discussing prior results.  20 minutes discussing various treatment options, writing medication prescriptions (HCS) and writing his note. There was also additional time in preparing for the visit and assisting the patient with technology issues he was having with the telehealth platform.\par \par \par

## 2023-07-25 NOTE — HISTORY OF PRESENT ILLNESS
[FreeTextEntry1] : The patient-doctor. relationship has been established in a face-to-face fashion on real-time video audio HIPAA compliant communication using telemedicine software. The patient was at home and the physician was in his office. The patient's identity has been confirmed.  The patient was previously emailed a copy of the telemedicine consent.  The patient has had a chance to review and has now given verbal consent and has requested care to be assessed and treated through telemedicine. The patient understands there may be limitations in this process and that they need not need further follow-up care in the office and/or hospital settings. We were unable to use the [employer] platform and an alternative platform was utilized.  The patient was at home and I was in the office.\par \par Verbal consent was given on Tuesday, September 22, 2022 at 8:50 AM by the patient.  It was requested by the physician.  A written consent was previously sent for the patient to sign and return.\par \par The patient presents for follow up.  He no longer is interested in trying to have sperm production.  He would like to go back to testosterone supplementation.  He has been off the medication for over a month.  He also feels his erections and ejaculation are poor and he cannot lose weight.  He also finds that insurance coverage of the medications are very variable.  He had been injecting 150 units gonyl F 3x/week and Novarel 4000 units 3 times weekly.\par \par PMH: Patient initially presented with hypogonadotrophic hypogonadism presents for followup. He stated that in December he had an aortic dissection. He required an open repair. He has been doing well since. He's been on pregnyl 3 times weekly since March of 2018, 4000 units 3x weekly. His T was 288 in Feb 2019. He has a partner and would like to have future paternity. 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 He was first seen when he was 36 years old and his wife wis 27 years old. She has had 2 prior pregnancies. He has not had any children from any relationship. He would like to conceive with his partner.\par \par He was previously diagnosed with hypogonadotropic hypogonadism at age ~13. He has sense of smell. 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 DEXA was normal. He has seen multiple endocrinologists in the past as well as Dr. Tate recently. He was previously on gonadotropins as a teenager, then subsequently transitioned to androgel then back to HCG and Menopur. He is off all medications since August 2017.\par \par He underwent a testis biopsy (details unclear from patient) but no sperm was cryopreserved. He was unable to afford gonadotropins due to insurance issues. Is currently on Medicaid.\par

## 2023-07-26 LAB
CULTURE RESULTS: SIGNIFICANT CHANGE UP
CULTURE RESULTS: SIGNIFICANT CHANGE UP
SPECIMEN SOURCE: SIGNIFICANT CHANGE UP
SPECIMEN SOURCE: SIGNIFICANT CHANGE UP

## 2023-07-31 ENCOUNTER — TRANSCRIPTION ENCOUNTER (OUTPATIENT)
Age: 43
End: 2023-07-31

## 2023-07-31 DIAGNOSIS — I10 ESSENTIAL (PRIMARY) HYPERTENSION: ICD-10-CM

## 2023-07-31 DIAGNOSIS — E23.0 HYPOPITUITARISM: ICD-10-CM

## 2023-07-31 DIAGNOSIS — K57.20 DIVERTICULITIS OF LARGE INTESTINE WITH PERFORATION AND ABSCESS WITHOUT BLEEDING: ICD-10-CM

## 2023-07-31 DIAGNOSIS — E66.9 OBESITY, UNSPECIFIED: ICD-10-CM

## 2023-07-31 DIAGNOSIS — J45.909 UNSPECIFIED ASTHMA, UNCOMPLICATED: ICD-10-CM

## 2023-07-31 DIAGNOSIS — E87.1 HYPO-OSMOLALITY AND HYPONATREMIA: ICD-10-CM

## 2023-07-31 DIAGNOSIS — N17.9 ACUTE KIDNEY FAILURE, UNSPECIFIED: ICD-10-CM

## 2023-07-31 DIAGNOSIS — N39.0 URINARY TRACT INFECTION, SITE NOT SPECIFIED: ICD-10-CM

## 2023-08-04 ENCOUNTER — APPOINTMENT (OUTPATIENT)
Dept: GASTROENTEROLOGY | Facility: CLINIC | Age: 43
End: 2023-08-04
Payer: COMMERCIAL

## 2023-08-04 VITALS
HEART RATE: 64 BPM | SYSTOLIC BLOOD PRESSURE: 133 MMHG | TEMPERATURE: 97.1 F | HEIGHT: 75 IN | DIASTOLIC BLOOD PRESSURE: 86 MMHG | WEIGHT: 307 LBS | OXYGEN SATURATION: 95 % | BODY MASS INDEX: 38.17 KG/M2

## 2023-08-04 DIAGNOSIS — Z81.8 FAMILY HISTORY OF OTHER MENTAL AND BEHAVIORAL DISORDERS: ICD-10-CM

## 2023-08-04 DIAGNOSIS — Z83.438 FAMILY HISTORY OF OTHER DISORDER OF LIPOPROTEIN METABOLISM AND OTHER LIPIDEMIA: ICD-10-CM

## 2023-08-04 DIAGNOSIS — K57.32 DIVERTICULITIS OF LARGE INTESTINE W/OUT PERFORATION OR ABSCESS W/OUT BLEEDING: ICD-10-CM

## 2023-08-04 PROCEDURE — 99203 OFFICE O/P NEW LOW 30 MIN: CPT

## 2023-08-04 NOTE — ASSESSMENT
[FreeTextEntry1] : Mr. Salguero is a 42-year-old male with several significant cardiological, endocrinological and pulmonary issues.  From the gastrointestinal perspective the patient has had 2 young carmen attacks of rather significant complicated diverticulitis.  I had a lengthy discussion with the patient regarding the pathophysiology of diverticulitis and the possible triggers.  Constipation needs to be avoided and diet is no longer considered to be a trigger.  During the acute attack the patient should avoid excessive quantities of roughage.  The patient does fit the criteria for elective resection of this diverticular segment.  He is quite young and has several significant comorbid conditions.  He develops another attack that does not resolve then he would require emergency surgery and a colostomy.  It would be more reasonable to perform the surgery, albeit high risk, in an elective manner.  The patient has agreed to see a colorectal surgeon for discussion.  The patient also was given a referral for a nutritionist as weight reduction is imperative both for his cardiac health as well as general wellbeing.  Tejas was told to call me with any acute exacerbations of his intestinal issues.

## 2023-08-04 NOTE — REVIEW OF SYSTEMS
[Fever] : no fever [Chills] : no chills [Feeling Tired] : feeling tired [Recent Weight Loss (___ Lbs)] : no recent weight loss [Chest Pain] : no chest pain [Palpitations] : no palpitations [SOB on Exertion] : shortness of breath during exertion [Abdominal Pain] : abdominal pain [As Noted in HPI] : as noted in HPI [Constipation] : no constipation [Diarrhea] : no diarrhea [Heartburn] : heartburn [Melena (black stool)] : no melena

## 2023-08-04 NOTE — HISTORY OF PRESENT ILLNESS
[FreeTextEntry1] : I saw patient Tejas Salguero in the office today.  The patient is a 42-year-old male who suffers from several conditions.  These include asthma as well as hypertension hypogonadism with decreased testosterone level and gastroesophageal reflux disease.  Patient takes an inhaler on a daily basis.  Tejas has had repair of a dissecting aortic aneurysm and is under the care of a cardiologist as well as a pulmonologist.  He currently has a mildly decreased exercise tolerance due to his asthma.  The patient has several servings of caffeine a day no significant ethanol and does not smoke.  The patient has had several episodes of significant diverticulitis.  Approximately 2 to 3 years ago the patient had a prolonged hospitalization for over 3 weeks for diverticulitis with an abscess.  He developed a partial small bowel obstruction at that time but had recovered.  Most recently the patient had recurrence of left lower quadrant discomfort and inability to move his bowels.  The patient went to the hospital and had a CAT scan which revealed complicated diverticulitis with microscopic perforation.  The patient is currently on antibiotics and is recovering.  There is no current significant abdominal pain nausea vomiting, fever or chills.  The patient states that in between these episodes his bowel movements are essentially normal.  The patient was inquiring about the necessity for surgery. [de-identified] : The patient had a recent CAT scan which revealed complicated diverticulitis.

## 2023-08-04 NOTE — PHYSICAL EXAM
[Alert] : alert [No Acute Distress] : no acute distress [Obese (BMI >= 30)] : obese (BMI >= 30) [No Respiratory Distress] : no respiratory distress [Auscultation Breath Sounds / Voice Sounds] : lungs were clear to auscultation bilaterally [Bowel Sounds] : normal bowel sounds [Abdomen Soft] : soft [] : no hepatosplenomegaly [de-identified] : There is a harsh grade 2/6 to 3/6 systolic murmur

## 2023-08-08 ENCOUNTER — APPOINTMENT (OUTPATIENT)
Dept: PULMONOLOGY | Facility: CLINIC | Age: 43
End: 2023-08-08
Payer: COMMERCIAL

## 2023-08-08 VITALS
RESPIRATION RATE: 16 BRPM | WEIGHT: 300 LBS | HEIGHT: 75 IN | DIASTOLIC BLOOD PRESSURE: 80 MMHG | HEART RATE: 60 BPM | TEMPERATURE: 97.4 F | OXYGEN SATURATION: 98 % | SYSTOLIC BLOOD PRESSURE: 120 MMHG | BODY MASS INDEX: 37.3 KG/M2

## 2023-08-08 DIAGNOSIS — R09.82 POSTNASAL DRIP: ICD-10-CM

## 2023-08-08 DIAGNOSIS — E66.3 OVERWEIGHT: ICD-10-CM

## 2023-08-08 PROCEDURE — 99214 OFFICE O/P EST MOD 30 MIN: CPT | Mod: 25

## 2023-08-08 PROCEDURE — 94010 BREATHING CAPACITY TEST: CPT

## 2023-08-08 PROCEDURE — 95012 NITRIC OXIDE EXP GAS DETER: CPT

## 2023-08-08 NOTE — ASSESSMENT
[FreeTextEntry1] : Mr. QUINTANA is a 42 year old male with a history of emergent type A aortic dissection repair and replacement 12/2018, HTN, azoospermia, elevated cholesterol, asthma, atopic dermatitis (s/p Dupixent but off due to insurance 8/2021), OW who presents for a follow up pulmonary re-evaluation. New job- better health- off Dupixent/ s/p 2nd bout of Diverticulitis   The patient's SOB is felt to be multifactorial: - Overweight /  Out-of-shape - Poor breathing mechanics - Restrictive lung dysfunction -(posture) - Mild Intermittent Asthma - Cardiac Disease (Dr. Trey Johnson)  Problem 1a: moderate-severe persistent asthma -(stable) - improved -s/p Blood work for allergies: asthma panel, food IgE level (wnl), eosinophil level, Vitamin D level (wnl). - all negative) -continue Trelegy (100) 1 puff QD -continue Singulair 10 mg QHS -continue DuoNeb by nebulizer, prn when sick -Asthma is believed to be caused by inherited (genetic) and environmental factor, but its exact cause is unknown. Asthma may be triggered by allergens, lung infections, or irritants in the air. Asthma triggers are different for each person. -Inhaler technique reviewed as well as oral hygiene techniques reviewed with patient. Avoidance of cold air, extremes of temperature, rescue inhaler should be used before exercise. Order of medication reviewed with patient. Recommended use of a cool mist humidifier in the bedroom.   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 Compared to a placebo, patients with severe asthma receiving Nucala had fewer exacerbation requiring hospitalization and/or emergency department visits, and a longer time to first exacerbation. In addition, patients with severe asthma receiving Nucala or Fasenra experienced greater reductions in their daily maintenance oral corticosteroid dose, while maintaining asthma control compared with patients receiving placebo. Treatment with Nucala did not result in a significant improvement in lung function, as measured by the volume of air exhaled by patients in one second. The most common side effects include: headache, injection site reactions, back pain, weakness, and fatigue; hypersensitivity reactions can occur within hours or days including swelling of the face, mouth, and tongue, fainting, dizziness, hives, breathing problems, and rash; herpes zoster infections have occurred. The drug is a monoclonal antibody that inhibits interleukin -5 which helps regular eosinophils, a type of white blood cell that contributes to asthma. The over-production of eosinophils can cause inflammation in the lungs, increasing the frequency of asthma attacks. Patients must also take other medications, including high dose inhaled corticosteroids and at least one additional asthma drug   Problem 1c: atopic dermatitis- restart Dupixent, if needed  -mometasone cream -functional medicine diet -recommend read "wheat belly" -recommended borage and flax seed oil  Problem 2: atopic dermatitis -(eosinophilic) -s/p blood work: IgE level (-), eosinophil level (+), vitamin D levels (-) - Dupixent since 1/2020 (off since 7/2020) (insurance lapse)- 1/2021- restart -Dupixent is a prescription medicine used with other asthma medicines for the maintenance treatment of moderate-to-severe asthma in people aged 12 years and older whose asthma is not controlled with their current asthma medicines. Dupixent helps prevent severe asthma attacks (exacerbations) and can improve your breathing. Dupixent may also help reduce the amount of oral corticosteroids you need while preventing severe asthma attacks and improving your breathing. Dupixent is not used to treat sudden breathing problems. Risks and side effect of Dupixent were discussed and reviewed with patient.   Problem 3: No tracheomalacia -s/p Dynamic CT - no evidence of Dz -Tracheomalacia is usually acquired in adults and common causes include damage by tracheostomy or endotracheal intubation damaging the tracheal cartilage with increase risk with multiple intubations, prolonged intubation, and concurrent high dose steroid therapy; external chest wall trauma and surgery; chronic compression of the trachea by benign etiologies (eg, benign mediastinal goiter) or malignancy; relapsing polychondritis; or recurrent infection. Tracheomalacia can be asymptomatic, however signs or symptoms can develop as the severity of the airway narrowing progresses with major symptoms include dyspnea, cough, and sputum retention. Other symptoms include severe paroxysms of coughing, wheezing or stridor, barking cough and may be exacerbated by forced expiration, cough, and valsalva maneuver. Tracheomalacia is diagnosed by a bronchoscopic visualization of dynamic airway collapse on dynamic chest CT. Therapy is warranted in symptomatic patients with severe tracheomalacia and includes surgical repair as tracheobronchoplasty. The patient was referred to Dr. Deshawn Hammond or Dr. Bob Grajeda, at [facility] for a surgical consult.  Problem 4: GERD -add Pepcid 40 mg QHS  -Rule of 2s: avoid eating too much, eating too late, eating too spicy, eating two hours before bed. -Things to avoid including overeating, spicy foods, tight clothing, eating within three hours of bed, this list is not all inclusive. -For treatment of reflux, possible options discussed including diet control, H2 blockers, PPIs, as well as coating motility agents discussed as treatment options. Timing of meals and proximity of last meal to sleep were discussed. If symptoms persist, a formal gastrointestinal evaluation is needed.   Problem 5: allergy / sinus- (quiet) -continue Olopatadine 0.6% 1 sniff/nostril BID -continue Omnaris 1 sniff BID -continue Zyrtec 10 mg QHS  -recommended mometasone cream -recommended to use "Navage" nasal rinse device  Environmental measures for allergies were encouraged including mattress and pillow cover, air purifier, and environmental controls.   Problem 5a Epistaxis - quiet -add Darshan-Synephrine PRN -add Boroleum ointment    Problem 6: (+) sleep apnea (Vintuox) -s/p a home sleep study due to his EDS and snoring  -recommended dental device ([place] [employer] [place]) Sleep apnea is associated with adverse clinical consequences which an affect most organ systems. Cardiovascular disease risk includes arrhythmias, atrial fibrillation, hypertension, coronary artery disease, and stroke. Metabolic disorders include diabetes type 2, non-alcoholic fatty liver disease. Mood disorder especially depression; and cognitive decline especially in the elderly. Associations with chronic reflux/Wright's esophagus some but not all inclusive.  -Reasons include arousal consistent with hypopnea; respiratory events most prominent in REM sleep or supine position; therefore sleep staging and body position are important for accurate diagnosis and estimation of AHI. -Treatment options discussed including CPAP/BiPAP machine, oral appliance, ProVent therapy, Oxy-Aid by Respitec, new technologies, or positional sleep.Recommended use of the CPAP machine for moderate (AHI >15), moderate to severe (AHI 15-30) and severe patients (AHI > 30). Recommended weight loss which can reduce AHI especially in weight loss of greater than 5% of BMI. Positional sleep is recommended in those with low AHI, low-moderate BMI, and younger age. For severe sleep apnea, the hypoglossal nerve stimulator was recommended as well.   Problem 7: thoracic aortic aneurysm -follow-up with Dr. Scar Toledo  Problem 8: cardiac disease -follow-up with cardiologist (Dr. Trey Johnson)  Problem 9: poor mechanics of breathing Proper breathing techniques were reviewed with an emphasis of exhalation. Patient instructed to breath in for 1 second and out for four seconds. Patient was encouraged to not talk while walking.   Problem 10: overweight / out of shape -recommended Dr. Nishant Nichols 10 day detox  Weight loss, exercise, and diet control were discussed and are highly encouraged. Treatment options were given such as, aqua therapy, and contacting a nutritionist. Recommended to use the elliptical, stationary bike, less use of treadmill. Mindful eating was explained to the patient Obesity is associated with worsening asthma, shortness of breath, and potential for cardiac disease, diabetes, and other underlying medical conditions.   Problem 11: Health Maintenance/COVID19 Precautions  -discussed COVID 19 vaccine with patient - Clean your hands often. Wash your hands often with soap and water for at least 20 seconds, especially after blowing your nose, coughing, or sneezing, or having been in a public place. - If soap and water are not available, use a hand  that contains at least 60% alcohol. - To the extent possible, avoid touching high-touch surfaces in public places - elevator buttons, door handles, handrails, handshaking with people, etc. Use a tissue or your sleeve to cover your hand or finger if you must touch something. - Wash your hands after touching surfaces in public places. - Avoid touching your face, nose, eyes, etc. - Clean and disinfect your home to remove germs: practice routine cleaning of frequently touched surfaces (for example: tables, doorknobs, light switches, handles, desks, toilets, faucets, sinks & cell phones) - Avoid crowds, especially in poorly ventilated spaces. Your risk of exposure to respiratory viruses like COVID-19 may increase in crowded, closed-in settings with little air circulation if there are people in the crowd who are sick. All patients are recommended to practice social distancing and stay at least 6 feet away from others.  - Avoid all non-essential travel including plane trips, and especially avoid embarking on cruise ships. -If COVID-19 is spreading in your community, take extra measures to put distance between yourself and other people to further reduce your risk of being exposed to this new virus. -Stay home as much as possible. - Consider ways of getting food brought to your house through family, social, or commercial networks -Be aware that the virus has been known to live in the air up to 3 hours post exposure, cardboard up to 24 hours post exposure, copper up to 4 hours post exposure, steel and plastic up to 2-3 days post exposure. Risk of transmission from these surfaces are affected by many variables. COVID-19 precautionary Immune Support Recommendations: -OTC Vitamin C 500mg BID  -OTC Quercetin 250-500mg BID  -OTC Zinc 75-100mg per day  -OTC Melatonin 1 or 2mg a night  -OTC Vitamin D 1-4000mg per day  -OTC Tonic Water 8oz per day -Water 0.5-1 gallon per day Asthma and COVID19: You need to make sure your asthma is under control. This often requires the use of inhaled corticosteroids (and sometimes oral corticosteroids). Inhaled corticosteroids do not likely reduce your immune system's ability to fight infections, but oral corticosteroids may. It is important to use the steps above to protect yourself to limit your exposure to any respiratory virus.   Problem 12: health maintenance -recommended SaNOtize nasal spray  -refused influenza vaccine 2022 -recommended strep pneumonia vaccines: Prevnar-13 vaccine, followed by Pneumo vaccine 23 one year following  -recommended early intervention for URIs  -recommended regular osteoporosis evaluations  -recommended early dermatological evaluations  -recommended after the age of 50 to the age of 70, colonoscopy every 5 years   Follow-up in 3-4 months with MARCO A and NiOx Patient is encouraged to call or fax the office with any changes, questions, or concerns. Explained to the patient in full detail with demonstrations how to use the inhalers and inhaler hygiene. -Education provided to the patient regarding their visit and conditions 
No

## 2023-08-08 NOTE — PROCEDURE
[FreeTextEntry1] : PFT reveals mild restrictive dysfunction, with an FEV1 of 3.20 L, which is 66% of predicted, with a normal flow volume loop. PFTs were performed to evaluate for SOB  FENO was 16; a normal value being less than 25 Fractional exhaled nitric oxide (FENO) is regarded as a simple, noninvasive method for assessing eosinophilic airway inflammation. Produced by a variety of cells within the lung, nitric oxide (NO) concentrations are generally low in healthy individuals. However, high concentrations of NO appear to be involved in nonspecific host defense mechanisms and chronic inflammatory diseases such as asthma. The American Thoracic Society (ATS) therefore has recommended using FENO to aid in the diagnosis and monitoring of eosinophilic airway inflammation and asthma, and for identifying steroid responsive individuals whose chronic respiratory symptoms may be caused by airway inflammation.

## 2023-08-08 NOTE — PHYSICAL EXAM
[No Acute Distress] : no acute distress [Normal Oropharynx] : normal oropharynx [III] : Mallampati Class: III [Normal Appearance] : normal appearance [No Neck Mass] : no neck mass [Normal Rate/Rhythm] : normal rate/rhythm [Normal S1, S2] : normal s1, s2 [No Murmurs] : no murmurs [No Resp Distress] : no resp distress [Clear to Auscultation Bilaterally] : clear to auscultation bilaterally [No Abnormalities] : no abnormalities [Benign] : benign [Normal Gait] : normal gait [No Clubbing] : no clubbing [No Cyanosis] : no cyanosis [No Edema] : no edema [FROM] : FROM [Normal Color/ Pigmentation] : normal color/ pigmentation [No Focal Deficits] : no focal deficits [Normal Affect] : normal affect [Oriented x3] : oriented x3 [TextBox_2] : OW [TextBox_54] : 3/6 systolic murmur  [TextBox_68] : I:E 1:3; Clear

## 2023-08-08 NOTE — ADDENDUM
[FreeTextEntry1] : Documented by Perez Dinero acting as a scribe for Dr. Blade Veloz on 08/08/2023 .  All medical record entries made by the Scribe were at my, Dr. Blade Veloz's, direction and personally dictated by me on 08/08/2023 . I have reviewed the chart and agree that the record accurately reflects my personal performance of the history, physical exam, assessment and plan. I have also personally directed, reviewed, and agree with the discharge instructions.

## 2023-08-09 ENCOUNTER — TRANSCRIPTION ENCOUNTER (OUTPATIENT)
Age: 43
End: 2023-08-09

## 2023-08-14 ENCOUNTER — TRANSCRIPTION ENCOUNTER (OUTPATIENT)
Age: 43
End: 2023-08-14

## 2023-08-16 ENCOUNTER — NON-APPOINTMENT (OUTPATIENT)
Age: 43
End: 2023-08-16

## 2023-08-16 ENCOUNTER — APPOINTMENT (OUTPATIENT)
Dept: CARDIOLOGY | Facility: CLINIC | Age: 43
End: 2023-08-16
Payer: COMMERCIAL

## 2023-08-16 VITALS
OXYGEN SATURATION: 97 % | HEIGHT: 75 IN | BODY MASS INDEX: 37.8 KG/M2 | SYSTOLIC BLOOD PRESSURE: 130 MMHG | WEIGHT: 304 LBS | DIASTOLIC BLOOD PRESSURE: 83 MMHG | HEART RATE: 55 BPM

## 2023-08-16 PROCEDURE — 99214 OFFICE O/P EST MOD 30 MIN: CPT | Mod: 25

## 2023-08-16 PROCEDURE — 93000 ELECTROCARDIOGRAM COMPLETE: CPT

## 2023-08-21 ENCOUNTER — APPOINTMENT (OUTPATIENT)
Dept: SURGERY | Facility: CLINIC | Age: 43
End: 2023-08-21
Payer: COMMERCIAL

## 2023-08-21 ENCOUNTER — APPOINTMENT (OUTPATIENT)
Dept: CARDIOLOGY | Facility: CLINIC | Age: 43
End: 2023-08-21
Payer: COMMERCIAL

## 2023-08-21 ENCOUNTER — NON-APPOINTMENT (OUTPATIENT)
Age: 43
End: 2023-08-21

## 2023-08-21 VITALS
HEART RATE: 56 BPM | WEIGHT: 298 LBS | DIASTOLIC BLOOD PRESSURE: 67 MMHG | TEMPERATURE: 98.2 F | HEIGHT: 75 IN | BODY MASS INDEX: 37.05 KG/M2 | OXYGEN SATURATION: 95 % | SYSTOLIC BLOOD PRESSURE: 119 MMHG

## 2023-08-21 VITALS
WEIGHT: 298 LBS | HEART RATE: 58 BPM | OXYGEN SATURATION: 99 % | DIASTOLIC BLOOD PRESSURE: 78 MMHG | SYSTOLIC BLOOD PRESSURE: 116 MMHG | HEIGHT: 75 IN | BODY MASS INDEX: 37.05 KG/M2

## 2023-08-21 PROCEDURE — 99213 OFFICE O/P EST LOW 20 MIN: CPT

## 2023-08-21 PROCEDURE — 99215 OFFICE O/P EST HI 40 MIN: CPT

## 2023-08-21 NOTE — PHYSICAL EXAM
[Normal Uvula] : normal uvula [Normal] : normal palmar creases without syndactyly or other anomalies [Bifid Uvula] : no bifid uvula [Cleft Lip] : no cleft lip [Cleft Palate] : no cleft palate [Pectus Deformity] : no pectus deformity [Scoliosis] : no scoliosis [Tremor] : no tremor [de-identified] : large head [de-identified] : wears glasses [de-identified] : regular 3/6 murmur [FreeTextEntry2] : 193 [FreeTextEntry3] : 205 [FreeTextEntry4] : 1.06 [FreeTextEntry5] : 84 [FreeTextEntry6] : 109 [FreeTextEntry7] : 0.77 [TWNoteComboBox1] : 0 [TWNoteComboBox2] : 0 [TWNoteComboBox3] : 0 [TWNoteComboBox4] : 0 [TWNoteComboBox5] : 0 [de-identified] : 1 [de-identified] : 0 [de-identified] : 0 [de-identified] : 1 [de-identified] : 0 [Right] : Right: N [Left] : Left: N [] : No

## 2023-08-21 NOTE — REASON FOR VISIT
[FreeTextEntry3] : Dear Dr. John and Dr. Johnson       . I saw your patient JOANNE QUINTANA on 08/21/2023 . Please see the note below for the assessment and plan.   JOANNE QUINTANA  was seen  for an initial consultation at the [facility] at [facility] on 04/17/2023.   Mr. QUINTANA was referred by  Dr. John and Dr. Johnson   for hereditary cardiac predisposition risk assessment and counseling, due to hx of aortic aneurysm and dissection.

## 2023-08-21 NOTE — FAMILY HISTORY
[FreeTextEntry1] : FamilyHistory_20_twCiteListControlStart FamilyHistory_20_twCiteListControlEnd [id number] \par  A four-generation family history was constructed and scanned into [employer]. \par  Family history is significant for: \par  siblings\par  sister 40 yo healthy , hx lazy eye: 3 daughters 13 yo hx lazy eye, 10 yo wears glasses, 8 yo \par  brother identical twin 41 yo mild obesity: son 3 yo, daughter 8 yo and expecting all healthy \par  brother  identical twin 41 yo hx nerve problem following car accident: no children\par  brother 37 yo (born with problems due to mother on Lithium at the time of her pregnancy) developmental delay, \par  \par  half siblings shared father\par  half sister 38 yo: 3 sons , one son with hx seizures\par  \par  half sister 33 yo hx down syndrome, independent\par  half brother 25 yo\par  half brother 26 yo\par  \par  Mother 70 yo hx bipolar do \par  Maternal aunts  none\par  Maternal uncles x1, 66 yo healthy, hx PTSD following wartime service: no children\par  Maternal Grandmother dec 82 yo mental health disease from trauma\par  Maternal Grandfather dec unk med hx \par  \par  Father 75 yo, Dm \par  multiple aunts and uncles \par  Paternal aunts\par  Paternal uncles\par  one uncle with > 20 surgeries\par  Paternal Grandfather dec brother with maternal grandfather \par  Paternal Grandmother dec 79 yo  hx DM, Le amputations \par  \par  children: none\par  \par  his maternal families originate from [place] and paternal families originate from [facility] [place]. \par  No Ashkenazi Sikhism ancestry. \par  Family history was positive for consanguinity   paternal and maternal grandfathers are brothers, parents are 1st cousins \par  No family history of SIDS\par   \par  \par   [FreeTextEntry2] : [facility] [place] [FreeTextEntry3] : [facility] [place]

## 2023-08-21 NOTE — HISTORY OF PRESENT ILLNESS
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 Residual dissection in ascending aorta seen on TTE diagnosed at age 36 yo   he underwent genetic testing and today presents for results

## 2023-08-21 NOTE — RESULTS/DATA
[TextEntry] : Thoracic Aortic Aneurysm and Dissection (TAAD) / Marfan syndrome / Related Disorders Sequencing and Deletion/Duplication Panel Result: Negative

## 2023-08-22 ENCOUNTER — TRANSCRIPTION ENCOUNTER (OUTPATIENT)
Age: 43
End: 2023-08-22

## 2023-08-22 DIAGNOSIS — Z12.10 ENCOUNTER FOR SCREENING FOR MALIGNANT NEOPLASM OF INTESTINAL TRACT, UNSPECIFIED: ICD-10-CM

## 2023-08-22 NOTE — HISTORY OF PRESENT ILLNESS
[de-identified] : 43 yo male with history of Kallmann syndrome, obesity, HTN, asthma, type a aortic dissection s/p repair with post op cardiogenic shock, with recent admission for perforated diverticulitis treated with antibiotics. This is the patient's second episode with the first being in June of 2021. This time, on CT found to have a microperf. PAtient recovered well and was discharged home.  Denies pain, feels some pressure in LLQ when moving his bowels. No fevers/chills.  AVSS No acute distress Abdomen soft, non tender, non distended, no palpable masses/hernias.  Plan for colonoscopy and discussion for surgery thereafter if he wishes. Would need cardiac clearance. Will follow up with Patient in September.

## 2023-08-30 ENCOUNTER — TRANSCRIPTION ENCOUNTER (OUTPATIENT)
Age: 43
End: 2023-08-30

## 2023-08-31 ENCOUNTER — TRANSCRIPTION ENCOUNTER (OUTPATIENT)
Age: 43
End: 2023-08-31

## 2023-09-01 ENCOUNTER — TRANSCRIPTION ENCOUNTER (OUTPATIENT)
Age: 43
End: 2023-09-01

## 2023-09-08 ENCOUNTER — NON-APPOINTMENT (OUTPATIENT)
Age: 43
End: 2023-09-08

## 2023-09-08 ENCOUNTER — APPOINTMENT (OUTPATIENT)
Dept: CV DIAGNOSITCS | Facility: HOSPITAL | Age: 43
End: 2023-09-08

## 2023-09-08 ENCOUNTER — OUTPATIENT (OUTPATIENT)
Dept: OUTPATIENT SERVICES | Facility: HOSPITAL | Age: 43
LOS: 1 days | End: 2023-09-08
Payer: COMMERCIAL

## 2023-09-08 DIAGNOSIS — Z98.890 OTHER SPECIFIED POSTPROCEDURAL STATES: Chronic | ICD-10-CM

## 2023-09-08 DIAGNOSIS — I71.21 ANEURYSM OF THE ASCENDING AORTA, WITHOUT RUPTURE: ICD-10-CM

## 2023-09-08 PROCEDURE — 93306 TTE W/DOPPLER COMPLETE: CPT | Mod: 26

## 2023-09-20 ENCOUNTER — APPOINTMENT (OUTPATIENT)
Dept: GASTROENTEROLOGY | Facility: AMBULATORY MEDICAL SERVICES | Age: 43
End: 2023-09-20

## 2023-09-25 ENCOUNTER — APPOINTMENT (OUTPATIENT)
Dept: SURGERY | Facility: CLINIC | Age: 43
End: 2023-09-25

## 2023-09-28 ENCOUNTER — TRANSCRIPTION ENCOUNTER (OUTPATIENT)
Age: 43
End: 2023-09-28

## 2023-10-17 ENCOUNTER — APPOINTMENT (OUTPATIENT)
Dept: UROLOGY | Facility: CLINIC | Age: 43
End: 2023-10-17
Payer: COMMERCIAL

## 2023-10-17 VITALS — SYSTOLIC BLOOD PRESSURE: 116 MMHG | DIASTOLIC BLOOD PRESSURE: 74 MMHG | HEART RATE: 66 BPM

## 2023-10-17 DIAGNOSIS — N46.01 ORGANIC AZOOSPERMIA: ICD-10-CM

## 2023-10-17 PROCEDURE — 99214 OFFICE O/P EST MOD 30 MIN: CPT

## 2023-10-23 LAB
25(OH)D3 SERPL-MCNC: 33.1 NG/ML
ESTRADIOL SERPL-MCNC: 23 PG/ML
FSH SERPL-MCNC: 0.4 IU/L
HCT VFR BLD CALC: 48.7 %
HGB BLD-MCNC: 15.8 G/DL
LH SERPL-ACNC: <0.3 IU/L
MCHC RBC-ENTMCNC: 30 PG
MCHC RBC-ENTMCNC: 32.4 GM/DL
MCV RBC AUTO: 92.6 FL
PLATELET # BLD AUTO: 210 K/UL
PROLACTIN SERPL-MCNC: 10.6 NG/ML
RBC # BLD: 5.26 M/UL
RBC # FLD: 13.5 %
TESTOST FREE SERPL-MCNC: 11.9 PG/ML
TESTOST SERPL-MCNC: 579 NG/DL
WBC # FLD AUTO: 10.4 K/UL

## 2023-11-30 NOTE — HISTORY OF PRESENT ILLNESS
Spiked temp at 0130H. Tylenol given per MAR. Dr. Ramires informed. Latest temp 98degF. Doing good with oral fluid intake then fair appetite with solid food. Improved airway entry on the RLL. No other concern.    [Home] : at home, [unfilled] , at the time of the visit. [Other Location: e.g. Home (Enter Location, City,State)___] : at [unfilled] [Self] : self [Patient] : the patient [FreeTextEntry1] : The patient-doctor. relationship has been established in a face-to-face fashion on real-time video audio HIPAA compliant communication using telemedicine software.  The patient's identity has been confirmed.  The patient was previously emailed a copy of the telemedicine consent.  The patient has had a chance to review and has now given verbal consent and has requested care to be assessed and treated through telemedicine. The patient understands there may be limitations in this process and that they need not need further follow-up care in the office and/or hospital settings.\par \par Verbal consent was given on Thursday, April 30, 2020 at 11:38 AM by the patient.  It was requested by the physician.  A written consent was previously sent for the patient to sign and return.\par \par The patient presents for telehealth consultation.  He had been injecting 150 units gonyl F 3x/week and Novarel 4000 units 3 times weekly and has been doing very well from a energy and libido standpoint.  However, he recently changed pharmacies and has been having trouble getting authorization for his Gonal-f and therefore has not been taking it.  He has also not obtained a semen analysis.  His recent blood studies were good.  His erections have been greatly improved as well. \par \par PMH: Patient initially presented with hypogonadotrophic hypogonadism presents for followup. He stated that in December he had an aortic dissection. He required an open repair. He has been doing well since. He's been on pregnyl 3 times weekly since March of 2018, 4000 units 3x weekly. His T was 288 in Feb 2019. He has a partner and would like to have future paternity. 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 He was first seen when he was 36 years old and his wife wis 27 years old. She has had 2 prior pregnancies. He has not had any children from any relationship. He would like to conceive with his partner.\par \par He was previously diagnosed with hypogonadotropic hypogonadism at age ~13. He has sense of smell. 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 DEXA was normal. He has seen multiple endocrinologists in the past as well as Dr. Tate recently. He was previously on gonadotropins as a teenager, then subsequently transitioned to androgel then back to HCG and Menopur. He is off all medications since August 2017.\par \par He underwent a testis biopsy (details unclear from patient) but no sperm was cryopreserved. He was unable to afford gonadotropins due to insurance issues. Is currently on Medicaid.\par

## 2023-12-06 ENCOUNTER — APPOINTMENT (OUTPATIENT)
Dept: PULMONOLOGY | Facility: CLINIC | Age: 43
End: 2023-12-06
Payer: COMMERCIAL

## 2023-12-06 VITALS
HEIGHT: 75 IN | BODY MASS INDEX: 35.31 KG/M2 | HEART RATE: 69 BPM | TEMPERATURE: 96.9 F | SYSTOLIC BLOOD PRESSURE: 132 MMHG | OXYGEN SATURATION: 95 % | RESPIRATION RATE: 16 BRPM | DIASTOLIC BLOOD PRESSURE: 74 MMHG | WEIGHT: 284 LBS

## 2023-12-06 DIAGNOSIS — L20.9 ATOPIC DERMATITIS, UNSPECIFIED: ICD-10-CM

## 2023-12-06 PROCEDURE — 94727 GAS DIL/WSHOT DETER LNG VOL: CPT

## 2023-12-06 PROCEDURE — 94729 DIFFUSING CAPACITY: CPT

## 2023-12-06 PROCEDURE — ZZZZZ: CPT

## 2023-12-06 PROCEDURE — 99214 OFFICE O/P EST MOD 30 MIN: CPT | Mod: 25

## 2023-12-06 PROCEDURE — 95012 NITRIC OXIDE EXP GAS DETER: CPT

## 2023-12-06 PROCEDURE — 94010 BREATHING CAPACITY TEST: CPT

## 2023-12-26 ENCOUNTER — APPOINTMENT (OUTPATIENT)
Dept: UROLOGY | Facility: CLINIC | Age: 43
End: 2023-12-26
Payer: COMMERCIAL

## 2023-12-26 PROCEDURE — 99214 OFFICE O/P EST MOD 30 MIN: CPT | Mod: 95

## 2023-12-26 RX ORDER — SYRINGE WITH NEEDLE, 1 ML 25GX5/8"
22G X 1-1/2" SYRINGE, EMPTY DISPOSABLE MISCELLANEOUS
Qty: 24 | Refills: 1 | Status: ACTIVE | COMMUNITY
Start: 2023-12-26 | End: 1900-01-01

## 2023-12-26 NOTE — ASSESSMENT
[FreeTextEntry1] : The patient presents for follow up.  He no longer is interested in trying to have sperm production.  He wanted to go back to testosterone supplementation.  He did not come back for injection training as requested.  He also feels his erections and ejaculation are poor and he cannot lose weight.  He finds that insurance coverage of the medications are very variable.  He had been injecting 150 units gonyl F 3x/week and Novarel 4000 units 3 times weekly.  He understands that going back to testosterone supplementation will turn off sperm production. His wife is 33. He feels that he needs to get everything else in line first and will consider going back to hCG and the FSH analog down the line. We discussed the pros and cons of bias. I prescribed testosterone cypionate and will have him in the office for injection training.  October 17, 2023 blood test demonstrated hematocrit of 47%, estradiol 23 pg/mL, prolactin 10.6 ng/mL, FSH 0.4 IU/L, vitamin D 25 was 33.1 ng/mL, LH less than 0.3 IU/L, testosterone free 11.9 pg/mL with a total testosterone of 579 ng/dL.  REF: [id number]  Telehealth Consultation: 30 minutes  10 minutes reviewing his history and discussing prior results.  20 minutes discussing various treatment options, writing medication prescriptions, requests for lab testing and writing his note. There was also additional time in preparing for the visit and assisting the patient with technology issues he was having with the telehealth platform.

## 2023-12-26 NOTE — HISTORY OF PRESENT ILLNESS
[FreeTextEntry1] : The patient-doctor. relationship has been established in a face-to-face fashion on real-time video audio HIPAA compliant communication using telemedicine software. The patient was at home and the physician was in his office. The patient's identity has been confirmed.  The patient was previously emailed a copy of the telemedicine consent.  The patient has had a chance to review and has now given verbal consent and has requested care to be assessed and treated through telemedicine. The patient understands there may be limitations in this process and that they need not need further follow-up care in the office and/or hospital settings. We were unable to use the [employer] platform and an alternative platform was utilized.  The patient was at home and I was in the office.  Verbal consent was given on Tuesday, December 26, 2023 at 9 AM by the patient.  It was requested by the physician.  A written consent was previously sent for the patient to sign and return.  The patient presents for follow up.  He no longer is interested in trying to have sperm production.  He wanted to go back to testosterone supplementation.  He did not come back for injection training as requested.  He also feels his erections and ejaculation are poor and he cannot lose weight.  He finds that insurance coverage of the medications are very variable.  He had been injecting 150 units gonyl F 3x/week and Novarel 4000 units 3 times weekly.  PMH: Patient initially presented with hypogonadotrophic hypogonadism presents for followup. He stated that in December he had an aortic dissection. He required an open repair. He has been doing well since. He's been on pregnyl 3 times weekly since March of 2018, 4000 units 3x weekly. His T was 288 in Feb 2019. He has a partner and would like to have future paternity. 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 He was first seen when he was 36 years old and his wife wis 27 years old. She has had 2 prior pregnancies. He has not had any children from any relationship. He would like to conceive with his partner.  He was previously diagnosed with hypogonadotropic hypogonadism at age ~13. He has sense of smell. 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 DEXA was normal. He has seen multiple endocrinologists in the past as well as Dr. Tate recently. He was previously on gonadotropins as a teenager, then subsequently transitioned to androgel then back to HCG and Menopur. He is off all medications since August 2017.  He underwent a testis biopsy (details unclear from patient) but no sperm was cryopreserved. He was unable to afford gonadotropins due to insurance issues. Is currently on Medicaid.

## 2024-01-11 ENCOUNTER — TRANSCRIPTION ENCOUNTER (OUTPATIENT)
Age: 44
End: 2024-01-11

## 2024-01-16 ENCOUNTER — RX RENEWAL (OUTPATIENT)
Age: 44
End: 2024-01-16

## 2024-01-16 RX ORDER — FLUTICASONE FUROATE, UMECLIDINIUM BROMIDE AND VILANTEROL TRIFENATATE 200; 62.5; 25 UG/1; UG/1; UG/1
200-62.5-25 POWDER RESPIRATORY (INHALATION)
Qty: 180 | Refills: 1 | Status: ACTIVE | COMMUNITY
Start: 2021-08-03 | End: 1900-01-01

## 2024-01-18 NOTE — HISTORY OF PRESENT ILLNESS
Patient instructed to take losartan with a sip of water on the morning of procedure. [FreeTextEntry1] : Mr. QUINTANA is a 40 year old male with a history of emergent type A aortic dissection repair and replacement 12/2018, HTN, azoospermia, elevated cholesterol, asthma, atopic dermatitis, OW, snoring, and SOB who presents for a follow up pulmonary re-evaluation. His chief complaint is \par \par -he notes intermittent right side nosebleeds onset 07/23/2021\par -he notes getting Rx 7/30/2021\par -he notes seeing NP Roula\par -he notes breating improved\par -he notes coughing improved \par -he notes sinus issues \par -he notes thick and heavy mucus\par -he notes bleeding once triggered is constant for couple minutes\par -he notes currently feeling pain in nose \par -he notes losing 4 lbs\par -he notes not eating carbs\par -he notes sleep stable \par -he notes lack of exercise but looking to go back to gym\par -he notes rash behind leg has improved by Rx\par -he notes no getting Dupixent due to insurance issues \par -he notes using Bevespi \par - He  denies any chest pains, chest pressure, diarrhea, constipation, dysphagia, myalgia, dizziness, leg swelling, leg pain, itchy eyes, itchy ears, heartburn, reflux, or sour taste in the mouth.

## 2024-02-14 ENCOUNTER — APPOINTMENT (OUTPATIENT)
Dept: CARDIOLOGY | Facility: CLINIC | Age: 44
End: 2024-02-14
Payer: COMMERCIAL

## 2024-02-14 ENCOUNTER — NON-APPOINTMENT (OUTPATIENT)
Age: 44
End: 2024-02-14

## 2024-02-14 VITALS
WEIGHT: 284 LBS | BODY MASS INDEX: 35.31 KG/M2 | TEMPERATURE: 97 F | OXYGEN SATURATION: 94 % | HEART RATE: 64 BPM | DIASTOLIC BLOOD PRESSURE: 72 MMHG | HEIGHT: 75 IN | SYSTOLIC BLOOD PRESSURE: 127 MMHG

## 2024-02-14 DIAGNOSIS — R29.91 UNSPECIFIED SYMPTOMS AND SIGNS INVOLVING THE MUSCULOSKELETAL SYSTEM: ICD-10-CM

## 2024-02-14 DIAGNOSIS — I71.21 ANEURYSM OF THE ASCENDING AORTA, WITHOUT RUPTURE: ICD-10-CM

## 2024-02-14 DIAGNOSIS — Z98.890 OTHER SPECIFIED POSTPROCEDURAL STATES: ICD-10-CM

## 2024-02-14 DIAGNOSIS — I10 ESSENTIAL (PRIMARY) HYPERTENSION: ICD-10-CM

## 2024-02-14 PROCEDURE — 93000 ELECTROCARDIOGRAM COMPLETE: CPT

## 2024-02-14 PROCEDURE — 99214 OFFICE O/P EST MOD 30 MIN: CPT | Mod: 25

## 2024-02-14 RX ORDER — LABETALOL HYDROCHLORIDE 300 MG/1
300 TABLET, FILM COATED ORAL TWICE DAILY
Qty: 360 | Refills: 3 | Status: ACTIVE | COMMUNITY
Start: 2019-07-29

## 2024-02-15 ENCOUNTER — TRANSCRIPTION ENCOUNTER (OUTPATIENT)
Age: 44
End: 2024-02-15

## 2024-03-18 ENCOUNTER — OUTPATIENT (OUTPATIENT)
Dept: OUTPATIENT SERVICES | Facility: HOSPITAL | Age: 44
LOS: 1 days | End: 2024-03-18
Payer: COMMERCIAL

## 2024-03-18 ENCOUNTER — APPOINTMENT (OUTPATIENT)
Dept: CT IMAGING | Facility: IMAGING CENTER | Age: 44
End: 2024-03-18
Payer: COMMERCIAL

## 2024-03-18 DIAGNOSIS — Z98.890 OTHER SPECIFIED POSTPROCEDURAL STATES: Chronic | ICD-10-CM

## 2024-03-18 DIAGNOSIS — I71.21 ANEURYSM OF THE ASCENDING AORTA, WITHOUT RUPTURE: ICD-10-CM

## 2024-03-19 ENCOUNTER — APPOINTMENT (OUTPATIENT)
Dept: CT IMAGING | Facility: IMAGING CENTER | Age: 44
End: 2024-03-19

## 2024-03-19 PROCEDURE — 74174 CTA ABD&PLVS W/CONTRAST: CPT | Mod: 26

## 2024-03-19 PROCEDURE — 71275 CT ANGIOGRAPHY CHEST: CPT | Mod: 26

## 2024-03-19 PROCEDURE — 71275 CT ANGIOGRAPHY CHEST: CPT

## 2024-03-19 PROCEDURE — 74174 CTA ABD&PLVS W/CONTRAST: CPT

## 2024-03-20 ENCOUNTER — APPOINTMENT (OUTPATIENT)
Dept: CARDIOTHORACIC SURGERY | Facility: CLINIC | Age: 44
End: 2024-03-20
Payer: COMMERCIAL

## 2024-03-20 DIAGNOSIS — Z86.79 PERSONAL HISTORY OF OTHER DISEASES OF THE CIRCULATORY SYSTEM: ICD-10-CM

## 2024-03-20 DIAGNOSIS — I71.9 AORTIC ANEURYSM OF UNSPECIFIED SITE, W/OUT RUPTURE: ICD-10-CM

## 2024-03-20 PROCEDURE — 99214 OFFICE O/P EST MOD 30 MIN: CPT

## 2024-03-21 ENCOUNTER — TRANSCRIPTION ENCOUNTER (OUTPATIENT)
Age: 44
End: 2024-03-21

## 2024-03-21 VITALS
RESPIRATION RATE: 16 BRPM | DIASTOLIC BLOOD PRESSURE: 93 MMHG | HEIGHT: 75 IN | OXYGEN SATURATION: 95 % | WEIGHT: 270 LBS | BODY MASS INDEX: 33.57 KG/M2 | HEART RATE: 59 BPM | SYSTOLIC BLOOD PRESSURE: 147 MMHG

## 2024-03-21 NOTE — ASSESSMENT
[FreeTextEntry1] : 43 year old male with a past medical history of Kallmann syndrome, HTN, HLD, asthma, hypogonadism, obesity, ?MARYLOU, s/p Emergency repair of type A aortic dissection with ascending aorta and hemiarch replacement on 12/8/18 by Dr. Scar Toledo, presents for one year follow up visit with repeat diagnostic imaging.   I have reviewed the patient's medical records, diagnostic images during the time of this office consultation and have made the following recommendation. I have reviewed the indications for surgery, and used our webpage [email or web address] [email or web address] to illustrate the aorta and anatomy of the heart. I have discussed the indications for surgery with the patient. Those indications are the following: size greater than 5.0 cm, symptomatic aneurysms, family history of aortic dissection or aneurysm death with a size greater than 4.5 cm, other necessary cardiac procedures such as coronary artery bypass grafting or valvular disorders with an aneurysm greater than 4.5 cm, or connective tissue disorders with an aneurysm size greater than 4.5 cm. The patient meets the indications.   I had a lengthy discussion with the patient regarding her aneurysmal disease and progression. I have discussed with the patient that I will reformat his MRI images to determine if he is a candidate for TRIOMPHE aortic arch stent graft. If eligible, patient will require a carotid to carotid to subclavian bypass, TEVAR. I have explained the risks of the surgery, including approximately 5-10% % major mortality or morbidity including paralysis, stroke, infection, bleeding, death, renal failure and heart attack. If the patient is not a candidate for aortic arch stent graft, she will require a reop sternotomy, aortic arch replacement which is a 15% mortality or morbidity risk. All questions were addressed and patient agrees to proceed with plan. I have answered all questions fully.   - Reformat images; discuss with sponsor to determine if the patient is a candidate for TRIOMPHE aortic arch stent graft. Will contact the patient in 1-2 weeks to discuss further surgical planning. - Continue current medication regimen. - Follow up with cardiologist Dr. Johnson

## 2024-03-21 NOTE — END OF VISIT
[Time Spent: ___ minutes] : I have spent [unfilled] minutes of time on the encounter. [FreeTextEntry3] : I, JOSE Pena personally performed the evaluation and management (E/M) services for this established patient who presents today with (a) new problem(s)/exacerbation of (an) existing condition(s).  That E/M includes conducting the clinically appropriate interval history &/or exam, assessing all new/exacerbated conditions, and establishing a new plan of care.  Today, my ELVIRA, was here to observe &/or participate in the visit & follow plan of care established by me.

## 2024-03-21 NOTE — DATA REVIEWED
[FreeTextEntry1] : 2/11/23 CTA CAP revealed Status post ascending thoracic aortic repair. The aortic root the epicenter Valsalva is dilated as discussed. There is a dissection of the aorta beginning at the distal surgical margin traversing throughout the course of the thoracic aorta into the abdominal aorta. Throughout the course of the false lumen is significantly larger than the true lumen as discussed above. The brachiocephalic artery and left common carotid artery originating from the true lumen. The left subclavian artery originates from the false lumen.  - The aortic dissection continues into the abdominal aorta terminating just above the level of the inferior mesenteric artery. The false lumen throughout its course is larger than the true lumen as discussed. The celiac axis and superior mesenteric artery originate from the true lumen. Dual right renal arterial supply originates from the false lumen in the left main renal artery originates from the true lumen. - Fusiform aneurysmal dilatation of the infrarenal aorta. - Dilated common iliac arteries more so on the left. - Mild loculated pleural fluid parallels the descending thoracic aorta. - Moderate fatty infiltration liver. - Bilateral renal simple cortical cysts. - Left colonic diverticulosis without diverticulitis. - Moderate-sized fat-containing umbilical hernia.  1/30/23 TTE revealed Normal trileaflet aortic valve.Mild aortic regurgitation . The aortic root measures 4.8 cm. Patient is S/P Type A aortic dissection with resection and replacement of ascending aorta and hemiarch with a Hemashield graft. The ascending aorta was not well visualized but limited imaging suggests the continued presence of a residual dissection within the aortic arch (unchanged from 2019). Mild TR.  CTA chest abdomen pelvis 06/30/2020: Aortic root: 5.0 cm, Previously 4.7 cm Mid-ascending aorta: 3.3 cm, Previously 3.3 cm Transverse aortic arch: 3.8 cm, Previously 3.7 cm Mid-descending aorta: 4.7 cm, Previously 3.9 cm Level of diaphragm: 3.0 cm, Previously 3.0 cm Dissection flap terminates just below the renal artery origin. Aortic measurements at the level above, at, and below the renal arteries are 3.3 cm, 3.2 cm, and 3.0 cm. My measurement of the aortic root is 4.8cm.   CT chest without contrast 7/13/19: residual dissection remains within the aortic arch. * Aortic root: 4.7 cm at the sinuses of Valsalva * Ascending aorta at the main pulmonary artery: 3.3 cm * Aortic arch just proximal to the brachiocephalic artery: 3.5 cm * At the aortic isthmus: 3.7 cm * Descending thoracic aorta at the main pulmonary artery 3.9 cm * Descending thoracic aorta at the left inferior pulmonary vein 3.3 cm * Aorta at the diaphragmatic hiatus: 3.0 cm. The main pulmonary artery is normal in caliber.  Echocardiogram 12/4/19 revealed: -EF 71%. normal trileaflet aortic valve. no aortic valve regurgitation noted. the aortic root measures 4.7cm.  7

## 2024-03-21 NOTE — PHYSICAL EXAM
[Sclera] : the sclera and conjunctiva were normal [Extraocular Movements] : extraocular movements were intact [Neck Appearance] : the appearance of the neck was normal [] : no respiratory distress [Auscultation Breath Sounds / Voice Sounds] : lungs were clear to auscultation bilaterally [Normal Rate] : normal [II] : a grade 2 [Rhythm Regular] : regular [Abdomen Soft] : soft [Bowel Sounds] : normal bowel sounds [Abnormal Walk] : normal gait [Skin Color & Pigmentation] : normal skin color and pigmentation [Cranial Nerves] : cranial nerves 2-12 were intact [Oriented To Time, Place, And Person] : oriented to person, place, and time [FreeTextEntry1] : Deferred

## 2024-04-11 ENCOUNTER — TRANSCRIPTION ENCOUNTER (OUTPATIENT)
Age: 44
End: 2024-04-11

## 2024-04-27 PROBLEM — E23.0 HYPOGONADOTROPIC HYPOGONADISM: Status: ACTIVE | Noted: 2017-10-15

## 2024-04-27 NOTE — OBJECTIVE
[History reviewed] : History reviewed. [Medications and Allergies reviewed] : Medications and allergies reviewed. Benzoyl Peroxide Pregnancy And Lactation Text: This medication is Pregnancy Category C. It is unknown if benzoyl peroxide is excreted in breast milk.

## 2024-04-30 ENCOUNTER — APPOINTMENT (OUTPATIENT)
Dept: UROLOGY | Facility: CLINIC | Age: 44
End: 2024-04-30
Payer: COMMERCIAL

## 2024-04-30 DIAGNOSIS — E23.0 HYPOPITUITARISM: ICD-10-CM

## 2024-04-30 PROCEDURE — G2211 COMPLEX E/M VISIT ADD ON: CPT | Mod: NC,1L

## 2024-04-30 PROCEDURE — 99214 OFFICE O/P EST MOD 30 MIN: CPT

## 2024-04-30 RX ORDER — TESTOSTERONE CYPIONATE 100 MG/ML
100 INJECTION, SOLUTION INTRAMUSCULAR
Qty: 12 | Refills: 0 | Status: ACTIVE | COMMUNITY
Start: 2023-07-25 | End: 1900-01-01

## 2024-04-30 NOTE — ASSESSMENT
[FreeTextEntry1] : The patient presents for follow up.  He no longer is interested in trying to have sperm production.  He wanted to go back to testosterone supplementation.  He did not come back for injection training as requested.  He also feels his erections and ejaculation are poor and he cannot lose weight.  He finds that insurance coverage of the medications are very variable.  He had been injecting 150 units gonyl F 3x/week and Novarel 4000 units 3 times weekly.  He understands that going back to testosterone supplementation will turn off sperm production. His wife is 33. He feels that he needs to get everything else in line first and will consider going back to hCG and the FSH analog down the line. We discussed the pros and cons of bias. I prescribed testosterone cypionate and will have him in the office for injection training.  October 17, 2023 blood test demonstrated hematocrit of 47%, estradiol 23 pg/mL, prolactin 10.6 ng/mL, FSH 0.4 IU/L, vitamin D 25 was 33.1 ng/mL, LH less than 0.3 IU/L, testosterone free 11.9 pg/mL with a total testosterone of 579 ng/dL.  I have written for new blood studies.  Since he has been off the medication for several weeks he will start the medication and take blood test in 3 weeks.  We will discuss the results 2 weeks after that.  REF: [id number]  Telehealth Consultation: 35 minutes reviewing his history and discussing prior results, discussing various treatment options, writing medication prescriptions, requests for lab testing and writing his note. There was also additional time in preparing for the visit and assisting the patient with technology issues he was having with the telehealth platform.

## 2024-04-30 NOTE — HISTORY OF PRESENT ILLNESS
[FreeTextEntry1] : The patient-doctor. relationship has been established in a face-to-face fashion on real-time video audio HIPAA compliant communication using telemedicine software. The patient, Tejas Salguero was at home and participated in the telephonic visit with the Physician Lukasz Singh MD PhD who was in his medical office. The patient's identity has been confirmed.  The patient was previously emailed a copy of the telemedicine consent.  The patient has had a chance to review and has now given verbal consent and has requested care to be assessed and treated through telemedicine. The patient understands there may be limitations in this process and that they need not need further follow-up care in the office and/or hospital settings.   Verbal consent was given on Tuesday, April 30, 2024 at 9 AM by the patient.  It was requested by the physician.  A written consent was previously sent for the patient to sign and return.  The patient presents for follow up.  He no longer is interested in trying to have sperm production.  He wanted to go back to testosterone supplementation.  He did not come back for injection training as requested.  He also feels his erections and ejaculation are poor and he cannot lose weight.  He finds that insurance coverage of the medications are very variable.  He had been injecting 150 units gonyl F 3x/week and Novarel 4000 units 3 times weekly.  PMH: Patient initially presented with hypogonadotrophic hypogonadism presents for followup. He stated that in December he had an aortic dissection. He required an open repair. He has been doing well since. He's been on pregnyl 3 times weekly since March of 2018, 4000 units 3x weekly. His T was 288 in Feb 2019. He has a partner and would like to have future paternity. 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 He was first seen when he was 36 years old and his wife wis 27 years old. She has had 2 prior pregnancies. He has not had any children from any relationship. He would like to conceive with his partner.  He was previously diagnosed with hypogonadotropic hypogonadism at age ~13. He has sense of smell. 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 DEXA was normal. He has seen multiple endocrinologists in the past as well as Dr. Tate recently. He was previously on gonadotropins as a teenager, then subsequently transitioned to androgel then back to HCG and Menopur. He is off all medications since August 2017.  He underwent a testis biopsy (details unclear from patient) but no sperm was cryopreserved. He was unable to afford gonadotropins due to insurance issues. Is currently on Medicaid.

## 2024-06-04 ENCOUNTER — TRANSCRIPTION ENCOUNTER (OUTPATIENT)
Age: 44
End: 2024-06-04

## 2024-06-07 ENCOUNTER — APPOINTMENT (OUTPATIENT)
Dept: PULMONOLOGY | Facility: CLINIC | Age: 44
End: 2024-06-07
Payer: COMMERCIAL

## 2024-06-07 VITALS
TEMPERATURE: 96.9 F | HEIGHT: 75 IN | HEART RATE: 62 BPM | SYSTOLIC BLOOD PRESSURE: 142 MMHG | RESPIRATION RATE: 16 BRPM | BODY MASS INDEX: 33.57 KG/M2 | DIASTOLIC BLOOD PRESSURE: 90 MMHG | WEIGHT: 270 LBS | OXYGEN SATURATION: 98 %

## 2024-06-07 DIAGNOSIS — J82.83 EOSINOPHILIC ASTHMA: ICD-10-CM

## 2024-06-07 DIAGNOSIS — J30.9 ALLERGIC RHINITIS, UNSPECIFIED: ICD-10-CM

## 2024-06-07 DIAGNOSIS — K21.9 GASTRO-ESOPHAGEAL REFLUX DISEASE W/OUT ESOPHAGITIS: ICD-10-CM

## 2024-06-07 DIAGNOSIS — J45.40 MODERATE PERSISTENT ASTHMA, UNCOMPLICATED: ICD-10-CM

## 2024-06-07 DIAGNOSIS — G47.33 OBSTRUCTIVE SLEEP APNEA (ADULT) (PEDIATRIC): ICD-10-CM

## 2024-06-07 DIAGNOSIS — R06.83 SNORING: ICD-10-CM

## 2024-06-07 DIAGNOSIS — R06.02 SHORTNESS OF BREATH: ICD-10-CM

## 2024-06-07 PROCEDURE — 99214 OFFICE O/P EST MOD 30 MIN: CPT | Mod: 25

## 2024-06-07 PROCEDURE — 94010 BREATHING CAPACITY TEST: CPT

## 2024-06-07 PROCEDURE — 95012 NITRIC OXIDE EXP GAS DETER: CPT

## 2024-06-07 RX ORDER — DUPILUMAB 200 MG/1.14ML
200 INJECTION, SOLUTION SUBCUTANEOUS
Qty: 1 | Refills: 6 | Status: DISCONTINUED | COMMUNITY
Start: 2020-01-02 | End: 2024-06-07

## 2024-06-07 RX ORDER — DUPILUMAB 200 MG/1.14ML
200 INJECTION, SOLUTION SUBCUTANEOUS
Qty: 2.28 | Refills: 2 | Status: DISCONTINUED | COMMUNITY
Start: 2020-01-02 | End: 2024-06-07

## 2024-06-07 NOTE — PROCEDURE
[FreeTextEntry1] : PFTs revealed mild obstructive dysfunction; FEV1 was 3.56L, which is 73% of predicted; no inspiratory limb. PFTs were performed to evaluate for SOB, asthma  FENO was 17; a normal value being less than 25 Fractional exhaled nitric oxide (FENO) is regarded as a simple, noninvasive method for assessing eosinophilic airway inflammation. Produced by a variety of cells within the lung, nitric oxide (NO) concentrations are generally low in healthy individuals. However, high concentrations of NO appear to be involved in nonspecific host defense mechanisms and chronic inflammatory diseases such as asthma. The American Thoracic Society (ATS) therefore has recommended using FENO to aid in the diagnosis and monitoring of eosinophilic airway inflammation and asthma, and for identifying steroid responsive individuals whose chronic respiratory symptoms may be caused by airway inflammation.

## 2024-06-07 NOTE — REASON FOR VISIT
[Follow-Up] : a follow-up visit [FreeTextEntry1] : asthma / congestion; h/o emergent type A aortic dissection repair and replacement 12/2018, asthma, atopic dermatitis, OW, MARYLOU

## 2024-06-07 NOTE — HISTORY OF PRESENT ILLNESS
[FreeTextEntry1] : Mr. QUINTANA is a 43 year old male with a history of emergent type A aortic dissection repair and replacement 12/2018, HTN, azoospermia, elevated cholesterol, asthma, atopic dermatitis, OW, snoring, and SOB who presents for a follow-up pulmonary evaluation. His chief complaint is   -he notes feeling generally well  -he notes he's been wheezing and having skin irritations since he ate pizza in 3/2024 -he notes he's highly sensitive to tomatoes -he notes work is going well -he notes he's been dealing with diverticulitis. He switches to a fluid diet when he feels flares of diverticulitis coming -he denies exercising because of laziness -he notes he's down 60 lbs from 1 year ago. 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

## 2024-06-07 NOTE — ADDENDUM
[FreeTextEntry1] : Documented by Keturah Astudillo acting as a scribe for Dr. Blade Veloz on 06/07/2024. All medical record entries made by the Scribe were at my, Dr. Blade Veloz's, direction and personally dictated by me on 06/07/2024. I have reviewed the chart and agree that the record accurately reflects my personal performance of the history, physical exam, assessment and plan. I have also personally directed, reviewed, and agree with the discharge instructions.

## 2024-06-07 NOTE — ASSESSMENT
[FreeTextEntry1] : Mr. QUINTANA is a 43 year old male with a history of emergent type A aortic dissection repair and replacement 12/2018, HTN, azoospermia, elevated cholesterol, asthma, atopic dermatitis (s/p Dupixent but off due to insurance 8/2021), OW- better health- off Dupixent/ s/p 2nd bout of Diverticulitis (no Surgery)- stable with weight loss  The patient's SOB is felt to be multifactorial: - Overweight / Out-of-shape - Poor breathing mechanics - Restrictive lung dysfunction -(posture) - moderate-severe persistent Asthma - Cardiac Disease (Dr. Trey Johnson)  Problem 1a: moderate-severe persistent asthma -(stable) - improved -continue Trelegy (100) 1 puff QD -continue Singulair 10 mg QHS -continue DuoNeb by nebulizer, prn when sick -Asthma is believed to be caused by inherited (genetic) and environmental factor, but its exact cause is unknown. Asthma may be triggered by allergens, lung infections, or irritants in the air. Asthma triggers are different for each person. -Inhaler technique reviewed as well as oral hygiene techniques reviewed with patient. Avoidance of cold air, extremes of temperature, rescue inhaler should be used before exercise. Order of medication reviewed with patient. Recommended use of a cool mist humidifier in the bedroom.  problem 1b: eosinophilic asthma (off Dupixent) - Dupixent to continue (1/2020- 11/2021)- restart if needed -The safety and efficacy of Nucala was established in three double-blind, randomized, placebo-controlled trials in patients with severe asthma. Compared to a placebo, patients with severe asthma receiving Nucala had fewer exacerbation requiring hospitalization and/or emergency department visits, and a longer time to first exacerbation. In addition, patients with severe asthma receiving Nucala or Fasenra experienced greater reductions in their daily maintenance oral corticosteroid dose, while maintaining asthma control compared with patients receiving placebo. Treatment with Nucala did not result in a significant improvement in lung function, as measured by the volume of air exhaled by patients in one second. The most common side effects include: headache, injection site reactions, back pain, weakness, and fatigue; hypersensitivity reactions can occur within hours or days including swelling of the face, mouth, and tongue, fainting, dizziness, hives, breathing problems, and rash; herpes zoster infections have occurred. The drug is a monoclonal antibody that inhibits interleukin -5 which helps regular eosinophils, a type of white blood cell that contributes to asthma. The over-production of eosinophils can cause inflammation in the lungs, increasing the frequency of asthma attacks. Patients must also take other medications, including high dose inhaled corticosteroids and at least one additional asthma drug  Problem 1c: atopic dermatitis- restart Dupixent, if needed -mometasone cream -functional medicine diet -recommend to read "wheat belly" -recommended borage and flax seed oil  Problem 2: atopic dermatitis -(eosinophilic) -s/p blood work: IgE level (-), eosinophil level (+), vitamin D levels (-) - Dupixent since 1/2020 (off since 7/2020) (insurance lapse)- 1/2021- restart if needed -Dupixent is a prescription medicine used with other asthma medicines for the maintenance treatment of moderate-to-severe asthma in people aged 12 years and older whose asthma is not controlled with their current asthma medicines. Dupixent helps prevent severe asthma attacks (exacerbations) and can improve your breathing. Dupixent may also help reduce the amount of oral corticosteroids you need while preventing severe asthma attacks and improving your breathing. Dupixent is not used to treat sudden breathing problems. Risks and side effect of Dupixent were discussed and reviewed with patient.  Problem 3: No tracheomalacia -s/p Dynamic CT - no evidence of Dz -Tracheomalacia is usually acquired in adults and common causes include damage by tracheostomy or endotracheal intubation damaging the tracheal cartilage with increase risk with multiple intubations, prolonged intubation, and concurrent high dose steroid therapy; external chest wall trauma and surgery; chronic compression of the trachea by benign etiologies (eg, benign mediastinal goiter) or malignancy; relapsing polychondritis; or recurrent infection. Tracheomalacia can be asymptomatic, however signs or symptoms can develop as the severity of the airway narrowing progresses with major symptoms include dyspnea, cough, and sputum retention. Other symptoms include severe paroxysms of coughing, wheezing or stridor, barking cough and may be exacerbated by forced expiration, cough, and valsalva maneuver. Tracheomalacia is diagnosed by a bronchoscopic visualization of dynamic airway collapse on dynamic chest CT. Therapy is warranted in symptomatic patients with severe tracheomalacia and includes surgical repair as tracheobronchoplasty. The patient was referred to Dr. Deshawn Hammond or Dr. Bob Grajeda, at [facility] for a surgical consult.  Problem 4: GERD -continue Pepcid 40 mg QHS -recommended [employer]  -Rule of 2s: avoid eating too much, eating too late, eating too spicy, eating two hours before bed. -Things to avoid including overeating, spicy foods, tight clothing, eating within three hours of bed, this list is not all inclusive. -For treatment of reflux, possible options discussed including diet control, H2 blockers, PPIs, as well as coating motility agents discussed as treatment options. Timing of meals and proximity of last meal to sleep were discussed. If symptoms persist, a formal gastrointestinal evaluation is needed.  Problem 5: allergy / sinus- (quiet) -continue Olopatadine 0.6% 1 sniff/nostril BID -continue Omnaris 1 sniff BID -continue Zyrtec 10 mg QHS -recommended mometasone cream -recommended to use "Navage" nasal rinse device -s/p Blood work for allergies: asthma panel, food IgE level (wnl), eosinophil level, Vitamin D level (wnl) - all negative) Environmental measures for allergies were encouraged including mattress and pillow cover, air purifier, and environmental controls.  Problem 5a Epistaxis - quiet -add Darshan-Synephrine PRN -add Boroleum ointment  Problem 6: (+) sleep apnea ([employer]) -s/p a home sleep study due to his EDS and snoring -recommended dental device ([place] [employer] [place]) Sleep apnea is associated with adverse clinical consequences which an affect most organ systems. Cardiovascular disease risk includes arrhythmias, atrial fibrillation, hypertension, coronary artery disease, and stroke. Metabolic disorders include diabetes type 2, non-alcoholic fatty liver disease. Mood disorder especially depression; and cognitive decline especially in the elderly. Associations with chronic reflux/Wright's esophagus some but not all inclusive. -Reasons include arousal consistent with hypopnea; respiratory events most prominent in REM sleep or supine position; therefore sleep staging and body position are important for accurate diagnosis and estimation of AHI. -Treatment options discussed including CPAP/BiPAP machine, oral appliance, ProVent therapy, Oxy-Aid by [employer], new technologies, or positional sleep.Recommended use of the CPAP machine for moderate (AHI >15), moderate to severe (AHI 15-30) and severe patients (AHI > 30). Recommended weight loss which can reduce AHI especially in weight loss of greater than 5% of BMI. Positional sleep is recommended in those with low AHI, low-moderate BMI, and younger age. For severe sleep apnea, the hypoglossal nerve stimulator was recommended as well.  Problem 7: thoracic aortic aneurysm -follow-up with Dr. Scar Toledo  Problem 8: cardiac disease -follow-up with cardiologist (Dr. Trey Johnson)  Problem 9: poor mechanics of breathing Proper breathing techniques were reviewed with an emphasis of exhalation. Patient instructed to breath in for 1 second and out for four seconds. Patient was encouraged to not talk while walking.  Problem 10: overweight / out of shape -recommended Dr. Nishant Nichols 10 day detox Weight loss, exercise, and diet control were discussed and are highly encouraged. Treatment options were given such as, aqua therapy, and contacting a nutritionist. Recommended to use the elliptical, stationary bike, less use of treadmill. Mindful eating was explained to the patient Obesity is associated with worsening asthma, shortness of breath, and potential for cardiac disease, diabetes, and other underlying medical conditions.  Problem 11: Health Maintenance/COVID19 Precautions -discussed COVID 19 vaccine with patient - Clean your hands often. Wash your hands often with soap and water for at least 20 seconds, especially after blowing your nose, coughing, or sneezing, or having been in a public place. - If soap and water are not available, use a hand  that contains at least 60% alcohol. - To the extent possible, avoid touching high-touch surfaces in public places - elevator buttons, door handles, handrails, handshaking with people, etc. Use a tissue or your sleeve to cover your hand or finger if you must touch something. - Wash your hands after touching surfaces in public places. - Avoid touching your face, nose, eyes, etc. - Clean and disinfect your home to remove germs: practice routine cleaning of frequently touched surfaces (for example: tables, doorknobs, light switches, handles, desks, toilets, faucets, sinks & cell phones) - Avoid crowds, especially in poorly ventilated spaces. Your risk of exposure to respiratory viruses like COVID-19 may increase in crowded, closed-in settings with little air circulation if there are people in the crowd who are sick. All patients are recommended to practice social distancing and stay at least 6 feet away from others. - Avoid all non-essential travel including plane trips, and especially avoid embarking on cruise ships. -If COVID-19 is spreading in your community, take extra measures to put distance between yourself and other people to further reduce your risk of being exposed to this new virus. -Stay home as much as possible. - Consider ways of getting food brought to your house through family, social, or commercial networks -Be aware that the virus has been known to live in the air up to 3 hours post exposure, cardboard up to 24 hours post exposure, copper up to 4 hours post exposure, steel and plastic up to 2-3 days post exposure. Risk of transmission from these surfaces are affected by many variables. COVID-19 precautionary Immune Support Recommendations: -OTC Vitamin C 500mg BID -OTC Quercetin 250-500mg BID -OTC Zinc 75-100mg per day -OTC Melatonin 1 or 2mg a night -OTC Vitamin D 1-4000mg per day -OTC Tonic Water 8oz per day -Water 0.5-1 gallon per day Asthma and COVID19: You need to make sure your asthma is under control. This often requires the use of inhaled corticosteroids (and sometimes oral corticosteroids). Inhaled corticosteroids do not likely reduce your immune system's ability to fight infections, but oral corticosteroids may. It is important to use the steps above to protect yourself to limit your exposure to any respiratory virus.  Problem 12: health maintenance -recommended SaNOtize nasal spray -refused influenza vaccine 2023 (refused) -recommended strep pneumonia vaccines: Prevnar-13 vaccine, followed by Pneumo vaccine 23 one year following -recommended early intervention for URIs -recommended regular osteoporosis evaluations -recommended early dermatological evaluations -recommended after the age of 50 to the age of 70, colonoscopy every 5 years  Follow-up in 3-4 months with MARCO A and Urmila Patient is encouraged to call or fax the office with any changes, questions, or concerns.

## 2024-06-07 NOTE — PHYSICAL EXAM
[No Acute Distress] : no acute distress [Normal Oropharynx] : normal oropharynx [III] : Mallampati Class: III [Normal Appearance] : normal appearance [No Neck Mass] : no neck mass [Normal Rate/Rhythm] : normal rate/rhythm [Normal S1, S2] : normal s1, s2 [Clear to Auscultation Bilaterally] : clear to auscultation bilaterally [No Resp Distress] : no resp distress [No Abnormalities] : no abnormalities [Benign] : benign [Normal Gait] : normal gait [No Clubbing] : no clubbing [No Cyanosis] : no cyanosis [No Edema] : no edema [FROM] : FROM [Normal Color/ Pigmentation] : normal color/ pigmentation [No Focal Deficits] : no focal deficits [Normal Affect] : normal affect [Oriented x3] : oriented x3 [TextBox_2] : OW [TextBox_54] : 2/6 systolic murmur  [TextBox_68] : I:E 1:3; Clear

## 2024-06-22 LAB
25(OH)D3 SERPL-MCNC: 40.7 NG/ML
ALBUMIN SERPL ELPH-MCNC: 4.8 G/DL
ALP BLD-CCNC: 99 U/L
ALT SERPL-CCNC: 22 U/L
ANION GAP SERPL CALC-SCNC: 10 MMOL/L
AST SERPL-CCNC: 20 U/L
BILIRUB SERPL-MCNC: 1.3 MG/DL
BUN SERPL-MCNC: 14 MG/DL
CALCIUM SERPL-MCNC: 10.1 MG/DL
CHLORIDE SERPL-SCNC: 101 MMOL/L
CO2 SERPL-SCNC: 28 MMOL/L
CREAT SERPL-MCNC: 1.07 MG/DL
EGFR: 88 ML/MIN/1.73M2
ESTRADIOL SERPL-MCNC: 42 PG/ML
FSH SERPL-MCNC: <0.3 IU/L
GLUCOSE SERPL-MCNC: 134 MG/DL
HCT VFR BLD CALC: 49.6 %
HGB BLD-MCNC: 17.3 G/DL
LH SERPL-ACNC: <0.3 IU/L
MCHC RBC-ENTMCNC: 29.6 PG
MCHC RBC-ENTMCNC: 34.9 GM/DL
MCV RBC AUTO: 84.9 FL
PLATELET # BLD AUTO: 221 K/UL
POTASSIUM SERPL-SCNC: 4.9 MMOL/L
PROLACTIN SERPL-MCNC: 13 NG/ML
PROT SERPL-MCNC: 8.1 G/DL
RBC # BLD: 5.84 M/UL
RBC # FLD: 13.2 %
SODIUM SERPL-SCNC: 139 MMOL/L
WBC # FLD AUTO: 7.23 K/UL

## 2024-06-25 LAB
TESTOST FREE SERPL-MCNC: 21.2 PG/ML
TESTOST SERPL-MCNC: 973 NG/DL

## 2024-07-25 ENCOUNTER — APPOINTMENT (OUTPATIENT)
Dept: UROLOGY | Facility: CLINIC | Age: 44
End: 2024-07-25
Payer: COMMERCIAL

## 2024-07-25 DIAGNOSIS — E23.0 HYPOPITUITARISM: ICD-10-CM

## 2024-07-25 PROCEDURE — G2211 COMPLEX E/M VISIT ADD ON: CPT | Mod: NC

## 2024-07-25 PROCEDURE — 99214 OFFICE O/P EST MOD 30 MIN: CPT

## 2024-07-25 NOTE — HISTORY OF PRESENT ILLNESS
[FreeTextEntry1] : The patient-doctor. relationship has been established in a face-to-face fashion on real-time video audio HIPAA compliant communication using telemedicine software. The patient, Tejas Salguero was at home and participated in the telephonic visit with the Physician Lukasz Singh MD PhD who was in his medical office. The patient's identity has been confirmed.  The patient was previously emailed a copy of the telemedicine consent.  The patient has had a chance to review and has now given verbal consent and has requested care to be assessed and treated through telemedicine. The patient understands there may be limitations in this process and that they need not need further follow-up care in the office and/or hospital settings.   Verbal consent was given on Tuesday, April 30, 2024 at 9 AM by the patient.  It was requested by the physician.  A written consent was previously sent for the patient to sign and return.  The patient presents for follow up.  He no longer is interested in trying to have sperm production.  He has gone back to testosterone supplementation.  He did not come back for injection training as requested.  He is happy that he is losing weight on testosterone supplementation.  He has been injecting 50 mg of testosterone a week down from 100 mg a wee due to his elevated blood values.  However, he would like to change the type of medication he is taking.  PMH: Patient initially presented with hypogonadotrophic hypogonadism presents for followup. He stated that in December he had an aortic dissection. He required an open repair. He has been doing well since. He's been on pregnyl 3 times weekly since March of 2018, 4000 units 3x weekly. His T was 288 in Feb 2019. He has a partner and would like to have future paternity. 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 He was first seen when he was 36 years old and his wife wis 27 years old. She has had 2 prior pregnancies. He has not had any children from any relationship. He would like to conceive with his partner.  He was previously diagnosed with hypogonadotropic hypogonadism at age ~13. He has sense of smell. 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 DEXA was normal. He has seen multiple endocrinologists in the past as well as Dr. Tate recently. He was previously on gonadotropins as a teenager, then subsequently transitioned to androgel then back to HCG and Menopur. He is off all medications since August 2017.  He underwent a testis biopsy (details unclear from patient) but no sperm was cryopreserved. He was unable to afford gonadotropins due to insurance issues. Is currently on Medicaid.

## 2024-07-25 NOTE — ASSESSMENT
[FreeTextEntry1] : The patient presents for follow up.  He no longer is interested in trying to have sperm production.  He has gone back to testosterone supplementation.  He did not come back for injection training as requested.  He is happy that he is losing weight on testosterone supplementation.  He has been injecting 50 mg of testosterone a week down from 100 mg a wee due to his elevated blood values.  However, he would like to change the type of medication he is taking.  He understands that going back to testosterone supplementation will turn off sperm production. His wife is 33. He feels that he needs to get everything else in line first and will consider going back to hCG and the FSH analog down the line. We discussed the pros and cons of bias. I prescribed testosterone cypionate and will have him in the office for injection training.  October 17, 2023 blood test demonstrated hematocrit of 47%, estradiol 23 pg/mL, prolactin 10.6 ng/mL, FSH 0.4 IU/L, vitamin D 25 was 33.1 ng/mL, LH less than 0.3 IU/L, testosterone free 11.9 pg/mL with a total testosterone of 579 ng/dL.  Blood studies dated June 22, 2024 demonstrated a hemoglobin of 17.3 g/dL and a hematocrit of 49.6%.  Estradiol 42 pg/mL, vitamin D 25 was 40.7 ng/mL, prolactin 13.0 ng/mL, LH less than 0.3 IU/L, for FSH less than 0.3 IU/L.  His free testosterone was 21.2 pg/mL with a total testosterone of 973 ng/dL.  We discussed options.  He would like to return to testosterone gel.  I have written for 4 point pumps a day and will obtain blood studies in 3 weeks and discussed the results 2 weeks after that.  REF: [id number]  Telehealth Consultation: 35 minutes reviewing his history and discussing prior results, discussing various treatment options, writing medication prescriptions, requests for lab testing and writing his note. There was also additional time in preparing for the visit and assisting the patient with technology issues he was having with the telehealth platform.

## 2024-07-25 NOTE — ASSESSMENT
[FreeTextEntry1] : The patient presents for follow up.  He no longer is interested in trying to have sperm production.  He has gone back to testosterone supplementation.  He did not come back for injection training as requested.  He is happy that he is losing weight on testosterone supplementation.  He has been injecting 50 mg of testosterone a week down from 100 mg a wee due to his elevated blood values.  However, he would like to change the type of medication he is taking.  He understands that going back to testosterone supplementation will turn off sperm production. His wife is 33. He feels that he needs to get everything else in line first and will consider going back to hCG and the FSH analog down the line. We discussed the pros and cons of bias. I prescribed testosterone cypionate and will have him in the office for injection training.  October 17, 2023 blood test demonstrated hematocrit of 47%, estradiol 23 pg/mL, prolactin 10.6 ng/mL, FSH 0.4 IU/L, vitamin D 25 was 33.1 ng/mL, LH less than 0.3 IU/L, testosterone free 11.9 pg/mL with a total testosterone of 579 ng/dL.  Blood studies dated June 22, 2024 demonstrated a hemoglobin of 17.3 g/dL and a hematocrit of 49.6%.  Estradiol 42 pg/mL, vitamin D 25 was 40.7 ng/mL, prolactin 13.0 ng/mL, LH less than 0.3 IU/L, for FSH less than 0.3 IU/L.  His free testosterone was 21.2 pg/mL with a total testosterone of 973 ng/dL.  We discussed options.  He would like to return to testosterone gel.  I have written for 4 point pumps a day and will obtain blood studies in 3 weeks and discussed the results 2 weeks after that.  REF: [id number]  Telehealth Consultation: 35 minutes reviewing his history and discussing prior results, discussing various treatment options, writing medication prescriptions, requests for lab testing and writing his note. There was also additional time in preparing for the visit and assisting the patient with technology issues he was having with the telehealth platform.

## 2024-07-26 RX ORDER — TESTOSTERONE 16.2 MG/G
20.25 MG/ACT GEL TRANSDERMAL
Qty: 6 | Refills: 0 | Status: ACTIVE | COMMUNITY
Start: 2024-07-25

## 2024-07-29 ENCOUNTER — TRANSCRIPTION ENCOUNTER (OUTPATIENT)
Age: 44
End: 2024-07-29

## 2024-08-06 NOTE — PHYSICAL THERAPY INITIAL EVALUATION ADULT - STRENGTHENING, PT EVAL
No GOAL: Pt will improved B UE/LE strength to for increased limb stability, to improve gait, and facilitate stair negotiation in 4 weeks

## 2024-09-03 ENCOUNTER — TRANSCRIPTION ENCOUNTER (OUTPATIENT)
Age: 44
End: 2024-09-03

## 2024-09-10 ENCOUNTER — NON-APPOINTMENT (OUTPATIENT)
Age: 44
End: 2024-09-10

## 2024-09-11 ENCOUNTER — NON-APPOINTMENT (OUTPATIENT)
Age: 44
End: 2024-09-11

## 2024-09-11 ENCOUNTER — APPOINTMENT (OUTPATIENT)
Dept: CARDIOLOGY | Facility: CLINIC | Age: 44
End: 2024-09-11
Payer: COMMERCIAL

## 2024-09-11 VITALS
BODY MASS INDEX: 32.7 KG/M2 | HEIGHT: 75 IN | WEIGHT: 263 LBS | DIASTOLIC BLOOD PRESSURE: 80 MMHG | HEART RATE: 62 BPM | SYSTOLIC BLOOD PRESSURE: 126 MMHG

## 2024-09-11 VITALS — OXYGEN SATURATION: 96 %

## 2024-09-11 DIAGNOSIS — Q25.43 CONGENITAL ANEURYSM OF AORTA: ICD-10-CM

## 2024-09-11 DIAGNOSIS — Z98.890 OTHER SPECIFIED POSTPROCEDURAL STATES: ICD-10-CM

## 2024-09-11 DIAGNOSIS — Z86.79 PERSONAL HISTORY OF OTHER DISEASES OF THE CIRCULATORY SYSTEM: ICD-10-CM

## 2024-09-11 DIAGNOSIS — R29.91 UNSPECIFIED SYMPTOMS AND SIGNS INVOLVING THE MUSCULOSKELETAL SYSTEM: ICD-10-CM

## 2024-09-11 DIAGNOSIS — I71.9 AORTIC ANEURYSM OF UNSPECIFIED SITE, W/OUT RUPTURE: ICD-10-CM

## 2024-09-11 DIAGNOSIS — I10 ESSENTIAL (PRIMARY) HYPERTENSION: ICD-10-CM

## 2024-09-11 PROCEDURE — 99214 OFFICE O/P EST MOD 30 MIN: CPT | Mod: 25

## 2024-09-11 PROCEDURE — G2211 COMPLEX E/M VISIT ADD ON: CPT | Mod: NC

## 2024-09-11 PROCEDURE — 93000 ELECTROCARDIOGRAM COMPLETE: CPT

## 2024-09-20 NOTE — PHYSICAL EXAM
[General Appearance - Well Developed] : well developed [General Appearance - Well Nourished] : well nourished [Normal Appearance] : normal appearance [Well Groomed] : well groomed [General Appearance - In No Acute Distress] : no acute distress [Oriented To Time, Place, And Person] : oriented to person, place, and time [Affect] : the affect was normal [Mood] : the mood was normal [Not Anxious] : not anxious No

## 2024-09-24 ENCOUNTER — APPOINTMENT (OUTPATIENT)
Dept: UROLOGY | Facility: CLINIC | Age: 44
End: 2024-09-24
Payer: COMMERCIAL

## 2024-09-24 DIAGNOSIS — E23.0 HYPOPITUITARISM: ICD-10-CM

## 2024-09-24 PROCEDURE — G2211 COMPLEX E/M VISIT ADD ON: CPT | Mod: NC

## 2024-09-24 PROCEDURE — 99214 OFFICE O/P EST MOD 30 MIN: CPT

## 2024-09-24 NOTE — ASSESSMENT
[FreeTextEntry1] : The patient presents for follow up.  He no longer is interested in trying to have sperm production.  He has gone back to testosterone supplementation.  He is happy that he is losing weight on testosterone supplementation. He returns to review recent blood studies.  He is feeling great on testosterone gel.  He continues to lose weight and gain muscle.  He understands that going back to testosterone supplementation will turn off sperm production. His wife is 33. He feels that he needs to get everything else in line first and will consider going back to hCG and the FSH analog down the line. We discussed the pros and cons of bias. I prescribed testosterone cypionate and will have him in the office for injection training.  Laboratory studies dated September 7, 2024 demonstrated hematocrit of 50.1%, LH 0.4 IU/L, FSH 0.5 IU/L, estradiol 26 pg/mL, vitamin D 25 was 32.2 ng/mL, testosterone free 10.4 pg/mL with a total testosterone of 537 ng/dL.  We discussed options.  He would like to return to testosterone gel.  I have written for 4 point pumps a day and will obtain blood studies in 3 weeks and discussed the results 2 weeks after that.  REF: [id number]  Telehealth Consultation: 35 minutes reviewing his history and discussing prior results, discussing various treatment options, writing medication prescriptions, requests for lab testing and writing his note. There was also additional time in preparing for the visit and assisting the patient with technology issues he was having with the telehealth platform.

## 2024-09-24 NOTE — HISTORY OF PRESENT ILLNESS
[FreeTextEntry1] : The patient-doctor. relationship has been established in a face-to-face fashion on real-time video audio HIPAA compliant communication using telemedicine software. The patient, Tejas Salguero was at home and participated in the telephonic visit with the Physician Lukasz Singh MD PhD who was in his medical office. The patient's identity has been confirmed.  The patient was previously emailed a copy of the telemedicine consent.  The patient has had a chance to review and has now given verbal consent and has requested care to be assessed and treated through telemedicine. The patient understands there may be limitations in this process and that they need not need further follow-up care in the office and/or hospital settings.   Verbal consent was given on Tuesday, April 30, 2024 at 9 AM by the patient.  It was requested by the physician.  A written consent was previously sent for the patient to sign and return.  The patient presents for follow up.  He no longer is interested in trying to have sperm production.  He has gone back to testosterone supplementation.  He is happy that he is losing weight on testosterone supplementation. He returns to review recent blood studies.  He is feeling great on testosterone gel.  He continues to lose weight and gain muscle.  PMH: Patient initially presented with hypogonadotrophic hypogonadism presents for followup. He stated that in December he had an aortic dissection. He required an open repair. He has been doing well since. He's been on pregnyl 3 times weekly since March of 2018, 4000 units 3x weekly. His T was 288 in Feb 2019. He has a partner and would like to have future paternity. 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 He was first seen when he was 36 years old and his wife wis 27 years old. She has had 2 prior pregnancies. He has not had any children from any relationship. He would like to conceive with his partner.  He was previously diagnosed with hypogonadotropic hypogonadism at age ~13. He has sense of smell. 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 DEXA was normal. He has seen multiple endocrinologists in the past as well as Dr. Tate recently. He was previously on gonadotropins as a teenager, then subsequently transitioned to androgel then back to HCG and Menopur. He is off all medications since August 2017.  He underwent a testis biopsy (details unclear from patient) but no sperm was cryopreserved. He was unable to afford gonadotropins due to insurance issues. Is currently on Medicaid.

## 2024-09-24 NOTE — ASSESSMENT
[FreeTextEntry1] : The patient presents for follow up.  He no longer is interested in trying to have sperm production.  He has gone back to testosterone supplementation.  He is happy that he is losing weight on testosterone supplementation. He returns to review recent blood studies.  He is feeling great on testosterone gel.  He continues to lose weight and gain muscle.  He understands that going back to testosterone supplementation will turn off sperm production. His wife is 33. He feels that he needs to get everything else in line first and will consider going back to hCG and the FSH analog down the line. We discussed the pros and cons of bias. I prescribed testosterone cypionate and will have him in the office for injection training.  Laboratory studies dated September 7, 2024 demonstrated hematocrit of 50.1%, LH 0.4 IU/L, FSH 0.5 IU/L, estradiol 26 pg/mL, vitamin D 25 was 32.2 ng/mL, testosterone free 10.4 pg/mL with a total testosterone of 537 ng/dL.  We discussed options.  He would like to return to testosterone gel.  I have written for 4 point pumps a day and will obtain blood studies in 3 weeks and discussed the results 2 weeks after that.  REF: [id number]  Telehealth Consultation: 35 minutes reviewing his history and discussing prior results, discussing various treatment options, writing medication prescriptions, requests for lab testing and writing his note. There was also additional time in preparing for the visit and assisting the patient with technology issues he was having with the telehealth platform.

## 2024-10-03 ENCOUNTER — TRANSCRIPTION ENCOUNTER (OUTPATIENT)
Age: 44
End: 2024-10-03

## 2024-11-04 NOTE — DISCHARGE NOTE ADULT - CARE PROVIDER_API CALL
Scar Toledo), Surgery; Thoracic and Cardiac Surgery  [address] [place] [address]  Phone: [phone]  Fax: [phone] Scar Toledo), Surgery; Thoracic and Cardiac Surgery  [address] [place] [address]  Phone: [phone]  Fax: [phone]    Annemarie Leong (), Nephrology  [address] [place] [id number]  Phone: [phone]  Fax: [phone]

## 2024-11-07 LAB
ESTRADIOL SERPL-MCNC: 31 PG/ML
FSH SERPL-MCNC: <0.3 IU/L
HCT VFR BLD CALC: 48.7 %
HGB BLD-MCNC: 16.5 G/DL
LH SERPL-ACNC: 0.4 IU/L
MCHC RBC-ENTMCNC: 30.6 PG
MCHC RBC-ENTMCNC: 33.9 G/DL
MCV RBC AUTO: 90.2 FL
PLATELET # BLD AUTO: 191 K/UL
PROLACTIN SERPL-MCNC: 7.4 NG/ML
PSA SERPL-MCNC: 0.62 NG/ML
RBC # BLD: 5.4 M/UL
RBC # FLD: 14 %
TESTOST FREE SERPL-MCNC: 13.5 PG/ML
TESTOST SERPL-MCNC: 988 NG/DL
WBC # FLD AUTO: 5.82 K/UL

## 2024-11-12 ENCOUNTER — APPOINTMENT (OUTPATIENT)
Dept: UROLOGY | Facility: CLINIC | Age: 44
End: 2024-11-12
Payer: COMMERCIAL

## 2024-11-12 PROCEDURE — G2211 COMPLEX E/M VISIT ADD ON: CPT | Mod: NC

## 2024-11-12 PROCEDURE — 99214 OFFICE O/P EST MOD 30 MIN: CPT

## 2024-12-06 ENCOUNTER — APPOINTMENT (OUTPATIENT)
Dept: PULMONOLOGY | Facility: CLINIC | Age: 44
End: 2024-12-06
Payer: COMMERCIAL

## 2024-12-06 VITALS
WEIGHT: 254 LBS | HEART RATE: 66 BPM | TEMPERATURE: 97.6 F | OXYGEN SATURATION: 95 % | BODY MASS INDEX: 31.58 KG/M2 | RESPIRATION RATE: 16 BRPM | HEIGHT: 75 IN | DIASTOLIC BLOOD PRESSURE: 84 MMHG | SYSTOLIC BLOOD PRESSURE: 120 MMHG

## 2024-12-06 DIAGNOSIS — K21.9 GASTRO-ESOPHAGEAL REFLUX DISEASE W/OUT ESOPHAGITIS: ICD-10-CM

## 2024-12-06 DIAGNOSIS — J82.83 EOSINOPHILIC ASTHMA: ICD-10-CM

## 2024-12-06 DIAGNOSIS — R06.02 SHORTNESS OF BREATH: ICD-10-CM

## 2024-12-06 DIAGNOSIS — G47.33 OBSTRUCTIVE SLEEP APNEA (ADULT) (PEDIATRIC): ICD-10-CM

## 2024-12-06 DIAGNOSIS — J30.9 ALLERGIC RHINITIS, UNSPECIFIED: ICD-10-CM

## 2024-12-06 DIAGNOSIS — E66.3 OVERWEIGHT: ICD-10-CM

## 2024-12-06 DIAGNOSIS — L20.9 ATOPIC DERMATITIS, UNSPECIFIED: ICD-10-CM

## 2024-12-06 PROCEDURE — 99214 OFFICE O/P EST MOD 30 MIN: CPT | Mod: 25

## 2024-12-06 PROCEDURE — 94799 UNLISTED PULMONARY SVC/PX: CPT

## 2024-12-06 PROCEDURE — 94010 BREATHING CAPACITY TEST: CPT

## 2024-12-06 PROCEDURE — 94729 DIFFUSING CAPACITY: CPT

## 2024-12-06 PROCEDURE — 95012 NITRIC OXIDE EXP GAS DETER: CPT

## 2024-12-06 PROCEDURE — 94727 GAS DIL/WSHOT DETER LNG VOL: CPT

## 2024-12-06 PROCEDURE — ZZZZZ: CPT

## 2024-12-12 ENCOUNTER — APPOINTMENT (OUTPATIENT)
Dept: UROLOGY | Facility: CLINIC | Age: 44
End: 2024-12-12

## 2024-12-24 PROBLEM — F10.90 ALCOHOL USE: Status: ACTIVE | Noted: 2017-09-14

## 2025-01-11 ENCOUNTER — TRANSCRIPTION ENCOUNTER (OUTPATIENT)
Age: 45
End: 2025-01-11

## 2025-01-23 ENCOUNTER — APPOINTMENT (OUTPATIENT)
Dept: UROLOGY | Facility: CLINIC | Age: 45
End: 2025-01-23
Payer: COMMERCIAL

## 2025-01-23 DIAGNOSIS — E23.0 HYPOPITUITARISM: ICD-10-CM

## 2025-01-23 DIAGNOSIS — N46.01 ORGANIC AZOOSPERMIA: ICD-10-CM

## 2025-01-23 PROCEDURE — 99214 OFFICE O/P EST MOD 30 MIN: CPT

## 2025-01-23 PROCEDURE — G2211 COMPLEX E/M VISIT ADD ON: CPT | Mod: NC

## 2025-01-23 RX ORDER — TESTOSTERONE 20.25 MG/1.25G
20.25 MG/ACT GEL, METERED TRANSDERMAL
Qty: 3 | Refills: 0 | Status: ACTIVE | COMMUNITY
Start: 2025-01-23 | End: 1900-01-01

## 2025-02-12 NOTE — ED ADULT NURSE NOTE - NSSISCREENINGQ1_ED_A_ED
Have you had Fatigue?  No     2.   Have you chest Pain? No     3.   Have you had Dyspnea (SOB) ? No     4.   Have you had Orthopnea? No     5.   Have you had PND? No     6.   Have you had leg swelling? No     7.    Have you had any weight gain? No     8. Have you had any palpitations? No     9. Have you had any syncope? No     10. Do you have any wounds on legs? No  
Negative for dizziness, syncope, facial asymmetry, speech difficulty, weakness, light-headedness and numbness.   Psychiatric/Behavioral:  Negative for confusion and sleep disturbance.          Physical Exam  Vitals reviewed.   Constitutional:       General: He is not in acute distress.     Appearance: Normal appearance. He is not ill-appearing or diaphoretic.   HENT:      Head: Normocephalic and atraumatic.   Eyes:      General: No scleral icterus.        Right eye: No discharge.         Left eye: No discharge.      Conjunctiva/sclera: Conjunctivae normal.   Neck:      Vascular: No carotid bruit.   Cardiovascular:      Rate and Rhythm: Normal rate and regular rhythm.      Heart sounds: Normal heart sounds. No murmur heard.     No friction rub. No gallop.   Pulmonary:      Effort: Pulmonary effort is normal. No respiratory distress.      Breath sounds: Normal breath sounds. No wheezing, rhonchi or rales.   Chest:      Chest wall: No tenderness.   Abdominal:      General: Bowel sounds are normal. There is no distension.      Palpations: Abdomen is soft.      Tenderness: There is no abdominal tenderness. There is no guarding.   Musculoskeletal:         General: No swelling or tenderness.      Cervical back: Neck supple.      Right lower leg: Edema present.      Left lower leg: Edema present.   Skin:     General: Skin is warm and dry.      Findings: No erythema or rash.   Neurological:      Mental Status: He is alert. Mental status is at baseline.      Motor: No weakness.      Gait: Gait normal.   Psychiatric:         Mood and Affect: Mood normal.         Behavior: Behavior normal.         Thought Content: Thought content normal.         ASSESSMENT and PLAN  Mr. Moore has a reminder for a \"due or due soon\" health maintenance. I have asked that he contact his primary care provider for follow-up on this health maintenance.    Chronic heart failure unspecified ejection fraction - has some signs hypervolemia today with 
No

## 2025-02-14 ENCOUNTER — TRANSCRIPTION ENCOUNTER (OUTPATIENT)
Age: 45
End: 2025-02-14

## 2025-02-19 ENCOUNTER — TRANSCRIPTION ENCOUNTER (OUTPATIENT)
Age: 45
End: 2025-02-19

## 2025-02-25 ENCOUNTER — TRANSCRIPTION ENCOUNTER (OUTPATIENT)
Age: 45
End: 2025-02-25

## 2025-02-27 ENCOUNTER — RX RENEWAL (OUTPATIENT)
Age: 45
End: 2025-02-27

## 2025-02-28 ENCOUNTER — TRANSCRIPTION ENCOUNTER (OUTPATIENT)
Age: 45
End: 2025-02-28

## 2025-03-13 ENCOUNTER — APPOINTMENT (OUTPATIENT)
Dept: CARDIOLOGY | Facility: CLINIC | Age: 45
End: 2025-03-13
Payer: COMMERCIAL

## 2025-03-13 ENCOUNTER — NON-APPOINTMENT (OUTPATIENT)
Age: 45
End: 2025-03-13

## 2025-03-13 VITALS
DIASTOLIC BLOOD PRESSURE: 81 MMHG | WEIGHT: 254 LBS | TEMPERATURE: 97 F | SYSTOLIC BLOOD PRESSURE: 135 MMHG | HEART RATE: 61 BPM | HEIGHT: 75 IN | BODY MASS INDEX: 31.58 KG/M2 | OXYGEN SATURATION: 96 %

## 2025-03-13 DIAGNOSIS — Z98.890 OTHER SPECIFIED POSTPROCEDURAL STATES: ICD-10-CM

## 2025-03-13 DIAGNOSIS — Q25.43 CONGENITAL ANEURYSM OF AORTA: ICD-10-CM

## 2025-03-13 DIAGNOSIS — I10 ESSENTIAL (PRIMARY) HYPERTENSION: ICD-10-CM

## 2025-03-13 PROCEDURE — 99214 OFFICE O/P EST MOD 30 MIN: CPT

## 2025-03-13 PROCEDURE — 93000 ELECTROCARDIOGRAM COMPLETE: CPT

## 2025-03-13 PROCEDURE — G2211 COMPLEX E/M VISIT ADD ON: CPT | Mod: NC

## 2025-03-13 RX ORDER — DILTIAZEM HYDROCHLORIDE 180 MG/1
180 TABLET, EXTENDED RELEASE ORAL DAILY
Qty: 90 | Refills: 3 | Status: ACTIVE | COMMUNITY
Start: 2025-03-13 | End: 1900-01-01

## 2025-03-25 ENCOUNTER — APPOINTMENT (OUTPATIENT)
Dept: CARDIOLOGY | Facility: CLINIC | Age: 45
End: 2025-03-25

## 2025-03-25 DIAGNOSIS — I10 ESSENTIAL (PRIMARY) HYPERTENSION: ICD-10-CM

## 2025-03-25 DIAGNOSIS — I71.9 AORTIC ANEURYSM OF UNSPECIFIED SITE, W/OUT RUPTURE: ICD-10-CM

## 2025-03-25 DIAGNOSIS — R29.91 UNSPECIFIED SYMPTOMS AND SIGNS INVOLVING THE MUSCULOSKELETAL SYSTEM: ICD-10-CM

## 2025-03-25 DIAGNOSIS — Z98.890 OTHER SPECIFIED POSTPROCEDURAL STATES: ICD-10-CM

## 2025-03-25 DIAGNOSIS — R06.02 SHORTNESS OF BREATH: ICD-10-CM

## 2025-03-25 DIAGNOSIS — Q25.43 CONGENITAL ANEURYSM OF AORTA: ICD-10-CM

## 2025-04-11 ENCOUNTER — APPOINTMENT (OUTPATIENT)
Dept: PULMONOLOGY | Facility: CLINIC | Age: 45
End: 2025-04-11
Payer: COMMERCIAL

## 2025-04-11 VITALS
SYSTOLIC BLOOD PRESSURE: 120 MMHG | DIASTOLIC BLOOD PRESSURE: 72 MMHG | HEART RATE: 74 BPM | BODY MASS INDEX: 42.17 KG/M2 | TEMPERATURE: 97.1 F | RESPIRATION RATE: 16 BRPM | HEIGHT: 63 IN | OXYGEN SATURATION: 94 % | WEIGHT: 238 LBS

## 2025-04-11 DIAGNOSIS — E66.3 OVERWEIGHT: ICD-10-CM

## 2025-04-11 DIAGNOSIS — J82.83 EOSINOPHILIC ASTHMA: ICD-10-CM

## 2025-04-11 DIAGNOSIS — J30.9 ALLERGIC RHINITIS, UNSPECIFIED: ICD-10-CM

## 2025-04-11 DIAGNOSIS — G47.33 OBSTRUCTIVE SLEEP APNEA (ADULT) (PEDIATRIC): ICD-10-CM

## 2025-04-11 DIAGNOSIS — R06.02 SHORTNESS OF BREATH: ICD-10-CM

## 2025-04-11 DIAGNOSIS — K21.9 GASTRO-ESOPHAGEAL REFLUX DISEASE W/OUT ESOPHAGITIS: ICD-10-CM

## 2025-04-11 DIAGNOSIS — J45.40 MODERATE PERSISTENT ASTHMA, UNCOMPLICATED: ICD-10-CM

## 2025-04-11 PROCEDURE — 94010 BREATHING CAPACITY TEST: CPT

## 2025-04-11 PROCEDURE — 99214 OFFICE O/P EST MOD 30 MIN: CPT | Mod: 25

## 2025-04-11 PROCEDURE — 95012 NITRIC OXIDE EXP GAS DETER: CPT

## 2025-04-11 RX ORDER — BUDESONIDE, GLYCOPYRROLATE, AND FORMOTEROL FUMARATE 160; 9; 4.8 UG/1; UG/1; UG/1
160-9-4.8 AEROSOL, METERED RESPIRATORY (INHALATION)
Qty: 3 | Refills: 1 | Status: ACTIVE | COMMUNITY
Start: 2025-04-11 | End: 1900-01-01

## 2025-04-22 ENCOUNTER — APPOINTMENT (OUTPATIENT)
Dept: CARDIOLOGY | Facility: CLINIC | Age: 45
End: 2025-04-22
Payer: COMMERCIAL

## 2025-04-22 DIAGNOSIS — R00.0 TACHYCARDIA, UNSPECIFIED: ICD-10-CM

## 2025-04-22 PROCEDURE — G2211 COMPLEX E/M VISIT ADD ON: CPT | Mod: NC,95

## 2025-04-22 PROCEDURE — 99213 OFFICE O/P EST LOW 20 MIN: CPT | Mod: 95

## 2025-05-09 ENCOUNTER — TRANSCRIPTION ENCOUNTER (OUTPATIENT)
Age: 45
End: 2025-05-09

## 2025-05-12 ENCOUNTER — TRANSCRIPTION ENCOUNTER (OUTPATIENT)
Age: 45
End: 2025-05-12

## 2025-07-24 ENCOUNTER — APPOINTMENT (OUTPATIENT)
Dept: UROLOGY | Facility: CLINIC | Age: 45
End: 2025-07-24
Payer: COMMERCIAL

## 2025-07-24 VITALS
HEIGHT: 63 IN | HEART RATE: 58 BPM | TEMPERATURE: 97.9 F | BODY MASS INDEX: 42.52 KG/M2 | OXYGEN SATURATION: 95 % | RESPIRATION RATE: 16 BRPM | SYSTOLIC BLOOD PRESSURE: 164 MMHG | DIASTOLIC BLOOD PRESSURE: 99 MMHG | WEIGHT: 240 LBS

## 2025-07-24 DIAGNOSIS — E23.0 HYPOPITUITARISM: ICD-10-CM

## 2025-07-24 PROCEDURE — G2211 COMPLEX E/M VISIT ADD ON: CPT | Mod: NC

## 2025-07-24 PROCEDURE — 99214 OFFICE O/P EST MOD 30 MIN: CPT

## 2025-07-29 LAB
ESTRADIOL SERPL-MCNC: 19 PG/ML
HCT VFR BLD CALC: 47.7 %
HGB BLD-MCNC: 15.6 G/DL
MCHC RBC-ENTMCNC: 29.1 PG
MCHC RBC-ENTMCNC: 32.7 G/DL
MCV RBC AUTO: 88.8 FL
PLATELET # BLD AUTO: 192 K/UL
PSA FREE FLD-MCNC: 14 %
PSA FREE SERPL-MCNC: 0.06 NG/ML
PSA SERPL-MCNC: 0.44 NG/ML
RBC # BLD: 5.37 M/UL
RBC # FLD: 13 %
TESTOST SERPL-MCNC: 403 NG/DL
WBC # FLD AUTO: 8.73 K/UL

## 2025-08-07 ENCOUNTER — OUTPATIENT (OUTPATIENT)
Dept: OUTPATIENT SERVICES | Facility: HOSPITAL | Age: 45
LOS: 1 days | End: 2025-08-07
Payer: COMMERCIAL

## 2025-08-07 ENCOUNTER — APPOINTMENT (OUTPATIENT)
Dept: CV DIAGNOSITCS | Facility: HOSPITAL | Age: 45
End: 2025-08-07

## 2025-08-07 ENCOUNTER — RESULT REVIEW (OUTPATIENT)
Age: 45
End: 2025-08-07

## 2025-08-07 DIAGNOSIS — Q25.43 CONGENITAL ANEURYSM OF AORTA: ICD-10-CM

## 2025-08-07 DIAGNOSIS — Z98.890 OTHER SPECIFIED POSTPROCEDURAL STATES: Chronic | ICD-10-CM

## 2025-08-07 DIAGNOSIS — R00.0 TACHYCARDIA, UNSPECIFIED: ICD-10-CM

## 2025-08-07 PROCEDURE — 93306 TTE W/DOPPLER COMPLETE: CPT | Mod: 26

## 2025-09-02 ENCOUNTER — TRANSCRIPTION ENCOUNTER (OUTPATIENT)
Age: 45
End: 2025-09-02